# Patient Record
Sex: FEMALE | Race: WHITE | Employment: OTHER | URBAN - METROPOLITAN AREA
[De-identification: names, ages, dates, MRNs, and addresses within clinical notes are randomized per-mention and may not be internally consistent; named-entity substitution may affect disease eponyms.]

---

## 2017-09-12 PROBLEM — Z96.641 STATUS POST RIGHT HIP REPLACEMENT: Status: ACTIVE | Noted: 2017-09-12

## 2017-09-12 PROBLEM — M16.11 PRIMARY OSTEOARTHRITIS OF RIGHT HIP: Chronic | Status: ACTIVE | Noted: 2017-09-12

## 2018-04-11 ENCOUNTER — HOSPITAL ENCOUNTER (OUTPATIENT)
Age: 73
Discharge: HOME | End: 2018-04-13
Payer: MEDICARE

## 2018-04-11 PROCEDURE — 87186 SC STD MICRODIL/AGAR DIL: CPT

## 2018-04-11 PROCEDURE — 87088 URINE BACTERIA CULTURE: CPT

## 2018-04-11 PROCEDURE — 87077 CULTURE AEROBIC IDENTIFY: CPT

## 2018-04-14 LAB
ORGANISM: ABNORMAL
URINE CULTURE, ROUTINE: ABNORMAL
URINE CULTURE, ROUTINE: ABNORMAL

## 2018-07-09 ENCOUNTER — HOSPITAL ENCOUNTER (OUTPATIENT)
Age: 73
Discharge: HOME | End: 2018-07-11

## 2018-07-09 PROCEDURE — 88305 TISSUE EXAM BY PATHOLOGIST: CPT

## 2018-10-17 ENCOUNTER — INITIAL CONSULT (OUTPATIENT)
Dept: GERIATRIC MEDICINE | Age: 73
End: 2018-10-17
Payer: MEDICARE

## 2018-10-17 VITALS
DIASTOLIC BLOOD PRESSURE: 74 MMHG | WEIGHT: 203.9 LBS | HEIGHT: 65 IN | BODY MASS INDEX: 33.97 KG/M2 | HEART RATE: 64 BPM | TEMPERATURE: 98.1 F | RESPIRATION RATE: 20 BRPM | SYSTOLIC BLOOD PRESSURE: 126 MMHG

## 2018-10-17 DIAGNOSIS — G31.84 MCI (MILD COGNITIVE IMPAIRMENT): Primary | ICD-10-CM

## 2018-10-17 PROCEDURE — 1101F PT FALLS ASSESS-DOCD LE1/YR: CPT | Performed by: INTERNAL MEDICINE

## 2018-10-17 PROCEDURE — 1123F ACP DISCUSS/DSCN MKR DOCD: CPT | Performed by: INTERNAL MEDICINE

## 2018-10-17 PROCEDURE — G8399 PT W/DXA RESULTS DOCUMENT: HCPCS | Performed by: INTERNAL MEDICINE

## 2018-10-17 PROCEDURE — 99212 OFFICE O/P EST SF 10 MIN: CPT | Performed by: INTERNAL MEDICINE

## 2018-10-17 PROCEDURE — 1036F TOBACCO NON-USER: CPT | Performed by: INTERNAL MEDICINE

## 2018-10-17 PROCEDURE — 4040F PNEUMOC VAC/ADMIN/RCVD: CPT | Performed by: INTERNAL MEDICINE

## 2018-10-17 PROCEDURE — G8484 FLU IMMUNIZE NO ADMIN: HCPCS | Performed by: INTERNAL MEDICINE

## 2018-10-17 PROCEDURE — 1090F PRES/ABSN URINE INCON ASSESS: CPT | Performed by: INTERNAL MEDICINE

## 2018-10-17 PROCEDURE — 3017F COLORECTAL CA SCREEN DOC REV: CPT | Performed by: INTERNAL MEDICINE

## 2018-10-17 PROCEDURE — G8427 DOCREV CUR MEDS BY ELIG CLIN: HCPCS | Performed by: INTERNAL MEDICINE

## 2018-10-17 PROCEDURE — G8417 CALC BMI ABV UP PARAM F/U: HCPCS | Performed by: INTERNAL MEDICINE

## 2018-10-17 RX ORDER — DONEPEZIL HYDROCHLORIDE 5 MG/1
5 TABLET, FILM COATED ORAL
Qty: 30 TABLET | Refills: 5 | Status: SHIPPED | OUTPATIENT
Start: 2018-10-17 | End: 2018-11-08 | Stop reason: SDUPTHER

## 2018-10-17 RX ORDER — NAPROXEN 375 MG/1
375 TABLET ORAL
COMMUNITY
End: 2019-06-12

## 2018-10-17 RX ORDER — MAGNESIUM OXIDE 400 MG/1
400 TABLET ORAL DAILY
COMMUNITY
End: 2019-06-12

## 2018-10-17 RX ORDER — VALACYCLOVIR HYDROCHLORIDE 500 MG/1
500 TABLET, FILM COATED ORAL 2 TIMES DAILY PRN
COMMUNITY
End: 2019-06-12

## 2018-10-17 RX ORDER — MODAFINIL 200 MG/1
200 TABLET ORAL DAILY
COMMUNITY
End: 2021-03-16

## 2018-10-17 ASSESSMENT — PATIENT HEALTH QUESTIONNAIRE - PHQ9
2. FEELING DOWN, DEPRESSED OR HOPELESS: 1
SUM OF ALL RESPONSES TO PHQ QUESTIONS 1-9: 2
1. LITTLE INTEREST OR PLEASURE IN DOING THINGS: 1
SUM OF ALL RESPONSES TO PHQ QUESTIONS 1-9: 2
SUM OF ALL RESPONSES TO PHQ9 QUESTIONS 1 & 2: 2

## 2018-10-23 ENCOUNTER — HOSPITAL ENCOUNTER (OUTPATIENT)
Age: 73
Discharge: HOME | End: 2018-10-25
Payer: MEDICARE

## 2018-10-23 PROCEDURE — 87088 URINE BACTERIA CULTURE: CPT

## 2018-10-26 LAB — URINE CULTURE, ROUTINE: NORMAL

## 2018-11-07 ENCOUNTER — TELEPHONE (OUTPATIENT)
Dept: SURGERY | Age: 73
End: 2018-11-07

## 2018-11-09 RX ORDER — DONEPEZIL HYDROCHLORIDE 5 MG/1
5 TABLET, FILM COATED ORAL
Qty: 90 TABLET | Refills: 5 | Status: SHIPPED | OUTPATIENT
Start: 2018-11-09 | End: 2019-07-02

## 2018-11-19 ENCOUNTER — TELEPHONE (OUTPATIENT)
Dept: SURGERY | Age: 73
End: 2018-11-19

## 2018-11-19 NOTE — TELEPHONE ENCOUNTER
Patient called to schedule appt for referral for Lt side pain, ?hernia; rectal bleeding for Dr. Paul Avitia. Patient is requesting to see Dr. Spencer Leary, since she had seen him before. Informed patient the office will call her at [phone] to schedule the appointment.

## 2018-12-04 ENCOUNTER — PREP FOR PROCEDURE (OUTPATIENT)
Dept: SURGERY | Age: 73
End: 2018-12-04

## 2018-12-04 ENCOUNTER — OFFICE VISIT (OUTPATIENT)
Dept: SURGERY | Age: 73
End: 2018-12-04
Payer: MEDICARE

## 2018-12-04 VITALS
RESPIRATION RATE: 16 BRPM | HEIGHT: 64 IN | WEIGHT: 207 LBS | SYSTOLIC BLOOD PRESSURE: 144 MMHG | DIASTOLIC BLOOD PRESSURE: 64 MMHG | OXYGEN SATURATION: 93 % | TEMPERATURE: 97.5 F | BODY MASS INDEX: 35.34 KG/M2 | HEART RATE: 60 BPM

## 2018-12-04 DIAGNOSIS — K59.00 CONSTIPATION, UNSPECIFIED CONSTIPATION TYPE: ICD-10-CM

## 2018-12-04 DIAGNOSIS — R10.9 LEFT SIDED ABDOMINAL PAIN: ICD-10-CM

## 2018-12-04 DIAGNOSIS — R10.9 LEFT SIDED ABDOMINAL PAIN: Primary | ICD-10-CM

## 2018-12-04 DIAGNOSIS — K59.00 CONSTIPATION, UNSPECIFIED CONSTIPATION TYPE: Primary | ICD-10-CM

## 2018-12-04 DIAGNOSIS — K62.5 RECTAL BLEEDING: ICD-10-CM

## 2018-12-04 PROCEDURE — G8427 DOCREV CUR MEDS BY ELIG CLIN: HCPCS | Performed by: SURGERY

## 2018-12-04 PROCEDURE — 3017F COLORECTAL CA SCREEN DOC REV: CPT | Performed by: SURGERY

## 2018-12-04 PROCEDURE — G8484 FLU IMMUNIZE NO ADMIN: HCPCS | Performed by: SURGERY

## 2018-12-04 PROCEDURE — 4040F PNEUMOC VAC/ADMIN/RCVD: CPT | Performed by: SURGERY

## 2018-12-04 PROCEDURE — 99205 OFFICE O/P NEW HI 60 MIN: CPT | Performed by: SURGERY

## 2018-12-04 PROCEDURE — G8399 PT W/DXA RESULTS DOCUMENT: HCPCS | Performed by: SURGERY

## 2018-12-04 PROCEDURE — 1090F PRES/ABSN URINE INCON ASSESS: CPT | Performed by: SURGERY

## 2018-12-04 PROCEDURE — G8417 CALC BMI ABV UP PARAM F/U: HCPCS | Performed by: SURGERY

## 2018-12-04 PROCEDURE — 1036F TOBACCO NON-USER: CPT | Performed by: SURGERY

## 2018-12-04 PROCEDURE — 1101F PT FALLS ASSESS-DOCD LE1/YR: CPT | Performed by: SURGERY

## 2018-12-04 PROCEDURE — 1123F ACP DISCUSS/DSCN MKR DOCD: CPT | Performed by: SURGERY

## 2018-12-04 RX ORDER — SODIUM CHLORIDE 9 MG/ML
INJECTION, SOLUTION INTRAVENOUS CONTINUOUS
Status: CANCELLED | OUTPATIENT
Start: 2018-12-04

## 2018-12-04 NOTE — PROGRESS NOTES
GENERAL SURGERY  HISTORY & PHYSICAL    Damien Fu  71 y.o. female   PCP: Thelma Taylor MD     Chief Complaint   Patient presents with    Other     left sided pain rectal bleeding sometimes with wiping. pt states no rhyme or reason as to when the pain occurs         History    HPI:  The patient presents today for evaluation of LLQ abdominal pain that is intermittent, achy, and started several months ago. She also notes worsening constipation and rectal bleeding. Past Medical History:   Diagnosis Date    Arthritis     Diabetes mellitus ([address]     Difficult intubation     carries card, copy on chart  Glidescope#3 with ETT size7    Heart block     Hyperlipidemia     Hypothyroidism     GORDY on CPAP     Pacemaker     Ringing in the ears     Syncope         Past Surgical History:   Procedure Laterality Date    ABLATION OF DYSRHYTHMIC FOCUS  1996    COLONOSCOPY  2009? Dr. Maryellen Kuhn twisted     COLONOSCOPY  2008? Dr. Ly Getting. incomplete.  COLPORRHAPHY  09/10/2011    POSTERIOR    HYSTERECTOMY      vaginal. uterus only.  KNEE ARTHROPLASTY Right     partial    KNEE ARTHROSCOPY  9/08/2011    right knee    PACEMAKER PLACEMENT  5/23/2014    Medtronic dual chamber    TOTAL HIP ARTHROPLASTY Right 2015        Social History     Social History    Marital status:      Spouse name: N/A    Number of children: N/A    Years of education: N/A     Occupational History    Not on file.      Social History Main Topics    Smoking status: Former Smoker     Packs/day: 1.50     Years: 13.00     Types: Cigarettes     Start date: 1/1/1973     Quit date: 1/1/1986    Smokeless tobacco: Never Used      Comment: quit smoking 1985    Alcohol use Yes      Comment: socially -- 3 drinks per month at the most    Drug use: No    Sexual activity: Not on file     Other Topics Concern    Not on file     Social History Narrative    No narrative on file        Family History   Problem Relation Age of Onset

## 2018-12-04 NOTE — PATIENT INSTRUCTIONS
usually give an initial report after the scope is removed. Other tests may be recommended. A small amount of bleeding may occur during the first few days after the procedure. When you return home after the procedure, be sure to follow your doctor's instructions, which may include:   Resume medicines as instructed by your doctor. Resume normal diet, unless directed otherwise by your doctor. The sedative will make you drowsy. Avoid driving, operating machinery, or making important decisions for the rest of the day. Rest for the remainder of the day. After arriving home, contact your doctor if any of the following occurs:   Bleeding from your rectum, notify your doctor if you pass a teaspoonful of blood or more. Black, tarry stools   Severe abdominal pain   Hard, swollen abdomen   Signs of infection, including fever or chills   Inability to pass gas or stool   Coughing, shortness of breath, chest pain, severe nausea or vomiting     In case of an emergency, CALL 911 . GOLYTELY®/COLYTE SPLIT DOSE COLONOSCOPY INSTRUCTIONS    Please follow the below instructions only. Do not follow alternative directions provided by the pharmacy or on the preparation bottle itself. ONE WEEK PRIOR TO THE PROCEDURE:  Josue Beverage your prescription for the Golytely bowel preparation   Try to limit fiber intake   Avoid taking iron supplements if possible      THE DAY BEFORE THE PROCEDURE:   Fill the entire Golytely/colyte jug and place in the refrigerator in the morning.  Beginning when you wake up, follow a clear liquid diet all day:  Clear liquids include:  Water, lemonade/Gatorade, liquidbroth/bouillon, popsicles, black coffee, sodas, apple/grape/white cranberry juice, gelatin (not red colored), sorbet.  Starting at 5pm - drink one 8 oz glass of the prep every 15-30 minutes until you have finished half of the preparation.  Make sure you shake the bottle after each glass to make sure you are getting the the medicine wears off and you can think clearly and react easily. · Rest when you feel tired. Getting enough sleep will help you recover. · You must have a reliable adult drive you home and stay with you overnight after your procedure or your procedure may be cancelled. Diet  · You can eat your normal diet, unless your doctor gives you other instructions. If your stomach is upset, try clear liquids and bland, low-fat foods like plain toast or rice. · Drink plenty of fluids (unless your doctor tells you not to). · Don't drink alcohol for 24 hours. Medicines  · Be safe with medicines. Read and follow all instructions on the label. ¨ If the doctor gave you a prescription medicine for pain, take it as prescribed. ¨ If you are not taking a prescription pain medicine, ask your doctor if you can take an over-the-counter medicine. · If you think your pain medicine is making you sick to your stomach:  ¨ Take your medicine after meals (unless your doctor has told you not to). ¨ Ask your doctor for a different pain medicine. When should you call for help? Call 911 anytime you think you may need emergency care. For example, call if:  · You have severe trouble breathing. · You passed out (lost consciousness). Call your doctor now or seek immediate medical care if:  · You have trouble breathing. · You have ongoing or worsening nausea or vomiting. · You have a fever. · You have a new or worse headache. · The medicine is not wearing off and you can't think clearly. Watch closely for changes in your health, and be sure to contact your doctor if:  · You do not get better as expected. Where can you learn more? Go to https://[employer] marie[employer]. org and sign in to your [employer] account. Enter O790 in the agreement24 avtal24 box to learn more about \"Sedation for a Medical Procedure: Care Instructions. \"     If you do not have an account, please click on the \"Sign Up Now\" link.   Current as of: August 14,

## 2018-12-05 ENCOUNTER — TELEPHONE (OUTPATIENT)
Dept: SURGERY | Age: 73
End: 2018-12-05

## 2018-12-13 NOTE — PROGRESS NOTES
products on the day of surgery    [x] If not already done, you can expect a call from registration    [x] You can expect a call the business day prior to procedure to notify you if your arrival time changes    [x] If you receive a survey after surgery we would greatly appreciate your comments    [] Parent/guardian of a minor must accompany their child and remain on the premises  the entire time they are under our care     [] Pediatric patients may bring favorite toy, blanket or comfort item with them    [] A caregiver or family member must remain with the patient during their stay if they are mentally handicapped, have dementia, disoriented or unable to use a call light or would be a safety concern if left unattended    [x] Please notify surgeon if you develop any illness between now and time of surgery (cold, cough, sore throat, fever, nausea, vomiting) or any signs of infections  including skin, wounds, and dental.    [x]  The Outpatient Pharmacy is available to fill your prescription here on your day of surgery, ask your preop nurse for details    [] Other instructions  EDUCATIONAL MATERIALS PROVIDED:    [] PAT Preoperative Education Packet/Booklet     [] Medication List    [] Fluoroscopy Information Pamphlet    [] Transfusion bracelet applied with instructions    [] Joint replacement video reviewed    [] Shower with soap, lather and rinse well, and use CHG wipes provided the evening before surgery as instructed

## 2018-12-17 ENCOUNTER — ANESTHESIA EVENT (OUTPATIENT)
Dept: ENDOSCOPY | Age: 73
End: 2018-12-17
Payer: MEDICARE

## 2018-12-17 ENCOUNTER — HOSPITAL ENCOUNTER (OUTPATIENT)
Age: 73
Setting detail: OUTPATIENT SURGERY
Discharge: HOME | End: 2018-12-17
Attending: SURGERY | Admitting: SURGERY
Payer: MEDICARE

## 2018-12-17 ENCOUNTER — ANESTHESIA (OUTPATIENT)
Dept: ENDOSCOPY | Age: 73
End: 2018-12-17
Payer: MEDICARE

## 2018-12-17 VITALS
OXYGEN SATURATION: 98 % | WEIGHT: 200 LBS | HEIGHT: 65 IN | HEART RATE: 78 BPM | TEMPERATURE: 98.6 F | BODY MASS INDEX: 33.32 KG/M2 | DIASTOLIC BLOOD PRESSURE: 62 MMHG | RESPIRATION RATE: 14 BRPM | SYSTOLIC BLOOD PRESSURE: 145 MMHG

## 2018-12-17 VITALS — OXYGEN SATURATION: 97 % | DIASTOLIC BLOOD PRESSURE: 51 MMHG | SYSTOLIC BLOOD PRESSURE: 108 MMHG

## 2018-12-17 LAB — METER GLUCOSE: 119 MG/DL (ref 74–99)

## 2018-12-17 PROCEDURE — 2580000003 HC RX 258: Performed by: SURGERY

## 2018-12-17 PROCEDURE — 7100000011 HC PHASE II RECOVERY - ADDTL 15 MIN: Performed by: SURGERY

## 2018-12-17 PROCEDURE — 2580000003 HC RX 258: Performed by: NURSE ANESTHETIST, CERTIFIED REGISTERED

## 2018-12-17 PROCEDURE — G0121 COLON CA SCRN NOT HI RSK IND: HCPCS | Performed by: SURGERY

## 2018-12-17 PROCEDURE — 6360000002 HC RX W HCPCS: Performed by: NURSE ANESTHETIST, CERTIFIED REGISTERED

## 2018-12-17 PROCEDURE — 3700000001 HC ADD 15 MINUTES (ANESTHESIA): Performed by: SURGERY

## 2018-12-17 PROCEDURE — 3609009500 HC COLONOSCOPY DIAGNOSTIC OR SCREENING: Performed by: SURGERY

## 2018-12-17 PROCEDURE — 7100000010 HC PHASE II RECOVERY - FIRST 15 MIN: Performed by: SURGERY

## 2018-12-17 PROCEDURE — 3700000000 HC ANESTHESIA ATTENDED CARE: Performed by: SURGERY

## 2018-12-17 PROCEDURE — 2709999900 HC NON-CHARGEABLE SUPPLY: Performed by: SURGERY

## 2018-12-17 PROCEDURE — 82962 GLUCOSE BLOOD TEST: CPT

## 2018-12-17 RX ORDER — SODIUM CHLORIDE 9 MG/ML
INJECTION, SOLUTION INTRAVENOUS CONTINUOUS PRN
Status: DISCONTINUED | OUTPATIENT
Start: 2018-12-17 | End: 2018-12-17 | Stop reason: SDUPTHER

## 2018-12-17 RX ORDER — SODIUM CHLORIDE 9 MG/ML
INJECTION, SOLUTION INTRAVENOUS CONTINUOUS
Status: DISCONTINUED | OUTPATIENT
Start: 2018-12-17 | End: 2018-12-17 | Stop reason: HOSPADM

## 2018-12-17 RX ORDER — 0.9 % SODIUM CHLORIDE 0.9 %
10 VIAL (ML) INJECTION PRN
Status: DISCONTINUED | OUTPATIENT
Start: 2018-12-17 | End: 2018-12-17 | Stop reason: HOSPADM

## 2018-12-17 RX ORDER — 0.9 % SODIUM CHLORIDE 0.9 %
10 VIAL (ML) INJECTION EVERY 12 HOURS SCHEDULED
Status: DISCONTINUED | OUTPATIENT
Start: 2018-12-17 | End: 2018-12-17 | Stop reason: HOSPADM

## 2018-12-17 RX ORDER — PROPOFOL 10 MG/ML
INJECTION, EMULSION INTRAVENOUS PRN
Status: DISCONTINUED | OUTPATIENT
Start: 2018-12-17 | End: 2018-12-17 | Stop reason: SDUPTHER

## 2018-12-17 RX ADMIN — SODIUM CHLORIDE: 9 INJECTION, SOLUTION INTRAVENOUS at 11:30

## 2018-12-17 RX ADMIN — SODIUM CHLORIDE: 9 INJECTION, SOLUTION INTRAVENOUS at 10:41

## 2018-12-17 RX ADMIN — PROPOFOL 420 MG: 10 INJECTION, EMULSION INTRAVENOUS at 11:25

## 2018-12-17 ASSESSMENT — PAIN - FUNCTIONAL ASSESSMENT: PAIN_FUNCTIONAL_ASSESSMENT: 0-10

## 2018-12-17 ASSESSMENT — PAIN SCALES - GENERAL: PAINLEVEL_OUTOF10: 0

## 2018-12-17 ASSESSMENT — PAIN DESCRIPTION - DESCRIPTORS: DESCRIPTORS: PRESSURE

## 2018-12-17 NOTE — ANESTHESIA PRE PROCEDURE
Department of Anesthesiology  Preprocedure Note       Name:  Genevieve Benedict   Age:  71 y.o.  :  1945                                          MRN:  [id number]         Date:  2018      Surgeon: Foster Gore):  Gianluca Magana MD    Procedure: COLONOSCOPY DIAGNOSTIC (N/A )    Medications prior to admission:   Prior to Admission medications    Medication Sig Start Date End Date Taking? Authorizing Provider   donepezil (ARICEPT) 5 MG tablet TAKE 1 TABLET BY MOUTH DAILY WITH LUNCH 18  Yes Brennon Garcia MD   TRIMETHOPRIM PO Take 100 mg by mouth every other day    Yes Historical Provider, MD   modafinil (PROVIGIL) 200 MG tablet Take 200 mg by mouth daily. .   Yes Historical Provider, MD   naproxen (NAPROSYN) 375 MG tablet Take 375 mg by mouth 1 to 2 tabs daily as needed; LD 18   Yes Historical Provider, MD   valACYclovir (VALTREX) 500 MG tablet Take 500 mg by mouth 2 times daily as needed   Yes Historical Provider, MD   UNABLE TO FIND Resveratrol, 1 tablet daily by mouth. LD 18   Yes Historical Provider, MD   FLAXSEED, LINSEED, PO Take 1,300 mg by mouth 2 times daily LD 18   Yes Historical Provider, MD   Probiotic Product (ALOE 01302 & PROBIOTICS) CAPS Take 1 tablet by mouth   Yes Historical Provider, MD [address], 2 tabs daily.   \"For generating cells\" ; LD 18   Yes Historical Provider, MD   metoprolol succinate (TOPROL XL) 100 MG extended release tablet Take 100 mg by mouth nightly   Yes Historical Provider, MD   citalopram (CELEXA) 20 MG tablet Take 20 mg by mouth nightly    Yes Historical Provider, MD   B Complex Vitamins (B-COMPLEX/B-12 PO) Take by mouth daily LD 18   Yes Historical Provider, MD   Mirabegron ER (MYRBETRIQ) 25 MG TB24 Take 50 mg by mouth nightly    Yes Historical Provider, MD   Biotin 5000 MCG CAPS Take 10,000 mcg by mouth daily LD 18   Yes Historical Provider, MD   levothyroxine (SYNTHROID) 100 MCG tablet Take 100 mcg by mouth Daily. Yes Historical Provider, MD   metformin (GLUCOPHAGE) 1000 MG tablet Take 1,000 mg by mouth daily (with breakfast)    Yes Historical Provider, MD   magnesium oxide (MAG-OX) 400 MG tablet Take 400 mg by mouth daily LD 12/12/18    Historical Provider, MD   aspirin 325 MG EC tablet Take 1 tablet by mouth 2 times daily DVT prophylaxis x 30 days 9/14/17 10/14/17  Cinthya Xie MD   aspirin  MG EC tablet Take 1 tablet by mouth 2 times daily for 15 days. 9/8/11 9/23/11  Cinthya Xie MD       Current medications:    Current Facility-Administered Medications   Medication Dose Route Frequency Provider Last Rate Last Dose    0.9 % sodium chloride infusion   Intravenous Continuous Irineo Rodas MD        sodium chloride (PF) 0.9 % injection 10 mL  10 mL Intravenous 2 times per day Irineo Rodas MD        sodium chloride (PF) 0.9 % injection 10 mL  10 mL Intravenous PRN Irineo Rodas MD           Allergies:     Allergies   Allergen Reactions    Morphine Itching    Statins Depletion Therapy Other (See Comments)     MYALGIA       Problem List:    Patient Active Problem List   Diagnosis Code    CHB (complete heart block) (HCC) I44.2    Diabetes mellitus type 2, uncontrolled ([facility] E11.65    Pacemaker Z95.0    Primary osteoarthritis of right hip M16.11    Status post right hip replacement Z96.641    GORDY on CPAP G47.33, Z99.89    Hypothyroidism E03.9    Left sided abdominal pain R10.9    Rectal bleeding K62.5    Constipation K59.00       Past Medical History:        Diagnosis Date    Arthritis     Diabetes mellitus ([facility] [address]     Difficult intubation     carries card, copy on chart  Glidescope#3 with ETT size7    Hyperlipidemia     Hypothyroidism     Left sided abdominal pain     Memory problem     still competent    GORDY on CPAP     Pacemaker     Rectal bleeding     Ringing in the ears        Past Surgical History:        Procedure Laterality Date    ABLATION OF DYSRHYTHMIC FOCUS  1996   

## 2018-12-17 NOTE — H&P
GENERAL SURGERY  HISTORY & PHYSICAL    Dena Fu  71 y.o. female   PCP: Charlette Yan MD     No chief complaint on file. History    HPI:  The patient presents today for evaluation of LLQ abdominal pain that is intermittent, achy, and started several months ago. She also notes worsening constipation and rectal bleeding. Past Medical History:   Diagnosis Date    Arthritis     Diabetes mellitus ([address]     Difficult intubation     carries card, copy on chart  Glidescope#3 with ETT size7    Hyperlipidemia     Hypothyroidism     Left sided abdominal pain     Memory problem     still competent    GORDY on CPAP     Pacemaker     Rectal bleeding     Ringing in the ears         Past Surgical History:   Procedure Laterality Date    ABLATION OF DYSRHYTHMIC FOCUS  1996    COLONOSCOPY  2009? Dr. Shannon Jacobs twisted     COLONOSCOPY  2008? Dr. Silvio Park. incomplete.  COLPORRHAPHY  09/10/2011    POSTERIOR    HYSTERECTOMY      vaginal. uterus only.  KNEE ARTHROPLASTY Right     partial    KNEE ARTHROSCOPY  9/08/2011    right knee    PACEMAKER PLACEMENT  5/23/2014    Medtronic dual chamber    TOTAL HIP ARTHROPLASTY Right 2015        Social History     Social History    Marital status:      Spouse name: N/A    Number of children: N/A    Years of education: N/A     Occupational History    Not on file.      Social History Main Topics    Smoking status: Former Smoker     Packs/day: 1.50     Years: 13.00     Types: Cigarettes     Start date: 1/1/1973     Quit date: 1/1/1986    Smokeless tobacco: Never Used      Comment: quit smoking 1985    Alcohol use Yes      Comment: socially -- 3 drinks per month at the most    Drug use: No    Sexual activity: Not on file     Other Topics Concern    Not on file     Social History Narrative    No narrative on file        Family History   Problem Relation Age of Onset    Pacemaker Mother     Heart Attack Father         No current or depression  Ophthalmic - no blurry vision or double vision  ENT - no epistaxis or sore throat  Hematological and Lymphatic - no bleeding problems or blood clots  Endocrine - no temperature intolerance or unexpected weight changes  Respiratory - no cough, shortness of breath, or wheezing  Cardiovascular - no chest pain or dyspnea on exertion  Gastrointestinal - see HPI  Genito-Urinary - no dysuria, trouble voiding, or hematuria  Musculoskeletal - no gait disturbance, joint pain or muscular weakness  Neurological - no headaches, numbness/tingling or weakness  Dermatological - no pruritus or rash    Physical Exam   BP (!) 147/69   Pulse 69   Temp 98.6 °F (37 °C) (Temporal)   Resp 18   Ht 5' 4.5\" (1.638 m)   Wt 200 lb (90.7 kg)   SpO2 94%   BMI 33.80 kg/m²    General - no acute distress, comfortable   Head - normocephalic,atraumatic  Eyes - pupils symmetric, conjunctivae pink  ENT - nose/ears appear normal,  moist oral mucosa   Neck - no cervical adenopathy, mass, thyromegaly   Respiratory - normal effort, clear to auscultation  CV - regular rate and rhythm, strong femoral pulses, no pedal edema  GI - remote surgical scars, non distended, mild LLQ tenderness without rebound/guarding, no hernia but epigastric bulging, no mass, no hepatosplenomegaly   Anorectal - deferred  Skin - warm, dry, good turgor; no rash  Musculoskeletal - normal gait, no deformities   Psychiatric - alert and oriented x 3; normal mood, affect, judgement, thought content      Prior consult reports were reviewed. Assessment  LLQ pain, rectal bleeding, constipation - r/o colorectal cancer, colitis, IBD      Plan    colonoscopy at [facility]    I discussed the risks, benefits, and alternatives to colonoscopy with possible biopsy/cauterization/polylpectomy with deep sedation with the patient including the risks of deep sedation (hypotension, hypoxia), bleeding, and perforation. The patient understands the above and agrees to proceed.      Thania Tiwari Rush Castellano MD, FACS

## 2019-01-07 ENCOUNTER — OFFICE VISIT (OUTPATIENT)
Dept: CARDIOLOGY CLINIC | Age: 74
End: 2019-01-07
Payer: MEDICARE

## 2019-01-07 VITALS
DIASTOLIC BLOOD PRESSURE: 86 MMHG | SYSTOLIC BLOOD PRESSURE: 130 MMHG | HEART RATE: 60 BPM | WEIGHT: 203.8 LBS | HEIGHT: 64 IN | RESPIRATION RATE: 18 BRPM | BODY MASS INDEX: 34.79 KG/M2

## 2019-01-07 DIAGNOSIS — Z01.810 PREOP CARDIOVASCULAR EXAM: Primary | ICD-10-CM

## 2019-01-07 DIAGNOSIS — R09.89 BRUIT: ICD-10-CM

## 2019-01-07 PROCEDURE — 93000 ELECTROCARDIOGRAM COMPLETE: CPT | Performed by: INTERNAL MEDICINE

## 2019-01-07 PROCEDURE — 1101F PT FALLS ASSESS-DOCD LE1/YR: CPT | Performed by: INTERNAL MEDICINE

## 2019-01-07 PROCEDURE — 1090F PRES/ABSN URINE INCON ASSESS: CPT | Performed by: INTERNAL MEDICINE

## 2019-01-07 PROCEDURE — 1123F ACP DISCUSS/DSCN MKR DOCD: CPT | Performed by: INTERNAL MEDICINE

## 2019-01-07 PROCEDURE — G8484 FLU IMMUNIZE NO ADMIN: HCPCS | Performed by: INTERNAL MEDICINE

## 2019-01-07 PROCEDURE — 4040F PNEUMOC VAC/ADMIN/RCVD: CPT | Performed by: INTERNAL MEDICINE

## 2019-01-07 PROCEDURE — 1036F TOBACCO NON-USER: CPT | Performed by: INTERNAL MEDICINE

## 2019-01-07 PROCEDURE — G8417 CALC BMI ABV UP PARAM F/U: HCPCS | Performed by: INTERNAL MEDICINE

## 2019-01-07 PROCEDURE — 3017F COLORECTAL CA SCREEN DOC REV: CPT | Performed by: INTERNAL MEDICINE

## 2019-01-07 PROCEDURE — 99214 OFFICE O/P EST MOD 30 MIN: CPT | Performed by: INTERNAL MEDICINE

## 2019-01-07 PROCEDURE — G8427 DOCREV CUR MEDS BY ELIG CLIN: HCPCS | Performed by: INTERNAL MEDICINE

## 2019-01-07 PROCEDURE — G8399 PT W/DXA RESULTS DOCUMENT: HCPCS | Performed by: INTERNAL MEDICINE

## 2019-02-13 ENCOUNTER — HOSPITAL ENCOUNTER (OUTPATIENT)
Dept: CARDIOLOGY | Age: 74
Discharge: HOME | End: 2019-02-13
Payer: MEDICARE

## 2019-02-13 DIAGNOSIS — R09.89 BRUIT: ICD-10-CM

## 2019-02-13 PROCEDURE — 93880 EXTRACRANIAL BILAT STUDY: CPT

## 2019-02-26 ENCOUNTER — HOSPITAL ENCOUNTER (OUTPATIENT)
Age: 74
Discharge: HOME | End: 2019-02-28
Payer: MEDICARE

## 2019-02-26 PROCEDURE — 87186 SC STD MICRODIL/AGAR DIL: CPT

## 2019-02-26 PROCEDURE — 87077 CULTURE AEROBIC IDENTIFY: CPT

## 2019-02-26 PROCEDURE — 87088 URINE BACTERIA CULTURE: CPT

## 2019-02-28 LAB
ORGANISM: ABNORMAL
URINE CULTURE, ROUTINE: ABNORMAL
URINE CULTURE, ROUTINE: ABNORMAL

## 2019-05-23 RX ORDER — LEVOTHYROXINE SODIUM 100 MCG
100 TABLET ORAL DAILY
COMMUNITY
End: 2019-07-02

## 2019-05-23 NOTE — TELEPHONE ENCOUNTER
came in -patient needs refills on   metaprolol 100 mg  Metformin 1000 mg  Levothyroxine 100 micrograms    [facility]

## 2019-05-24 RX ORDER — METOPROLOL SUCCINATE 100 MG/1
100 TABLET, EXTENDED RELEASE ORAL NIGHTLY
Qty: 90 TABLET | Refills: 3 | OUTPATIENT
Start: 2019-05-24

## 2019-05-24 RX ORDER — LEVOTHYROXINE SODIUM 0.1 MG/1
100 TABLET ORAL DAILY
Qty: 90 TABLET | Refills: 3 | OUTPATIENT
Start: 2019-05-24

## 2019-05-24 NOTE — TELEPHONE ENCOUNTER
Please double check. medent say she is taking toprol TWICE A DAY. medent says taking metformin 500MG TWICE A DAY. I just want to be sure before I send. Please double ck the  Doses and sig.      thx

## 2019-05-29 RX ORDER — METOPROLOL SUCCINATE 100 MG/1
100 TABLET, EXTENDED RELEASE ORAL 2 TIMES DAILY
Qty: 180 TABLET | Refills: 0 | Status: SHIPPED | OUTPATIENT
Start: 2019-05-29 | End: 2019-07-02 | Stop reason: SDUPTHER

## 2019-05-29 RX ORDER — TRIMETHOPRIM 100 MG/1
100 TABLET ORAL EVERY OTHER DAY
Qty: 45 TABLET | Refills: 1 | OUTPATIENT
Start: 2019-05-29

## 2019-05-29 RX ORDER — METOPROLOL SUCCINATE 50 MG/1
50 TABLET, EXTENDED RELEASE ORAL 2 TIMES DAILY
Qty: 180 TABLET | Refills: 1 | OUTPATIENT
Start: 2019-05-29

## 2019-05-29 RX ORDER — MODAFINIL 200 MG/1
200 TABLET ORAL DAILY
Qty: 90 TABLET | Refills: 1 | OUTPATIENT
Start: 2019-05-29 | End: 2020-06-28

## 2019-05-29 NOTE — TELEPHONE ENCOUNTER
PLEASE DOUBLE CHECK MEDS!!!    FIRST, I DO NOT MANAGE THE PROVIGIL, OR TRIMETHOPRIM OR MYBETRIQ, I NEVER HAVE. SHE SHOULD CONTACT PRESCRIBING PHYSICIAN FOR THESE. IN REGARD TO METFORMIN, IF ON 500MG BID THEN CHANGE TO 500MG TAB    MEDENT STATES TOPROL XL 100MG BID, THIS SAYS 50MG BID. I JUST NEED TO BE CLEAR ON DOSE. THANK YOU    PLEASE UPDATE MEDS AND REQUEST AND SEND TO ME WHEN APPROPRIATE.  Beni Carrasco

## 2019-06-06 ENCOUNTER — TELEPHONE (OUTPATIENT)
Dept: PRIMARY CARE CLINIC | Age: 74
End: 2019-06-06

## 2019-06-06 NOTE — TELEPHONE ENCOUNTER
Daughter calling to let you know she feels pt is showing signs of alzheimers. Pts mom just passed away from alzheimers recently.  Per daughter she has been on Aricept in past. She is requesting that you ref her to [facility] when she comes in on 6/20

## 2019-06-06 NOTE — TELEPHONE ENCOUNTER
I referred her to Dr Vish Espitia (local specialist for dementia) 10/2018. I don't know if she went. I agree she needs consultation. Who does she want her to see in [facility]? I would be happy to refer to whom ever they would like.

## 2019-06-12 ENCOUNTER — OFFICE VISIT (OUTPATIENT)
Dept: FAMILY MEDICINE CLINIC | Age: 74
End: 2019-06-12
Payer: MEDICARE

## 2019-06-12 VITALS
HEIGHT: 65 IN | DIASTOLIC BLOOD PRESSURE: 82 MMHG | BODY MASS INDEX: 33.49 KG/M2 | SYSTOLIC BLOOD PRESSURE: 132 MMHG | OXYGEN SATURATION: 95 % | RESPIRATION RATE: 16 BRPM | WEIGHT: 201 LBS | TEMPERATURE: 98.1 F | HEART RATE: 62 BPM

## 2019-06-12 DIAGNOSIS — F41.9 ANXIETY: Primary | ICD-10-CM

## 2019-06-12 PROCEDURE — 99213 OFFICE O/P EST LOW 20 MIN: CPT | Performed by: FAMILY MEDICINE

## 2019-06-12 RX ORDER — BUSPIRONE HYDROCHLORIDE 5 MG/1
5 TABLET ORAL 2 TIMES DAILY
Qty: 20 TABLET | Refills: 0 | Status: SHIPPED | OUTPATIENT
Start: 2019-06-12 | End: 2019-07-02

## 2019-06-12 NOTE — PROGRESS NOTES
19  Name: Vanessa Olivas    : 1945    Sex: female    Age: 68 y.o. Subjective:  Chief Complaint: Patient is here for anxiety     Very anxious      Here with     Mom   Last week        says crying a lot----feels real upset        She states not take  provigil  For few weeks          No  Cp or sob-----      Review of Systems   Psychiatric/Behavioral: Negative for agitation, behavioral problems, self-injury and suicidal ideas. The patient is nervous/anxious. Current Outpatient Medications:     busPIRone (BUSPAR) 5 MG tablet, Take 1 tablet by mouth 2 times daily, Disp: 20 tablet, Rfl: 0    metoprolol succinate (TOPROL XL) 100 MG extended release tablet, Take 1 tablet by mouth 2 times daily, Disp: 180 tablet, Rfl: 0    metFORMIN (GLUCOPHAGE) 500 MG tablet, Take 1 tablet by mouth 2 times daily, Disp: 180 tablet, Rfl: 0    SYNTHROID 100 MCG tablet, Take 100 mcg by mouth Daily, Disp: , Rfl:     donepezil (ARICEPT) 5 MG tablet, TAKE 1 TABLET BY MOUTH DAILY WITH LUNCH, Disp: 90 tablet, Rfl: 5    TRIMETHOPRIM PO, Take 100 mg by mouth every other day , Disp: , Rfl:     modafinil (PROVIGIL) 200 MG tablet, Take 200 mg by mouth daily. ., Disp: , Rfl:     UNABLE TO FIND, Resveratrol, 1 tablet daily by mouth. LD 18, Disp: , Rfl:     UNABLE TO FIND, Basis Elysium, 2 tabs daily.   \"For generating cells\" ; LD 18, Disp: , Rfl:     B Complex Vitamins (B-COMPLEX/B-12 PO), Take by mouth daily LD 18, Disp: , Rfl:     Mirabegron ER (MYRBETRIQ) 25 MG TB24, Take 50 mg by mouth nightly , Disp: , Rfl:   Allergies   Allergen Reactions    Morphine Itching    Statins Depletion Therapy Other (See Comments)     MYALGIA     Social History     Socioeconomic History    Marital status:      Spouse name: Not on file    Number of children: Not on file    Years of education: Not on file    Highest education level: Not on file   Occupational History    Not on file Social Needs    Financial resource strain: Not on file    Food insecurity:     Worry: Not on file     Inability: Not on file    Transportation needs:     Medical: Not on file     Non-medical: Not on file   Tobacco Use    Smoking status: Former Smoker     Packs/day: 1.50     Years: 13.00     Pack years: 19.50     Types: Cigarettes     Start date: 1973     Last attempt to quit: 1986     Years since quittin.4    Smokeless tobacco: Never Used    Tobacco comment: quit smoking    Substance and Sexual Activity    Alcohol use: Yes     Comment: socially -- 3 drinks per month at the most    Drug use: No    Sexual activity: Not on file   Lifestyle    Physical activity:     Days per week: Not on file     Minutes per session: Not on file    Stress: Not on file   Relationships    Social connections:     Talks on phone: Not on file     Gets together: Not on file     Attends [employer] service: Not on file     Active member of club or organization: Not on file     Attends meetings of clubs or organizations: Not on file     Relationship status: Not on file    Intimate partner violence:     Fear of current or ex partner: Not on file     Emotionally abused: Not on file     Physically abused: Not on file     Forced sexual activity: Not on file   Other Topics Concern    Not on file   Social History Narrative    Not on file      Past Medical History:   Diagnosis Date    Arthritis     Diabetes mellitus ([facility] [address]     Difficult intubation     carries card, copy on chart  Glidescope#3 with ETT size7    Hyperlipidemia     Hypothyroidism     Left sided abdominal pain     Memory problem     still competent    GORDY on CPAP     Pacemaker     Rectal bleeding     Ringing in the ears      Family History   Problem Relation Age of Onset    Pacemaker Mother     Heart Attack Father       Past Surgical History:   Procedure Laterality Date    ABLATION OF DYSRHYTHMIC FOCUS      COLONOSCOPY  ?     Dr. Julito Bueno

## 2019-06-14 ENCOUNTER — TELEPHONE (OUTPATIENT)
Dept: PRIMARY CARE CLINIC | Age: 74
End: 2019-06-14

## 2019-06-14 NOTE — TELEPHONE ENCOUNTER
Deann, per [facility]'s request I called to move pts appointment.  would like to talk to hs nurse about it. He is unhappy moving the appointment due to wifes alzheimer's and would like to verify with someone clinical that it is okay.  Could you please give him a call to discuss

## 2019-06-26 ENCOUNTER — HOSPITAL ENCOUNTER (OUTPATIENT)
Age: 74
Discharge: HOME | End: 2019-06-28
Payer: MEDICARE

## 2019-06-26 LAB
ALBUMIN SERPL-MCNC: 4.5 G/DL (ref 3.5–5.2)
ALP BLD-CCNC: 97 U/L (ref 35–104)
ALT SERPL-CCNC: 75 U/L (ref 0–32)
AMORPHOUS: NORMAL
ANION GAP SERPL CALCULATED.3IONS-SCNC: 14 MMOL/L (ref 7–16)
AST SERPL-CCNC: 62 U/L (ref 0–31)
BACTERIA: NORMAL /HPF
BASOPHILS ABSOLUTE: 0.06 E9/L (ref 0–0.2)
BASOPHILS RELATIVE PERCENT: 0.5 % (ref 0–2)
BILIRUB SERPL-MCNC: 0.4 MG/DL (ref 0–1.2)
BILIRUBIN URINE: NEGATIVE
BLOOD, URINE: NEGATIVE
BUN BLDV-MCNC: 17 MG/DL (ref 8–23)
CALCIUM SERPL-MCNC: 9.9 MG/DL (ref 8.6–10.2)
CHLORIDE BLD-SCNC: 107 MMOL/L (ref 98–107)
CHOLESTEROL, TOTAL: 225 MG/DL (ref 0–199)
CLARITY: ABNORMAL
CO2: 22 MMOL/L (ref 22–29)
COLOR: YELLOW
CREAT SERPL-MCNC: 0.8 MG/DL (ref 0.5–1)
EOSINOPHILS ABSOLUTE: 0.34 E9/L (ref 0.05–0.5)
EOSINOPHILS RELATIVE PERCENT: 3 % (ref 0–6)
GFR AFRICAN AMERICAN: >60
GFR NON-AFRICAN AMERICAN: >60 ML/MIN/1.73
GLUCOSE BLD-MCNC: 84 MG/DL (ref 74–99)
GLUCOSE URINE: NEGATIVE MG/DL
HBA1C MFR BLD: 6.3 % (ref 4–5.6)
HCT VFR BLD CALC: 46.1 % (ref 34–48)
HDLC SERPL-MCNC: 46 MG/DL
HEMOGLOBIN: 15.2 G/DL (ref 11.5–15.5)
IMMATURE GRANULOCYTES #: 0.02 E9/L
IMMATURE GRANULOCYTES %: 0.2 % (ref 0–5)
KETONES, URINE: NEGATIVE MG/DL
LDL CHOLESTEROL CALCULATED: 148 MG/DL (ref 0–99)
LEUKOCYTE ESTERASE, URINE: ABNORMAL
LYMPHOCYTES ABSOLUTE: 4.51 E9/L (ref 1.5–4)
LYMPHOCYTES RELATIVE PERCENT: 39.8 % (ref 20–42)
MCH RBC QN AUTO: 30.8 PG (ref 26–35)
MCHC RBC AUTO-ENTMCNC: 33 % (ref 32–34.5)
MCV RBC AUTO: 93.3 FL (ref 80–99.9)
MICROALBUMIN UR-MCNC: 13.4 MG/L
MONOCYTES ABSOLUTE: 0.66 E9/L (ref 0.1–0.95)
MONOCYTES RELATIVE PERCENT: 5.8 % (ref 2–12)
NEUTROPHILS ABSOLUTE: 5.73 E9/L (ref 1.8–7.3)
NEUTROPHILS RELATIVE PERCENT: 50.7 % (ref 43–80)
NITRITE, URINE: NEGATIVE
PDW BLD-RTO: 13.6 FL (ref 11.5–15)
PH UA: 5.5 (ref 5–9)
PLATELET # BLD: 183 E9/L (ref 130–450)
PMV BLD AUTO: 11.4 FL (ref 7–12)
POTASSIUM SERPL-SCNC: 3.6 MMOL/L (ref 3.5–5)
PROTEIN UA: NEGATIVE MG/DL
RBC # BLD: 4.94 E12/L (ref 3.5–5.5)
RBC UA: NORMAL /HPF (ref 0–2)
SODIUM BLD-SCNC: 143 MMOL/L (ref 132–146)
SPECIFIC GRAVITY UA: >=1.03 (ref 1–1.03)
T4 TOTAL: 7.1 MCG/DL (ref 4.5–11.7)
TOTAL PROTEIN: 7.3 G/DL (ref 6.4–8.3)
TRIGL SERPL-MCNC: 154 MG/DL (ref 0–149)
TSH SERPL DL<=0.05 MIU/L-ACNC: 5.29 UIU/ML (ref 0.27–4.2)
UROBILINOGEN, URINE: 0.2 E.U./DL
VLDLC SERPL CALC-MCNC: 31 MG/DL
WBC # BLD: 11.3 E9/L (ref 4.5–11.5)
WBC UA: NORMAL /HPF (ref 0–5)

## 2019-06-26 PROCEDURE — 84436 ASSAY OF TOTAL THYROXINE: CPT

## 2019-06-26 PROCEDURE — 85025 COMPLETE CBC W/AUTO DIFF WBC: CPT

## 2019-06-26 PROCEDURE — 82044 UR ALBUMIN SEMIQUANTITATIVE: CPT

## 2019-06-26 PROCEDURE — 80061 LIPID PANEL: CPT

## 2019-06-26 PROCEDURE — 36415 COLL VENOUS BLD VENIPUNCTURE: CPT

## 2019-06-26 PROCEDURE — 84443 ASSAY THYROID STIM HORMONE: CPT

## 2019-06-26 PROCEDURE — 83036 HEMOGLOBIN GLYCOSYLATED A1C: CPT

## 2019-06-26 PROCEDURE — 80053 COMPREHEN METABOLIC PANEL: CPT

## 2019-06-26 PROCEDURE — 81001 URINALYSIS AUTO W/SCOPE: CPT

## 2019-06-26 NOTE — TELEPHONE ENCOUNTER
wants to know if you want pt to have lab done before appt?       Looks like she was due for labs in feb

## 2019-06-26 NOTE — TELEPHONE ENCOUNTER
Yes, it does not appear that she is ever had her February labs so get those drawn as an Magen Mendes.

## 2019-06-27 NOTE — RESULT ENCOUNTER NOTE
Thyroid appears underactive. Cholesterol still elevated. LFTs slightly higher than before. Watch diet.   Keep follow-up to review in more detail, sooner as needed

## 2019-07-02 ENCOUNTER — OFFICE VISIT (OUTPATIENT)
Dept: PRIMARY CARE CLINIC | Age: 74
End: 2019-07-02
Payer: MEDICARE

## 2019-07-02 VITALS
TEMPERATURE: 97.8 F | SYSTOLIC BLOOD PRESSURE: 126 MMHG | BODY MASS INDEX: 32.9 KG/M2 | HEIGHT: 65 IN | WEIGHT: 197.5 LBS | HEART RATE: 58 BPM | DIASTOLIC BLOOD PRESSURE: 70 MMHG | OXYGEN SATURATION: 97 %

## 2019-07-02 DIAGNOSIS — F03.90 DEMENTIA WITHOUT BEHAVIORAL DISTURBANCE, UNSPECIFIED DEMENTIA TYPE: ICD-10-CM

## 2019-07-02 DIAGNOSIS — K76.9 LIVER DISEASE: ICD-10-CM

## 2019-07-02 DIAGNOSIS — I49.9 CARDIAC ARRHYTHMIA, UNSPECIFIED CARDIAC ARRHYTHMIA TYPE: ICD-10-CM

## 2019-07-02 DIAGNOSIS — E11.65 TYPE 2 DIABETES MELLITUS WITH HYPERGLYCEMIA, WITHOUT LONG-TERM CURRENT USE OF INSULIN (HCC): Primary | ICD-10-CM

## 2019-07-02 DIAGNOSIS — E55.9 VITAMIN D DEFICIENCY: ICD-10-CM

## 2019-07-02 DIAGNOSIS — D36.9 TUBULAR ADENOMA: ICD-10-CM

## 2019-07-02 DIAGNOSIS — E78.2 MIXED HYPERLIPIDEMIA: ICD-10-CM

## 2019-07-02 DIAGNOSIS — Z00.00 HEALTH MAINTENANCE EXAMINATION: ICD-10-CM

## 2019-07-02 DIAGNOSIS — E03.9 HYPOTHYROIDISM, UNSPECIFIED TYPE: ICD-10-CM

## 2019-07-02 DIAGNOSIS — I47.1 SUPRAVENTRICULAR TACHYCARDIA (HCC): ICD-10-CM

## 2019-07-02 PROBLEM — I47.10 SUPRAVENTRICULAR TACHYCARDIA: Status: ACTIVE | Noted: 2019-07-02

## 2019-07-02 PROCEDURE — 99215 OFFICE O/P EST HI 40 MIN: CPT | Performed by: FAMILY MEDICINE

## 2019-07-02 RX ORDER — DONEPEZIL HYDROCHLORIDE 10 MG/1
10 TABLET, FILM COATED ORAL NIGHTLY
Qty: 30 TABLET | Refills: 1 | Status: SHIPPED | OUTPATIENT
Start: 2019-07-02 | End: 2019-07-02

## 2019-07-02 RX ORDER — DONEPEZIL HYDROCHLORIDE 5 MG/1
5 TABLET, FILM COATED ORAL
Qty: 90 TABLET | Refills: 1 | Status: CANCELLED | OUTPATIENT
Start: 2019-07-02

## 2019-07-02 RX ORDER — MODAFINIL 200 MG/1
200 TABLET ORAL DAILY
Qty: 90 TABLET | Refills: 1 | Status: CANCELLED | OUTPATIENT
Start: 2019-07-02 | End: 2019-09-30

## 2019-07-02 RX ORDER — METOPROLOL SUCCINATE 100 MG/1
100 TABLET, EXTENDED RELEASE ORAL DAILY
Qty: 90 TABLET | Refills: 3 | Status: SHIPPED | OUTPATIENT
Start: 2019-07-02 | End: 2019-09-26

## 2019-07-02 RX ORDER — LEVOTHYROXINE SODIUM 112 UG/1
112 TABLET ORAL DAILY
Qty: 30 TABLET | Refills: 1 | Status: SHIPPED | OUTPATIENT
Start: 2019-07-02 | End: 2019-08-06

## 2019-07-02 RX ORDER — DONEPEZIL HYDROCHLORIDE 10 MG/1
10 TABLET, FILM COATED ORAL NIGHTLY
Qty: 30 TABLET | Refills: 1 | Status: SHIPPED | OUTPATIENT
Start: 2019-07-02 | End: 2019-09-11 | Stop reason: SDUPTHER

## 2019-07-02 RX ORDER — LEVOTHYROXINE SODIUM 100 MCG
100 TABLET ORAL DAILY
Qty: 90 TABLET | Refills: 1 | Status: CANCELLED | OUTPATIENT
Start: 2019-07-02

## 2019-07-02 RX ORDER — LEVOTHYROXINE SODIUM 112 UG/1
112 TABLET ORAL DAILY
Qty: 30 TABLET | Refills: 1 | Status: SHIPPED | OUTPATIENT
Start: 2019-07-02 | End: 2019-07-02

## 2019-07-02 ASSESSMENT — PATIENT HEALTH QUESTIONNAIRE - PHQ9
SUM OF ALL RESPONSES TO PHQ QUESTIONS 1-9: 1
SUM OF ALL RESPONSES TO PHQ9 QUESTIONS 1 & 2: 1
SUM OF ALL RESPONSES TO PHQ QUESTIONS 1-9: 1
2. FEELING DOWN, DEPRESSED OR HOPELESS: 1
1. LITTLE INTEREST OR PLEASURE IN DOING THINGS: 0

## 2019-07-02 NOTE — ASSESSMENT & PLAN NOTE
Counseled extensively. Differential reviewed including various forms of dementia and pseudodementia. Increase Aricept. Continue to titrate as tolerated. Consider add on therapy such as Namenda. She is to see Dr. Risa SY and referral placed. They would like referred to [facility] as well. Follow-up 5 weeks sooner as needed. Compliance emphasized. Safety precautions reviewed. Absolutely no driving if concerns for dementia. Previously scored a 28, 29 and 30/30 MLS. Cognitive screen today negative. MMS unable to be located in system.

## 2019-07-02 NOTE — PROGRESS NOTES
with her colonoscopy but did not follow-up with me until 4 months later. At that visit she scored a 28 out of 30. She had not gotten the recommended blood work. At that point there was advised to get the blood work as ordered and we referred to Dr. Radha Lyons. She was asked to follow-up in 1 month. At that 1 month follow-up, she had seen Dr. Radha Lyons who felt it was more of a stress reaction but could not rule out early dementia. She wanted to stay aggressive and so we started her on low-dose Aricept. We reviewed the blood work with her. Her B12 was in fact elevated at 1997. Other labs were reviewed and she was asked to follow-up in 3 months with blood work. She came back approximately 1 month later on December 10  For preoperative exam, was tolerating the Aricept and scored a 30/30 on Mini-Mental status exam.  She was asked to follow-up with Dr. Radha Lyons, she stated she had an appointment in mid January and she was asked to follow-up with me with blood work in February. Unfortunately she did not follow-up until today, July 2. Her  states she has had significant memory issues over the past 6 months or so. He stated that if I wanted her to follow-up in February I should have written it down very specifically for them. We do in fact write down their appointment on appointment cards and typically give a reminder call. However I explained that even if she did miss her follow-up appointment, if they were having issues she should have follow-up nonetheless specifically to review those issues and not wait. All that being said he is content with the plan today he states. The plan is to follow-up with Dr. Radha Lyons ASAP and we will re-refer. We will also refer to a memory specialist at [facility]. We will increase her Synthroid as her TSH is elevated from 100 to 112 mcg. She is to recheck blood work in 1 month and follow-up with me in 5 weeks.   We will increase her Aricept from 5 to 10 mg nightly COLONOSCOPY  ? Dr. Geovanna Hadley twisted     COLONOSCOPY  ? Dr. Cari Murray. incomplete.  COLONOSCOPY  2018    Dr. Azul James N/A 2018    COLONOSCOPY DIAGNOSTIC performed by Joyce Bowie MD at [address] [place] [address]  09/10/2011    POSTERIOR    HYSTERECTOMY      vaginal. uterus only.  KNEE ARTHROPLASTY Right     partial    KNEE ARTHROSCOPY  2011    right knee    PACEMAKER PLACEMENT  2014    Medtronic dual chamber    TOTAL HIP ARTHROPLASTY Right 2015     Family History   Problem Relation Age of Onset    Pacemaker Mother     Heart Attack Father      Social History     Tobacco Use    Smoking status: Former Smoker     Packs/day: 1.50     Years: 13.00     Pack years: 19.50     Types: Cigarettes     Start date: 1973     Last attempt to quit: 1986     Years since quittin.5    Smokeless tobacco: Never Used    Tobacco comment: quit smoking    Substance Use Topics    Alcohol use: Yes     Comment: socially -- 3 drinks per month at the most    Drug use: No      Social History     Social History Narrative    PMH:    Problem List: Urinary tract infectious disease, Obstructive sleep apnea syndrome, Adult health    examination, Adult health examination, Constipation, Derangement of knee, Vitamin D deficiency,    Hypothyroidism, Conduction disorder of the heart, Hyperlipidemia    Health Maintenance:    Mini Mental Status - (2018)    Colonoscopy - (2018)    Colonoscopy Screening - (2018)    Mammogram Screening - (2018)    Mammogram - (2018)    Colonoscopy - (2010)    Couseled on Home Safety - (2015)    Bone Density Scan - (3/28/2012)    Medical Problems:    Sleep Apnea - Dr Robina Ty, cpap    Pneumonia    Hypothyroidism - Dr João Ortiz - Dr Danis Traore    Hyperlipidemia - intolerance to all statins    Cardiac Arrhythmia - ? type.  s/p ablation    cardiac dysrhythmia - pauses - lead to pacemaker    Skin Cancer    Surgical Differential    Comprehensive Metabolic Panel    CBC Auto Differential    Comprehensive Metabolic Panel    Urinalysis    Microalbumin / Creatinine Urine Ratio    Vitamin B12 & Folate    Vitamin B12 & Folate   6. Liver disease  CBC Auto Differential    Comprehensive Metabolic Panel    CBC Auto Differential    Comprehensive Metabolic Panel    Urinalysis    Microalbumin / Creatinine Urine Ratio    Vitamin B12 & Folate    Vitamin B12 & Folate   7. Tubular adenoma  CBC Auto Differential    Comprehensive Metabolic Panel    CBC Auto Differential    Comprehensive Metabolic Panel    Urinalysis    Microalbumin / Creatinine Urine Ratio    Vitamin B12 & Folate    Vitamin B12 & Folate   8. Vitamin D deficiency  CBC Auto Differential    Comprehensive Metabolic Panel    CBC Auto Differential    Comprehensive Metabolic Panel    Urinalysis    Microalbumin / Creatinine Urine Ratio    Vitamin D 25 Hydroxy    Vitamin B12 & Folate    Vitamin B12 & Folate   9. Health maintenance examination  CBC Auto Differential    Comprehensive Metabolic Panel    CBC Auto Differential    Comprehensive Metabolic Panel    Urinalysis    Microalbumin / Creatinine Urine Ratio    Vitamin B12 & Folate    Vitamin B12 & Folate   10. Supraventricular tachycardia (HCC)         Hypothyroidism  TSH elevated. This could contribute to some of her symptoms. Increase Synthroid from 100-112. Blood work 1 month follow-up 5 weeks. Type 2 diabetes mellitus with hyperglycemia, without long-term current use of insulin (HCC)   Her A1c was 5.9 on metformin 1000 mg twice a day, decreased to 500 mg twice a  day and went up to 6.1 and now 6.3. She would like his monitored if it continues to trend up she  would like to go back up on the dose but for now keep it 500 twice a day. Risks  reviewed, proper hydration reviewed, risk of lactic acidosis reviewed . Encouraged  she watch blood sugar ambulatory with parameters reviewed call in if out of range.   Hyper and hypoglycemic evaluation or treatment, in-depth blood work,  imaging or otherwise. Hep C 10/18 negative. Tubular adenoma  Small polyp 7/18. Dr. Yelena [facility] recommended basic screenings if repeated at all. Asymptomatic      Vitamin D deficiency  History of. Monitor. Health maintenance examination  Health maintenance issues discussed at length 7/17. Encouraged yearly physicals. No flowsheet data found. Plan as above. Counseled extensively and differential diagnoses relevant to above were reviewed, including serious etiologies. Side effects and interactions of medications were reviewed. The plan is to follow-up with Dr. Risa SY and we will re-refer. We will also referred to a memory specialist at [facility]. We will increase her Synthroid as her TSH is elevated from 100 to 112 mcg. She is to recheck blood work in 1 month and follow-up with me in 5 weeks. We will increase her Aricept from 5 to 10 mg nightly and eventually titrate potentially to 23 mg and discussed add on therapy such as Namenda as well. They did not bring in her medications and could not recall exactly what she was taking. We did very specifically tell him and write down to first verify that what I refilled today is consistent with what and how she has been taking it. Refills provided. Also bring in her prescription bottles next time for us to verify or sooner if they choose. Always written down for him. Lab dates are highlighted to try to reduce confusion. Safety precautions reviewed. As long as symptoms steadily improve/resolve, and medical conditions follow the expected course, FU as below, sooner PRN. Return in about 5 weeks (around 8/6/2019), or if symptoms worsen or fail to improve, for Multiple. Signs and symptoms to watch for discussed, serious signs and symptoms reviewed. ER if any.      Over 40 minutes  spent with the patient in reviewing records, reviewing with patient/family, counseling, ordering,  prescribing, completing h&p, etc., with over 50% of the time spent face to face counseling. Dima Merritt MD    Patients are advised to check with insurance company to ensure coverage and to fully understand benefits and cost prior to any testing. This note was created with the assistance of voice recognition software. Document was reviewed however may contain grammatical errors.

## 2019-08-01 PROBLEM — Z00.00 HEALTH MAINTENANCE EXAMINATION: Status: RESOLVED | Noted: 2019-07-02 | Resolved: 2019-08-01

## 2019-08-05 ENCOUNTER — HOSPITAL ENCOUNTER (OUTPATIENT)
Age: 74
Discharge: HOME | End: 2019-08-07
Payer: MEDICARE

## 2019-08-05 DIAGNOSIS — Z00.00 HEALTH MAINTENANCE EXAMINATION: ICD-10-CM

## 2019-08-05 DIAGNOSIS — K76.9 LIVER DISEASE: ICD-10-CM

## 2019-08-05 DIAGNOSIS — E78.2 MIXED HYPERLIPIDEMIA: ICD-10-CM

## 2019-08-05 DIAGNOSIS — F03.90 DEMENTIA WITHOUT BEHAVIORAL DISTURBANCE, UNSPECIFIED DEMENTIA TYPE: ICD-10-CM

## 2019-08-05 DIAGNOSIS — E55.9 VITAMIN D DEFICIENCY: ICD-10-CM

## 2019-08-05 DIAGNOSIS — E03.9 HYPOTHYROIDISM, UNSPECIFIED TYPE: ICD-10-CM

## 2019-08-05 DIAGNOSIS — D36.9 TUBULAR ADENOMA: ICD-10-CM

## 2019-08-05 DIAGNOSIS — E11.65 TYPE 2 DIABETES MELLITUS WITH HYPERGLYCEMIA, WITHOUT LONG-TERM CURRENT USE OF INSULIN (HCC): ICD-10-CM

## 2019-08-05 DIAGNOSIS — I49.9 CARDIAC ARRHYTHMIA, UNSPECIFIED CARDIAC ARRHYTHMIA TYPE: ICD-10-CM

## 2019-08-05 LAB
ALBUMIN SERPL-MCNC: 4.4 G/DL (ref 3.5–5.2)
ALP BLD-CCNC: 97 U/L (ref 35–104)
ALT SERPL-CCNC: 68 U/L (ref 0–32)
ANION GAP SERPL CALCULATED.3IONS-SCNC: 16 MMOL/L (ref 7–16)
AST SERPL-CCNC: 48 U/L (ref 0–31)
BASOPHILS ABSOLUTE: 0.09 E9/L (ref 0–0.2)
BASOPHILS RELATIVE PERCENT: 0.9 % (ref 0–2)
BILIRUB SERPL-MCNC: 0.3 MG/DL (ref 0–1.2)
BUN BLDV-MCNC: 11 MG/DL (ref 8–23)
CALCIUM SERPL-MCNC: 9.8 MG/DL (ref 8.6–10.2)
CHLORIDE BLD-SCNC: 106 MMOL/L (ref 98–107)
CO2: 23 MMOL/L (ref 22–29)
CREAT SERPL-MCNC: 0.7 MG/DL (ref 0.5–1)
EOSINOPHILS ABSOLUTE: 0.26 E9/L (ref 0.05–0.5)
EOSINOPHILS RELATIVE PERCENT: 2.6 % (ref 0–6)
FOLATE: >20 NG/ML (ref 4.8–24.2)
GFR AFRICAN AMERICAN: >60
GFR NON-AFRICAN AMERICAN: >60 ML/MIN/1.73
GLUCOSE BLD-MCNC: 103 MG/DL (ref 74–99)
HCT VFR BLD CALC: 47.1 % (ref 34–48)
HEMOGLOBIN: 15.4 G/DL (ref 11.5–15.5)
IMMATURE GRANULOCYTES #: 0.05 E9/L
IMMATURE GRANULOCYTES %: 0.5 % (ref 0–5)
LYMPHOCYTES ABSOLUTE: 4.15 E9/L (ref 1.5–4)
LYMPHOCYTES RELATIVE PERCENT: 41.7 % (ref 20–42)
MCH RBC QN AUTO: 30.5 PG (ref 26–35)
MCHC RBC AUTO-ENTMCNC: 32.7 % (ref 32–34.5)
MCV RBC AUTO: 93.3 FL (ref 80–99.9)
MONOCYTES ABSOLUTE: 0.66 E9/L (ref 0.1–0.95)
MONOCYTES RELATIVE PERCENT: 6.6 % (ref 2–12)
NEUTROPHILS ABSOLUTE: 4.75 E9/L (ref 1.8–7.3)
NEUTROPHILS RELATIVE PERCENT: 47.7 % (ref 43–80)
PDW BLD-RTO: 12.9 FL (ref 11.5–15)
PLATELET # BLD: 222 E9/L (ref 130–450)
PMV BLD AUTO: 11 FL (ref 7–12)
POTASSIUM SERPL-SCNC: 4.2 MMOL/L (ref 3.5–5)
RBC # BLD: 5.05 E12/L (ref 3.5–5.5)
SODIUM BLD-SCNC: 145 MMOL/L (ref 132–146)
T4 FREE: 2 NG/DL (ref 0.93–1.7)
TOTAL PROTEIN: 7 G/DL (ref 6.4–8.3)
TSH SERPL DL<=0.05 MIU/L-ACNC: 0.41 UIU/ML (ref 0.27–4.2)
VITAMIN B-12: >2000 PG/ML (ref 211–946)
WBC # BLD: 10 E9/L (ref 4.5–11.5)

## 2019-08-05 PROCEDURE — 80053 COMPREHEN METABOLIC PANEL: CPT

## 2019-08-05 PROCEDURE — 82746 ASSAY OF FOLIC ACID SERUM: CPT

## 2019-08-05 PROCEDURE — 84443 ASSAY THYROID STIM HORMONE: CPT

## 2019-08-05 PROCEDURE — 84439 ASSAY OF FREE THYROXINE: CPT

## 2019-08-05 PROCEDURE — 85025 COMPLETE CBC W/AUTO DIFF WBC: CPT

## 2019-08-05 PROCEDURE — 36415 COLL VENOUS BLD VENIPUNCTURE: CPT

## 2019-08-05 PROCEDURE — 82607 VITAMIN B-12: CPT

## 2019-08-06 ENCOUNTER — OFFICE VISIT (OUTPATIENT)
Dept: PRIMARY CARE CLINIC | Age: 74
End: 2019-08-06
Payer: MEDICARE

## 2019-08-06 VITALS
DIASTOLIC BLOOD PRESSURE: 64 MMHG | SYSTOLIC BLOOD PRESSURE: 126 MMHG | WEIGHT: 194 LBS | OXYGEN SATURATION: 98 % | BODY MASS INDEX: 32.79 KG/M2 | HEART RATE: 64 BPM

## 2019-08-06 DIAGNOSIS — E55.9 VITAMIN D DEFICIENCY: ICD-10-CM

## 2019-08-06 DIAGNOSIS — E78.2 MIXED HYPERLIPIDEMIA: ICD-10-CM

## 2019-08-06 DIAGNOSIS — Z00.00 HEALTH MAINTENANCE EXAMINATION: ICD-10-CM

## 2019-08-06 DIAGNOSIS — E03.9 HYPOTHYROIDISM, UNSPECIFIED TYPE: Primary | ICD-10-CM

## 2019-08-06 DIAGNOSIS — E11.65 TYPE 2 DIABETES MELLITUS WITH HYPERGLYCEMIA, WITHOUT LONG-TERM CURRENT USE OF INSULIN (HCC): ICD-10-CM

## 2019-08-06 DIAGNOSIS — E53.8 VITAMIN B12 DEFICIENCY: ICD-10-CM

## 2019-08-06 DIAGNOSIS — F03.90 DEMENTIA WITHOUT BEHAVIORAL DISTURBANCE, UNSPECIFIED DEMENTIA TYPE: ICD-10-CM

## 2019-08-06 DIAGNOSIS — I47.1 SUPRAVENTRICULAR TACHYCARDIA (HCC): ICD-10-CM

## 2019-08-06 DIAGNOSIS — D36.9 TUBULAR ADENOMA: ICD-10-CM

## 2019-08-06 DIAGNOSIS — K76.9 LIVER DISEASE: ICD-10-CM

## 2019-08-06 PROBLEM — I49.9 CARDIAC ARRHYTHMIA: Status: ACTIVE | Noted: 2019-08-06

## 2019-08-06 PROCEDURE — 99214 OFFICE O/P EST MOD 30 MIN: CPT | Performed by: FAMILY MEDICINE

## 2019-08-06 RX ORDER — LEVOTHYROXINE SODIUM 0.1 MG/1
100 TABLET ORAL DAILY
Qty: 30 TABLET | Refills: 3 | Status: SHIPPED | OUTPATIENT
Start: 2019-08-06 | End: 2019-09-26

## 2019-08-27 ENCOUNTER — HOSPITAL ENCOUNTER (OUTPATIENT)
Age: 74
Discharge: HOME | End: 2019-08-29
Payer: MEDICARE

## 2019-08-27 PROCEDURE — 87186 SC STD MICRODIL/AGAR DIL: CPT

## 2019-08-27 PROCEDURE — 87077 CULTURE AEROBIC IDENTIFY: CPT

## 2019-08-27 PROCEDURE — 87088 URINE BACTERIA CULTURE: CPT

## 2019-08-28 ENCOUNTER — TELEPHONE (OUTPATIENT)
Dept: PRIMARY CARE CLINIC | Age: 74
End: 2019-08-28

## 2019-08-28 ENCOUNTER — APPOINTMENT (OUTPATIENT)
Dept: CT IMAGING | Age: 74
End: 2019-08-28
Payer: MEDICARE

## 2019-08-28 ENCOUNTER — OFFICE VISIT (OUTPATIENT)
Dept: FAMILY MEDICINE CLINIC | Age: 74
End: 2019-08-28
Payer: MEDICARE

## 2019-08-28 ENCOUNTER — HOSPITAL ENCOUNTER (EMERGENCY)
Age: 74
Discharge: HOME | End: 2019-08-28
Attending: EMERGENCY MEDICINE
Payer: MEDICARE

## 2019-08-28 ENCOUNTER — APPOINTMENT (OUTPATIENT)
Dept: GENERAL RADIOLOGY | Age: 74
End: 2019-08-28
Payer: MEDICARE

## 2019-08-28 VITALS
DIASTOLIC BLOOD PRESSURE: 82 MMHG | SYSTOLIC BLOOD PRESSURE: 141 MMHG | RESPIRATION RATE: 18 BRPM | OXYGEN SATURATION: 96 % | HEART RATE: 65 BPM | TEMPERATURE: 98 F

## 2019-08-28 VITALS
HEART RATE: 60 BPM | OXYGEN SATURATION: 95 % | DIASTOLIC BLOOD PRESSURE: 70 MMHG | WEIGHT: 194 LBS | BODY MASS INDEX: 32.79 KG/M2 | TEMPERATURE: 97.8 F | SYSTOLIC BLOOD PRESSURE: 128 MMHG

## 2019-08-28 DIAGNOSIS — D21.0 BENIGN NEOPLASM OF CONNECTIVE AND SOFT TISSUE OF NECK: Primary | ICD-10-CM

## 2019-08-28 DIAGNOSIS — R59.1 LYMPHADENOPATHY: Primary | ICD-10-CM

## 2019-08-28 LAB
ALBUMIN SERPL-MCNC: 4.2 G/DL (ref 3.5–5.2)
ALP BLD-CCNC: 98 U/L (ref 35–104)
ALT SERPL-CCNC: 68 U/L (ref 0–32)
ANION GAP SERPL CALCULATED.3IONS-SCNC: 13 MMOL/L (ref 7–16)
AST SERPL-CCNC: 57 U/L (ref 0–31)
BASOPHILS ABSOLUTE: 0.09 E9/L (ref 0–0.2)
BASOPHILS RELATIVE PERCENT: 0.8 % (ref 0–2)
BILIRUB SERPL-MCNC: 0.4 MG/DL (ref 0–1.2)
BUN BLDV-MCNC: 12 MG/DL (ref 8–23)
CALCIUM SERPL-MCNC: 9.8 MG/DL (ref 8.6–10.2)
CHLORIDE BLD-SCNC: 105 MMOL/L (ref 98–107)
CO2: 21 MMOL/L (ref 22–29)
CREAT SERPL-MCNC: 0.6 MG/DL (ref 0.5–1)
EOSINOPHILS ABSOLUTE: 0.36 E9/L (ref 0.05–0.5)
EOSINOPHILS RELATIVE PERCENT: 3.2 % (ref 0–6)
GFR AFRICAN AMERICAN: >60
GFR NON-AFRICAN AMERICAN: >60 ML/MIN/1.73
GLUCOSE BLD-MCNC: 100 MG/DL (ref 74–99)
HCT VFR BLD CALC: 47.2 % (ref 34–48)
HEMOGLOBIN: 15.7 G/DL (ref 11.5–15.5)
IMMATURE GRANULOCYTES #: 0.05 E9/L
IMMATURE GRANULOCYTES %: 0.4 % (ref 0–5)
LYMPHOCYTES ABSOLUTE: 4.53 E9/L (ref 1.5–4)
LYMPHOCYTES RELATIVE PERCENT: 39.9 % (ref 20–42)
MCH RBC QN AUTO: 30.7 PG (ref 26–35)
MCHC RBC AUTO-ENTMCNC: 33.3 % (ref 32–34.5)
MCV RBC AUTO: 92.4 FL (ref 80–99.9)
MONOCYTES ABSOLUTE: 0.7 E9/L (ref 0.1–0.95)
MONOCYTES RELATIVE PERCENT: 6.2 % (ref 2–12)
NEUTROPHILS ABSOLUTE: 5.62 E9/L (ref 1.8–7.3)
NEUTROPHILS RELATIVE PERCENT: 49.5 % (ref 43–80)
PDW BLD-RTO: 12.6 FL (ref 11.5–15)
PLATELET # BLD: 211 E9/L (ref 130–450)
PMV BLD AUTO: 10.7 FL (ref 7–12)
POTASSIUM SERPL-SCNC: 4.3 MMOL/L (ref 3.5–5)
RBC # BLD: 5.11 E12/L (ref 3.5–5.5)
SODIUM BLD-SCNC: 139 MMOL/L (ref 132–146)
TOTAL PROTEIN: 7.4 G/DL (ref 6.4–8.3)
TSH SERPL DL<=0.05 MIU/L-ACNC: 0.26 UIU/ML (ref 0.27–4.2)
WBC # BLD: 11.4 E9/L (ref 4.5–11.5)

## 2019-08-28 PROCEDURE — 71045 X-RAY EXAM CHEST 1 VIEW: CPT

## 2019-08-28 PROCEDURE — 80053 COMPREHEN METABOLIC PANEL: CPT

## 2019-08-28 PROCEDURE — 99213 OFFICE O/P EST LOW 20 MIN: CPT | Performed by: FAMILY MEDICINE

## 2019-08-28 PROCEDURE — 70450 CT HEAD/BRAIN W/O DYE: CPT

## 2019-08-28 PROCEDURE — 70490 CT SOFT TISSUE NECK W/O DYE: CPT

## 2019-08-28 PROCEDURE — 36415 COLL VENOUS BLD VENIPUNCTURE: CPT

## 2019-08-28 PROCEDURE — 85025 COMPLETE CBC W/AUTO DIFF WBC: CPT

## 2019-08-28 PROCEDURE — 84443 ASSAY THYROID STIM HORMONE: CPT

## 2019-08-28 PROCEDURE — 99284 EMERGENCY DEPT VISIT MOD MDM: CPT

## 2019-08-28 RX ORDER — PHENAZOPYRIDINE HYDROCHLORIDE 100 MG/1
TABLET, FILM COATED ORAL
Refills: 1 | COMMUNITY
Start: 2019-08-27 | End: 2019-09-16 | Stop reason: ALTCHOICE

## 2019-08-28 ASSESSMENT — ENCOUNTER SYMPTOMS
DIARRHEA: 0
EYE REDNESS: 0
EYE DISCHARGE: 0
VOMITING: 0
COUGH: 0
BACK PAIN: 0
WHEEZING: 0
SHORTNESS OF BREATH: 0
ABDOMINAL DISTENTION: 0
SORE THROAT: 0
EYE PAIN: 0
SINUS PRESSURE: 0
NAUSEA: 0

## 2019-08-28 ASSESSMENT — PAIN DESCRIPTION - DESCRIPTORS: DESCRIPTORS: PRESSURE

## 2019-08-28 ASSESSMENT — PAIN DESCRIPTION - ORIENTATION: ORIENTATION: RIGHT

## 2019-08-28 ASSESSMENT — PAIN DESCRIPTION - LOCATION: LOCATION: FACE

## 2019-08-28 ASSESSMENT — PAIN DESCRIPTION - PAIN TYPE: TYPE: ACUTE PAIN

## 2019-08-28 ASSESSMENT — PAIN SCALES - GENERAL: PAINLEVEL_OUTOF10: 5

## 2019-08-28 NOTE — PROGRESS NOTES
problem-specific Assessment & Plan notes found for this encounter. Plan as above. Counseled extensively and differential diagnoses relevant to above were reviewed, including serious etiologies. No follow-ups on file. Signs and symptoms to watch for discussed, serious signs and symptoms reviewed. ER if any. Roverto Griffin MD    Patients are advised to check with insurance company to ensure coverage and to fully understand benefits and cost prior to any testing. This note was created with the assistance of voice recognition software. Document was reviewed however may contain grammatical errors.

## 2019-08-29 NOTE — ED PROVIDER NOTES
Cardiovascular: Normal rate, regular rhythm and normal heart sounds. No murmur heard. Pulmonary/Chest: Effort normal and breath sounds normal. No respiratory distress. She has no wheezes. She has no rales. Abdominal: Soft. Bowel sounds are normal. There is no tenderness. There is no rebound and no guarding. Musculoskeletal: She exhibits tenderness (Right-sided facial TTP). She exhibits no edema. Neurological: She is alert and oriented to person, place, and time. No cranial nerve deficit. Coordination normal.   Skin: Skin is warm and dry. No signs of rash. Nursing note and vitals reviewed. Procedures     MDM  Number of Diagnoses or Management Options  Diagnosis management comments: Patient is a 69-year-old female is presenting with right-sided facial pain has been on for last several days. Patient is also had bilateral mandibular lymphadenopathy for quite a while. Patient was at her PCP today for routine visit and mentioned something and they had decided that she should be evaluated at the ED. She denies any form of rash or skin change to the area. Labs and imaging have been ordered in triage and are still pending. She is CT of the head as well as facial without contrast as we are unable to get a peripheral line to her. Amount and/or Complexity of Data Reviewed  Clinical lab tests: reviewed         ED Course as of Aug 28 2322   Wed Aug 28, 2019   2232 Facial CT does show asymmetric lymph nodes with the right more prominent than the left. No other findings noted. Patient will be discharged and asked to follow up for PCP. Biopsy most likely the next step.      [AL]      ED Course User Index  [AL] Leo Dubose DO          Admission on 08/28/2019   Component Date Value Ref Range Status    WBC 08/28/2019 11.4  4.5 - 11.5 E9/L Final    RBC 08/28/2019 5.11  3.50 - 5.50 E12/L Final    Hemoglobin 08/28/2019 15.7* 11.5 - 15.5 g/dL Final    Hematocrit 08/28/2019 47.2  34.0 - 48.0 % Final    MCV hip arthroplasty (Right, 2015); Colonoscopy (12/17/2018); and Colonoscopy (N/A, 12/17/2018). Social History:  reports that she quit smoking about 33 years ago. Her smoking use included cigarettes. She started smoking about 46 years ago. She has a 19.50 pack-year smoking history. She has never used smokeless tobacco. She reports that she drinks alcohol. She reports that she does not use drugs. Family History: family history includes Heart Attack in her father; Pacemaker in her mother. The patients home medications have been reviewed.     Allergies: Morphine and Statins depletion therapy    -------------------------------------------------- RESULTS -------------------------------------------------  Labs:  Results for orders placed or performed during the hospital encounter of 08/28/19   CBC Auto Differential   Result Value Ref Range    WBC 11.4 4.5 - 11.5 E9/L    RBC 5.11 3.50 - 5.50 E12/L    Hemoglobin 15.7 (H) 11.5 - 15.5 g/dL    Hematocrit 47.2 34.0 - 48.0 %    MCV 92.4 80.0 - 99.9 fL    MCH 30.7 26.0 - 35.0 pg    MCHC 33.3 32.0 - 34.5 %    RDW 12.6 11.5 - 15.0 fL    Platelets 808 673 - 791 E9/L    MPV 10.7 7.0 - 12.0 fL    Neutrophils % 49.5 43.0 - 80.0 %    Immature Granulocytes % 0.4 0.0 - 5.0 %    Lymphocytes % 39.9 20.0 - 42.0 %    Monocytes % 6.2 2.0 - 12.0 %    Eosinophils % 3.2 0.0 - 6.0 %    Basophils % 0.8 0.0 - 2.0 %    Neutrophils Absolute 5.62 1.80 - 7.30 E9/L    Immature Granulocytes # 0.05 E9/L    Lymphocytes Absolute 4.53 (H) 1.50 - 4.00 E9/L    Monocytes Absolute 0.70 0.10 - 0.95 E9/L    Eosinophils Absolute 0.36 0.05 - 0.50 E9/L    Basophils Absolute 0.09 0.00 - 0.20 E9/L   Comprehensive Metabolic Panel   Result Value Ref Range    Sodium 139 132 - 146 mmol/L    Potassium 4.3 3.5 - 5.0 mmol/L    Chloride 105 98 - 107 mmol/L    CO2 21 (L) 22 - 29 mmol/L    Anion Gap 13 7 - 16 mmol/L    Glucose 100 (H) 74 - 99 mg/dL    BUN 12 8 - 23 mg/dL    CREATININE 0.6 0.5 - 1.0 mg/dL    GFR Non- remained hemodynamically stable throughout their entire ED visit and are stable for discharge with outpatient follow-up. The plan has been discussed in detail and they are aware of the specific conditions for emergent return, as well as the importance of follow-up. Discharge Medication List as of 8/28/2019 10:32 PM          Diagnosis:  1. Lymphadenopathy        Disposition:  Patient's disposition: Discharge to home  Patient's condition is stable.        Leonor Del Cid DO  Resident  08/28/19 [id number]

## 2019-08-31 LAB
ORGANISM: ABNORMAL
ORGANISM: ABNORMAL
URINE CULTURE, ROUTINE: ABNORMAL
URINE CULTURE, ROUTINE: ABNORMAL

## 2019-09-05 PROBLEM — Z00.00 HEALTH MAINTENANCE EXAMINATION: Status: RESOLVED | Noted: 2019-07-02 | Resolved: 2019-09-05

## 2019-09-10 ENCOUNTER — TELEPHONE (OUTPATIENT)
Dept: PRIMARY CARE CLINIC | Age: 74
End: 2019-09-10

## 2019-09-10 DIAGNOSIS — F03.90 DEMENTIA WITHOUT BEHAVIORAL DISTURBANCE, UNSPECIFIED DEMENTIA TYPE: ICD-10-CM

## 2019-09-11 RX ORDER — DONEPEZIL HYDROCHLORIDE 10 MG/1
10 TABLET, FILM COATED ORAL NIGHTLY
Qty: 90 TABLET | Refills: 0 | Status: SHIPPED | OUTPATIENT
Start: 2019-09-11 | End: 2020-01-13 | Stop reason: SDUPTHER

## 2019-09-11 NOTE — TELEPHONE ENCOUNTER
PT  called inquiring about prescription for wife. States wife will be out today.   Prescription needs sent to [facility]

## 2019-09-16 ENCOUNTER — OFFICE VISIT (OUTPATIENT)
Dept: FAMILY MEDICINE CLINIC | Age: 74
End: 2019-09-16
Payer: MEDICARE

## 2019-09-16 VITALS
WEIGHT: 195 LBS | HEIGHT: 65 IN | OXYGEN SATURATION: 97 % | SYSTOLIC BLOOD PRESSURE: 120 MMHG | BODY MASS INDEX: 32.49 KG/M2 | TEMPERATURE: 97.7 F | RESPIRATION RATE: 20 BRPM | DIASTOLIC BLOOD PRESSURE: 82 MMHG | HEART RATE: 64 BPM

## 2019-09-16 DIAGNOSIS — M79.671 RIGHT FOOT PAIN: Primary | ICD-10-CM

## 2019-09-16 PROCEDURE — 99214 OFFICE O/P EST MOD 30 MIN: CPT | Performed by: PHYSICIAN ASSISTANT

## 2019-09-16 RX ORDER — METHYLPREDNISOLONE 4 MG/1
TABLET ORAL
Qty: 1 KIT | Refills: 0 | Status: SHIPPED | OUTPATIENT
Start: 2019-09-16 | End: 2019-12-06

## 2019-09-16 RX ORDER — CEPHALEXIN 500 MG/1
500 CAPSULE ORAL 3 TIMES DAILY
Qty: 21 CAPSULE | Refills: 0 | Status: SHIPPED | OUTPATIENT
Start: 2019-09-16 | End: 2019-09-23

## 2019-10-10 DIAGNOSIS — E11.65 TYPE 2 DIABETES MELLITUS WITH HYPERGLYCEMIA, WITHOUT LONG-TERM CURRENT USE OF INSULIN (HCC): ICD-10-CM

## 2019-10-10 DIAGNOSIS — K76.9 LIVER DISEASE: Primary | ICD-10-CM

## 2019-10-10 DIAGNOSIS — E78.2 MIXED HYPERLIPIDEMIA: ICD-10-CM

## 2019-10-10 DIAGNOSIS — E03.9 ACQUIRED HYPOTHYROIDISM: Chronic | ICD-10-CM

## 2019-10-10 DIAGNOSIS — E55.9 VITAMIN D DEFICIENCY: ICD-10-CM

## 2019-10-17 ENCOUNTER — OFFICE VISIT (OUTPATIENT)
Dept: FAMILY MEDICINE CLINIC | Age: 74
End: 2019-10-17
Payer: MEDICARE

## 2019-10-17 VITALS
TEMPERATURE: 97.1 F | OXYGEN SATURATION: 96 % | RESPIRATION RATE: 16 BRPM | WEIGHT: 194 LBS | BODY MASS INDEX: 33.3 KG/M2 | DIASTOLIC BLOOD PRESSURE: 72 MMHG | HEART RATE: 75 BPM | SYSTOLIC BLOOD PRESSURE: 114 MMHG

## 2019-10-17 DIAGNOSIS — L82.0 SEBORRHEIC KERATOSIS, INFLAMED: Primary | ICD-10-CM

## 2019-10-17 PROCEDURE — G8417 CALC BMI ABV UP PARAM F/U: HCPCS | Performed by: FAMILY MEDICINE

## 2019-10-17 PROCEDURE — 1090F PRES/ABSN URINE INCON ASSESS: CPT | Performed by: FAMILY MEDICINE

## 2019-10-17 PROCEDURE — G8484 FLU IMMUNIZE NO ADMIN: HCPCS | Performed by: FAMILY MEDICINE

## 2019-10-17 PROCEDURE — G8427 DOCREV CUR MEDS BY ELIG CLIN: HCPCS | Performed by: FAMILY MEDICINE

## 2019-10-17 PROCEDURE — 4040F PNEUMOC VAC/ADMIN/RCVD: CPT | Performed by: FAMILY MEDICINE

## 2019-10-17 PROCEDURE — G8399 PT W/DXA RESULTS DOCUMENT: HCPCS | Performed by: FAMILY MEDICINE

## 2019-10-17 PROCEDURE — 99213 OFFICE O/P EST LOW 20 MIN: CPT | Performed by: FAMILY MEDICINE

## 2019-10-17 PROCEDURE — 3017F COLORECTAL CA SCREEN DOC REV: CPT | Performed by: FAMILY MEDICINE

## 2019-10-17 PROCEDURE — 1036F TOBACCO NON-USER: CPT | Performed by: FAMILY MEDICINE

## 2019-10-17 PROCEDURE — 1123F ACP DISCUSS/DSCN MKR DOCD: CPT | Performed by: FAMILY MEDICINE

## 2019-10-17 ASSESSMENT — ENCOUNTER SYMPTOMS
VOMITING: 0
SHORTNESS OF BREATH: 0
DIARRHEA: 0
SINUS PRESSURE: 0
RHINORRHEA: 0
CONSTIPATION: 0
NAUSEA: 0
WHEEZING: 0
COUGH: 0
ABDOMINAL PAIN: 0
SORE THROAT: 0
EYE DISCHARGE: 0
SINUS PAIN: 0

## 2019-12-06 ENCOUNTER — OFFICE VISIT (OUTPATIENT)
Dept: FAMILY MEDICINE CLINIC | Age: 74
End: 2019-12-06
Payer: MEDICARE

## 2019-12-06 ENCOUNTER — HOSPITAL ENCOUNTER (OUTPATIENT)
Age: 74
Discharge: HOME | End: 2019-12-08
Payer: MEDICARE

## 2019-12-06 VITALS
HEART RATE: 98 BPM | TEMPERATURE: 97.8 F | OXYGEN SATURATION: 96 % | SYSTOLIC BLOOD PRESSURE: 128 MMHG | DIASTOLIC BLOOD PRESSURE: 70 MMHG

## 2019-12-06 DIAGNOSIS — N39.0 URINARY TRACT INFECTION WITHOUT HEMATURIA, SITE UNSPECIFIED: Primary | ICD-10-CM

## 2019-12-06 DIAGNOSIS — F03.90 DEMENTIA WITHOUT BEHAVIORAL DISTURBANCE, UNSPECIFIED DEMENTIA TYPE: ICD-10-CM

## 2019-12-06 LAB
BILIRUBIN, POC: NEGATIVE
BLOOD URINE, POC: NEGATIVE
CLARITY, POC: CLEAR
COLOR, POC: YELLOW
GLUCOSE URINE, POC: NEGATIVE
KETONES, POC: NEGATIVE
LEUKOCYTE EST, POC: ABNORMAL
NITRITE, POC: NEGATIVE
PH, POC: 5.5
PROTEIN, POC: NEGATIVE
SPECIFIC GRAVITY, POC: >=1.03
UROBILINOGEN, POC: 0.2

## 2019-12-06 PROCEDURE — 4040F PNEUMOC VAC/ADMIN/RCVD: CPT | Performed by: FAMILY MEDICINE

## 2019-12-06 PROCEDURE — 3017F COLORECTAL CA SCREEN DOC REV: CPT | Performed by: FAMILY MEDICINE

## 2019-12-06 PROCEDURE — 87088 URINE BACTERIA CULTURE: CPT

## 2019-12-06 PROCEDURE — G8417 CALC BMI ABV UP PARAM F/U: HCPCS | Performed by: FAMILY MEDICINE

## 2019-12-06 PROCEDURE — 1090F PRES/ABSN URINE INCON ASSESS: CPT | Performed by: FAMILY MEDICINE

## 2019-12-06 PROCEDURE — G8399 PT W/DXA RESULTS DOCUMENT: HCPCS | Performed by: FAMILY MEDICINE

## 2019-12-06 PROCEDURE — 99214 OFFICE O/P EST MOD 30 MIN: CPT | Performed by: FAMILY MEDICINE

## 2019-12-06 PROCEDURE — 1036F TOBACCO NON-USER: CPT | Performed by: FAMILY MEDICINE

## 2019-12-06 PROCEDURE — 81002 URINALYSIS NONAUTO W/O SCOPE: CPT | Performed by: FAMILY MEDICINE

## 2019-12-06 PROCEDURE — G8484 FLU IMMUNIZE NO ADMIN: HCPCS | Performed by: FAMILY MEDICINE

## 2019-12-06 PROCEDURE — G8427 DOCREV CUR MEDS BY ELIG CLIN: HCPCS | Performed by: FAMILY MEDICINE

## 2019-12-06 PROCEDURE — 1123F ACP DISCUSS/DSCN MKR DOCD: CPT | Performed by: FAMILY MEDICINE

## 2019-12-08 LAB — URINE CULTURE, ROUTINE: NORMAL

## 2020-01-13 NOTE — TELEPHONE ENCOUNTER
I can bridge if needed but [facility] has been managing this so they should be contacting them for refill.     She should follow-up when able    Let me know

## 2020-01-13 NOTE — TELEPHONE ENCOUNTER
Pt requesting medication refill to [facility]. Verified dose. Margo Lawg stated he just got out of a triple bipass but if she needs an appointment let him know.

## 2020-01-14 RX ORDER — DONEPEZIL HYDROCHLORIDE 10 MG/1
10 TABLET, FILM COATED ORAL NIGHTLY
Qty: 90 TABLET | Refills: 0 | Status: SHIPPED | OUTPATIENT
Start: 2020-01-14 | End: 2020-01-15 | Stop reason: SDUPTHER

## 2020-01-14 NOTE — TELEPHONE ENCOUNTER
Notified pt. Stated that it was last refilled by you and [facility] has never contacted them for refills. Pt would like to know if you can send the refills from here on out? Please advise. Thanks.

## 2020-01-15 RX ORDER — DONEPEZIL HYDROCHLORIDE 10 MG/1
10 TABLET, FILM COATED ORAL NIGHTLY
Qty: 14 TABLET | Refills: 0 | Status: SHIPPED
Start: 2020-01-15 | End: 2020-03-19 | Stop reason: SDUPTHER

## 2020-01-15 RX ORDER — DONEPEZIL HYDROCHLORIDE 10 MG/1
10 TABLET, FILM COATED ORAL NIGHTLY
Qty: 90 TABLET | Refills: 0 | Status: CANCELLED | OUTPATIENT
Start: 2020-01-15

## 2020-01-22 ENCOUNTER — OFFICE VISIT (OUTPATIENT)
Dept: PRIMARY CARE CLINIC | Age: 75
End: 2020-01-22
Payer: MEDICARE

## 2020-01-22 VITALS
TEMPERATURE: 98 F | WEIGHT: 194 LBS | DIASTOLIC BLOOD PRESSURE: 74 MMHG | BODY MASS INDEX: 33.3 KG/M2 | RESPIRATION RATE: 14 BRPM | OXYGEN SATURATION: 96 % | SYSTOLIC BLOOD PRESSURE: 136 MMHG | HEART RATE: 78 BPM

## 2020-01-22 PROCEDURE — 1123F ACP DISCUSS/DSCN MKR DOCD: CPT | Performed by: FAMILY MEDICINE

## 2020-01-22 PROCEDURE — 99214 OFFICE O/P EST MOD 30 MIN: CPT | Performed by: FAMILY MEDICINE

## 2020-01-22 PROCEDURE — G8417 CALC BMI ABV UP PARAM F/U: HCPCS | Performed by: FAMILY MEDICINE

## 2020-01-22 PROCEDURE — G8427 DOCREV CUR MEDS BY ELIG CLIN: HCPCS | Performed by: FAMILY MEDICINE

## 2020-01-22 PROCEDURE — G8399 PT W/DXA RESULTS DOCUMENT: HCPCS | Performed by: FAMILY MEDICINE

## 2020-01-22 PROCEDURE — G8484 FLU IMMUNIZE NO ADMIN: HCPCS | Performed by: FAMILY MEDICINE

## 2020-01-22 PROCEDURE — 1036F TOBACCO NON-USER: CPT | Performed by: FAMILY MEDICINE

## 2020-01-22 PROCEDURE — 1090F PRES/ABSN URINE INCON ASSESS: CPT | Performed by: FAMILY MEDICINE

## 2020-01-22 PROCEDURE — 4040F PNEUMOC VAC/ADMIN/RCVD: CPT | Performed by: FAMILY MEDICINE

## 2020-01-22 PROCEDURE — 3017F COLORECTAL CA SCREEN DOC REV: CPT | Performed by: FAMILY MEDICINE

## 2020-01-22 RX ORDER — KETOCONAZOLE 20 MG/ML
SHAMPOO TOPICAL
COMMUNITY
Start: 2020-01-18 | End: 2020-07-31

## 2020-01-22 ASSESSMENT — PATIENT HEALTH QUESTIONNAIRE - PHQ9
1. LITTLE INTEREST OR PLEASURE IN DOING THINGS: 0
2. FEELING DOWN, DEPRESSED OR HOPELESS: 0
SUM OF ALL RESPONSES TO PHQ QUESTIONS 1-9: 0
SUM OF ALL RESPONSES TO PHQ QUESTIONS 1-9: 0
SUM OF ALL RESPONSES TO PHQ9 QUESTIONS 1 & 2: 0

## 2020-01-22 NOTE — ASSESSMENT & PLAN NOTE
Counseled. Doing very well CPAP. Uses it at least 8 to 10 hours per night 7 nights per week with very good results. Got a new machine toward the end of 2019.

## 2020-01-22 NOTE — PROGRESS NOTES
abdominal pain nausea or vomiting change in bowels diarrhea constipation melena hematochezia dysuria urgency frequency numbness tingling focal weakness slurred speech facial droop or otherwise. ROS:  As above      Current Outpatient Medications:     donepezil (ARICEPT) 10 MG tablet, Take 1 tablet by mouth nightly, Disp: 14 tablet, Rfl: 0    levothyroxine (SYNTHROID) 112 MCG tablet, Take 1 tablet by mouth, Disp: , Rfl:     metFORMIN (GLUCOPHAGE) 500 MG tablet, Take 1 tablet by mouth daily , Disp: , Rfl:     metoprolol succinate (TOPROL XL) 100 MG extended release tablet, Take 1 tablet by mouth, Disp: , Rfl:     mirabegron (MYRBETRIQ) 50 MG TB24, Take 1 tablet by mouth, Disp: , Rfl:     modafinil (PROVIGIL) 200 MG tablet, Take 200 mg by mouth daily. ., Disp: , Rfl:     B Complex Vitamins (B-COMPLEX/B-12 PO), Take by mouth daily LD 12/12/18, Disp: , Rfl:     ketoconazole (NIZORAL) 2 % shampoo, RIAN EXT AA  OF SCALP TWICE A WEEK LEAVING ON 5 MINUTES THEN RINSE OFF, Disp: , Rfl:   Allergies   Allergen Reactions    Morphine Itching    Statins      Other reaction(s): Unknown    Statins Depletion Therapy Other (See Comments)     MYALGIA       Past Medical History:   Diagnosis Date    Arthritis     Diabetes mellitus ([facility] [place] [address]     Difficult intubation     carries card, copy on chart  Glidescope#3 with ETT size7    Hyperlipidemia     Hypothyroidism     Left sided abdominal pain     Memory problem     still competent    GORDY on CPAP     Pacemaker     Rectal bleeding     Ringing in the ears      Past Surgical History:   Procedure Laterality Date    ABLATION OF DYSRHYTHMIC FOCUS  1996    COLONOSCOPY  2009? Dr. Ruth Tucker twisted     COLONOSCOPY  2008? Dr. Horace Feldman. incomplete.      COLONOSCOPY  12/17/2018    Dr. Wendy Nolasco N/A 12/17/2018    COLONOSCOPY DIAGNOSTIC performed by Chiquis Peña MD at [address] [place] [address]  09/10/2011    POSTERIOR    HYSTERECTOMY

## 2020-01-27 ENCOUNTER — TELEPHONE (OUTPATIENT)
Dept: PRIMARY CARE CLINIC | Age: 75
End: 2020-01-27

## 2020-01-27 RX ORDER — LEVOTHYROXINE SODIUM 0.1 MG/1
100 TABLET ORAL DAILY
Qty: 90 TABLET | Refills: 3 | Status: SHIPPED
Start: 2020-01-27 | End: 2020-03-04

## 2020-01-27 NOTE — TELEPHONE ENCOUNTER
Name of Medication(s) Requested:  Levothyroxine    Pharmacy Requested:   [facility]    Medication(s) pended? [x] Yes  [] No    Last Appointment:  1/22/2020    Future appts:  Future Appointments   Date Time Provider Nini Isabel   3/4/2020  1:30 PM MD RAYSA Garcia [facility]   9/29/2020  3:45 PM Esdras Rahman MD [facility]        Does patient need call back?   [x] Yes  [] No

## 2020-01-30 ENCOUNTER — OFFICE VISIT (OUTPATIENT)
Dept: FAMILY MEDICINE CLINIC | Age: 75
End: 2020-01-30
Payer: MEDICARE

## 2020-01-30 VITALS
SYSTOLIC BLOOD PRESSURE: 124 MMHG | TEMPERATURE: 97.7 F | BODY MASS INDEX: 33.57 KG/M2 | OXYGEN SATURATION: 98 % | WEIGHT: 195.6 LBS | DIASTOLIC BLOOD PRESSURE: 78 MMHG | HEART RATE: 54 BPM

## 2020-01-30 PROCEDURE — G8417 CALC BMI ABV UP PARAM F/U: HCPCS | Performed by: FAMILY MEDICINE

## 2020-01-30 PROCEDURE — 17110 DESTRUCTION B9 LES UP TO 14: CPT | Performed by: FAMILY MEDICINE

## 2020-01-30 PROCEDURE — 1123F ACP DISCUSS/DSCN MKR DOCD: CPT | Performed by: FAMILY MEDICINE

## 2020-01-30 PROCEDURE — G8399 PT W/DXA RESULTS DOCUMENT: HCPCS | Performed by: FAMILY MEDICINE

## 2020-01-30 PROCEDURE — G8427 DOCREV CUR MEDS BY ELIG CLIN: HCPCS | Performed by: FAMILY MEDICINE

## 2020-01-30 PROCEDURE — 1036F TOBACCO NON-USER: CPT | Performed by: FAMILY MEDICINE

## 2020-01-30 PROCEDURE — 99212 OFFICE O/P EST SF 10 MIN: CPT | Performed by: FAMILY MEDICINE

## 2020-01-30 PROCEDURE — G8484 FLU IMMUNIZE NO ADMIN: HCPCS | Performed by: FAMILY MEDICINE

## 2020-01-30 PROCEDURE — 4040F PNEUMOC VAC/ADMIN/RCVD: CPT | Performed by: FAMILY MEDICINE

## 2020-01-30 PROCEDURE — 1090F PRES/ABSN URINE INCON ASSESS: CPT | Performed by: FAMILY MEDICINE

## 2020-01-30 PROCEDURE — 3017F COLORECTAL CA SCREEN DOC REV: CPT | Performed by: FAMILY MEDICINE

## 2020-02-04 ENCOUNTER — TELEPHONE (OUTPATIENT)
Dept: FAMILY MEDICINE CLINIC | Age: 75
End: 2020-02-04

## 2020-02-07 ENCOUNTER — HOSPITAL ENCOUNTER (OUTPATIENT)
Dept: CARDIAC REHAB | Age: 75
Discharge: HOME | End: 2020-02-07

## 2020-02-07 VITALS
HEART RATE: 66 BPM | BODY MASS INDEX: 32.65 KG/M2 | SYSTOLIC BLOOD PRESSURE: 141 MMHG | DIASTOLIC BLOOD PRESSURE: 85 MMHG | HEIGHT: 65 IN | RESPIRATION RATE: 20 BRPM | WEIGHT: 196 LBS

## 2020-02-07 PROCEDURE — 9900000038 HC CARDIAC REHAB PHASE 3 - MONTHLY

## 2020-02-07 ASSESSMENT — PATIENT HEALTH QUESTIONNAIRE - PHQ9: SUM OF ALL RESPONSES TO PHQ QUESTIONS 1-9: 4

## 2020-03-02 ENCOUNTER — HOSPITAL ENCOUNTER (OUTPATIENT)
Age: 75
Discharge: HOME | End: 2020-03-04
Payer: MEDICARE

## 2020-03-02 LAB
ALBUMIN SERPL-MCNC: 4.3 G/DL (ref 3.5–5.2)
ALP BLD-CCNC: 89 U/L (ref 35–104)
ALT SERPL-CCNC: 74 U/L (ref 0–32)
ANION GAP SERPL CALCULATED.3IONS-SCNC: 18 MMOL/L (ref 7–16)
AST SERPL-CCNC: 70 U/L (ref 0–31)
BASOPHILS ABSOLUTE: 0.07 E9/L (ref 0–0.2)
BASOPHILS RELATIVE PERCENT: 0.7 % (ref 0–2)
BILIRUB SERPL-MCNC: 0.5 MG/DL (ref 0–1.2)
BUN BLDV-MCNC: 12 MG/DL (ref 8–23)
CALCIUM SERPL-MCNC: 10 MG/DL (ref 8.6–10.2)
CHLORIDE BLD-SCNC: 105 MMOL/L (ref 98–107)
CHOLESTEROL, TOTAL: 221 MG/DL (ref 0–199)
CO2: 19 MMOL/L (ref 22–29)
CREAT SERPL-MCNC: 0.8 MG/DL (ref 0.5–1)
EOSINOPHILS ABSOLUTE: 0.32 E9/L (ref 0.05–0.5)
EOSINOPHILS RELATIVE PERCENT: 3 % (ref 0–6)
FOLATE: >20 NG/ML (ref 4.8–24.2)
GFR AFRICAN AMERICAN: >60
GFR NON-AFRICAN AMERICAN: >60 ML/MIN/1.73
GLUCOSE BLD-MCNC: 89 MG/DL (ref 74–99)
HBA1C MFR BLD: 5.9 % (ref 4–5.6)
HCT VFR BLD CALC: 48 % (ref 34–48)
HDLC SERPL-MCNC: 46 MG/DL
HEMOGLOBIN: 15.6 G/DL (ref 11.5–15.5)
IMMATURE GRANULOCYTES #: 0.03 E9/L
IMMATURE GRANULOCYTES %: 0.3 % (ref 0–5)
LDL CHOLESTEROL CALCULATED: 136 MG/DL (ref 0–99)
LYMPHOCYTES ABSOLUTE: 4.58 E9/L (ref 1.5–4)
LYMPHOCYTES RELATIVE PERCENT: 43.4 % (ref 20–42)
MCH RBC QN AUTO: 30.1 PG (ref 26–35)
MCHC RBC AUTO-ENTMCNC: 32.5 % (ref 32–34.5)
MCV RBC AUTO: 92.5 FL (ref 80–99.9)
MONOCYTES ABSOLUTE: 0.76 E9/L (ref 0.1–0.95)
MONOCYTES RELATIVE PERCENT: 7.2 % (ref 2–12)
NEUTROPHILS ABSOLUTE: 4.79 E9/L (ref 1.8–7.3)
NEUTROPHILS RELATIVE PERCENT: 45.4 % (ref 43–80)
PDW BLD-RTO: 12.7 FL (ref 11.5–15)
PLATELET # BLD: 191 E9/L (ref 130–450)
PMV BLD AUTO: 11 FL (ref 7–12)
POTASSIUM SERPL-SCNC: 4.4 MMOL/L (ref 3.5–5)
RBC # BLD: 5.19 E12/L (ref 3.5–5.5)
SODIUM BLD-SCNC: 142 MMOL/L (ref 132–146)
T4 FREE: 1.45 NG/DL (ref 0.93–1.7)
TOTAL PROTEIN: 7.2 G/DL (ref 6.4–8.3)
TRIGL SERPL-MCNC: 194 MG/DL (ref 0–149)
TSH SERPL DL<=0.05 MIU/L-ACNC: 4.81 UIU/ML (ref 0.27–4.2)
VITAMIN B-12: >2000 PG/ML (ref 211–946)
VITAMIN D 25-HYDROXY: 80 NG/ML (ref 30–100)
VLDLC SERPL CALC-MCNC: 39 MG/DL
WBC # BLD: 10.6 E9/L (ref 4.5–11.5)

## 2020-03-02 PROCEDURE — 84443 ASSAY THYROID STIM HORMONE: CPT

## 2020-03-02 PROCEDURE — 80053 COMPREHEN METABOLIC PANEL: CPT

## 2020-03-02 PROCEDURE — 82306 VITAMIN D 25 HYDROXY: CPT

## 2020-03-02 PROCEDURE — 36415 COLL VENOUS BLD VENIPUNCTURE: CPT

## 2020-03-02 PROCEDURE — 82746 ASSAY OF FOLIC ACID SERUM: CPT

## 2020-03-02 PROCEDURE — 85025 COMPLETE CBC W/AUTO DIFF WBC: CPT

## 2020-03-02 PROCEDURE — 84439 ASSAY OF FREE THYROXINE: CPT

## 2020-03-02 PROCEDURE — 83036 HEMOGLOBIN GLYCOSYLATED A1C: CPT

## 2020-03-02 PROCEDURE — 80061 LIPID PANEL: CPT

## 2020-03-02 PROCEDURE — 82607 VITAMIN B-12: CPT

## 2020-03-03 NOTE — RESULT ENCOUNTER NOTE
Vitamin D on the high side. Check on what supplementation she is taking. TSH elevated indicating underactivity.   Keep follow-up to review more detail sooner as needed

## 2020-03-04 ENCOUNTER — OFFICE VISIT (OUTPATIENT)
Dept: PRIMARY CARE CLINIC | Age: 75
End: 2020-03-04
Payer: MEDICARE

## 2020-03-04 VITALS
WEIGHT: 194 LBS | BODY MASS INDEX: 32.79 KG/M2 | TEMPERATURE: 98 F | SYSTOLIC BLOOD PRESSURE: 118 MMHG | DIASTOLIC BLOOD PRESSURE: 84 MMHG | OXYGEN SATURATION: 94 % | HEART RATE: 84 BPM | RESPIRATION RATE: 14 BRPM

## 2020-03-04 PROBLEM — Z12.39 SCREENING FOR MALIGNANT NEOPLASM OF BREAST: Status: ACTIVE | Noted: 2019-07-02

## 2020-03-04 PROCEDURE — 1123F ACP DISCUSS/DSCN MKR DOCD: CPT | Performed by: FAMILY MEDICINE

## 2020-03-04 PROCEDURE — 1036F TOBACCO NON-USER: CPT | Performed by: FAMILY MEDICINE

## 2020-03-04 PROCEDURE — G8417 CALC BMI ABV UP PARAM F/U: HCPCS | Performed by: FAMILY MEDICINE

## 2020-03-04 PROCEDURE — 99215 OFFICE O/P EST HI 40 MIN: CPT | Performed by: FAMILY MEDICINE

## 2020-03-04 PROCEDURE — 3017F COLORECTAL CA SCREEN DOC REV: CPT | Performed by: FAMILY MEDICINE

## 2020-03-04 PROCEDURE — G8484 FLU IMMUNIZE NO ADMIN: HCPCS | Performed by: FAMILY MEDICINE

## 2020-03-04 PROCEDURE — G8399 PT W/DXA RESULTS DOCUMENT: HCPCS | Performed by: FAMILY MEDICINE

## 2020-03-04 PROCEDURE — 1090F PRES/ABSN URINE INCON ASSESS: CPT | Performed by: FAMILY MEDICINE

## 2020-03-04 PROCEDURE — 4040F PNEUMOC VAC/ADMIN/RCVD: CPT | Performed by: FAMILY MEDICINE

## 2020-03-04 PROCEDURE — G8427 DOCREV CUR MEDS BY ELIG CLIN: HCPCS | Performed by: FAMILY MEDICINE

## 2020-03-04 RX ORDER — LEVOTHYROXINE SODIUM 112 UG/1
112 TABLET ORAL DAILY
Qty: 90 TABLET | Refills: 3 | Status: SHIPPED
Start: 2020-03-04 | End: 2021-02-17 | Stop reason: SDUPTHER

## 2020-03-04 NOTE — PROGRESS NOTES
19  Carla Bowels : 1945 Sex: female  Age: 69 y.o. Chief Complaint   Patient presents with    Sleep Apnea       HPI  HPI:      Patient presents today with her  for follow-up. They did not keep their appointment with Dr. Messer. I emphasized importance. He states \"they were not doing anything\" I explained the importance of follow-up so that full plan can be initiated. He admits that they wanted to do a 's test on her and that is why they canceled although admits that she is not driving anyhow, he was worried about traveling without a 's license. I counseled on this. Will follow up with him. TSH was high on 100, low on 112, high again on 100. Went from 0.2-4.10, , vitamin D 80 on vitamin D3 over-the-counter supplementation but if she does not overdose, hemoglobin 15.6 other labs stable, urinalysis negative. She was with Dr. Quinten Gunter who is moving and she will establish with another urologist in that group. They manage her Myrbetriq. She sees a sleep specialist who manages the Provigil. She follows with a cardiologist as well. She saw Dr. Reena Grewal .       She follows with urology (previously , will be switching since he left), Godwin Hernandez,  Dr. Geraldine Samson, Dr Messer ([facility])      Most Recent Labs  CBC  Lab Results   Component Value Date    WBC 10.6 2020    WBC 11.4 2019    WBC 10.0 2019    RBC 5.19 2020    RBC 5.11 2019    RBC 5.05 2019    HGB 15.6 2020    HGB 15.7 2019    HGB 15.4 2019    HCT 48.0 2020    HCT 47.2 2019    HCT 47.1 2019    MCV 92.5 2020    MCV 92.4 2019    MCV 93.3 2019     2020     2019     2019      CMP  Lab Results   Component Value Date     2020     2019     2019    K 4.4 2020    K 4.3 2019    K 144 10/04/2013     VITAMIN D  Lab Results   Component Value Date    VITD25 80 03/02/2020    VITD25 37 08/15/2011    VITD25 51 04/25/2011     MAGNESIUM  Lab Results   Component Value Date    MG 2.2 05/22/2014    MG 2.0 04/28/2014    MG 2.3 03/23/2011      PHOS  No results found for: PHOS   RON   Lab Results   Component Value Date    RON NEGATIVE 11/29/2012     RHEUMATOID FACTOR  No results found for: RF  PSA  No results found for: PSA   HEPATITIS C  No results found for: HCVABI  HIV  No results found for: GRG4MMB, HIV1QT  UA  Lab Results   Component Value Date    COLORU yellow 12/06/2019    COLORU Yellow 06/26/2019    COLORU Yellow 05/05/2017    COLORU YELLOW 11/29/2012    CLARITYU clear 12/06/2019    CLARITYU CLOUDY 06/26/2019    CLARITYU Clear 05/05/2017    CLARITYU CLEAR 11/29/2012    GLUCOSEU negative 12/06/2019    GLUCOSEU Negative 06/26/2019    GLUCOSEU Negative 05/05/2017    GLUCOSEU NEGATIVE 11/29/2012    BILIRUBINUR negative 12/06/2019    BILIRUBINUR Negative 06/26/2019    BILIRUBINUR Negative 05/05/2017    BILIRUBINUR NEGATIVE 11/29/2012    KETUA negative 12/06/2019    KETUA Negative 06/26/2019    KETUA Negative 05/05/2017    KETUA NEGATIVE 11/29/2012    SPECGRAV >=1.030 12/06/2019    SPECGRAV >=1.030 06/26/2019    SPECGRAV 1.020 05/05/2017    SPECGRAV >=1.030 11/29/2012    BLOODU negative 12/06/2019    BLOODU Negative 06/26/2019    BLOODU Negative 05/05/2017    BLOODU NEGATIVE 11/29/2012    PHUR 5.5 12/06/2019    PHUR 5.5 06/26/2019    PHUR 5.5 05/05/2017    PHUR 5.5 11/29/2012    PROTEINU negative 12/06/2019    PROTEINU Negative 06/26/2019    PROTEINU Negative 05/05/2017    PROTEINU NEGATIVE 11/29/2012    UROBILINOGEN 0.2 06/26/2019    UROBILINOGEN 0.2 05/05/2017    UROBILINOGEN 0.2 11/29/2012    NITRU Negative 06/26/2019    NITRU Negative 05/05/2017    NITRU NEGATIVE 11/29/2012    LEUKOCYTESUR trace 12/06/2019    LEUKOCYTESUR TRACE 06/26/2019    LEUKOCYTESUR TRACE 05/05/2017    LEUKOCYTESUR NEGATIVE 11/29/2012     Urine Micro/Albumin Ratio  No results found for: MALBCR      Review of Systems  ROS:  Const: Denies chills, fever, malaise and sweats. Eyes: Denies discharge, pain, redness and visual disturbance. ENMT: Denies earaches, other ear symptoms. Denies nasal or sinus symptoms other than stated  above. Denies mouth and tongue lesions and sore throat. CV: Denies chest discomfort, pain; diaphoresis, dizziness, edema, lightheadedness, orthopnea,  palpitations, syncope and near syncopal episode or any exertional symptoms  Resp: Denies cough, hemoptysis, pleuritic pain, SOB, sputum production and wheezing. GI: Denies abdominal pain, change in bowel habits, hematochezia, melena, nausea and vomiting. : Denies urinary symptoms including dysuria , urgency, frequency or hematuria. Musculo: Denies musculoskeletal symptoms. Skin: Denies bruising and rash. Neuro: Denies headache, numbness, stiff neck, tingling and focal weakness slurred speech or facial  droop  Hema/Lymph: Denies bleeding/bruising tendency and enlarged lymph nodes        Current Outpatient Medications:     levothyroxine (SYNTHROID) 112 MCG tablet, Take 1 tablet by mouth daily M-SAT; TAKE 1/2 PO SUNDAYS ONLY, Disp: 90 tablet, Rfl: 3    ketoconazole (NIZORAL) 2 % shampoo, RIAN EXT AA  OF SCALP TWICE A WEEK LEAVING ON 5 MINUTES THEN RINSE OFF, Disp: , Rfl:     donepezil (ARICEPT) 10 MG tablet, Take 1 tablet by mouth nightly, Disp: 14 tablet, Rfl: 0    metFORMIN (GLUCOPHAGE) 500 MG tablet, Take 1 tablet by mouth daily , Disp: , Rfl:     metoprolol succinate (TOPROL XL) 100 MG extended release tablet, Take 1 tablet by mouth, Disp: , Rfl:     mirabegron (MYRBETRIQ) 50 MG TB24, Take 1 tablet by mouth, Disp: , Rfl:     modafinil (PROVIGIL) 200 MG tablet, Take 200 mg by mouth daily. ., Disp: , Rfl:     B Complex Vitamins (B-COMPLEX/B-12 PO), Take by mouth daily LD 12/12/18, Disp: , Rfl:   Allergies   Allergen Reactions    Morphine Itching    Statins      Other reaction(s): Unknown    Statins Depletion Therapy Other (See Comments)     MYALGIA       Past Medical History:   Diagnosis Date    Arthritis     Diabetes mellitus ([address]     Difficult intubation     carries card, copy on chart  Glidescope#3 with ETT size7    Hyperlipidemia     Hypothyroidism     Left sided abdominal pain     Memory problem     still competent    GORDY on CPAP     Pacemaker     Rectal bleeding     Ringing in the ears      Past Surgical History:   Procedure Laterality Date    ABLATION OF DYSRHYTHMIC FOCUS      BREAST SURGERY      implants    COLONOSCOPY  ? Dr. Omaira Wilson twisted     COLONOSCOPY  ? Dr. Maude Lovelace. incomplete.  COLONOSCOPY  2018    Dr. Boom Trujillo N/A 2018    COLONOSCOPY DIAGNOSTIC performed by Prinec Daily MD at [address] [place] [address]  09/10/2011    POSTERIOR    HYSTERECTOMY      vaginal. uterus only.  KNEE ARTHROPLASTY Right     partial    KNEE ARTHROSCOPY  2011    right knee    PACEMAKER PLACEMENT  2014    Medtronic dual chamber    TOTAL HIP ARTHROPLASTY Right 2015     Family History   Problem Relation Age of Onset    Pacemaker Mother     Heart Attack Father      Social History     Tobacco Use    Smoking status: Former Smoker     Packs/day: 1.50     Years: 13.00     Pack years: 19.50     Types: Cigarettes     Start date: 1973     Last attempt to quit: 1986     Years since quittin.1    Smokeless tobacco: Never Used    Tobacco comment: quit smoking    Substance Use Topics    Alcohol use: Yes     Comment: socially -- 3 drinks per month at the most    Drug use: No      Social History     Patient does not qualify to have social determinant information on file (likely too young).    Social History Narrative    PMH:    Problem List: Urinary tract infectious disease, Obstructive sleep apnea syndrome, Adult health    examination, Adult health examination, Constipation, blood sugar ambulatory with parameters reviewed call in if out of range. Hyper and hypoglycemic precautions reviewed. Micro-and macrovascular  complications reviewed.  Importance of at least yearly eye exams and daily foot exams reviewed.  Tolerating metformin . no longer on invokana.     Dementia without behavioral disturbance  Counseled extensively.  Differential reviewed including various forms of dementia and pseudodementia.  On Aricept 10 mg daily.  She should titrate as tolerated.  Consider add on therapy such as Felicia Walker should see Dr. Jama SY , declines now, but was seen Dr. Lily Sams specialist from [facility] [employer] [facility] [employer]. However they were noncompliant without follow-up as well. Emphasized the importance of follow-up. She is not driving and we emphasized importance of this. Safety precautions reviewed.  Absolutely no driving if concerns for dementia.   Cognitive screen declined today negative.      Mixed hyperlipidemia  not at goal  Significant risk of cerebrovascular/cardiovascular event. Imperative this  be controlled with history of TIA, however adamantly refuses any further treatment at this time. Refuses to try any other statin or Zetia stating that these were not tolerated in the  past. She did not tolerate Niaspan. did not tolerate WelChol. . declines  cholestyramine. Risk of stroke and heart attack reviewed. lifestyle modification  reviewed. risk of hyperlipidemia reviewed . Did not tolerate fenofibrate  Counseled on repatha, declines. Simply wants monitored. Refuses other therapy     Supraventricular tachycardia ([address]  h/o svt Status post ablation  . Also history of long pauses-since had pacemaker by Dr. Kathi Hampton. FU with her. Follow-up Dr Keisha Mcmillan . Asymptomatic sends recordings by phone.  ekg done and reviewed with her     Liver disease  Counseled extensively. Differential reviewed, including serious etiologies. Likely  fatty liver. Precautions reviewed.  Lifestyle modification appropriate diet and weight  loss reviewed. Risks of even this leading to cirrhosis reviewed. Other than basic  monitoring on interested in other evaluation or treatment, in-depth blood work,  imaging or otherwise. Hep C 10/18 negative. Relatively stable    Tubular adenoma  Small polyp 7/18. Dr. Luke Jason recommended basic screenings if repeated at all.  Asymptomatic     Vitamin D deficiency  Becoming elevated at [de-identified] (3/20) on over-the-counter vitamin D3-they are not sure what dose there honestly should reduce it to the previous dose. Wrote this down    Health maintenance examination  Health maintenance issues discussed at length 7/17.  Encouraged yearly physicals    No flowsheet data found. Plan as above. Counseled extensively and differential diagnoses relevant to above were reviewed, including serious etiologies. Side effects and interactions of medications were reviewed. Adjust Synthroid. Otherwise simply wants blood work and follow-up in 3 months sooner as needed. Follow-up with [facility] [employer] specialist ASAP. Follow with other specialist as well. Over 40 minutes  spent with the patient in reviewing records, reviewing with patient/family, counseling, ordering,  prescribing, completing h&p, etc., with over 50% of the time spent face to face counseling. As long as symptoms steadily improve/resolve, and medical conditions follow the expected course, FU as below, sooner PRN. No follow-ups on file. Signs and symptoms to watch for discussed, serious signs and symptoms reviewed. ER if any. Kaley Danielson MD    Patients are advised to check with insurance company to ensure coverage and to fully understand benefits and cost prior to any testing. This note was created with the assistance of voice recognition software. Document was reviewed however may contain grammatical errors.

## 2020-03-19 RX ORDER — DONEPEZIL HYDROCHLORIDE 10 MG/1
10 TABLET, FILM COATED ORAL NIGHTLY
Qty: 90 TABLET | Refills: 3 | Status: SHIPPED
Start: 2020-03-19 | End: 2020-11-21 | Stop reason: SDUPTHER

## 2020-03-19 NOTE — TELEPHONE ENCOUNTER
OK.  I will send a refill so that she can continue this, but as mentioned on multiple telephone encounters as well as multiple office visits, their specialist is supposed to be managing this medication. They are supposed to be following with a specialist in [place], I had also previously referred to Dr. Fernando Woody locally but I realize they are not seeing him , although continues to be an option. Keep FU (with labs if applicable) as directed.

## 2020-03-30 ENCOUNTER — HOSPITAL ENCOUNTER (OUTPATIENT)
Dept: CARDIAC REHAB | Age: 75
Discharge: HOME | End: 2020-03-30

## 2020-04-03 PROBLEM — Z12.39 SCREENING FOR MALIGNANT NEOPLASM OF BREAST: Status: RESOLVED | Noted: 2019-07-02 | Resolved: 2020-04-03

## 2020-07-31 ENCOUNTER — OFFICE VISIT (OUTPATIENT)
Dept: PRIMARY CARE CLINIC | Age: 75
End: 2020-07-31
Payer: MEDICARE

## 2020-07-31 VITALS
TEMPERATURE: 96.7 F | DIASTOLIC BLOOD PRESSURE: 70 MMHG | BODY MASS INDEX: 33.29 KG/M2 | WEIGHT: 197 LBS | SYSTOLIC BLOOD PRESSURE: 128 MMHG | OXYGEN SATURATION: 93 % | HEART RATE: 75 BPM

## 2020-07-31 PROBLEM — D48.5 NEOPLASM OF UNCERTAIN BEHAVIOR OF SKIN: Status: ACTIVE | Noted: 2020-07-31

## 2020-07-31 PROBLEM — D75.1 POLYCYTHEMIA: Status: ACTIVE | Noted: 2020-07-31

## 2020-07-31 PROCEDURE — 3017F COLORECTAL CA SCREEN DOC REV: CPT | Performed by: FAMILY MEDICINE

## 2020-07-31 PROCEDURE — 3044F HG A1C LEVEL LT 7.0%: CPT | Performed by: FAMILY MEDICINE

## 2020-07-31 PROCEDURE — G8427 DOCREV CUR MEDS BY ELIG CLIN: HCPCS | Performed by: FAMILY MEDICINE

## 2020-07-31 PROCEDURE — 2022F DILAT RTA XM EVC RTNOPTHY: CPT | Performed by: FAMILY MEDICINE

## 2020-07-31 PROCEDURE — 1036F TOBACCO NON-USER: CPT | Performed by: FAMILY MEDICINE

## 2020-07-31 PROCEDURE — G8417 CALC BMI ABV UP PARAM F/U: HCPCS | Performed by: FAMILY MEDICINE

## 2020-07-31 PROCEDURE — G8399 PT W/DXA RESULTS DOCUMENT: HCPCS | Performed by: FAMILY MEDICINE

## 2020-07-31 PROCEDURE — 1123F ACP DISCUSS/DSCN MKR DOCD: CPT | Performed by: FAMILY MEDICINE

## 2020-07-31 PROCEDURE — 1090F PRES/ABSN URINE INCON ASSESS: CPT | Performed by: FAMILY MEDICINE

## 2020-07-31 PROCEDURE — 4040F PNEUMOC VAC/ADMIN/RCVD: CPT | Performed by: FAMILY MEDICINE

## 2020-07-31 PROCEDURE — 99215 OFFICE O/P EST HI 40 MIN: CPT | Performed by: FAMILY MEDICINE

## 2020-07-31 NOTE — ASSESSMENT & PLAN NOTE
Counseled. Check ferritin. Proper hydration. Recheck. Counseled extensively. Differential reviewed, including serious etiologies.

## 2020-07-31 NOTE — PROGRESS NOTES
19  Alok Gallagher : 1945 Sex: female  Age: 69 y.o. Chief Complaint   Patient presents with   Jada Escamilla Other     has not had labs done        HPI  HPI:    Patient presents today, I spoke with her  first.  Denver Champagne to have memory issues. He is quite frustrated [place], thinks it is [place] racket\". Does not a follow-up there. Willing to follow with Dr. Garcia Later now. Memory seems to be progressing. She is compliant with CPAP. Uses it at least 6 to 8 hours a night with good results    Driving precautions again reemphasized. Safety precautions reviewed.     She will continue to follow with urologist who manages [facility], previously saw Dr. Lacy Edwards    She follows with Dr. Gail Washburn      She has a skin lesion left cheek            She follows with urology (previously , will be switching since he left), Dr. Marjorie Dean (previously), Maryan Ayers,  Dr. Garcia Later, Dr Tod Posada ([facility]), Dr. Phill Oppenheim work from 3/20 reviewed, CO2 19, anion gap 18, HDL 46, , triglycerides 194, refuse medication, ALT 74, AST 70, hemoglobin A1c 5.9, TSH is slightly elevated at 4.810, free T4 1.45, vitamin D On high end at 80, hemoglobin 15.6, B12 greater than 1606 folic acid greater than 20  Most Recent Labs  CBC  Lab Results   Component Value Date    WBC 10.6 2020    WBC 11.4 2019    WBC 10.0 2019    RBC 5.19 2020    RBC 5.11 2019    RBC 5.05 2019    HGB 15.6 2020    HGB 15.7 2019    HGB 15.4 2019    HCT 48.0 2020    HCT 47.2 2019    HCT 47.1 2019    MCV 92.5 2020    MCV 92.4 2019    MCV 93.3 2019     2020     2019     2019      CMP  Lab Results   Component Value Date     2020     2019     2019    K 4.4 2020    K 4.3 2019    K 4.2 2019     2020     2019     08/05/2019    CO2 19 03/02/2020    CO2 21 08/28/2019    CO2 23 08/05/2019    ANIONGAP 18 03/02/2020    ANIONGAP 13 08/28/2019    ANIONGAP 16 08/05/2019    GLUCOSE 89 03/02/2020    GLUCOSE 100 08/28/2019    GLUCOSE 103 08/05/2019    GLUCOSE 104 08/15/2011    GLUCOSE 113 04/25/2011    GLUCOSE 97 04/01/2011    BUN 12 03/02/2020    BUN 12 08/28/2019    BUN 11 08/05/2019    CREATININE 0.8 03/02/2020    CREATININE 0.6 08/28/2019    CREATININE 0.7 08/05/2019    LABGLOM >60 03/02/2020    LABGLOM >60 08/28/2019    LABGLOM >60 08/05/2019    GFRAA >60 03/02/2020    GFRAA >60 08/28/2019    GFRAA >60 08/05/2019    CALCIUM 10.0 03/02/2020    CALCIUM 9.8 08/28/2019    CALCIUM 9.8 08/05/2019    PROT 7.2 03/02/2020    PROT 7.4 08/28/2019    PROT 7.0 08/05/2019    LABALBU 4.3 03/02/2020    LABALBU 4.2 08/28/2019    LABALBU 4.4 08/05/2019    LABALBU 4.4 08/15/2011    LABALBU 4.2 04/25/2011    LABALBU 4.2 03/24/2011    BILITOT 0.5 03/02/2020    BILITOT 0.4 08/28/2019    BILITOT 0.3 08/05/2019    ALKPHOS 89 03/02/2020    ALKPHOS 98 08/28/2019    ALKPHOS 97 08/05/2019    AST 70 03/02/2020    AST 57 08/28/2019    AST 48 08/05/2019    ALT 74 03/02/2020    ALT 68 08/28/2019    ALT 68 08/05/2019     A1C  Lab Results   Component Value Date    LABA1C 5.9 03/02/2020    LABA1C 6.3 06/26/2019    LABA1C 6.0 08/15/2011     TSH  Lab Results   Component Value Date    TSH 4.810 03/02/2020    TSH 0.262 08/28/2019    TSH 0.413 08/05/2019     FREET4  Lab Results   Component Value Date    V1UAUZD 7.1 06/26/2019    X8BSYIV 7.6 05/21/2014     LIPID  Lab Results   Component Value Date    CHOL 221 03/02/2020    CHOL 225 06/26/2019    CHOL 234 10/04/2013    CHOL 212 02/15/2013    CHOL 198 03/24/2011    HDL 46 03/02/2020    HDL 46 06/26/2019    HDL 56.0 10/04/2013    LDLCALC 136 03/02/2020    LDLCALC 148 06/26/2019    LDLCALC 149 10/04/2013    TRIG 194 03/02/2020    TRIG 154 06/26/2019    TRIG 144 10/04/2013     VITAMIN D  Lab Results   Component Value Date VITD25 80 03/02/2020    VITD25 37 08/15/2011    VITD25 51 04/25/2011     MAGNESIUM  Lab Results   Component Value Date    MG 2.2 05/22/2014    MG 2.0 04/28/2014    MG 2.3 03/23/2011      PHOS  No results found for: PHOS   RON   Lab Results   Component Value Date    RON NEGATIVE 11/29/2012     RHEUMATOID FACTOR  No results found for: RF  PSA  No results found for: PSA   HEPATITIS C  No results found for: HCVABI  HIV  No results found for: EXL4TDD, HIV1QT  UA  Lab Results   Component Value Date    COLORU yellow 12/06/2019    COLORU Yellow 06/26/2019    COLORU Yellow 05/05/2017    COLORU YELLOW 11/29/2012    CLARITYU clear 12/06/2019    CLARITYU CLOUDY 06/26/2019    CLARITYU Clear 05/05/2017    CLARITYU CLEAR 11/29/2012    GLUCOSEU negative 12/06/2019    GLUCOSEU Negative 06/26/2019    GLUCOSEU Negative 05/05/2017    GLUCOSEU NEGATIVE 11/29/2012    BILIRUBINUR negative 12/06/2019    BILIRUBINUR Negative 06/26/2019    BILIRUBINUR Negative 05/05/2017    BILIRUBINUR NEGATIVE 11/29/2012    KETUA negative 12/06/2019    KETUA Negative 06/26/2019    KETUA Negative 05/05/2017    KETUA NEGATIVE 11/29/2012    SPECGRAV >=1.030 12/06/2019    SPECGRAV >=1.030 06/26/2019    SPECGRAV 1.020 05/05/2017    SPECGRAV >=1.030 11/29/2012    BLOODU negative 12/06/2019    BLOODU Negative 06/26/2019    BLOODU Negative 05/05/2017    BLOODU NEGATIVE 11/29/2012    PHUR 5.5 12/06/2019    PHUR 5.5 06/26/2019    PHUR 5.5 05/05/2017    PHUR 5.5 11/29/2012    PROTEINU negative 12/06/2019    PROTEINU Negative 06/26/2019    PROTEINU Negative 05/05/2017    PROTEINU NEGATIVE 11/29/2012    UROBILINOGEN 0.2 06/26/2019    UROBILINOGEN 0.2 05/05/2017    UROBILINOGEN 0.2 11/29/2012    NITRU Negative 06/26/2019    NITRU Negative 05/05/2017    NITRU NEGATIVE 11/29/2012    LEUKOCYTESUR trace 12/06/2019    LEUKOCYTESUR TRACE 06/26/2019    LEUKOCYTESUR TRACE 05/05/2017    LEUKOCYTESUR NEGATIVE 11/29/2012     Urine Micro/Albumin Ratio  No results found for: MALBCR      Review of Systems  ROS:  Const: Denies chills, fever, malaise and sweats. Eyes: Denies discharge, pain, redness and visual disturbance. ENMT: Denies earaches, other ear symptoms. Denies nasal or sinus symptoms other than stated  above. Denies mouth and tongue lesions and sore throat. CV: Denies chest discomfort, pain; diaphoresis, dizziness, edema, lightheadedness, orthopnea,  palpitations, syncope and near syncopal episode or any exertional symptoms  Resp: Denies cough, hemoptysis, pleuritic pain, SOB, sputum production and wheezing. GI: Denies abdominal pain, change in bowel habits, hematochezia, melena, nausea and vomiting. : Denies urinary symptoms including dysuria , urgency, frequency or hematuria. Musculo: Denies musculoskeletal symptoms. Skin: Denies bruising and rash, other than as above. Neuro: Denies headache, numbness, stiff neck, tingling and focal weakness slurred speech or facial  Droop, other than as above. Hema/Lymph: Denies bleeding/bruising tendency and enlarged lymph nodes        Current Outpatient Medications:     donepezil (ARICEPT) 10 MG tablet, Take 1 tablet by mouth nightly, Disp: 90 tablet, Rfl: 3    levothyroxine (SYNTHROID) 112 MCG tablet, Take 1 tablet by mouth daily M-SAT; TAKE 1/2 PO SUNDAYS ONLY, Disp: 90 tablet, Rfl: 3    metFORMIN (GLUCOPHAGE) 500 MG tablet, Take 1 tablet by mouth daily , Disp: , Rfl:     metoprolol succinate (TOPROL XL) 100 MG extended release tablet, Take 1 tablet by mouth, Disp: , Rfl:     mirabegron (MYRBETRIQ) 50 MG TB24, Take 1 tablet by mouth, Disp: , Rfl:     modafinil (PROVIGIL) 200 MG tablet, Take 200 mg by mouth daily. ., Disp: , Rfl:     B Complex Vitamins (B-COMPLEX/B-12 PO), Take by mouth daily LD 12/12/18, Disp: , Rfl:   Allergies   Allergen Reactions    Morphine Itching    Statins      Other reaction(s): Unknown    Statins Depletion Therapy Other (See Comments)     MYALGIA       Past Medical History:   Diagnosis Date  Arthritis     Diabetes mellitus ([facility] [address]     Difficult intubation     carries card, copy on chart  Glidescope#3 with ETT size7    Hyperlipidemia     Hypothyroidism     Left sided abdominal pain     Memory problem     still competent    GORDY on CPAP     Pacemaker     Rectal bleeding     Ringing in the ears      Past Surgical History:   Procedure Laterality Date    ABLATION OF DYSRHYTHMIC FOCUS      BREAST SURGERY      implants    COLONOSCOPY  ? Dr. Rod Rodriguez twisted     COLONOSCOPY  ? Dr. Roland Hu. incomplete.  COLONOSCOPY  2018    Dr. Zackery Grimaldo N/A 2018    COLONOSCOPY DIAGNOSTIC performed by Faith Wagoner MD at [address] [place] [address]  09/10/2011    POSTERIOR    HYSTERECTOMY      vaginal. uterus only.      KNEE ARTHROPLASTY Right     partial    KNEE ARTHROSCOPY  2011    right knee    PACEMAKER PLACEMENT  2014    [employer]tronic dual chamber    TOTAL HIP ARTHROPLASTY Right 2015     Family History   Problem Relation Age of Onset    Pacemaker Mother     Heart Attack Father      Social History     Tobacco Use    Smoking status: Former Smoker     Packs/day: 1.50     Years: 13.00     Pack years: 19.50     Types: Cigarettes     Start date: 1973     Last attempt to quit: 1986     Years since quittin.6    Smokeless tobacco: Never Used    Tobacco comment: quit smoking    Substance Use Topics    Alcohol use: Yes     Comment: socially -- 3 drinks per month at the most    Drug use: No      Social History     Social History Narrative    PMH:    Problem List: Urinary tract infectious disease, Obstructive sleep apnea syndrome, Adult health    examination, Adult health examination, Constipation, Derangement of knee, Vitamin D deficiency,    Hypothyroidism, Conduction disorder of the heart, Hyperlipidemia    Health Maintenance:    Mini Mental Status - (2018)    Colonoscopy - (2018)    Colonoscopy Screening - (2018) appreciated. Extremities: No clubbing, cyanosis, or edema. No calf inflammation or tenderness. Abdomen: Bowel sounds are normoactive. Abdomen is soft, nontender, and nondistended. No  abdominal masses. No palpable hepatosplenomegaly. Lymph: No palpable or visible regional lymphadenopathy. Musculoskeletal: no acute joint inflammation. Skin: Dry and warm with no rash. Raised red irritated lesion left skin  Neuro: Alert and oriented. Affect: appropriate. Upper Extremities: 5/5 bilaterally. Lower Extremities:  5/5 bilaterally. Sensation intact to light touch. Reflexes: DTR's are symmetric and 2+ bilaterally. .  Cranial Nerves: Cranial nerves grossly intact. Assessment and Plan:   Diagnosis Orders   1. Dementia without behavioral disturbance, unspecified dementia type [facility])  Ambulatory referral to Geriatrics    Comprehensive Metabolic Panel    CBC Auto Differential    Vitamin B12 & Folate   2. Type 2 diabetes mellitus with hyperglycemia, without long-term current use of insulin (HCC)  Comprehensive Metabolic Panel    CBC Auto Differential    Hemoglobin A1C    Urinalysis    Microalbumin / Creatinine Urine Ratio   3. Supraventricular tachycardia (HCC)  Comprehensive Metabolic Panel    CBC Auto Differential   4. Acquired hypothyroidism  TSH without Reflex    Comprehensive Metabolic Panel    CBC Auto Differential    T4, Free   5. Liver disease  Comprehensive Metabolic Panel    CBC Auto Differential    Hepatitis C Antibody   6. Mixed hyperlipidemia  Lipid Panel    Comprehensive Metabolic Panel    CBC Auto Differential    CK   7. Obstructive sleep apnea syndrome  Comprehensive Metabolic Panel    CBC Auto Differential   8. Tubular adenoma  Comprehensive Metabolic Panel    CBC Auto Differential   9. Vitamin D deficiency  Comprehensive Metabolic Panel    CBC Auto Differential    Vitamin D 25 Hydroxy   10.  Neoplasm of uncertain behavior of skin  Amb External Referral To Dermatology    Comprehensive Metabolic Panel    CBC Auto Differential   11. Polycythemia  Ferritin       Polycythemia  Counseled. Check ferritin. Proper hydration. Recheck. Counseled extensively. Differential reviewed, including serious etiologies. Neoplasm of uncertain behavior of skin  Counseled. Refer back  to Dr. Daniel Weaver. Obstructive sleep apnea syndrome  Counseled. Doing very well CPAP. Uses it at least 8 to 10 hours per night 7 nights per week with very good results. Got a new machine toward the end of 2019. Hypothyroidism    TSH elevated on 112mcg qd, 1/2 Sunday. Was suppressed on 112 qd in the past. Declines d.a.w. Wants to continue generic. Various options reviewed including occluding continuing current dose i.e. alternating 100/112, versus suddenly increasing back to daily. She simply wants blood work again in a month and decide then. Current dose equating to about 104 mcg daily. Declines imaging.        Type 2 diabetes mellitus with hyperglycemia, without long-term current use of insulin ([facility])  Hemoglobin A1c stable, 5.9 on metformin 500 mg twice a day. Previously 1000 twice a day but she would like to keep low dose. Precautions reviewed. Risks  reviewed, proper hydration reviewed, risk of lactic acidosis reviewed . Encouraged  she watch blood sugar ambulatory with parameters reviewed call in if out of range. Hyper and hypoglycemic precautions reviewed. Micro-and macrovascular  complications reviewed.  Importance of at least yearly eye exams and daily foot exams reviewed.  Tolerating metformin . no longer on invokana.     Dementia without behavioral disturbance  Counseled extensively.  Differential reviewed including various forms of dementia and pseudodementia.  On Aricept 10 mg daily.  She should titrate as tolerated.  Consider add on therapy such as Namenda. They defer to specialist. Deni Chapa were seeing Dr. Luiza Roque specialist from [place]. However they re noncompliant without follow-up, feels it is a \"racket\".   Not compliant with follow with Dr. Yesenia Banerjee either but now say they will do so, referral placed. Emphasized the importance of follow-up. She is not driving and we emphasized importance of this. Safety precautions reviewed.  Absolutely no driving if concerns for dementia.         Mixed hyperlipidemia  not at goal  Significant risk of cerebrovascular/cardiovascular event. Imperative this  be controlled with history of TIA, however adamantly refuses any further treatment at this time. Refuses to try any other statin or Zetia stating that these were not tolerated in the  past. She did not tolerate Niaspan. did not tolerate WelChol. . declines  cholestyramine. Risk of stroke and heart attack reviewed. lifestyle modification  reviewed. risk of hyperlipidemia reviewed . Did not tolerate fenofibrate  Counseled on repatha, declines. Simply wants monitored. Refuses other therapy     Supraventricular tachycardia ([address]  h/o svt Status post ablation  . Also history of long pauses-since had pacemaker by Dr. Elena Ortega. FU with her. Follow-up Dr Clinton Lares . Asymptomatic sends recordings by phone.  ekg done and reviewed with her     Liver disease  Counseled extensively. Differential reviewed, including serious etiologies. Likely  fatty liver. Precautions reviewed. Lifestyle modification appropriate diet and weight  loss reviewed. Risks of even this leading to cirrhosis reviewed. Other than basic  monitoring on interested in other evaluation or treatment, in-depth blood work,  imaging or otherwise. Hep C 10/18 negative. Recheck. Relatively stable    Tubular adenoma  Small polyp 7/18. Dr. Contreras Saychaya recommended basic screenings if repeated at all.  Asymptomatic     Vitamin D deficiency  Becoming elevated at [de-identified] (3/20) on over-the-counter vitamin D3-they are not sure what dose they are on, they should reduce it. Again wrote this, they will call us with dose. Health maintenance examination  Health maintenance issues discussed at length 7/17.  Encouraged yearly physicals. Wants to stay conservative now      Polycythemia  Counseled. Check ferritin. Proper hydration. Recheck. Counseled extensively. Differential reviewed, including serious etiologies. Neoplasm of uncertain behavior of skin  Counseled. Refer back  to Dr. Seymour La. No flowsheet data found. Plan as above. Counseled extensively and differential diagnoses relevant to above were reviewed, including serious etiologies. Side effects and interactions of medications were reviewed. Counseled extensively. They will call us with vitamin D dosing. She symptoms get blood work and follow-up in about 5 weeks sooner as needed. Refer back to Dr. Jeevan Horowitz in Dr. Seymour La. Precautions reviewed. Compliance emphasized. Over 40 minutes  spent with the patient in reviewing records, reviewing with patient/family, counseling, ordering,  prescribing, completing h&p, etc., with over 50% of the time spent face to face counseling. As long as symptoms steadily improve/resolve, and medical conditions follow the expected course, FU as below, sooner PRN. Return in about 5 weeks (around 9/4/2020), or if symptoms worsen or fail to improve. Signs and symptoms to watch for discussed, serious signs and symptoms reviewed. ER if any. Kevin De La Cruz MD    Patients are advised to check with insurance company to ensure coverage and to fully understand benefits and cost prior to any testing. This note was created with the assistance of voice recognition software. Document was reviewed however may contain grammatical errors.

## 2020-08-07 ENCOUNTER — OFFICE VISIT (OUTPATIENT)
Dept: GERIATRIC MEDICINE | Age: 75
End: 2020-08-07
Payer: MEDICARE

## 2020-08-07 VITALS
RESPIRATION RATE: 20 BRPM | SYSTOLIC BLOOD PRESSURE: 130 MMHG | BODY MASS INDEX: 32.96 KG/M2 | DIASTOLIC BLOOD PRESSURE: 84 MMHG | TEMPERATURE: 98.5 F | WEIGHT: 197.8 LBS | HEART RATE: 72 BPM | HEIGHT: 65 IN

## 2020-08-07 PROCEDURE — 99212 OFFICE O/P EST SF 10 MIN: CPT | Performed by: INTERNAL MEDICINE

## 2020-08-07 PROCEDURE — 1123F ACP DISCUSS/DSCN MKR DOCD: CPT | Performed by: INTERNAL MEDICINE

## 2020-08-07 PROCEDURE — 1090F PRES/ABSN URINE INCON ASSESS: CPT | Performed by: INTERNAL MEDICINE

## 2020-08-07 PROCEDURE — 4040F PNEUMOC VAC/ADMIN/RCVD: CPT | Performed by: INTERNAL MEDICINE

## 2020-08-07 PROCEDURE — 3017F COLORECTAL CA SCREEN DOC REV: CPT | Performed by: INTERNAL MEDICINE

## 2020-08-07 PROCEDURE — 1036F TOBACCO NON-USER: CPT | Performed by: INTERNAL MEDICINE

## 2020-08-07 PROCEDURE — G8427 DOCREV CUR MEDS BY ELIG CLIN: HCPCS | Performed by: INTERNAL MEDICINE

## 2020-08-07 PROCEDURE — G8417 CALC BMI ABV UP PARAM F/U: HCPCS | Performed by: INTERNAL MEDICINE

## 2020-08-07 PROCEDURE — G8399 PT W/DXA RESULTS DOCUMENT: HCPCS | Performed by: INTERNAL MEDICINE

## 2020-08-12 ENCOUNTER — HOSPITAL ENCOUNTER (OUTPATIENT)
Age: 75
Discharge: HOME | End: 2020-08-14
Payer: MEDICARE

## 2020-08-12 LAB
ALBUMIN SERPL-MCNC: 4.2 G/DL (ref 3.5–5.2)
ALP BLD-CCNC: 87 U/L (ref 35–104)
ALT SERPL-CCNC: 53 U/L (ref 0–32)
ANION GAP SERPL CALCULATED.3IONS-SCNC: 18 MMOL/L (ref 7–16)
AST SERPL-CCNC: 57 U/L (ref 0–31)
BASOPHILS ABSOLUTE: 0.06 E9/L (ref 0–0.2)
BASOPHILS RELATIVE PERCENT: 0.6 % (ref 0–2)
BILIRUB SERPL-MCNC: 0.4 MG/DL (ref 0–1.2)
BUN BLDV-MCNC: 14 MG/DL (ref 8–23)
CALCIUM SERPL-MCNC: 10.1 MG/DL (ref 8.6–10.2)
CHLORIDE BLD-SCNC: 107 MMOL/L (ref 98–107)
CHOLESTEROL, TOTAL: 235 MG/DL (ref 0–199)
CO2: 20 MMOL/L (ref 22–29)
CREAT SERPL-MCNC: 0.9 MG/DL (ref 0.5–1)
EOSINOPHILS ABSOLUTE: 0.31 E9/L (ref 0.05–0.5)
EOSINOPHILS RELATIVE PERCENT: 3.2 % (ref 0–6)
FERRITIN: 153 NG/ML
FOLATE: >20 NG/ML (ref 4.8–24.2)
GFR AFRICAN AMERICAN: >60
GFR NON-AFRICAN AMERICAN: >60 ML/MIN/1.73
GLUCOSE BLD-MCNC: 156 MG/DL (ref 74–99)
HBA1C MFR BLD: 6.1 % (ref 4–5.6)
HCT VFR BLD CALC: 46.9 % (ref 34–48)
HDLC SERPL-MCNC: 47 MG/DL
HEMOGLOBIN: 15.5 G/DL (ref 11.5–15.5)
IMMATURE GRANULOCYTES #: 0.03 E9/L
IMMATURE GRANULOCYTES %: 0.3 % (ref 0–5)
LDL CHOLESTEROL CALCULATED: 122 MG/DL (ref 0–99)
LYMPHOCYTES ABSOLUTE: 4.39 E9/L (ref 1.5–4)
LYMPHOCYTES RELATIVE PERCENT: 45 % (ref 20–42)
MCH RBC QN AUTO: 30.8 PG (ref 26–35)
MCHC RBC AUTO-ENTMCNC: 33 % (ref 32–34.5)
MCV RBC AUTO: 93.1 FL (ref 80–99.9)
MONOCYTES ABSOLUTE: 0.55 E9/L (ref 0.1–0.95)
MONOCYTES RELATIVE PERCENT: 5.6 % (ref 2–12)
NEUTROPHILS ABSOLUTE: 4.41 E9/L (ref 1.8–7.3)
NEUTROPHILS RELATIVE PERCENT: 45.3 % (ref 43–80)
PDW BLD-RTO: 12.9 FL (ref 11.5–15)
PLATELET # BLD: 189 E9/L (ref 130–450)
PMV BLD AUTO: 11.5 FL (ref 7–12)
POTASSIUM SERPL-SCNC: 3.9 MMOL/L (ref 3.5–5)
RBC # BLD: 5.04 E12/L (ref 3.5–5.5)
SODIUM BLD-SCNC: 145 MMOL/L (ref 132–146)
T4 FREE: 1.67 NG/DL (ref 0.93–1.7)
TOTAL CK: 29 U/L (ref 20–180)
TOTAL PROTEIN: 7.2 G/DL (ref 6.4–8.3)
TRIGL SERPL-MCNC: 332 MG/DL (ref 0–149)
TSH SERPL DL<=0.05 MIU/L-ACNC: 1.87 UIU/ML (ref 0.27–4.2)
VITAMIN B-12: 1821 PG/ML (ref 211–946)
VLDLC SERPL CALC-MCNC: 66 MG/DL
WBC # BLD: 9.8 E9/L (ref 4.5–11.5)

## 2020-08-12 PROCEDURE — 36415 COLL VENOUS BLD VENIPUNCTURE: CPT

## 2020-08-12 PROCEDURE — 80053 COMPREHEN METABOLIC PANEL: CPT

## 2020-08-12 PROCEDURE — 82550 ASSAY OF CK (CPK): CPT

## 2020-08-12 PROCEDURE — 82306 VITAMIN D 25 HYDROXY: CPT

## 2020-08-12 PROCEDURE — 80061 LIPID PANEL: CPT

## 2020-08-12 PROCEDURE — 84443 ASSAY THYROID STIM HORMONE: CPT

## 2020-08-12 PROCEDURE — 85025 COMPLETE CBC W/AUTO DIFF WBC: CPT

## 2020-08-12 PROCEDURE — 82607 VITAMIN B-12: CPT

## 2020-08-12 PROCEDURE — 82746 ASSAY OF FOLIC ACID SERUM: CPT

## 2020-08-12 PROCEDURE — 83036 HEMOGLOBIN GLYCOSYLATED A1C: CPT

## 2020-08-12 PROCEDURE — 86803 HEPATITIS C AB TEST: CPT

## 2020-08-12 PROCEDURE — 82728 ASSAY OF FERRITIN: CPT

## 2020-08-12 PROCEDURE — 84439 ASSAY OF FREE THYROXINE: CPT

## 2020-08-13 LAB
HEPATITIS C ANTIBODY INTERPRETATION: NORMAL
VITAMIN D 25-HYDROXY: 97 NG/ML (ref 30–100)

## 2020-08-13 NOTE — RESULT ENCOUNTER NOTE
Vitamin D is nearly at a toxic level. Needs to discontinue any vitamin D supplementation.   Have her keep her follow-up to review more detail sooner as needed

## 2020-08-19 ENCOUNTER — TELEPHONE (OUTPATIENT)
Dept: PRIMARY CARE CLINIC | Age: 75
End: 2020-08-19

## 2020-08-19 NOTE — TELEPHONE ENCOUNTER
Ludy Carrero calling for patient. She is part of a case study for [employer] by Dr. Christ Armendariz and was advised if O2 stat dropped below 93% to contact her doctor. This morning her O2 was 90% wants to know what they should do?   Please advise

## 2020-09-04 ENCOUNTER — OFFICE VISIT (OUTPATIENT)
Dept: PRIMARY CARE CLINIC | Age: 75
End: 2020-09-04
Payer: MEDICARE

## 2020-09-04 VITALS
TEMPERATURE: 98.1 F | HEART RATE: 74 BPM | BODY MASS INDEX: 33.63 KG/M2 | DIASTOLIC BLOOD PRESSURE: 70 MMHG | SYSTOLIC BLOOD PRESSURE: 128 MMHG | OXYGEN SATURATION: 93 % | WEIGHT: 199 LBS

## 2020-09-04 PROCEDURE — G8399 PT W/DXA RESULTS DOCUMENT: HCPCS | Performed by: FAMILY MEDICINE

## 2020-09-04 PROCEDURE — G8427 DOCREV CUR MEDS BY ELIG CLIN: HCPCS | Performed by: FAMILY MEDICINE

## 2020-09-04 PROCEDURE — 2022F DILAT RTA XM EVC RTNOPTHY: CPT | Performed by: FAMILY MEDICINE

## 2020-09-04 PROCEDURE — 4040F PNEUMOC VAC/ADMIN/RCVD: CPT | Performed by: FAMILY MEDICINE

## 2020-09-04 PROCEDURE — G8417 CALC BMI ABV UP PARAM F/U: HCPCS | Performed by: FAMILY MEDICINE

## 2020-09-04 PROCEDURE — 3017F COLORECTAL CA SCREEN DOC REV: CPT | Performed by: FAMILY MEDICINE

## 2020-09-04 PROCEDURE — 3044F HG A1C LEVEL LT 7.0%: CPT | Performed by: FAMILY MEDICINE

## 2020-09-04 PROCEDURE — 1123F ACP DISCUSS/DSCN MKR DOCD: CPT | Performed by: FAMILY MEDICINE

## 2020-09-04 PROCEDURE — 99215 OFFICE O/P EST HI 40 MIN: CPT | Performed by: FAMILY MEDICINE

## 2020-09-04 PROCEDURE — 1036F TOBACCO NON-USER: CPT | Performed by: FAMILY MEDICINE

## 2020-09-04 PROCEDURE — 1090F PRES/ABSN URINE INCON ASSESS: CPT | Performed by: FAMILY MEDICINE

## 2020-09-04 NOTE — PROGRESS NOTES
19  Cole Getting : 1945 Sex: female  Age: 69 y.o. Chief Complaint   Patient presents with    Other     follow up chronic medical conditions        Other       HPI:      Here for follow-up on blood work. No acute complaints today. Saw Dr. José Miguel Erazo, enrolled her in a study involving nitrate water and increased Aricept to 10 mg daily. No longer seeing memory specialist Augusta, says \"racquette\"    She is compliant with CPAP. Uses it at least 6 to 8 hours a night with good results    Driving precautions again reemphasized. Safety precautions reviewed. She will continue to follow with urologist who manages [facility], previously saw Dr. Jae Bullard    She follows with Dr. Sanjay Garcia , Candelaria Marx , saw recently. Enrolled on study, ,nitrate water. Increased aricept      She has a skin lesion left cheek            She follows with urology (previously , will be switching since he left), Dr. Tito Pittman (previously), Clementina Martinez,  Dr. José Miguel Erazo, Dr Vipul Montana ([facility]), Dr. Christiano Webb neg, [id number], FOLIC ACID >82, Ferritin 153, CBC neg, VIT D 97, TFT's nL, A1C 5.9-6.1, ALT 74-53, AST 70-57, , ,     Vitamin D nearly toxic, counseled, not sure what dose she is taking, she is is taking vitamin D supplementation though she is going to stop.   Wrote down for her and her specialist appointments    Most Recent Labs  CBC  Lab Results   Component Value Date    WBC 9.8 2020    WBC 10.6 2020    WBC 11.4 2019    RBC 5.04 2020    RBC 5.19 2020    RBC 5.11 2019    HGB 15.5 2020    HGB 15.6 2020    HGB 15.7 2019    HCT 46.9 2020    HCT 48.0 2020    HCT 47.2 2019    MCV 93.1 2020    MCV 92.5 2020    MCV 92.4 2019     2020     2020     2019      CMP  Lab Results   Component Value Date     2020     TRIG 194 03/02/2020    TRIG 154 06/26/2019     VITAMIN D  Lab Results   Component Value Date    VITD25 97 08/12/2020    VITD25 80 03/02/2020    VITD25 37 08/15/2011     MAGNESIUM  Lab Results   Component Value Date    MG 2.2 05/22/2014    MG 2.0 04/28/2014    MG 2.3 03/23/2011      PHOS  No results found for: PHOS   RON   Lab Results   Component Value Date    RON NEGATIVE 11/29/2012     RHEUMATOID FACTOR  No results found for: RF  PSA  No results found for: PSA   HEPATITIS C  Lab Results   Component Value Date    HCVABI Non-Reactive 08/12/2020     HIV  No results found for: MFD0RQR, HIV1QT  UA  Lab Results   Component Value Date    COLORU yellow 12/06/2019    COLORU Yellow 06/26/2019    COLORU Yellow 05/05/2017    COLORU YELLOW 11/29/2012    CLARITYU clear 12/06/2019    CLARITYU CLOUDY 06/26/2019    CLARITYU Clear 05/05/2017    CLARITYU CLEAR 11/29/2012    GLUCOSEU negative 12/06/2019    GLUCOSEU Negative 06/26/2019    GLUCOSEU Negative 05/05/2017    GLUCOSEU NEGATIVE 11/29/2012    BILIRUBINUR negative 12/06/2019    BILIRUBINUR Negative 06/26/2019    BILIRUBINUR Negative 05/05/2017    BILIRUBINUR NEGATIVE 11/29/2012    KETUA negative 12/06/2019    KETUA Negative 06/26/2019    KETUA Negative 05/05/2017    KETUA NEGATIVE 11/29/2012    SPECGRAV >=1.030 12/06/2019    SPECGRAV >=1.030 06/26/2019    SPECGRAV 1.020 05/05/2017    SPECGRAV >=1.030 11/29/2012    BLOODU negative 12/06/2019    BLOODU Negative 06/26/2019    BLOODU Negative 05/05/2017    BLOODU NEGATIVE 11/29/2012    PHUR 5.5 12/06/2019    PHUR 5.5 06/26/2019    PHUR 5.5 05/05/2017    PHUR 5.5 11/29/2012    PROTEINU negative 12/06/2019    PROTEINU Negative 06/26/2019    PROTEINU Negative 05/05/2017    PROTEINU NEGATIVE 11/29/2012    UROBILINOGEN 0.2 06/26/2019    UROBILINOGEN 0.2 05/05/2017    UROBILINOGEN 0.2 11/29/2012    NITRU Negative 06/26/2019    NITRU Negative 05/05/2017    NITRU NEGATIVE 11/29/2012    LEUKOCYTESUR trace 12/06/2019    LEUKOCYTESUR TRACE 06/26/2019    LEUKOCYTESUR TRACE 05/05/2017    LEUKOCYTESUR NEGATIVE 11/29/2012     Urine Micro/Albumin Ratio  No results found for: MALBCR      Review of Systems  ROS:  Const: Denies chills, fever, malaise and sweats. Eyes: Denies discharge, pain, redness and visual disturbance. ENMT: Denies earaches, other ear symptoms. Denies nasal or sinus symptoms other than stated  above. Denies mouth and tongue lesions and sore throat. CV: Denies chest discomfort, pain; diaphoresis, dizziness, edema, lightheadedness, orthopnea,  palpitations, syncope and near syncopal episode or any exertional symptoms  Resp: Denies cough, hemoptysis, pleuritic pain, SOB, sputum production and wheezing. GI: Denies abdominal pain, change in bowel habits, hematochezia, melena, nausea and vomiting. : Denies urinary symptoms including dysuria , urgency, frequency or hematuria. Musculo: Denies musculoskeletal symptoms. Skin: Denies bruising and rash, other than as above. Neuro: Denies headache, numbness, stiff neck, tingling and focal weakness slurred speech or facial  Droop, other than as above. Hema/Lymph: Denies bleeding/bruising tendency and enlarged lymph nodes        Current Outpatient Medications:     donepezil (ARICEPT) 10 MG tablet, Take 1 tablet by mouth nightly, Disp: 90 tablet, Rfl: 3    levothyroxine (SYNTHROID) 112 MCG tablet, Take 1 tablet by mouth daily M-SAT; TAKE 1/2 PO SUNDAYS ONLY, Disp: 90 tablet, Rfl: 3    metFORMIN (GLUCOPHAGE) 500 MG tablet, Take 1 tablet by mouth daily , Disp: , Rfl:     metoprolol succinate (TOPROL XL) 100 MG extended release tablet, Take 1 tablet by mouth, Disp: , Rfl:     mirabegron (MYRBETRIQ) 50 MG TB24, Take 1 tablet by mouth daily , Disp: , Rfl:     modafinil (PROVIGIL) 200 MG tablet, Take 200 mg by mouth daily. ., Disp: , Rfl:     B Complex Vitamins (B-COMPLEX/B-12 PO), Take by mouth daily LD 12/12/18, Disp: , Rfl:   Allergies   Allergen Reactions    Morphine Itching    Statins Other reaction(s): Unknown    Statins Depletion Therapy Other (See Comments)     MYALGIA       Past Medical History:   Diagnosis Date    Arthritis     Diabetes mellitus ([address]     Difficult intubation     carries card, copy on chart  Glidescope#3 with ETT size7    Hyperlipidemia     Hypothyroidism     Left sided abdominal pain     Memory problem     still competent    GORDY on CPAP     Pacemaker     Rectal bleeding     Ringing in the ears      Past Surgical History:   Procedure Laterality Date    ABLATION OF DYSRHYTHMIC FOCUS      BREAST SURGERY      implants    COLONOSCOPY  ? Dr. Raquel Singh twisted     COLONOSCOPY  ? Dr. Daniel Best. incomplete.  COLONOSCOPY  2018    Dr. Patel Morelos N/A 2018    COLONOSCOPY DIAGNOSTIC performed by Alma Patel MD at [address] [place] [address]  09/10/2011    POSTERIOR    HYSTERECTOMY      vaginal. uterus only.      KNEE ARTHROPLASTY Right     partial    KNEE ARTHROSCOPY  2011    right knee    PACEMAKER PLACEMENT  2014    Medtronic dual chamber    TOTAL HIP ARTHROPLASTY Right 2015     Family History   Problem Relation Age of Onset    Pacemaker Mother     Heart Attack Father      Social History     Tobacco Use    Smoking status: Former Smoker     Packs/day: 1.50     Years: 13.00     Pack years: 19.50     Types: Cigarettes     Start date: 1973     Last attempt to quit: 1986     Years since quittin.6    Smokeless tobacco: Never Used    Tobacco comment: quit smoking    Substance Use Topics    Alcohol use: Yes     Comment: socially -- 3 drinks per month at the most    Drug use: No      Social History     Social History Narrative    PMH:    Problem List: Urinary tract infectious disease, Obstructive sleep apnea syndrome, Adult health    examination, Adult health examination, Constipation, Derangement of knee, Vitamin D deficiency,    Hypothyroidism, Conduction disorder of the heart, Hyperlipidemia    Health Maintenance:    Mini Mental Status - (2018)    Colonoscopy - (2018)    Colonoscopy Screening - (2018)    Mammogram Screening - (2018)    Mammogram - (2018)    Colonoscopy - (2010)    Couseled on Home Safety - (2015)    Bone Density Scan - (3/28/2012)    Medical Problems:    Sleep Apnea - Dr Erin Pepper, cpap    Pneumonia    Hypothyroidism - Dr Sonna Favre - Dr Jessica Abebe    Hyperlipidemia - intolerance to all statins    Cardiac Arrhythmia - ? type. s/p ablation    cardiac dysrhythmia - pauses - lead to pacemaker    Skin Cancer    Surgical Hx:    Partial Hysterectomy - Still with Both Ovaries. Breast Implants    Pacemaker - Dr Antonio Garcia    Knee Replacement, Carpal Tunnel Release, colon-vaginal fistula repair    Bladder Repair - sling    RT Hip Arthroplasty - MARGARITA        FH:    Father:    . (Hx)    Mother:    . (Hx)    Dad - MI 39,  70 MI    Mom - DM , currently living age 80        SH: Marital: . Personal Habits: Cigarette Use: Former Cigarette Smoker - quit age 36. Occ ETOH, minimal. . 2 kids, 6 step kids. 27 grandkids. .Alcohol: Rarely consumes alcohol        Vitals:    20 1320   BP: 128/70   Pulse: 74   Temp: 98.1 °F (36.7 °C)   SpO2: 93%   Weight: 199 lb (90.3 kg)      Wt Readings from Last 3 Encounters:   20 199 lb (90.3 kg)   20 197 lb 12.8 oz (89.7 kg)   20 197 lb (89.4 kg)        Physical Exam  Exam:  Const: Appears comfortable. No signs of acute distress present. Head/Face: Atraumatic on inspection. Eyes: EOMI in both eyes. No discharge from the eyes. PERRL. Sclerae clear. ENMT: Auditory canals normal. Tympanic membranes: intact and translucent. External nose WNL. Nasal mucosa is clear. Oropharynx: No erythema or exudate. Posterior pharynx is normal.  Neck: Supple. Palpation reveals no adenopathy. No masses appreciated. No JVD. Carotids: no  bruits. Resp: Respirations are unlabored.  Clear to auscultation. No rales, rhonchi or wheezes appreciated  over the lungs bilaterally. CV: Rate is regular. Rhythm is regular. No gallop or rubs. No heart murmur appreciated. Extremities: No clubbing, cyanosis, or edema. No calf inflammation or tenderness. Abdomen: Bowel sounds are normoactive. Abdomen is soft, nontender, and nondistended. No  abdominal masses. No palpable hepatosplenomegaly. Lymph: No palpable or visible regional lymphadenopathy. Musculoskeletal: no acute joint inflammation. Skin: Dry and warm with no rash. Raised red irritated lesion left skin  Neuro: Alert and oriented. Affect: appropriate. Upper Extremities: 5/5 bilaterally. Lower Extremities:  5/5 bilaterally. Sensation intact to light touch. Reflexes: DTR's are symmetric and 2+ bilaterally. .  Cranial Nerves: Cranial nerves grossly intact. Assessment and Plan:   Diagnosis Orders   1. Acquired hypothyroidism  CBC Auto Differential    Comprehensive Metabolic Panel    TSH without Reflex    T4, Free   2. Dementia without behavioral disturbance, unspecified dementia type (HCC)  CBC Auto Differential   3. Liver disease  CBC Auto Differential    Comprehensive Metabolic Panel   4. Cardiac arrhythmia, unspecified cardiac arrhythmia type  CBC Auto Differential   5. Mixed hyperlipidemia  CBC Auto Differential    Comprehensive Metabolic Panel    CK    Lipid Panel   6. Neoplasm of uncertain behavior of skin  CBC Auto Differential   7. Obstructive sleep apnea syndrome  CBC Auto Differential   8. Polycythemia  CBC Auto Differential   9. Supraventricular tachycardia (HCC)  CBC Auto Differential   10. Tubular adenoma  CBC Auto Differential   11. Type 2 diabetes mellitus with hyperglycemia, without long-term current use of insulin (HCC)  CBC Auto Differential    Hemoglobin A1C    Urinalysis    Microalbumin / Creatinine Urine Ratio   12. Vitamin D deficiency  CBC Auto Differential    Vitamin D 25 Hydroxy       Polycythemia  Counseled.   Hemoglobin this. Safety precautions reviewed.  Absolutely no driving if concerns for dementia.         Mixed hyperlipidemia  not at goal  Significant risk of cerebrovascular/cardiovascular event. Imperative this  be controlled with history of TIA, however adamantly refuses any further treatment at this time. Refuses to try any other statin or Zetia stating that these were not tolerated in the  past. She did not tolerate Niaspan. did not tolerate WelChol. . declines  cholestyramine. Risk of stroke and heart attack reviewed. lifestyle modification  reviewed. risk of hyperlipidemia reviewed . Did not tolerate fenofibrate  Counseled on repatha, declines. Simply wants monitored. Refuses other therapy     Supraventricular tachycardia ([facility] [address]  h/o svt Status post ablation  . Also history of long pauses-since had pacemaker by Dr. Maddison Andrade. FU with her. Follow-up Dr Denisse Segura . Asymptomatic sends recordings by phone.  ekg done and reviewed with her     Liver disease  Counseled extensively. Differential reviewed, including serious etiologies. Likely  fatty liver. Precautions reviewed. Lifestyle modification appropriate diet and weight  loss reviewed. Risks of even this leading to cirrhosis reviewed. Other than basic  monitoring on interested in other evaluation or treatment, in-depth blood work,  imaging or otherwise. Hep C 10/18 negative, again was negative 9/20. Relatively stable    Tubular adenoma  Small polyp 7/18. Dr. Maite Felder recommended basic screenings if repeated at all.  Asymptomatic     Vitamin D deficiency  Borderline toxic. Needs to stop vitamin D and they will do so. Health maintenance examination  Health maintenance issues discussed at length 7/17.  Encouraged yearly physicals. Wants to stay conservative now            No flowsheet data found. Plan as above. Counseled extensively and differential diagnoses relevant to above were reviewed, including serious etiologies.    Side effects and interactions of medications were reviewed. Counseled extensively. Stop vitamin D. Continue per specialist.  Parth Tripathi extensively. Otherwise symptoms blood work and follow-up in 3-month sooner as needed. Over 40 minutes  spent with the patient in reviewing records, reviewing with patient/family, counseling, ordering,  prescribing, completing h&p, etc., with over 50% of the time spent face to face counseling. As long as symptoms steadily improve/resolve, and medical conditions follow the expected course, FU as below, sooner PRN. Return in about 3 months (around 12/4/2020). Signs and symptoms to watch for discussed, serious signs and symptoms reviewed. ER if any. Mendoza Bravo MD    Patients are advised to check with insurance company to ensure coverage and to fully understand benefits and cost prior to any testing. This note was created with the assistance of voice recognition software. Document was reviewed however may contain grammatical errors.

## 2020-09-21 ENCOUNTER — NURSE ONLY (OUTPATIENT)
Dept: PRIMARY CARE CLINIC | Age: 75
End: 2020-09-21

## 2020-09-21 ENCOUNTER — HOSPITAL ENCOUNTER (OUTPATIENT)
Age: 75
Discharge: HOME | End: 2020-09-23
Payer: MEDICARE

## 2020-09-21 PROCEDURE — U0003 INFECTIOUS AGENT DETECTION BY NUCLEIC ACID (DNA OR RNA); SEVERE ACUTE RESPIRATORY SYNDROME CORONAVIRUS 2 (SARS-COV-2) (CORONAVIRUS DISEASE [COVID-19]), AMPLIFIED PROBE TECHNIQUE, MAKING USE OF HIGH THROUGHPUT TECHNOLOGIES AS DESCRIBED BY CMS-2020-01-R: HCPCS

## 2020-09-23 LAB
SARS-COV-2: NOT DETECTED
SOURCE: NORMAL

## 2020-10-26 ENCOUNTER — HOSPITAL ENCOUNTER (OUTPATIENT)
Age: 75
Discharge: HOME | End: 2020-10-28
Payer: MEDICARE

## 2020-10-26 ENCOUNTER — NURSE ONLY (OUTPATIENT)
Dept: PRIMARY CARE CLINIC | Age: 75
End: 2020-10-26

## 2020-10-26 PROCEDURE — U0003 INFECTIOUS AGENT DETECTION BY NUCLEIC ACID (DNA OR RNA); SEVERE ACUTE RESPIRATORY SYNDROME CORONAVIRUS 2 (SARS-COV-2) (CORONAVIRUS DISEASE [COVID-19]), AMPLIFIED PROBE TECHNIQUE, MAKING USE OF HIGH THROUGHPUT TECHNOLOGIES AS DESCRIBED BY CMS-2020-01-R: HCPCS

## 2020-10-28 LAB
SARS-COV-2: NOT DETECTED
SOURCE: NORMAL

## 2020-11-02 ENCOUNTER — OFFICE VISIT (OUTPATIENT)
Dept: PRIMARY CARE CLINIC | Age: 75
End: 2020-11-02
Payer: MEDICARE

## 2020-11-02 VITALS
OXYGEN SATURATION: 97 % | DIASTOLIC BLOOD PRESSURE: 80 MMHG | HEART RATE: 60 BPM | SYSTOLIC BLOOD PRESSURE: 132 MMHG | TEMPERATURE: 98.1 F

## 2020-11-02 PROCEDURE — G8482 FLU IMMUNIZE ORDER/ADMIN: HCPCS | Performed by: FAMILY MEDICINE

## 2020-11-02 PROCEDURE — 4040F PNEUMOC VAC/ADMIN/RCVD: CPT | Performed by: FAMILY MEDICINE

## 2020-11-02 PROCEDURE — G8399 PT W/DXA RESULTS DOCUMENT: HCPCS | Performed by: FAMILY MEDICINE

## 2020-11-02 PROCEDURE — 3017F COLORECTAL CA SCREEN DOC REV: CPT | Performed by: FAMILY MEDICINE

## 2020-11-02 PROCEDURE — 1090F PRES/ABSN URINE INCON ASSESS: CPT | Performed by: FAMILY MEDICINE

## 2020-11-02 PROCEDURE — 69209 REMOVE IMPACTED EAR WAX UNI: CPT | Performed by: FAMILY MEDICINE

## 2020-11-02 PROCEDURE — G8417 CALC BMI ABV UP PARAM F/U: HCPCS | Performed by: FAMILY MEDICINE

## 2020-11-02 PROCEDURE — G8427 DOCREV CUR MEDS BY ELIG CLIN: HCPCS | Performed by: FAMILY MEDICINE

## 2020-11-02 PROCEDURE — 1036F TOBACCO NON-USER: CPT | Performed by: FAMILY MEDICINE

## 2020-11-02 PROCEDURE — 1123F ACP DISCUSS/DSCN MKR DOCD: CPT | Performed by: FAMILY MEDICINE

## 2020-11-02 PROCEDURE — 99212 OFFICE O/P EST SF 10 MIN: CPT | Performed by: FAMILY MEDICINE

## 2020-11-03 NOTE — PROGRESS NOTES
incomplete.  COLONOSCOPY  2018    Dr. Silva Reap N/A 2018    COLONOSCOPY DIAGNOSTIC performed by Shahnaz Tejeda MD at [address] [place] [address]  09/10/2011    POSTERIOR    HYSTERECTOMY      vaginal. uterus only.  KNEE ARTHROPLASTY Right     partial    KNEE ARTHROSCOPY  2011    right knee    PACEMAKER PLACEMENT  2014    Medtronic dual chamber    TOTAL HIP ARTHROPLASTY Right 2015     Family History   Problem Relation Age of Onset    Pacemaker Mother     Heart Attack Father      Social History     Tobacco Use    Smoking status: Former Smoker     Packs/day: 1.50     Years: 13.00     Pack years: 19.50     Types: Cigarettes     Start date: 1973     Last attempt to quit: 1986     Years since quittin.8    Smokeless tobacco: Never Used    Tobacco comment: quit smoking    Substance Use Topics    Alcohol use: Yes     Comment: socially -- 3 drinks per month at the most    Drug use: No      Social History     Social History Narrative    PMH:    Problem List: Urinary tract infectious disease, Obstructive sleep apnea syndrome, Adult health    examination, Adult health examination, Constipation, Derangement of knee, Vitamin D deficiency,    Hypothyroidism, Conduction disorder of the heart, Hyperlipidemia    Health Maintenance:    Mini Mental Status - (2018)    Colonoscopy - (2018)    Colonoscopy Screening - (2018)    Mammogram Screening - (2018)    Mammogram - (2018)    Colonoscopy - (2010)    Couseled on Home Safety - (2015)    Bone Density Scan - (3/28/2012)    Medical Problems:    Sleep Apnea - Dr Bee Ruvalcaba, cpap    Pneumonia    Hypothyroidism - Dr Mccormick Deacory - Dr Fili nAg    Hyperlipidemia - intolerance to all statins    Cardiac Arrhythmia - ? type. s/p ablation    cardiac dysrhythmia - pauses - lead to pacemaker    Skin Cancer    Surgical Hx:    Partial Hysterectomy - Still with Both Ovaries.     Breast Implants Pacemaker - Dr Patten Flatten    Knee Replacement, Carpal Tunnel Release, colon-vaginal fistula repair    Bladder Repair - sling    RT Hip Arthroplasty - MARGARITA        FH:    Father:    . (Hx)    Mother:    . (Hx)    Dad - MI 39,  70 MI    Mom - DM , currently living age 80        SH: Marital: . Personal Habits: Cigarette Use: Former Cigarette Smoker - quit age 36. Occ ETOH, minimal. . 2 kids, 6 step kids. 27 grandkids. .Alcohol: Rarely consumes alcohol        Vitals:    20 1450   BP: 132/80   Pulse: 60   Temp: 98.1 °F (36.7 °C)   SpO2: 97%     Wt Readings from Last 3 Encounters:   10/28/20 196 lb (88.9 kg)   20 199 lb (90.3 kg)   20 197 lb 12.8 oz (89.7 kg)        Physical Exam    Exam:  Const: Appears comfortable. No signs of acute distress present. Head/Face: Atraumatic, normocephalic on inspection. Eyes: No discharge from the eyes. Sclerae clear. ENMT: Ears show severe impaction bilaterally  Neck: Supple. Palpation reveals no adenopathy. No masses appreciated. No JVD. Resp: Respirations are unlabored. Clear to auscultation bilaterally. No rales, rhonchi or wheezes appreciated over the  lungs bilaterally. CV: RRR   Extremities: No clubbing or cyanosis. No edema of the lower limbs  bilaterally. No calf inflammation or tenderness. Abdomen: Abdomen is soft, nontender, and nondistended. No abdominal masses  appreciated. No palpable hepatosplenomegaly. Bowel sounds are normoactive. Skin: Dry and warm with no rash. Muscular skeletal: No acute joint inflammation. Neuro:Grossly intact without focal deficit    Risk benefits of irrigation reviewed including perforation. We discussed attempting today versus trying Debrox first versus referral.  They would like irrigation attempted. Irrigation was successful, ear canals were clear and TMs are clear afterward. Her symptoms resolved. Office Labs This Visit :  No results found for this visit on 20. Assessment and Plan:    1. Bilateral hearing loss due to cerumen impaction  Resolved after irrigation  - DC REMOVAL IMPACTED CERUMEN IRRIGATION/LVG UNILAT    2. Otalgia of both ears  Resolved after irrigation      No problem-specific Assessment & Plan notes found for this encounter. Plan as above. Counseled extensively and differential diagnoses relevant to above were reviewed, including serious etiologies. Side effects and interactions of medications were reviewed. As long as symptoms steadily improve/resolve, and medical conditions follow the expected course, FU as below, sooner PRN. No follow-ups on file. As previously directed sooner as needed      Signs and symptoms to watch for discussed, serious signs and symptoms reviewed. ER if any. Beverly Foy MD    Patients are advised to check with insurance company to ensure coverage and to fully understand benefits and cost prior to any testing. This note was created with the assistance of voice recognition software. Document was reviewed however may contain grammatical errors.

## 2020-11-06 ENCOUNTER — OFFICE VISIT (OUTPATIENT)
Dept: GERIATRIC MEDICINE | Age: 75
End: 2020-11-06
Payer: MEDICARE

## 2020-11-06 VITALS
WEIGHT: 196 LBS | TEMPERATURE: 97.6 F | DIASTOLIC BLOOD PRESSURE: 80 MMHG | BODY MASS INDEX: 33.46 KG/M2 | OXYGEN SATURATION: 94 % | RESPIRATION RATE: 16 BRPM | HEART RATE: 60 BPM | SYSTOLIC BLOOD PRESSURE: 118 MMHG | HEIGHT: 64 IN

## 2020-11-06 PROCEDURE — 1036F TOBACCO NON-USER: CPT | Performed by: INTERNAL MEDICINE

## 2020-11-06 PROCEDURE — G8482 FLU IMMUNIZE ORDER/ADMIN: HCPCS | Performed by: INTERNAL MEDICINE

## 2020-11-06 PROCEDURE — 4040F PNEUMOC VAC/ADMIN/RCVD: CPT | Performed by: INTERNAL MEDICINE

## 2020-11-06 PROCEDURE — 99212 OFFICE O/P EST SF 10 MIN: CPT | Performed by: INTERNAL MEDICINE

## 2020-11-06 PROCEDURE — G8399 PT W/DXA RESULTS DOCUMENT: HCPCS | Performed by: INTERNAL MEDICINE

## 2020-11-06 PROCEDURE — G8427 DOCREV CUR MEDS BY ELIG CLIN: HCPCS | Performed by: INTERNAL MEDICINE

## 2020-11-06 PROCEDURE — 3017F COLORECTAL CA SCREEN DOC REV: CPT | Performed by: INTERNAL MEDICINE

## 2020-11-06 PROCEDURE — 1090F PRES/ABSN URINE INCON ASSESS: CPT | Performed by: INTERNAL MEDICINE

## 2020-11-06 PROCEDURE — 1123F ACP DISCUSS/DSCN MKR DOCD: CPT | Performed by: INTERNAL MEDICINE

## 2020-11-06 PROCEDURE — G8417 CALC BMI ABV UP PARAM F/U: HCPCS | Performed by: INTERNAL MEDICINE

## 2020-11-06 RX ORDER — MEMANTINE HYDROCHLORIDE 10 MG/1
10 TABLET ORAL DAILY
Qty: 30 TABLET | Refills: 5 | Status: SHIPPED
Start: 2020-11-06 | End: 2021-09-15

## 2020-11-21 DIAGNOSIS — F03.90 DEMENTIA WITHOUT BEHAVIORAL DISTURBANCE, UNSPECIFIED DEMENTIA TYPE: ICD-10-CM

## 2020-11-21 RX ORDER — DONEPEZIL HYDROCHLORIDE 10 MG/1
10 TABLET, FILM COATED ORAL 2 TIMES DAILY
Qty: 180 TABLET | Refills: 3 | Status: SHIPPED
Start: 2020-11-21 | End: 2021-11-05

## 2020-12-01 ENCOUNTER — HOSPITAL ENCOUNTER (OUTPATIENT)
Age: 75
Discharge: HOME | End: 2020-12-01
Payer: MEDICARE

## 2020-12-01 LAB
ALBUMIN SERPL-MCNC: 4.5 G/DL (ref 3.5–5.2)
ALP BLD-CCNC: 89 U/L (ref 35–104)
ALT SERPL-CCNC: 56 U/L (ref 0–32)
ANION GAP SERPL CALCULATED.3IONS-SCNC: 9 MMOL/L (ref 7–16)
AST SERPL-CCNC: 49 U/L (ref 0–31)
BASOPHILS ABSOLUTE: 0.06 E9/L (ref 0–0.2)
BASOPHILS RELATIVE PERCENT: 0.5 % (ref 0–2)
BILIRUB SERPL-MCNC: 0.3 MG/DL (ref 0–1.2)
BUN BLDV-MCNC: 13 MG/DL (ref 8–23)
CALCIUM SERPL-MCNC: 10 MG/DL (ref 8.6–10.2)
CHLORIDE BLD-SCNC: 105 MMOL/L (ref 98–107)
CHOLESTEROL, TOTAL: 247 MG/DL (ref 0–199)
CO2: 27 MMOL/L (ref 22–29)
CREAT SERPL-MCNC: 0.8 MG/DL (ref 0.5–1)
EOSINOPHILS ABSOLUTE: 0.24 E9/L (ref 0.05–0.5)
EOSINOPHILS RELATIVE PERCENT: 2 % (ref 0–6)
FERRITIN: 148 NG/ML
GFR AFRICAN AMERICAN: >60
GFR NON-AFRICAN AMERICAN: >60 ML/MIN/1.73
GLUCOSE BLD-MCNC: 84 MG/DL (ref 74–99)
HBA1C MFR BLD: 5.6 % (ref 4–5.6)
HCT VFR BLD CALC: 47.4 % (ref 34–48)
HDLC SERPL-MCNC: 52 MG/DL
HEMOGLOBIN: 15.3 G/DL (ref 11.5–15.5)
IMMATURE GRANULOCYTES #: 0.05 E9/L
IMMATURE GRANULOCYTES %: 0.4 % (ref 0–5)
LDL CHOLESTEROL CALCULATED: 156 MG/DL (ref 0–99)
LYMPHOCYTES ABSOLUTE: 5.76 E9/L (ref 1.5–4)
LYMPHOCYTES RELATIVE PERCENT: 49 % (ref 20–42)
MCH RBC QN AUTO: 30.6 PG (ref 26–35)
MCHC RBC AUTO-ENTMCNC: 32.3 % (ref 32–34.5)
MCV RBC AUTO: 94.8 FL (ref 80–99.9)
MONOCYTES ABSOLUTE: 0.9 E9/L (ref 0.1–0.95)
MONOCYTES RELATIVE PERCENT: 7.7 % (ref 2–12)
NEUTROPHILS ABSOLUTE: 4.75 E9/L (ref 1.8–7.3)
NEUTROPHILS RELATIVE PERCENT: 40.4 % (ref 43–80)
PDW BLD-RTO: 12.8 FL (ref 11.5–15)
PLATELET # BLD: 188 E9/L (ref 130–450)
PMV BLD AUTO: 10.1 FL (ref 7–12)
POTASSIUM SERPL-SCNC: 4.3 MMOL/L (ref 3.5–5)
RBC # BLD: 5 E12/L (ref 3.5–5.5)
SODIUM BLD-SCNC: 141 MMOL/L (ref 132–146)
T4 FREE: 1.68 NG/DL (ref 0.93–1.7)
TOTAL CK: 33 U/L (ref 20–180)
TOTAL PROTEIN: 7.6 G/DL (ref 6.4–8.3)
TRIGL SERPL-MCNC: 196 MG/DL (ref 0–149)
TSH SERPL DL<=0.05 MIU/L-ACNC: 1.76 UIU/ML (ref 0.27–4.2)
VLDLC SERPL CALC-MCNC: 39 MG/DL
WBC # BLD: 11.8 E9/L (ref 4.5–11.5)

## 2020-12-01 PROCEDURE — 81001 URINALYSIS AUTO W/SCOPE: CPT

## 2020-12-01 PROCEDURE — 85025 COMPLETE CBC W/AUTO DIFF WBC: CPT

## 2020-12-01 PROCEDURE — 84443 ASSAY THYROID STIM HORMONE: CPT

## 2020-12-01 PROCEDURE — 83036 HEMOGLOBIN GLYCOSYLATED A1C: CPT

## 2020-12-01 PROCEDURE — 82044 UR ALBUMIN SEMIQUANTITATIVE: CPT

## 2020-12-01 PROCEDURE — 82728 ASSAY OF FERRITIN: CPT

## 2020-12-01 PROCEDURE — 36415 COLL VENOUS BLD VENIPUNCTURE: CPT

## 2020-12-01 PROCEDURE — 80061 LIPID PANEL: CPT

## 2020-12-01 PROCEDURE — 82550 ASSAY OF CK (CPK): CPT

## 2020-12-01 PROCEDURE — 82570 ASSAY OF URINE CREATININE: CPT

## 2020-12-01 PROCEDURE — 84439 ASSAY OF FREE THYROXINE: CPT

## 2020-12-01 PROCEDURE — 80053 COMPREHEN METABOLIC PANEL: CPT

## 2020-12-01 PROCEDURE — 82306 VITAMIN D 25 HYDROXY: CPT

## 2020-12-01 NOTE — RESULT ENCOUNTER NOTE
Cholesterol elevated again , Liver enzymes elevated but stable, white count somewhat elevated minimally, make sure no symptoms of infection or otherwise.   Keep follow-up to review more detail sooner as needed

## 2020-12-02 LAB
BACTERIA: ABNORMAL /HPF
BILIRUBIN URINE: NEGATIVE
BLOOD, URINE: NEGATIVE
CLARITY: CLEAR
COLOR: YELLOW
CRYSTALS, UA: ABNORMAL /HPF
GLUCOSE URINE: NEGATIVE MG/DL
KETONES, URINE: NEGATIVE MG/DL
LEUKOCYTE ESTERASE, URINE: ABNORMAL
NITRITE, URINE: POSITIVE
PH UA: 6 (ref 5–9)
PROTEIN UA: NEGATIVE MG/DL
RBC UA: ABNORMAL /HPF (ref 0–2)
RENAL EPITHELIAL, UA: ABNORMAL /HPF
SPECIFIC GRAVITY UA: >=1.03 (ref 1–1.03)
UROBILINOGEN, URINE: 0.2 E.U./DL
VITAMIN D 25-HYDROXY: 78 NG/ML (ref 30–100)
WBC UA: ABNORMAL /HPF (ref 0–5)
YEAST: PRESENT /HPF

## 2020-12-02 NOTE — RESULT ENCOUNTER NOTE
Vitamin D on the high side, make sure she is holding her vitamin D. Let me know if she is still taking it. Looks like yeast in the urine.   Check if any symptoms of UTI or evidence of yeast infection

## 2020-12-04 ENCOUNTER — OFFICE VISIT (OUTPATIENT)
Dept: PRIMARY CARE CLINIC | Age: 75
End: 2020-12-04
Payer: MEDICARE

## 2020-12-04 VITALS
TEMPERATURE: 98.3 F | OXYGEN SATURATION: 95 % | WEIGHT: 195 LBS | HEART RATE: 68 BPM | SYSTOLIC BLOOD PRESSURE: 132 MMHG | BODY MASS INDEX: 33.47 KG/M2 | DIASTOLIC BLOOD PRESSURE: 70 MMHG

## 2020-12-04 PROBLEM — D72.829 LEUKOCYTOSIS: Status: ACTIVE | Noted: 2020-12-04

## 2020-12-04 LAB
CREATININE URINE: 153 MG/DL (ref 29–226)
MICROALBUMIN UR-MCNC: 18.8 MG/L
MICROALBUMIN/CREAT UR-RTO: 12.3 (ref 0–30)

## 2020-12-04 PROCEDURE — G8482 FLU IMMUNIZE ORDER/ADMIN: HCPCS | Performed by: FAMILY MEDICINE

## 2020-12-04 PROCEDURE — 1090F PRES/ABSN URINE INCON ASSESS: CPT | Performed by: FAMILY MEDICINE

## 2020-12-04 PROCEDURE — 1123F ACP DISCUSS/DSCN MKR DOCD: CPT | Performed by: FAMILY MEDICINE

## 2020-12-04 PROCEDURE — 99215 OFFICE O/P EST HI 40 MIN: CPT | Performed by: FAMILY MEDICINE

## 2020-12-04 PROCEDURE — G8427 DOCREV CUR MEDS BY ELIG CLIN: HCPCS | Performed by: FAMILY MEDICINE

## 2020-12-04 PROCEDURE — 1036F TOBACCO NON-USER: CPT | Performed by: FAMILY MEDICINE

## 2020-12-04 PROCEDURE — 2022F DILAT RTA XM EVC RTNOPTHY: CPT | Performed by: FAMILY MEDICINE

## 2020-12-04 PROCEDURE — G8399 PT W/DXA RESULTS DOCUMENT: HCPCS | Performed by: FAMILY MEDICINE

## 2020-12-04 PROCEDURE — G8417 CALC BMI ABV UP PARAM F/U: HCPCS | Performed by: FAMILY MEDICINE

## 2020-12-04 PROCEDURE — 3044F HG A1C LEVEL LT 7.0%: CPT | Performed by: FAMILY MEDICINE

## 2020-12-04 PROCEDURE — 3017F COLORECTAL CA SCREEN DOC REV: CPT | Performed by: FAMILY MEDICINE

## 2020-12-04 PROCEDURE — 4040F PNEUMOC VAC/ADMIN/RCVD: CPT | Performed by: FAMILY MEDICINE

## 2020-12-04 RX ORDER — CEPHALEXIN 500 MG/1
500 CAPSULE ORAL 2 TIMES DAILY
Qty: 14 CAPSULE | Refills: 0 | Status: SHIPPED | OUTPATIENT
Start: 2020-12-04 | End: 2020-12-11

## 2020-12-04 RX ORDER — FLUCONAZOLE 150 MG/1
TABLET ORAL
Qty: 2 TABLET | Refills: 0 | Status: SHIPPED
Start: 2020-12-04 | End: 2021-02-17

## 2020-12-04 NOTE — ASSESSMENT & PLAN NOTE
Counseled. Differential reviewed, including serious intra-abdominal pathology, including stones, including vaginal infections. Evidence of yeast infection. Patient defers pelvic exam.  Reviewed UA. Patient declines imaging/BW. Proper hygiene reviewed. Proper hydration reviewed. Rationale behind cranberry juice reviewed. R/B of azo and probiotic reviewed. Antibiotic therapy as per EMR with standard precautions. As long as sxs steadily improve/resolve, FU by 2wks, sooner prn. Cephalexin and Diflucan with precautions.

## 2020-12-04 NOTE — PROGRESS NOTES
Juanis Dixon : 1945 Sex: female  Age: 69 y.o. Chief Complaint   Patient presents with    3 Month Follow-Up    Discuss Labs       HPI:    Patient presents today for follow-up. To review blood work. She does note dysuria urgency and frequency. Follow Dr. Goldy Fofana. Involved in a nitrate water study. No longer seeing memory specialist [place], says \"racquette\"    She is compliant with CPAP. Uses it at least 6 to 8 hours a night with good results    Driving precautions again reemphasized. Not driving. Safety precautions reviewed. She will continue to follow with urologist who manages [facility], previously saw Dr. Estela Jeronimo      She follows with urology (previously , will be switching since he left), Dr. Brianda Julien (previously), Bulmaro Lin,  Dr. Goldy Fofana, Dr Jaylin Nayak ([facility]), Dr. Holguin List 11.8, pos ua + yeast.   VIT D 97-78. CMP normal except ALT elevated but stable 56, AST 49, glucose 84, LDL Went from 122-156 triglycerides went down from 3 32-1 96 and HDL went up to 52.   Hemoglobin A1c 5.6 TFTs normal vitamin D trending down from 97-78, white count elevated 11.8 hemoglobin 15.3 platelet count 535 ferritin 148 urinalysis shows positive nitrite small leukocyte many bacteria and yeast, microalbumin creatinine ratio 12.3  Most Recent Labs  CBC  Lab Results   Component Value Date    WBC 11.8 2020    WBC 9.8 2020    WBC 10.6 2020    RBC 5.00 2020    RBC 5.04 2020    RBC 5.19 2020    HGB 15.3 2020    HGB 15.5 2020    HGB 15.6 2020    HCT 47.4 2020    HCT 46.9 2020    HCT 48.0 2020    MCV 94.8 2020    MCV 93.1 2020    MCV 92.5 2020     2020     2020     2020      CMP  Lab Results   Component Value Date     2020     2020     2020    K 4.3 2020    K 3.9 2020    K 4.4 03/02/2020     12/01/2020     08/12/2020     03/02/2020    CO2 27 12/01/2020    CO2 20 08/12/2020    CO2 19 03/02/2020    ANIONGAP 9 12/01/2020    ANIONGAP 18 08/12/2020    ANIONGAP 18 03/02/2020    GLUCOSE 84 12/01/2020    GLUCOSE 156 08/12/2020    GLUCOSE 89 03/02/2020    GLUCOSE 104 08/15/2011    GLUCOSE 113 04/25/2011    GLUCOSE 97 04/01/2011    BUN 13 12/01/2020    BUN 14 08/12/2020    BUN 12 03/02/2020    CREATININE 0.8 12/01/2020    CREATININE 0.9 08/12/2020    CREATININE 0.8 03/02/2020    LABGLOM >60 12/01/2020    LABGLOM >60 08/12/2020    LABGLOM >60 03/02/2020    GFRAA >60 12/01/2020    GFRAA >60 08/12/2020    GFRAA >60 03/02/2020    CALCIUM 10.0 12/01/2020    CALCIUM 10.1 08/12/2020    CALCIUM 10.0 03/02/2020    PROT 7.6 12/01/2020    PROT 7.2 08/12/2020    PROT 7.2 03/02/2020    LABALBU 4.5 12/01/2020    LABALBU 4.2 08/12/2020    LABALBU 4.3 03/02/2020    LABALBU 4.4 08/15/2011    LABALBU 4.2 04/25/2011    LABALBU 4.2 03/24/2011    BILITOT 0.3 12/01/2020    BILITOT 0.4 08/12/2020    BILITOT 0.5 03/02/2020    ALKPHOS 89 12/01/2020    ALKPHOS 87 08/12/2020    ALKPHOS 89 03/02/2020    AST 49 12/01/2020    AST 57 08/12/2020    AST 70 03/02/2020    ALT 56 12/01/2020    ALT 53 08/12/2020    ALT 74 03/02/2020     A1C  Lab Results   Component Value Date    LABA1C 5.6 12/01/2020    LABA1C 6.1 08/12/2020    LABA1C 5.9 03/02/2020     TSH  Lab Results   Component Value Date    TSH 1.760 12/01/2020    TSH 1.870 08/12/2020    TSH 4.810 03/02/2020     FREET4  Lab Results   Component Value Date    C7TLOLO 7.1 06/26/2019    G5PKHXI 7.6 05/21/2014     LIPID  Lab Results   Component Value Date    CHOL 247 12/01/2020    CHOL 235 08/12/2020    CHOL 221 03/02/2020    HDL 52 12/01/2020    HDL 47 08/12/2020    HDL 46 03/02/2020    LDLCALC 156 12/01/2020    LDLCALC 122 08/12/2020    LDLCALC 136 03/02/2020    TRIG 196 12/01/2020    TRIG 332 08/12/2020    TRIG 194 03/02/2020     VITAMIN D  Lab Results   Component Urine Micro/Albumin Ratio  Lab Results   Component Value Date    [facility]         Review of Systems  ROS:  Const: Denies chills, fever, malaise and sweats. Eyes: Denies discharge, pain, redness and visual disturbance. ENMT: Denies earaches, other ear symptoms. Denies nasal or sinus symptoms other than stated  above. Denies mouth and tongue lesions and sore throat. CV: Denies chest discomfort, pain; diaphoresis, dizziness, edema, lightheadedness, orthopnea,  palpitations, syncope and near syncopal episode or any exertional symptoms  Resp: Denies cough, hemoptysis, pleuritic pain, SOB, sputum production and wheezing. GI: Denies abdominal pain, change in bowel habits, hematochezia, melena, nausea and vomiting. : Dysuria urgency and frequency  Musculo: Denies musculoskeletal symptoms. Skin: Denies bruising and rash, other than as above. Neuro: Denies headache, numbness, stiff neck, tingling and focal weakness slurred speech or facial  Droop, other than as above. Hema/Lymph: Denies bleeding/bruising tendency and enlarged lymph nodes        Current Outpatient Medications:     cephALEXin (KEFLEX) 500 MG capsule, Take 1 capsule by mouth 2 times daily for 7 days, Disp: 14 capsule, Rfl: 0    fluconazole (DIFLUCAN) 150 MG tablet, Take 1 po qd x 1 at onset of symptoms.  Repeat  x 1 after course of antibiotic, Disp: 2 tablet, Rfl: 0    donepezil (ARICEPT) 10 MG tablet, Take 1 tablet by mouth 2 times daily, Disp: 180 tablet, Rfl: 3    memantine (NAMENDA) 10 MG tablet, Take 1 tablet by mouth daily, Disp: 30 tablet, Rfl: 5    levothyroxine (SYNTHROID) 112 MCG tablet, Take 1 tablet by mouth daily M-SAT; TAKE 1/2 PO SUNDAYS ONLY, Disp: 90 tablet, Rfl: 3    metFORMIN (GLUCOPHAGE) 500 MG tablet, Take 1 tablet by mouth daily , Disp: , Rfl:     metoprolol succinate (TOPROL XL) 100 MG extended release tablet, Take 1 tablet by mouth, Disp: , Rfl:     mirabegron (MYRBETRIQ) 50 MG TB24, Take 1 tablet by mouth daily , Disp: , Rfl:     modafinil (PROVIGIL) 200 MG tablet, Take 200 mg by mouth daily. ., Disp: , Rfl:     B Complex Vitamins (B-COMPLEX/B-12 PO), Take by mouth daily LD 18, Disp: , Rfl:   Allergies   Allergen Reactions    Morphine Itching    Statins      Other reaction(s): Unknown    Statins Depletion Therapy Other (See Comments)     MYALGIA       Past Medical History:   Diagnosis Date    Arthritis     Diabetes mellitus ([address]     Difficult intubation     carries card, copy on chart  Glidescope#3 with ETT size7    Hyperlipidemia     Hypothyroidism     Left sided abdominal pain     Memory problem     still competent    GORDY on CPAP     Pacemaker     Rectal bleeding     Ringing in the ears      Past Surgical History:   Procedure Laterality Date    ABLATION OF DYSRHYTHMIC FOCUS      BREAST SURGERY      implants    COLONOSCOPY  ? Dr. Eron Horvath twisted     COLONOSCOPY  ? Dr. Concepción Faith. incomplete.  COLONOSCOPY  2018    Dr. Ariel Finley N/A 2018    COLONOSCOPY DIAGNOSTIC performed by Edel Restrepo MD at [address] [place] [address]  09/10/2011    POSTERIOR    HYSTERECTOMY      vaginal. uterus only.      KNEE ARTHROPLASTY Right     partial    KNEE ARTHROSCOPY  2011    right knee    PACEMAKER PLACEMENT  2014    Medtronic dual chamber    TOTAL HIP ARTHROPLASTY Right 2015     Family History   Problem Relation Age of Onset    Pacemaker Mother     Heart Attack Father      Social History     Tobacco Use    Smoking status: Former Smoker     Packs/day: 1.50     Years: 13.00     Pack years: 19.50     Types: Cigarettes     Start date: 1973     Last attempt to quit: 1986     Years since quittin.9    Smokeless tobacco: Never Used    Tobacco comment: quit smoking    Substance Use Topics    Alcohol use: Yes     Comment: socially -- 3 drinks per month at the most    Drug use: No      Social History     Social History Narrative    PMH:    Problem List: Urinary tract infectious disease, Obstructive sleep apnea syndrome, Adult health    examination, Adult health examination, Constipation, Derangement of knee, Vitamin D deficiency,    Hypothyroidism, Conduction disorder of the heart, Hyperlipidemia    Health Maintenance:    Mini Mental Status - (2018)    Colonoscopy - (2018)    Colonoscopy Screening - (2018)    Mammogram Screening - (2018)    Mammogram - (2018)    Colonoscopy - (2010)    Couseled on Home Safety - (2015)    Bone Density Scan - (3/28/2012)    Medical Problems:    Sleep Apnea - Dr Karine Vizcarra, cpap    Pneumonia    Hypothyroidism - Dr Cal Elizalde - Dr Gisela Mancia    Hyperlipidemia - intolerance to all statins    Cardiac Arrhythmia - ? type. s/p ablation    cardiac dysrhythmia - pauses - lead to pacemaker    Skin Cancer    Surgical Hx:    Partial Hysterectomy - Still with Both Ovaries. Breast Implants    Pacemaker - Dr Sammie Polanco    Knee Replacement, Carpal Tunnel Release, colon-vaginal fistula repair    Bladder Repair - sling    RT Hip Arthroplasty - MARGARITA        FH:    Father:    . (Hx)    Mother:    . (Hx)    Dad - MI 39,  70 MI    Mom - DM , currently living age 80        SH: Marital: . Personal Habits: Cigarette Use: Former Cigarette Smoker - quit age 36. Occ ETOH, minimal. . 2 kids, 6 step kids. 27 grandkids. .Alcohol: Rarely consumes alcohol        Vitals:    20 1310   BP: 132/70   Pulse: 68   Temp: 98.3 °F (36.8 °C)   SpO2: 95%   Weight: 195 lb (88.5 kg)      Wt Readings from Last 3 Encounters:   20 195 lb (88.5 kg)   20 196 lb (88.9 kg)   10/28/20 196 lb (88.9 kg)        Physical Exam  Exam:  Const: Appears comfortable. No signs of acute distress present. Head/Face: Atraumatic on inspection. Eyes: EOMI in both eyes. No discharge from the eyes. PERRL. Sclerae clear. ENMT: Auditory canals normal. Tympanic membranes: intact and translucent. External nose WNL. Nasal mucosa is clear. Oropharynx: No erythema or exudate. Posterior pharynx is normal.  Neck: Supple. Palpation reveals no adenopathy. No masses appreciated. No JVD. Carotids: no  bruits. Resp: Respirations are unlabored. Clear to auscultation. No rales, rhonchi or wheezes appreciated  over the lungs bilaterally. CV: Rate is regular. Rhythm is regular. No gallop or rubs. No heart murmur appreciated. Extremities: No clubbing, cyanosis, or edema. No calf inflammation or tenderness. Abdomen: Bowel sounds are normoactive. Abdomen is soft, nontender, and nondistended. No  abdominal masses. No palpable hepatosplenomegaly. Lymph: No palpable or visible regional lymphadenopathy. Musculoskeletal: no acute joint inflammation. Skin: Dry and warm with no rash. Raised red irritated lesion left skin  Neuro: Alert and oriented. Affect: appropriate. Upper Extremities: 5/5 bilaterally. Lower Extremities:  5/5 bilaterally. Sensation intact to light touch. Reflexes: DTR's are symmetric and 2+ bilaterally. .  Cranial Nerves: Cranial nerves grossly intact. Assessment and Plan:   Diagnosis Orders   1. Leukocytosis, unspecified type  CBC Auto Differential    CBC Auto Differential   2. Urinary tract infection without hematuria, site unspecified  Urinalysis    Culture, Urine    cephALEXin (KEFLEX) 500 MG capsule    fluconazole (DIFLUCAN) 150 MG tablet   3. Mixed hyperlipidemia  CK    Comprehensive Metabolic Panel    Lipid Panel   4. Liver disease     5. Acquired hypothyroidism  TSH without Reflex    T4, Free   6. Dementia without behavioral disturbance, unspecified dementia type (HCC)     7. Cardiac arrhythmia, unspecified cardiac arrhythmia type     8. Obstructive sleep apnea syndrome     9. Supraventricular tachycardia ([address]     10. Tubular adenoma     11.  Type 2 diabetes mellitus with hyperglycemia, without long-term current use of insulin (HCC)  Comprehensive Metabolic Panel    Hemoglobin A1C Urinalysis    Microalbumin / Creatinine Urine Ratio   12. Vitamin D deficiency  Vitamin D 25 Hydroxy   13. Vitamin B12 deficiency  Vitamin B12 & Folate         Urinary tract infection without hematuria  Counseled. Differential reviewed, including serious intra-abdominal pathology, including stones, including vaginal infections. Evidence of yeast infection. Patient defers pelvic exam.  Reviewed UA. Patient declines imaging/BW. Proper hygiene reviewed. Proper hydration reviewed. Rationale behind cranberry juice reviewed. R/B of azo and probiotic reviewed. Antibiotic therapy as per EMR with standard precautions. As long as sxs steadily improve/resolve, FU by 2wks, sooner prn. Cephalexin and Diflucan with precautions. Leukocytosis  Counseled extensively. Differential reviewed, including serious etiologies. Possibly from UTI. Recheck 2 weeks sooner as needed      Polycythemia  Counseled. Hemoglobin normalized but ferritin was borderline high, but improved. Monitor. Counseled on hemochromatosis. Proper hydration. Counseled extensively. Differential reviewed, including serious etiologies. Neoplasm of uncertain behavior of skin  Counseled. Continue per Dr. Dr. Kiara Olivo. Obstructive sleep apnea syndrome  Counseled. Doing very well CPAP. Uses it at least 8 to 10 hours per night 7 nights per week with very good results. Got a new machine toward the end of 2019. Hypothyroidism    TSH elevated on 112mcg qd, 1/2 Sunday. Was suppressed on 112 qd in the past. Declines d.a.w. Wants to continue generic. TFTs currently normal.  Defers imaging.        Type 2 diabetes mellitus with hyperglycemia, without long-term current use of insulin ([facility])  Hemoglobin A1c stable, 5.9 to 6.1 -5.6on metformin 500 mg twice a day. Previously 1000 twice a day but she would like to keep low dose. Precautions reviewed. Risks  reviewed, proper hydration reviewed, risk of lactic acidosis reviewed .  Encouraged  she watch blood sugar ambulatory with parameters reviewed call in if out of range. Hyper and hypoglycemic precautions reviewed. Micro-and macrovascular  complications reviewed.  Importance of at least yearly eye exams and daily foot exams reviewed.  Tolerating metformin . no longer on invokana.     Dementia without behavioral disturbance  Counseled extensively.  Differential reviewed including various forms of dementia and pseudodementia.  On Aricept 10 mg daily, and Namenda.   Continue per Dr. Fernando Gonzalez were seeing Dr. Margarito Ryan specialist from [facility] [address]. However they re noncompliant without follow-up, feels it is a \"racket\". Not compliant with follow with Dr. Issac Carpenter either but now they are. She is enrolled in a study, nitrate water. Emphasized the importance of follow-up. She is not driving and we emphasized importance of this. Safety precautions reviewed.  Absolutely no driving if concerns for dementia.         Mixed hyperlipidemia  not at goal  Significant risk of cerebrovascular/cardiovascular event. Imperative this  be controlled with history of TIA, however adamantly refuses any further treatment at this time. Refuses to try any other statin or Zetia stating that these were not tolerated in the  past. She did not tolerate Niaspan. did not tolerate WelChol. . declines  cholestyramine. Risk of stroke and heart attack reviewed. lifestyle modification  reviewed. risk of hyperlipidemia reviewed . Did not tolerate fenofibrate  Counseled on repatha, declines. Simply wants monitored. Despite counseling refuses additional therapy now. Monitor.     Supraventricular tachycardia ([address]  h/o svt Status post ablation  . Also history of long pauses-since had pacemaker by Dr. iTm Stanton. FU with her. Follow-up Dr Srikanth Huang . Asymptomatic sends recordings by phone.  ekg done and reviewed with her     Liver disease  Counseled extensively. Differential reviewed, including serious etiologies. Likely  fatty liver. Precautions reviewed. Lifestyle modification appropriate diet and weight  loss reviewed. Risks of even this leading to cirrhosis reviewed. Other than basic  monitoring on interested in other evaluation or treatment, in-depth blood work,  imaging or otherwise. Hep C 10/18 negative, again was negative 9/20. Relatively stable    Tubular adenoma  Small polyp 7/18. Dr. Lisa Carreon recommended basic screenings if repeated at all.  Asymptomatic     Vitamin D deficiency  On supplementation she is borderline toxic, it has improved, continue to monitor. Continue to remain off vitamin D. Health maintenance examination  Health maintenance issues discussed at length 7/17.  Encouraged yearly physicals. Wants to stay conservative now            No flowsheet data found. Plan as above. Counseled extensively and differential diagnoses relevant to above were reviewed, including serious etiologies. Side effects and interactions of medications were reviewed. Counseled extensively. Add cephalexin x7 days, Diflucan now and repeat in 7 days. Repeat CBC urinalysis culture 2 weeks. Call for results follow-up if abnormal as needed otherwise blood work follow-up in 3 months sooner as needed. Continue per multiple specialist      Over 40 minutes  spent with the patient in reviewing records, reviewing with patient/family, counseling, ordering,  prescribing, completing h&p, etc., with over 50% of the time spent face to face counseling. As long as symptoms steadily improve/resolve, and medical conditions follow the expected course, FU as below, sooner PRN. Return in about 3 months (around 3/4/2021), or if symptoms worsen or fail to improve, for physical.    Signs and symptoms to watch for discussed, serious signs and symptoms reviewed. ER if any. Flavia Ramirez MD    Patients are advised to check with insurance company to ensure coverage and to fully understand benefits and cost prior to any testing.   This

## 2020-12-04 NOTE — ASSESSMENT & PLAN NOTE
Counseled extensively. Differential reviewed, including serious etiologies. Possibly from UTI.   Recheck 2 weeks sooner as needed

## 2021-02-08 DIAGNOSIS — E53.8 VITAMIN B12 DEFICIENCY: ICD-10-CM

## 2021-02-08 DIAGNOSIS — E78.2 MIXED HYPERLIPIDEMIA: ICD-10-CM

## 2021-02-08 DIAGNOSIS — E55.9 VITAMIN D DEFICIENCY: ICD-10-CM

## 2021-02-08 DIAGNOSIS — E03.9 ACQUIRED HYPOTHYROIDISM: ICD-10-CM

## 2021-02-08 DIAGNOSIS — D72.829 LEUKOCYTOSIS, UNSPECIFIED TYPE: ICD-10-CM

## 2021-02-08 DIAGNOSIS — N39.0 URINARY TRACT INFECTION WITHOUT HEMATURIA, SITE UNSPECIFIED: ICD-10-CM

## 2021-02-08 DIAGNOSIS — E11.65 TYPE 2 DIABETES MELLITUS WITH HYPERGLYCEMIA, WITHOUT LONG-TERM CURRENT USE OF INSULIN (HCC): ICD-10-CM

## 2021-02-08 LAB
ALBUMIN SERPL-MCNC: 4.2 G/DL (ref 3.5–5.2)
ALP BLD-CCNC: 82 U/L (ref 35–104)
ALT SERPL-CCNC: 41 U/L (ref 0–32)
ANION GAP SERPL CALCULATED.3IONS-SCNC: 13 MMOL/L (ref 7–16)
AST SERPL-CCNC: 55 U/L (ref 0–31)
BACTERIA: ABNORMAL /HPF
BILIRUB SERPL-MCNC: 0.3 MG/DL (ref 0–1.2)
BILIRUBIN URINE: NEGATIVE
BLOOD, URINE: NEGATIVE
BUN BLDV-MCNC: 12 MG/DL (ref 8–23)
CALCIUM SERPL-MCNC: 9.8 MG/DL (ref 8.6–10.2)
CHLORIDE BLD-SCNC: 106 MMOL/L (ref 98–107)
CHOLESTEROL, TOTAL: 233 MG/DL (ref 0–199)
CLARITY: CLEAR
CO2: 22 MMOL/L (ref 22–29)
COLOR: YELLOW
CREAT SERPL-MCNC: 0.6 MG/DL (ref 0.5–1)
CREATININE URINE: 77 MG/DL (ref 29–226)
FOLATE: >20 NG/ML (ref 4.8–24.2)
GFR AFRICAN AMERICAN: >60
GFR NON-AFRICAN AMERICAN: >60 ML/MIN/1.73
GLUCOSE BLD-MCNC: 80 MG/DL (ref 74–99)
GLUCOSE URINE: NEGATIVE MG/DL
HDLC SERPL-MCNC: 46 MG/DL
KETONES, URINE: NEGATIVE MG/DL
LDL CHOLESTEROL CALCULATED: 134 MG/DL (ref 0–99)
LEUKOCYTE ESTERASE, URINE: ABNORMAL
MICROALBUMIN UR-MCNC: <12 MG/L
MICROALBUMIN/CREAT UR-RTO: ABNORMAL (ref 0–30)
NITRITE, URINE: NEGATIVE
PH UA: 5.5 (ref 5–9)
POTASSIUM SERPL-SCNC: 4.3 MMOL/L (ref 3.5–5)
PROTEIN UA: NEGATIVE MG/DL
RBC UA: ABNORMAL /HPF (ref 0–2)
SODIUM BLD-SCNC: 141 MMOL/L (ref 132–146)
SPECIFIC GRAVITY UA: 1.02 (ref 1–1.03)
T4 FREE: 2.04 NG/DL (ref 0.93–1.7)
TOTAL CK: 45 U/L (ref 20–180)
TOTAL PROTEIN: 7.2 G/DL (ref 6.4–8.3)
TRIGL SERPL-MCNC: 267 MG/DL (ref 0–149)
TSH SERPL DL<=0.05 MIU/L-ACNC: 3.33 UIU/ML (ref 0.27–4.2)
UROBILINOGEN, URINE: 0.2 E.U./DL
VITAMIN B-12: 1749 PG/ML (ref 211–946)
VLDLC SERPL CALC-MCNC: 53 MG/DL
WBC UA: ABNORMAL /HPF (ref 0–5)

## 2021-02-09 ENCOUNTER — HOSPITAL ENCOUNTER (OUTPATIENT)
Age: 76
Discharge: HOME | End: 2021-02-09
Payer: MEDICARE

## 2021-02-09 DIAGNOSIS — N39.0 URINARY TRACT INFECTION WITHOUT HEMATURIA, SITE UNSPECIFIED: ICD-10-CM

## 2021-02-09 DIAGNOSIS — D72.829 LEUKOCYTOSIS, UNSPECIFIED TYPE: ICD-10-CM

## 2021-02-09 LAB
BACTERIA: ABNORMAL /HPF
BASOPHILS ABSOLUTE: 0.07 E9/L (ref 0–0.2)
BASOPHILS ABSOLUTE: 0.11 E9/L (ref 0–0.2)
BASOPHILS RELATIVE PERCENT: 0.7 % (ref 0–2)
BASOPHILS RELATIVE PERCENT: 0.9 % (ref 0–2)
BILIRUBIN URINE: NEGATIVE
BLOOD, URINE: NEGATIVE
BURR CELLS: ABNORMAL
CLARITY: CLEAR
COLOR: YELLOW
EOSINOPHILS ABSOLUTE: 0.2 E9/L (ref 0.05–0.5)
EOSINOPHILS ABSOLUTE: 0.21 E9/L (ref 0.05–0.5)
EOSINOPHILS RELATIVE PERCENT: 1.7 % (ref 0–6)
EOSINOPHILS RELATIVE PERCENT: 1.9 % (ref 0–6)
GLUCOSE URINE: NEGATIVE MG/DL
HCT VFR BLD CALC: 47 % (ref 34–48)
HCT VFR BLD CALC: 48 % (ref 34–48)
HEMOGLOBIN: 15.6 G/DL (ref 11.5–15.5)
HEMOGLOBIN: 15.9 G/DL (ref 11.5–15.5)
IMMATURE GRANULOCYTES #: 0.04 E9/L
IMMATURE GRANULOCYTES %: 0.4 % (ref 0–5)
KETONES, URINE: NEGATIVE MG/DL
LEUKOCYTE ESTERASE, URINE: ABNORMAL
LYMPHOCYTES ABSOLUTE: 4.44 E9/L (ref 1.5–4)
LYMPHOCYTES ABSOLUTE: 6.55 E9/L (ref 1.5–4)
LYMPHOCYTES RELATIVE PERCENT: 42.2 % (ref 20–42)
LYMPHOCYTES RELATIVE PERCENT: 52.2 % (ref 20–42)
MCH RBC QN AUTO: 30.8 PG (ref 26–35)
MCH RBC QN AUTO: 31.1 PG (ref 26–35)
MCHC RBC AUTO-ENTMCNC: 32.5 % (ref 32–34.5)
MCHC RBC AUTO-ENTMCNC: 33.8 % (ref 32–34.5)
MCV RBC AUTO: 91.1 FL (ref 80–99.9)
MCV RBC AUTO: 95.8 FL (ref 80–99.9)
MONOCYTES ABSOLUTE: 0.38 E9/L (ref 0.1–0.95)
MONOCYTES ABSOLUTE: 0.66 E9/L (ref 0.1–0.95)
MONOCYTES RELATIVE PERCENT: 2.6 % (ref 2–12)
MONOCYTES RELATIVE PERCENT: 6.3 % (ref 2–12)
NEUTROPHILS ABSOLUTE: 5.1 E9/L (ref 1.8–7.3)
NEUTROPHILS ABSOLUTE: 5.42 E9/L (ref 1.8–7.3)
NEUTROPHILS RELATIVE PERCENT: 42.6 % (ref 43–80)
NEUTROPHILS RELATIVE PERCENT: 48.5 % (ref 43–80)
NITRITE, URINE: NEGATIVE
OVALOCYTES: ABNORMAL
PDW BLD-RTO: 12.8 FL (ref 11.5–15)
PDW BLD-RTO: 13 FL (ref 11.5–15)
PH UA: 6 (ref 5–9)
PLATELET # BLD: 164 E9/L (ref 130–450)
PLATELET # BLD: 171 E9/L (ref 130–450)
PMV BLD AUTO: 11.6 FL (ref 7–12)
PMV BLD AUTO: 9.7 FL (ref 7–12)
PROTEIN UA: NEGATIVE MG/DL
RBC # BLD: 5.01 E12/L (ref 3.5–5.5)
RBC # BLD: 5.16 E12/L (ref 3.5–5.5)
RBC UA: ABNORMAL /HPF (ref 0–2)
SCHISTOCYTES: ABNORMAL
SPECIFIC GRAVITY UA: >=1.03 (ref 1–1.03)
TEAR DROP CELLS: ABNORMAL
UROBILINOGEN, URINE: 0.2 E.U./DL
VITAMIN D 25-HYDROXY: 81 NG/ML (ref 30–100)
WBC # BLD: 10.5 E9/L (ref 4.5–11.5)
WBC # BLD: 12.6 E9/L (ref 4.5–11.5)
WBC UA: ABNORMAL /HPF (ref 0–5)

## 2021-02-09 PROCEDURE — 85025 COMPLETE CBC W/AUTO DIFF WBC: CPT

## 2021-02-09 PROCEDURE — 36415 COLL VENOUS BLD VENIPUNCTURE: CPT

## 2021-02-09 PROCEDURE — 81001 URINALYSIS AUTO W/SCOPE: CPT

## 2021-02-10 LAB
ORGANISM: ABNORMAL
URINE CULTURE, ROUTINE: ABNORMAL

## 2021-02-10 NOTE — RESULT ENCOUNTER NOTE
Interestingly, blood work was drawn 1 day later, I am not sure why. White count actually normalized? ?.  Hemoglobin slightly elevated but hematocrit normal,Abnormal cells do not seem present at this draw???   Follow-up

## 2021-02-17 ENCOUNTER — OFFICE VISIT (OUTPATIENT)
Dept: PRIMARY CARE CLINIC | Age: 76
End: 2021-02-17
Payer: MEDICARE

## 2021-02-17 VITALS
BODY MASS INDEX: 33.47 KG/M2 | HEART RATE: 55 BPM | OXYGEN SATURATION: 97 % | WEIGHT: 195 LBS | TEMPERATURE: 97.9 F | DIASTOLIC BLOOD PRESSURE: 62 MMHG | SYSTOLIC BLOOD PRESSURE: 120 MMHG

## 2021-02-17 DIAGNOSIS — F03.90 DEMENTIA WITHOUT BEHAVIORAL DISTURBANCE, UNSPECIFIED DEMENTIA TYPE: ICD-10-CM

## 2021-02-17 DIAGNOSIS — E55.9 VITAMIN D DEFICIENCY: ICD-10-CM

## 2021-02-17 DIAGNOSIS — D36.9 TUBULAR ADENOMA: ICD-10-CM

## 2021-02-17 DIAGNOSIS — D72.829 LEUKOCYTOSIS, UNSPECIFIED TYPE: ICD-10-CM

## 2021-02-17 DIAGNOSIS — I49.9 CARDIAC ARRHYTHMIA, UNSPECIFIED CARDIAC ARRHYTHMIA TYPE: ICD-10-CM

## 2021-02-17 DIAGNOSIS — E53.8 VITAMIN B12 DEFICIENCY: ICD-10-CM

## 2021-02-17 DIAGNOSIS — E11.65 TYPE 2 DIABETES MELLITUS WITH HYPERGLYCEMIA, WITHOUT LONG-TERM CURRENT USE OF INSULIN (HCC): ICD-10-CM

## 2021-02-17 DIAGNOSIS — E03.9 ACQUIRED HYPOTHYROIDISM: ICD-10-CM

## 2021-02-17 DIAGNOSIS — K76.9 LIVER DISEASE: ICD-10-CM

## 2021-02-17 DIAGNOSIS — E78.2 MIXED HYPERLIPIDEMIA: Primary | ICD-10-CM

## 2021-02-17 DIAGNOSIS — G47.33 OBSTRUCTIVE SLEEP APNEA SYNDROME: ICD-10-CM

## 2021-02-17 PROCEDURE — 1123F ACP DISCUSS/DSCN MKR DOCD: CPT | Performed by: FAMILY MEDICINE

## 2021-02-17 PROCEDURE — 3017F COLORECTAL CA SCREEN DOC REV: CPT | Performed by: FAMILY MEDICINE

## 2021-02-17 PROCEDURE — G8427 DOCREV CUR MEDS BY ELIG CLIN: HCPCS | Performed by: FAMILY MEDICINE

## 2021-02-17 PROCEDURE — G8482 FLU IMMUNIZE ORDER/ADMIN: HCPCS | Performed by: FAMILY MEDICINE

## 2021-02-17 PROCEDURE — 1036F TOBACCO NON-USER: CPT | Performed by: FAMILY MEDICINE

## 2021-02-17 PROCEDURE — G8417 CALC BMI ABV UP PARAM F/U: HCPCS | Performed by: FAMILY MEDICINE

## 2021-02-17 PROCEDURE — 2022F DILAT RTA XM EVC RTNOPTHY: CPT | Performed by: FAMILY MEDICINE

## 2021-02-17 PROCEDURE — 99215 OFFICE O/P EST HI 40 MIN: CPT | Performed by: FAMILY MEDICINE

## 2021-02-17 PROCEDURE — 4040F PNEUMOC VAC/ADMIN/RCVD: CPT | Performed by: FAMILY MEDICINE

## 2021-02-17 PROCEDURE — 3046F HEMOGLOBIN A1C LEVEL >9.0%: CPT | Performed by: FAMILY MEDICINE

## 2021-02-17 PROCEDURE — G8399 PT W/DXA RESULTS DOCUMENT: HCPCS | Performed by: FAMILY MEDICINE

## 2021-02-17 PROCEDURE — 1090F PRES/ABSN URINE INCON ASSESS: CPT | Performed by: FAMILY MEDICINE

## 2021-02-17 RX ORDER — OMEGA-3-ACID ETHYL ESTERS 1 G/1
2 CAPSULE, LIQUID FILLED ORAL 2 TIMES DAILY
Qty: 360 CAPSULE | Refills: 3 | Status: SHIPPED | OUTPATIENT
Start: 2021-02-17

## 2021-02-17 RX ORDER — LEVOTHYROXINE SODIUM 112 UG/1
112 TABLET ORAL DAILY
Qty: 90 TABLET | Refills: 3 | Status: SHIPPED
Start: 2021-02-17 | End: 2021-05-21 | Stop reason: ALTCHOICE

## 2021-02-17 SDOH — ECONOMIC STABILITY: FOOD INSECURITY: WITHIN THE PAST 12 MONTHS, YOU WORRIED THAT YOUR FOOD WOULD RUN OUT BEFORE YOU GOT MONEY TO BUY MORE.: PATIENT DECLINED

## 2021-02-17 SDOH — ECONOMIC STABILITY: TRANSPORTATION INSECURITY
IN THE PAST 12 MONTHS, HAS LACK OF TRANSPORTATION KEPT YOU FROM MEETINGS, WORK, OR FROM GETTING THINGS NEEDED FOR DAILY LIVING?: PATIENT DECLINED

## 2021-02-17 SDOH — ECONOMIC STABILITY: INCOME INSECURITY: HOW HARD IS IT FOR YOU TO PAY FOR THE VERY BASICS LIKE FOOD, HOUSING, MEDICAL CARE, AND HEATING?: PATIENT DECLINED

## 2021-02-17 ASSESSMENT — PATIENT HEALTH QUESTIONNAIRE - PHQ9
SUM OF ALL RESPONSES TO PHQ QUESTIONS 1-9: 0
SUM OF ALL RESPONSES TO PHQ9 QUESTIONS 1 & 2: 0

## 2021-02-17 NOTE — PROGRESS NOTES
Ravi Hoskins : 1945 Sex: female  Age: 77 y.o. Chief Complaint   Patient presents with    Discuss Labs       HPI:    Overall feeling well. Dementia does seem to be slowly progressing. Sees Dr. Sebastian Day next month. Tolerating medication      She follows with urology (previously , will be switching since he left), Dr. Beba Chan (previously), Keisha Tolbert,  Dr. Sebastian Day, Dr Melva Guerra ([facility]), Dr. Jared Chaparro    Blood work reviewed  , Cholesterol still not controlled,  triglycerides 267 with an HDL 46. Willing to try Lovaza, not willing to try statins or other pharmacotherapy ever again. CMP normal except ALT went from 56-41 AST 49-55 glucose 80 TSH 3.3 Free T4 2.04 vitamin D 81 off supplementation. Interestingly CBC was abnormal with a white count of 12.6 from 11.8 hemoglobin 15.6 platelet count 359 with schistocytes and teardrop cells ovalocytes. She was told to go back the next day by the lab stating that they did not draw enough blood they redraw a CBC and her white count normalized, hemoglobin 15.9. My recommendations were to see hematologist, going to defer now, urinalysis overall negative, asymptomatic, culture did show Enterococcus but again asymptomatic.   Most Recent Labs  CBC  Lab Results   Component Value Date    WBC 10.5 2021    WBC 12.6 2021    WBC 11.8 2020    RBC 5.16 2021    RBC 5.01 2021    RBC 5.00 2020    HGB 15.9 2021    HGB 15.6 2021    HGB 15.3 2020    HCT 47.0 2021    HCT 48.0 2021    HCT 47.4 2020    MCV 91.1 2021    MCV 95.8 2021    MCV 94.8 2020     2021     2021     2020      CMP  Lab Results   Component Value Date     2021     2020     2020    K 4.3 2021    K 4.3 2020    K 3.9 2020     2021     2020     08/12/2020    CO2 22 02/08/2021    CO2 27 12/01/2020    CO2 20 08/12/2020    ANIONGAP 13 02/08/2021    ANIONGAP 9 12/01/2020    ANIONGAP 18 08/12/2020    GLUCOSE 80 02/08/2021    GLUCOSE 84 12/01/2020    GLUCOSE 156 08/12/2020    GLUCOSE 104 08/15/2011    GLUCOSE 113 04/25/2011    GLUCOSE 97 04/01/2011    BUN 12 02/08/2021    BUN 13 12/01/2020    BUN 14 08/12/2020    CREATININE 0.6 02/08/2021    CREATININE 0.8 12/01/2020    CREATININE 0.9 08/12/2020    LABGLOM >60 02/08/2021    LABGLOM >60 12/01/2020    LABGLOM >60 08/12/2020    GFRAA >60 02/08/2021    GFRAA >60 12/01/2020    GFRAA >60 08/12/2020    CALCIUM 9.8 02/08/2021    CALCIUM 10.0 12/01/2020    CALCIUM 10.1 08/12/2020    PROT 7.2 02/08/2021    PROT 7.6 12/01/2020    PROT 7.2 08/12/2020    LABALBU 4.2 02/08/2021    LABALBU 4.5 12/01/2020    LABALBU 4.2 08/12/2020    LABALBU 4.4 08/15/2011    LABALBU 4.2 04/25/2011    LABALBU 4.2 03/24/2011    BILITOT 0.3 02/08/2021    BILITOT 0.3 12/01/2020    BILITOT 0.4 08/12/2020    ALKPHOS 82 02/08/2021    ALKPHOS 89 12/01/2020    ALKPHOS 87 08/12/2020    AST 55 02/08/2021    AST 49 12/01/2020    AST 57 08/12/2020    ALT 41 02/08/2021    ALT 56 12/01/2020    ALT 53 08/12/2020     A1C  Lab Results   Component Value Date    LABA1C 5.6 12/01/2020    LABA1C 6.1 08/12/2020    LABA1C 5.9 03/02/2020     TSH  Lab Results   Component Value Date    TSH 3.330 02/08/2021    TSH 1.760 12/01/2020    TSH 1.870 08/12/2020     FREET4  Lab Results   Component Value Date    N4VNHJD 7.1 06/26/2019    H9XAFCM 7.6 05/21/2014     LIPID  Lab Results   Component Value Date    CHOL 233 02/08/2021    CHOL 247 12/01/2020    CHOL 235 08/12/2020    HDL 46 02/08/2021    HDL 52 12/01/2020    HDL 47 08/12/2020    LDLCALC 134 02/08/2021    LDLCALC 156 12/01/2020    LDLCALC 122 08/12/2020    TRIG 267 02/08/2021    TRIG 196 12/01/2020    TRIG 332 08/12/2020     VITAMIN D  Lab Results   Component Value Date    VITD25 81 02/08/2021    VITD25 78 12/01/2020 above. Denies mouth and tongue lesions and sore throat. CV: Denies chest discomfort, pain; diaphoresis, dizziness, edema, lightheadedness, orthopnea,  palpitations, syncope and near syncopal episode or any exertional symptoms  Resp: Denies cough, hemoptysis, pleuritic pain, SOB, sputum production and wheezing. GI: Denies abdominal pain, change in bowel habits, hematochezia, melena, nausea and vomiting. : Dysuria urgency and frequency  Musculo: Denies musculoskeletal symptoms. Skin: Denies bruising and rash, other than as above. Neuro: Denies headache, numbness, stiff neck, tingling and focal weakness slurred speech or facial  Droop, other than as above. Hema/Lymph: Denies bleeding/bruising tendency and enlarged lymph nodes        Current Outpatient Medications:     levothyroxine (SYNTHROID) 112 MCG tablet, Take 1 tablet by mouth daily M-SAT; TAKE 1/2 PO SUNDAYS ONLY, Disp: 90 tablet, Rfl: 3    omega-3 acid ethyl esters (LOVAZA) 1 g capsule, Take 2 capsules by mouth 2 times daily, Disp: 360 capsule, Rfl: 3    donepezil (ARICEPT) 10 MG tablet, Take 1 tablet by mouth 2 times daily, Disp: 180 tablet, Rfl: 3    memantine (NAMENDA) 10 MG tablet, Take 1 tablet by mouth daily, Disp: 30 tablet, Rfl: 5    metFORMIN (GLUCOPHAGE) 500 MG tablet, Take 1 tablet by mouth daily , Disp: , Rfl:     metoprolol succinate (TOPROL XL) 100 MG extended release tablet, Take 1 tablet by mouth, Disp: , Rfl:     mirabegron (MYRBETRIQ) 50 MG TB24, Take 1 tablet by mouth daily , Disp: , Rfl:     modafinil (PROVIGIL) 200 MG tablet, Take 200 mg by mouth daily. ., Disp: , Rfl:     B Complex Vitamins (B-COMPLEX/B-12 PO), Take by mouth daily LD 12/12/18, Disp: , Rfl:   Allergies   Allergen Reactions    Morphine Itching    Statins      Other reaction(s): Unknown    Statins Depletion Therapy Other (See Comments)     MYALGIA       Past Medical History:   Diagnosis Date    Arthritis     Diabetes mellitus ([facility] [address]     Difficult intubation     carries card, copy on chart  Glidescope#3 with ETT size7    Hyperlipidemia     Hypothyroidism     Left sided abdominal pain     Memory problem     still competent    GORDY on CPAP     Pacemaker     Rectal bleeding     Ringing in the ears      Past Surgical History:   Procedure Laterality Date    ABLATION OF DYSRHYTHMIC FOCUS      BREAST SURGERY      implants    COLONOSCOPY  ? Dr. Dilshad Sommers twisted     COLONOSCOPY  ? Dr. Rajeev Otero. incomplete.  COLONOSCOPY  2018    Dr. Margaret Deleon N/A 2018    COLONOSCOPY DIAGNOSTIC performed by Charity Huang MD at [address] [place] [address]  09/10/2011    POSTERIOR    HYSTERECTOMY      vaginal. uterus only.      KNEE ARTHROPLASTY Right     partial    KNEE ARTHROSCOPY  2011    right knee    PACEMAKER PLACEMENT  2014    Medtronic dual chamber    TOTAL HIP ARTHROPLASTY Right 2015     Family History   Problem Relation Age of Onset    Pacemaker Mother     Heart Attack Father      Social History     Tobacco Use    Smoking status: Former Smoker     Packs/day: 1.50     Years: 13.00     Pack years: 19.50     Types: Cigarettes     Start date: 1973     Quit date: 1986     Years since quittin.1    Smokeless tobacco: Never Used    Tobacco comment: quit smoking    Substance Use Topics    Alcohol use: Yes     Comment: socially -- 3 drinks per month at the most    Drug use: No      Social History     Social History Narrative    PMH:    Problem List: Urinary tract infectious disease, Obstructive sleep apnea syndrome, Adult health    examination, Adult health examination, Constipation, Derangement of knee, Vitamin D deficiency,    Hypothyroidism, Conduction disorder of the heart, Hyperlipidemia    Health Maintenance:    Mini Mental Status - (2018)    Colonoscopy - (2018)    Colonoscopy Screening - (2018)    Mammogram Screening - (2018)    Mammogram - (2018) Colonoscopy - (2010)    Couseled on Home Safety - (2015)    Bone Density Scan - (3/28/2012)    Medical Problems:    Sleep Apnea - Dr Josefa Mack, cpap    Pneumonia    Hypothyroidism - Dr Isabela Wheat - Dr Elvis Chino    Hyperlipidemia - intolerance to all statins    Cardiac Arrhythmia - ? type. s/p ablation    cardiac dysrhythmia - pauses - lead to pacemaker    Skin Cancer    Surgical Hx:    Partial Hysterectomy - Still with Both Ovaries. Breast Implants    Pacemaker - Dr Tg Montanez    Knee Replacement, Carpal Tunnel Release, colon-vaginal fistula repair    Bladder Repair - sling    RT Hip Arthroplasty - MARGARITA        FH:    Father:    . (Hx)    Mother:    . (Hx)    Dad - MI 39,  70 MI    Mom - DM , currently living age 80        SH: Marital: . Personal Habits: Cigarette Use: Former Cigarette Smoker - quit age 36. Occ ETOH, minimal. . 2 kids, 6 step kids. 27 grandkids. .Alcohol: Rarely consumes alcohol        Vitals:    21 1305   BP: 120/62   Pulse: 55   Temp: 97.9 °F (36.6 °C)   SpO2: 97%   Weight: 195 lb (88.5 kg)      Wt Readings from Last 3 Encounters:   21 195 lb (88.5 kg)   20 195 lb (88.5 kg)   20 196 lb (88.9 kg)          Exam:  Const: Appears comfortable. No signs of acute distress present. Head/Face: Atraumatic on inspection. Eyes: EOMI in both eyes. No discharge from the eyes. PERRL. Sclerae clear. ENMT: Auditory canals normal. Tympanic membranes: intact and translucent. External nose WNL. Nasal mucosa is clear. Oropharynx: No erythema or exudate. Posterior pharynx is normal.  Neck: Supple. Palpation reveals no adenopathy. No masses appreciated. No JVD. Carotids: no  bruits. Resp: Respirations are unlabored. Clear to auscultation. No rales, rhonchi or wheezes appreciated  over the lungs bilaterally. CV: Rate is regular. Rhythm is regular. No gallop or rubs. No heart murmur appreciated. Extremities: No clubbing, cyanosis, or edema.  No calf including serious etiologies. Simply wants basic monitoring at this time. Neoplasm of uncertain behavior of skin  Counseled. Continue per Dr. Dr. Damian Lemus. Obstructive sleep apnea syndrome  Counseled. Doing very well CPAP. Uses it at least 8 to 10 hours per night 7 nights per week with very good results. Got a new machine toward the end of 2019. Hypothyroidism    TSH elevated on 112mcg qd, 1/2 Sunday. Was suppressed on 112 qd in the past. Declines d.a.w. Wants to continue generic. Fam Lilly Defers imaging. TSH normal but free T4 elevated, took medicine shortly before blood draw. Continue current dosing, monitor, asymptomatic     Type 2 diabetes mellitus with hyperglycemia, without long-term current use of insulin ([facility])  Hemoglobin A1c stable, 5.9 to 6.1 -5.6on metformin 500 mg twice a day. Previously 1000 twice a day but she would like to keep low dose. Precautions reviewed. Risks  reviewed, proper hydration reviewed, risk of lactic acidosis reviewed . Encouraged  she watch blood sugar ambulatory with parameters reviewed call in if out of range. Hyper and hypoglycemic precautions reviewed. Micro-and macrovascular  complications reviewed.  Importance of at least yearly eye exams and daily foot exams reviewed.  Tolerating metformin . no longer on invokana. Monitor     Dementia without behavioral disturbance  Counseled extensively.  Differential reviewed including various forms of dementia and pseudodementia.  On Aricept 10 mg daily, and Namenda.   Continue per Dr. Davis Na were seeing Dr. Jos Wiley specialist from [place]. However they re noncompliant without follow-up, feels it is a \"racket\". Not compliant with follow with Dr. Danielle Xavier either but now they are. She is enrolled in a study, nitrate water. She is not driving and we emphasized importance of this.  Safety precautions reviewed.   Sees Dr. Danielle Xavier again next month    Mixed hyperlipidemia  not at goal  Significant risk of cerebrovascular/cardiovascular event. Imperative this  be controlled with history of TIA, however adamantly refuses any further treatment at this time. Refuses to try any other statin or Zetia stating that these were not tolerated in the  past. She did not tolerate Niaspan. did not tolerate WelChol. . declines  cholestyramine. Risk of stroke and heart attack reviewed. lifestyle modification  reviewed. risk of hyperlipidemia reviewed . Did not tolerate fenofibrate  Counseled on repatha, declines. After discussion and shared decision-making would like to try Lovaza with precautions, simply with blood work in 3 months sooner as needed     Supraventricular tachycardia ([address]  h/o svt Status post ablation  . Also history of long pauses-since had pacemaker by Dr. Taurus Crocker. FU with her. Follow-up Dr Rakan Pompa . Asymptomatic sends recordings by phone.  ekg done and reviewed with her     Liver disease  Counseled extensively. Differential reviewed, including serious etiologies. Likely  fatty liver. Precautions reviewed. Lifestyle modification appropriate diet and weight  loss reviewed. Risks of even this leading to cirrhosis reviewed. Other than basic  monitoring on interested in other evaluation or treatment, in-depth blood work,  imaging or otherwise. Hep C 10/18 negative, again was negative 9/20. Relatively stable    Tubular adenoma  Small polyp 7/18. Dr. Beather Libman recommended basic screenings if repeated at all.  Asymptomatic     Vitamin D deficiency  On supplementation she is borderline toxic, it has improved, continue to monitor. Continue to remain off vitamin D. Health maintenance examination  Health maintenance issues discussed at length 7/17.  Encouraged yearly physicals. Wants to stay conservative now. Also schedule AWV next visit    B12 deficiency  Risk of high and low reviewed. Now high, discussed backing off. No flowsheet data found. Plan as above.   Counseled extensively and differential diagnoses relevant to above were reviewed, including serious etiologies. Side effects and interactions of medications were reviewed. After discussion and shared decision making would like to proceed as above, continue per specialists, add Lovaza, continue current regimen otherwise, blood work and follow-up in 3 months sooner as needed. All questions answered    As long as symptoms steadily improve/resolve, and medical conditions follow the expected course, FU as below, sooner PRN. Return in about 3 months (around 5/17/2021), or if symptoms worsen or fail to improve, for physical, Review BW. Signs and symptoms to watch for discussed, serious signs and symptoms reviewed. ER if any. Over 40 minutes  spent with the patient in reviewing records, reviewing with patient/family, counseling, ordering,  prescribing, completing h&p, etc., with over 50% of the time spent face to face counseling. Brendon Mathews MD    Patients are advised to check with insurance company to ensure coverage and to fully understand benefits and cost prior to any testing. This note was created with the assistance of voice recognition software. Document was reviewed however may contain grammatical errors.

## 2021-02-25 ENCOUNTER — OFFICE VISIT (OUTPATIENT)
Dept: FAMILY MEDICINE CLINIC | Age: 76
End: 2021-02-25
Payer: MEDICARE

## 2021-02-25 VITALS
HEART RATE: 64 BPM | WEIGHT: 195 LBS | OXYGEN SATURATION: 98 % | DIASTOLIC BLOOD PRESSURE: 80 MMHG | HEIGHT: 64 IN | RESPIRATION RATE: 18 BRPM | TEMPERATURE: 97.8 F | BODY MASS INDEX: 33.29 KG/M2 | SYSTOLIC BLOOD PRESSURE: 128 MMHG

## 2021-02-25 DIAGNOSIS — N39.0 RECURRENT UTI: ICD-10-CM

## 2021-02-25 DIAGNOSIS — R30.0 DYSURIA: Primary | ICD-10-CM

## 2021-02-25 DIAGNOSIS — R30.0 DYSURIA: ICD-10-CM

## 2021-02-25 DIAGNOSIS — N30.00 ACUTE CYSTITIS WITHOUT HEMATURIA: ICD-10-CM

## 2021-02-25 LAB
BILIRUBIN, POC: NEGATIVE
BLOOD URINE, POC: NEGATIVE
CLARITY, POC: CLEAR
COLOR, POC: YELLOW
GLUCOSE URINE, POC: NEGATIVE
KETONES, POC: NEGATIVE
LEUKOCYTE EST, POC: NORMAL
NITRITE, POC: NEGATIVE
PH, POC: 6
PROTEIN, POC: NEGATIVE
SPECIFIC GRAVITY, POC: >=1.03
UROBILINOGEN, POC: 0.2

## 2021-02-25 PROCEDURE — 1036F TOBACCO NON-USER: CPT | Performed by: PHYSICIAN ASSISTANT

## 2021-02-25 PROCEDURE — 81002 URINALYSIS NONAUTO W/O SCOPE: CPT | Performed by: PHYSICIAN ASSISTANT

## 2021-02-25 PROCEDURE — G8399 PT W/DXA RESULTS DOCUMENT: HCPCS | Performed by: PHYSICIAN ASSISTANT

## 2021-02-25 PROCEDURE — G8427 DOCREV CUR MEDS BY ELIG CLIN: HCPCS | Performed by: PHYSICIAN ASSISTANT

## 2021-02-25 PROCEDURE — 1090F PRES/ABSN URINE INCON ASSESS: CPT | Performed by: PHYSICIAN ASSISTANT

## 2021-02-25 PROCEDURE — 3017F COLORECTAL CA SCREEN DOC REV: CPT | Performed by: PHYSICIAN ASSISTANT

## 2021-02-25 PROCEDURE — G8482 FLU IMMUNIZE ORDER/ADMIN: HCPCS | Performed by: PHYSICIAN ASSISTANT

## 2021-02-25 PROCEDURE — 99214 OFFICE O/P EST MOD 30 MIN: CPT | Performed by: PHYSICIAN ASSISTANT

## 2021-02-25 PROCEDURE — 1123F ACP DISCUSS/DSCN MKR DOCD: CPT | Performed by: PHYSICIAN ASSISTANT

## 2021-02-25 PROCEDURE — G8417 CALC BMI ABV UP PARAM F/U: HCPCS | Performed by: PHYSICIAN ASSISTANT

## 2021-02-25 PROCEDURE — 4040F PNEUMOC VAC/ADMIN/RCVD: CPT | Performed by: PHYSICIAN ASSISTANT

## 2021-02-25 RX ORDER — CEPHALEXIN 500 MG/1
500 CAPSULE ORAL 2 TIMES DAILY
Qty: 14 CAPSULE | Refills: 0 | Status: SHIPPED
Start: 2021-02-25 | End: 2021-03-01 | Stop reason: ALTCHOICE

## 2021-02-25 NOTE — PROGRESS NOTES
 ABLATION OF DYSRHYTHMIC FOCUS  1996    BREAST SURGERY      implants    COLONOSCOPY  2009? Dr. Parker Martin twisted     COLONOSCOPY  2008? Dr. Henrietta Chun. incomplete.  COLONOSCOPY  12/17/2018    Dr. Brianda Melgar N/A 12/17/2018    COLONOSCOPY DIAGNOSTIC performed by Carie Carrasco MD at [address] [place] [address]  09/10/2011    POSTERIOR    HYSTERECTOMY      vaginal. uterus only.  KNEE ARTHROPLASTY Right     partial    KNEE ARTHROSCOPY  9/08/2011    right knee    PACEMAKER PLACEMENT  5/23/2014    Medtronic dual chamber    TOTAL HIP ARTHROPLASTY Right 2015       Family History   Problem Relation Age of Onset    Pacemaker Mother     Heart Attack Father        Medications:     Current Outpatient Medications:     levothyroxine (SYNTHROID) 112 MCG tablet, Take 1 tablet by mouth daily M-SAT; TAKE 1/2 PO SUNDAYS ONLY, Disp: 90 tablet, Rfl: 3    omega-3 acid ethyl esters (LOVAZA) 1 g capsule, Take 2 capsules by mouth 2 times daily, Disp: 360 capsule, Rfl: 3    donepezil (ARICEPT) 10 MG tablet, Take 1 tablet by mouth 2 times daily, Disp: 180 tablet, Rfl: 3    memantine (NAMENDA) 10 MG tablet, Take 1 tablet by mouth daily, Disp: 30 tablet, Rfl: 5    metFORMIN (GLUCOPHAGE) 500 MG tablet, Take 1 tablet by mouth daily , Disp: , Rfl:     metoprolol succinate (TOPROL XL) 100 MG extended release tablet, Take 1 tablet by mouth, Disp: , Rfl:     mirabegron (MYRBETRIQ) 50 MG TB24, Take 1 tablet by mouth daily , Disp: , Rfl:     modafinil (PROVIGIL) 200 MG tablet, Take 200 mg by mouth daily. ., Disp: , Rfl:     B Complex Vitamins (B-COMPLEX/B-12 PO), Take by mouth daily LD 12/12/18, Disp: , Rfl:     Allergies:      Allergies   Allergen Reactions    Morphine Itching    Statins      Other reaction(s): Unknown    Statins Depletion Therapy Other (See Comments)     MYALGIA       Social History:     Social History     Tobacco Use    Smoking status: Former Smoker     Packs/day: 1.50     Years: 13.00 Pack years: 19.50     Types: Cigarettes     Start date: 1973     Quit date: 1986     Years since quittin.1    Smokeless tobacco: Never Used    Tobacco comment: quit smoking    Substance Use Topics    Alcohol use: Yes     Comment: socially -- 3 drinks per month at the most    Drug use: No       Patient lives at home. Physical Exam:     Vitals:    21 1131   BP: 128/80   Pulse: 64   Resp: 18   Temp: 97.8 °F (36.6 °C)   SpO2: 98%   Weight: 195 lb (88.5 kg)   Height: 5' 4\" (1.626 m)       Exam:  Physical Exam  Nurses note and vital signs reviewed and patient is not hypoxic. General: The patient appears well and in no apparent distress. Patient is resting comfortably on cart. Skin: Warm, dry, no pallor noted. There is no rash noted. Head: Normocephalic, atraumatic. Eye: Normal conjunctiva  Ears, Nose, Mouth, and Throat: Oral mucosa is moist  Cardiovascular: Regular Rate and Rhythm  Respiratory: Patient is in no distress, no accessory muscle use, lungs are clear to auscultation, no wheezing, crackles or rhonchi  Back: Non-tender, no CVA tenderness bilaterally to percussion. GI: Normal bowel sounds, no tenderness to palpation, no masses appreciated. No rebound, guarding, or rigidity noted. Musculoskeletal: The patient has no evidence of calf tenderness, no pitting edema, symmetrical pulses noted bilaterally  Neurological: A&O x4, normal speech      Testing:     Results for orders placed or performed in visit on 21   POCT Urinalysis no Micro   Result Value Ref Range    Color, UA yellow     Clarity, UA clear     Glucose, UA POC negative     Bilirubin, UA negative     Ketones, UA negative     Spec Grav, UA >=1.030     Blood, UA POC negative     pH, UA 6.0     Protein, UA POC negative     Urobilinogen, UA 0.2     Leukocytes, UA small     Nitrite, UA negative            Medical Decision Making:     Vital signs reviewed    Past medical history reviewed. Allergies reviewed. Medications reviewed. Patient on arrival does not appear to be in any apparent distress or discomfort. The patient is noted to be afebrile and nontoxic appearing. The patient had UA that did show evidence of a UTI. We did obtain a urine culture, which was sent to the lab for further evaluation. The patient recently had a urine culture that was done and showed resistance to doxycycline but did have sensitivity to ampicillin. Urinalysis did show evidence of a urinary tract infection. The patient had a urine culture set up. The patient will be started on antibiotics. The patient is to push fluids get plenty of rest.  We will have the patient follow-up with primary care physician. Patient will need repeat urinalysis to ensure resolution. The patient is to follow up with PCP for results and we will make any necessary adjustments to the patient's medication regimen. The patient will go directly to ED if notes nausea, vomiting, back pain, fever, chills or worsening symptoms. The patient has no other concerns at this time. Clinical Impression:   Bisi Castle was seen today for dysuria. Diagnoses and all orders for this visit:    Dysuria  -     POCT Urinalysis no Micro  -     Culture, Urine; Future    Acute cystitis without hematuria    Recurrent UTI    Other orders  -     cephALEXin (KEFLEX) 500 MG capsule; Take 1 capsule by mouth 2 times daily for 7 days        The patient is to call for any concerns or return if any of the signs or symptoms worsen. The patient is to follow-up with PCP in the next 2-3 days for repeat evaluation repeat assessment or go directly to the emergency department.      SIGNATURE: Caleb Grey III, PA-C

## 2021-02-28 LAB
ORGANISM: ABNORMAL
URINE CULTURE, ROUTINE: ABNORMAL

## 2021-03-01 RX ORDER — NITROFURANTOIN 25; 75 MG/1; MG/1
100 CAPSULE ORAL 2 TIMES DAILY
Qty: 14 CAPSULE | Refills: 0 | Status: SHIPPED | OUTPATIENT
Start: 2021-03-01 | End: 2021-03-08

## 2021-03-16 ENCOUNTER — OFFICE VISIT (OUTPATIENT)
Dept: GERIATRIC MEDICINE | Age: 76
End: 2021-03-16
Payer: MEDICARE

## 2021-03-16 VITALS
HEART RATE: 60 BPM | DIASTOLIC BLOOD PRESSURE: 78 MMHG | TEMPERATURE: 97.5 F | WEIGHT: 196.5 LBS | BODY MASS INDEX: 32.74 KG/M2 | HEIGHT: 65 IN | RESPIRATION RATE: 16 BRPM | SYSTOLIC BLOOD PRESSURE: 122 MMHG

## 2021-03-16 DIAGNOSIS — G31.84 MCI (MILD COGNITIVE IMPAIRMENT): Primary | ICD-10-CM

## 2021-03-16 PROCEDURE — G8399 PT W/DXA RESULTS DOCUMENT: HCPCS | Performed by: INTERNAL MEDICINE

## 2021-03-16 PROCEDURE — 1123F ACP DISCUSS/DSCN MKR DOCD: CPT | Performed by: INTERNAL MEDICINE

## 2021-03-16 PROCEDURE — G8482 FLU IMMUNIZE ORDER/ADMIN: HCPCS | Performed by: INTERNAL MEDICINE

## 2021-03-16 PROCEDURE — G8427 DOCREV CUR MEDS BY ELIG CLIN: HCPCS | Performed by: INTERNAL MEDICINE

## 2021-03-16 PROCEDURE — 99212 OFFICE O/P EST SF 10 MIN: CPT | Performed by: INTERNAL MEDICINE

## 2021-03-16 PROCEDURE — 1090F PRES/ABSN URINE INCON ASSESS: CPT | Performed by: INTERNAL MEDICINE

## 2021-03-16 PROCEDURE — 3017F COLORECTAL CA SCREEN DOC REV: CPT | Performed by: INTERNAL MEDICINE

## 2021-03-16 PROCEDURE — G8417 CALC BMI ABV UP PARAM F/U: HCPCS | Performed by: INTERNAL MEDICINE

## 2021-03-16 PROCEDURE — 4040F PNEUMOC VAC/ADMIN/RCVD: CPT | Performed by: INTERNAL MEDICINE

## 2021-03-16 PROCEDURE — 1036F TOBACCO NON-USER: CPT | Performed by: INTERNAL MEDICINE

## 2021-03-16 RX ORDER — MEMANTINE HYDROCHLORIDE 10 MG/1
10 TABLET ORAL 2 TIMES DAILY
Qty: 180 TABLET | Refills: 3 | Status: SHIPPED
Start: 2021-03-16 | End: 2022-08-22

## 2021-03-16 NOTE — PROGRESS NOTES
Chief Complaint   Patient presents with    Follow-up     November follow up re: SDAT. Here with , Chika Ko.

## 2021-03-16 NOTE — PROGRESS NOTES
CC Here for checkup on memory and MCI  Here for follow up and  notices some decline  eg forgot a light swich used regularly upstairs was there   Knew he estelita made mustard and brought some  And today she is 3/6 was 4/4 and missed year said 2020    CLOCK 4/4   2/3  5 MINUTE MEMORY  Impression: Slow advancing of SDAT   Plan;Aricept 20 mg a day            NAMENDA to 20 mg titration           Continue H3 , considering Citriuline later

## 2021-03-31 ENCOUNTER — TELEPHONE (OUTPATIENT)
Dept: PRIMARY CARE CLINIC | Age: 76
End: 2021-03-31

## 2021-03-31 NOTE — TELEPHONE ENCOUNTER
Pt  calling and concerned about bp readings for patient. They have been 102/66 105/63 106/73 [id number] 117/66 all within the last 15 minutes. He is worried because they usually are not this low. ANy concern? Not having any symptoms at all. hy

## 2021-03-31 NOTE — TELEPHONE ENCOUNTER
Typically we do not worry unless systolic less than 286, sometimes even less than 90 unless symptoms. Make follow-up so we can check as well. Notify of symptoms.   Properly hydrate

## 2021-05-05 DIAGNOSIS — E03.9 ACQUIRED HYPOTHYROIDISM: ICD-10-CM

## 2021-05-05 DIAGNOSIS — D72.829 LEUKOCYTOSIS, UNSPECIFIED TYPE: ICD-10-CM

## 2021-05-05 DIAGNOSIS — E11.65 TYPE 2 DIABETES MELLITUS WITH HYPERGLYCEMIA, WITHOUT LONG-TERM CURRENT USE OF INSULIN (HCC): ICD-10-CM

## 2021-05-05 DIAGNOSIS — E53.8 VITAMIN B12 DEFICIENCY: ICD-10-CM

## 2021-05-05 DIAGNOSIS — E78.2 MIXED HYPERLIPIDEMIA: ICD-10-CM

## 2021-05-17 ENCOUNTER — OFFICE VISIT (OUTPATIENT)
Dept: PRIMARY CARE CLINIC | Age: 76
End: 2021-05-17
Payer: MEDICARE

## 2021-05-17 VITALS
BODY MASS INDEX: 31.82 KG/M2 | HEIGHT: 65 IN | WEIGHT: 191 LBS | HEART RATE: 60 BPM | DIASTOLIC BLOOD PRESSURE: 76 MMHG | TEMPERATURE: 97.5 F | SYSTOLIC BLOOD PRESSURE: 124 MMHG | OXYGEN SATURATION: 97 %

## 2021-05-17 DIAGNOSIS — N39.0 URINARY TRACT INFECTION WITHOUT HEMATURIA, SITE UNSPECIFIED: ICD-10-CM

## 2021-05-17 DIAGNOSIS — K76.9 LIVER DISEASE: ICD-10-CM

## 2021-05-17 DIAGNOSIS — N39.0 URINARY TRACT INFECTION WITHOUT HEMATURIA, SITE UNSPECIFIED: Primary | ICD-10-CM

## 2021-05-17 DIAGNOSIS — F03.90 DEMENTIA WITHOUT BEHAVIORAL DISTURBANCE, UNSPECIFIED DEMENTIA TYPE: ICD-10-CM

## 2021-05-17 DIAGNOSIS — E03.9 ACQUIRED HYPOTHYROIDISM: ICD-10-CM

## 2021-05-17 DIAGNOSIS — G47.33 OBSTRUCTIVE SLEEP APNEA SYNDROME: ICD-10-CM

## 2021-05-17 DIAGNOSIS — E78.2 MIXED HYPERLIPIDEMIA: ICD-10-CM

## 2021-05-17 DIAGNOSIS — E53.8 VITAMIN B12 DEFICIENCY: ICD-10-CM

## 2021-05-17 DIAGNOSIS — E55.9 VITAMIN D DEFICIENCY: ICD-10-CM

## 2021-05-17 DIAGNOSIS — I47.1 SUPRAVENTRICULAR TACHYCARDIA (HCC): ICD-10-CM

## 2021-05-17 DIAGNOSIS — E11.65 TYPE 2 DIABETES MELLITUS WITH HYPERGLYCEMIA, WITHOUT LONG-TERM CURRENT USE OF INSULIN (HCC): ICD-10-CM

## 2021-05-17 DIAGNOSIS — I49.9 CARDIAC ARRHYTHMIA, UNSPECIFIED CARDIAC ARRHYTHMIA TYPE: ICD-10-CM

## 2021-05-17 DIAGNOSIS — D36.9 TUBULAR ADENOMA: ICD-10-CM

## 2021-05-17 LAB
BILIRUBIN, POC: NEGATIVE
BLOOD URINE, POC: NEGATIVE
CLARITY, POC: CLEAR
COLOR, POC: YELLOW
GLUCOSE URINE, POC: NEGATIVE
KETONES, POC: NEGATIVE
LEUKOCYTE EST, POC: NORMAL
NITRITE, POC: NEGATIVE
PH, POC: 6
PROTEIN, POC: NEGATIVE
SPECIFIC GRAVITY, POC: 1.02
UROBILINOGEN, POC: 0.2

## 2021-05-17 PROCEDURE — 81002 URINALYSIS NONAUTO W/O SCOPE: CPT | Performed by: FAMILY MEDICINE

## 2021-05-17 PROCEDURE — 99214 OFFICE O/P EST MOD 30 MIN: CPT | Performed by: FAMILY MEDICINE

## 2021-05-17 PROCEDURE — 3046F HEMOGLOBIN A1C LEVEL >9.0%: CPT | Performed by: FAMILY MEDICINE

## 2021-05-17 PROCEDURE — G8399 PT W/DXA RESULTS DOCUMENT: HCPCS | Performed by: FAMILY MEDICINE

## 2021-05-17 PROCEDURE — 1123F ACP DISCUSS/DSCN MKR DOCD: CPT | Performed by: FAMILY MEDICINE

## 2021-05-17 PROCEDURE — 1036F TOBACCO NON-USER: CPT | Performed by: FAMILY MEDICINE

## 2021-05-17 PROCEDURE — G8427 DOCREV CUR MEDS BY ELIG CLIN: HCPCS | Performed by: FAMILY MEDICINE

## 2021-05-17 PROCEDURE — 4040F PNEUMOC VAC/ADMIN/RCVD: CPT | Performed by: FAMILY MEDICINE

## 2021-05-17 PROCEDURE — G8417 CALC BMI ABV UP PARAM F/U: HCPCS | Performed by: FAMILY MEDICINE

## 2021-05-17 PROCEDURE — 2022F DILAT RTA XM EVC RTNOPTHY: CPT | Performed by: FAMILY MEDICINE

## 2021-05-17 PROCEDURE — 1090F PRES/ABSN URINE INCON ASSESS: CPT | Performed by: FAMILY MEDICINE

## 2021-05-17 PROCEDURE — 3017F COLORECTAL CA SCREEN DOC REV: CPT | Performed by: FAMILY MEDICINE

## 2021-05-17 RX ORDER — TRIMETHOPRIM 100 MG/1
TABLET ORAL
COMMUNITY
Start: 2021-03-11 | End: 2021-09-15

## 2021-05-17 RX ORDER — NITROFURANTOIN 25; 75 MG/1; MG/1
100 CAPSULE ORAL 2 TIMES DAILY
Qty: 14 CAPSULE | Refills: 0 | Status: SHIPPED | OUTPATIENT
Start: 2021-05-17 | End: 2021-05-24

## 2021-05-17 NOTE — PROGRESS NOTES
Coy Cancer : 1945 Sex: female  Age: 77 y.o. Chief Complaint   Patient presents with    Annual Exam    Discuss Labs    Hyperlipidemia       HPI:    Patient originally presents for annual wellness and to review blood work but the lab was unable to draw her and so they canceled all of her labs. She is also complaining of 2 weeks of dysuria urgency frequency and odor to her urine.   She like this addressed and she will follow up for the physical.  In the past failed to cephalexin but symptoms resolved with Macrobid    Her  feels her dementia is stable in the short-term memory is actually slightly improved, feels the nitrate trial is helping      She follows with urology (previously , will be switching since he left), Dr. Lars Toro (previously), Sydney Richardson,  Dr. Shell Tomas, Dr Aki Garcia ([facility]), Dr. Kar Molina      Most Recent Labs  CBC  Lab Results   Component Value Date    WBC 10.5 2021    WBC 12.6 2021    WBC 11.8 2020    RBC 5.16 2021    RBC 5.01 2021    RBC 5.00 2020    HGB 15.9 2021    HGB 15.6 2021    HGB 15.3 2020    HCT 47.0 2021    HCT 48.0 2021    HCT 47.4 2020    MCV 91.1 2021    MCV 95.8 2021    MCV 94.8 2020     2021     2021     2020      CMP  Lab Results   Component Value Date     2021     2020     2020    K 4.3 2021    K 4.3 2020    K 3.9 2020     2021     2020     2020    CO2 22 2021    CO2 27 2020    CO2 20 2020    ANIONGAP 13 2021    ANIONGAP 9 2020    ANIONGAP 18 2020    GLUCOSE 80 2021    GLUCOSE 84 2020    GLUCOSE 156 2020    GLUCOSE 104 08/15/2011    GLUCOSE 113 2011    GLUCOSE 97 2011    BUN 12 2021    BUN 13 2020    BUN 14 2020 Date    HCVABI Non-Reactive 08/12/2020     HIV  No results found for: PAA5MMP, HIV1QT  UA  Lab Results   Component Value Date    COLORU Yellow 05/17/2021    COLORU yellow 02/25/2021    COLORU Yellow 02/09/2021    COLORU Yellow 02/08/2021    COLORU Yellow 12/01/2020    CLARITYU Clear 05/17/2021    CLARITYU clear 02/25/2021    CLARITYU Clear 02/09/2021    CLARITYU Clear 02/08/2021    CLARITYU Clear 12/01/2020    GLUCOSEU Negative 05/17/2021    GLUCOSEU negative 02/25/2021    GLUCOSEU Negative 02/09/2021    GLUCOSEU Negative 02/08/2021    GLUCOSEU Negative 12/01/2020    BILIRUBINUR Negative 05/17/2021    BILIRUBINUR negative 02/25/2021    BILIRUBINUR Negative 02/09/2021    BILIRUBINUR Negative 02/08/2021    BILIRUBINUR Negative 12/01/2020    BILIRUBINUR negative 12/06/2019    KETUA Negative 05/17/2021    KETUA negative 02/25/2021    KETUA Negative 02/09/2021    KETUA Negative 02/08/2021    KETUA Negative 12/01/2020    SPECGRAV 1.020 05/17/2021    SPECGRAV >=1.030 02/25/2021    SPECGRAV >=1.030 02/09/2021    SPECGRAV 1.025 02/08/2021    SPECGRAV >=1.030 12/01/2020    BLOODU Negative 05/17/2021    BLOODU negative 02/25/2021    BLOODU Negative 02/09/2021    BLOODU Negative 02/08/2021    BLOODU Negative 12/01/2020    PHUR 6.0 05/17/2021    PHUR 6.0 02/25/2021    PHUR 6.0 02/09/2021    PHUR 5.5 02/08/2021    PHUR 6.0 12/01/2020    PROTEINU Negative 05/17/2021    PROTEINU negative 02/25/2021    PROTEINU Negative 02/09/2021    PROTEINU Negative 02/08/2021    PROTEINU Negative 12/01/2020    UROBILINOGEN 0.2 02/09/2021    UROBILINOGEN 0.2 02/08/2021    UROBILINOGEN 0.2 12/01/2020    NITRU Negative 02/09/2021    NITRU Negative 02/08/2021    NITRU POSITIVE 12/01/2020    LEUKOCYTESUR Small 05/17/2021    LEUKOCYTESUR small 02/25/2021    LEUKOCYTESUR SMALL 02/09/2021    LEUKOCYTESUR TRACE 02/08/2021    LEUKOCYTESUR SMALL 12/01/2020     Urine Micro/Albumin Ratio  Lab Results   Component Value Date    MALBCR - 02/08/2021    MALBCR Disp: , Rfl:     mirabegron (MYRBETRIQ) 50 MG TB24, Take 1 tablet by mouth daily , Disp: , Rfl:     B Complex Vitamins (B-COMPLEX/B-12 PO), Take by mouth daily LD 18, Disp: , Rfl:   Allergies   Allergen Reactions    Morphine Itching    Statins      Other reaction(s): Unknown    Statins Depletion Therapy Other (See Comments)     MYALGIA       Past Medical History:   Diagnosis Date    Arthritis     Diabetes mellitus ([address]     Difficult intubation     carries card, copy on chart  Glidescope#3 with ETT size7    Hyperlipidemia     Hypothyroidism     Left sided abdominal pain     Memory problem     still competent    GORDY on CPAP     Pacemaker     Rectal bleeding     Ringing in the ears      Past Surgical History:   Procedure Laterality Date    ABLATION OF DYSRHYTHMIC FOCUS      BREAST SURGERY      implants    COLONOSCOPY  ? Dr. Leeann Owen twisted     COLONOSCOPY  ? Dr. Burkett Body. incomplete.  COLONOSCOPY  2018    Dr. Adán Babcock N/A 2018    COLONOSCOPY DIAGNOSTIC performed by Alison Wan MD at [address] [place] [address]  09/10/2011    POSTERIOR    HYSTERECTOMY      vaginal. uterus only.      KNEE ARTHROPLASTY Right     partial    KNEE ARTHROSCOPY  2011    right knee    PACEMAKER PLACEMENT  2014    Medtronic dual chamber    TOTAL HIP ARTHROPLASTY Right 2015     Family History   Problem Relation Age of Onset    Pacemaker Mother     Heart Attack Father      Social History     Tobacco Use    Smoking status: Former Smoker     Packs/day: 1.50     Years: 13.00     Pack years: 19.50     Types: Cigarettes     Start date: 1973     Quit date: 1986     Years since quittin.3    Smokeless tobacco: Never Used    Tobacco comment: quit smoking    Vaping Use    Vaping Use: Never used   Substance Use Topics    Alcohol use: Yes     Comment: socially -- 3 drinks per month at the most    Drug use: No      Social History     Social History Narrative    PMH:    Problem List: Urinary tract infectious disease, Obstructive sleep apnea syndrome, Adult health    examination, Adult health examination, Constipation, Derangement of knee, Vitamin D deficiency,    Hypothyroidism, Conduction disorder of the heart, Hyperlipidemia    Health Maintenance:    Mini Mental Status - (2018)    Colonoscopy - (2018)    Colonoscopy Screening - (2018)    Mammogram Screening - (2018)    Mammogram - (2018)    Colonoscopy - (2010)    Couseled on Home Safety - (2015)    Bone Density Scan - (3/28/2012)    Medical Problems:    Sleep Apnea - Dr Chelle Hernandez, cpap    Pneumonia    Hypothyroidism - Dr Nolan Zaidi - Dr Cornel Gregory    Hyperlipidemia - intolerance to all statins    Cardiac Arrhythmia - ? type. s/p ablation    cardiac dysrhythmia - pauses - lead to pacemaker    Skin Cancer    Surgical Hx:    Partial Hysterectomy - Still with Both Ovaries. Breast Implants    Pacemaker - Dr Philippe Edge    Knee Replacement, Carpal Tunnel Release, colon-vaginal fistula repair    Bladder Repair - sling    RT Hip Arthroplasty - MARGARITA        FH:    Father:    . (Hx)    Mother:    . (Hx)    Dad - MI 39,  70 MI    Mom - DM , currently living age 80        SH: Marital: . Personal Habits: Cigarette Use: Former Cigarette Smoker - quit age 36. Occ ETOH, minimal. . 2 kids, 6 step kids. 27 grandkids. .Alcohol: Rarely consumes alcohol        Vitals:    21 1510   BP: 124/76   Pulse: 60   Temp: 97.5 °F (36.4 °C)   SpO2: 97%   Weight: 191 lb (86.6 kg)   Height: 5' 4.5\" (1.638 m)      Wt Readings from Last 3 Encounters:   21 191 lb (86.6 kg)   21 196 lb 8 oz (89.1 kg)   21 195 lb (88.5 kg)          Exam:  Const: Appears comfortable. No signs of acute distress present. Head/Face: Atraumatic on inspection. Eyes: EOMI in both eyes. No discharge from the eyes. PERRL. Sclerae clear.   ENMT: Auditory canals normal. Tympanic membranes: intact and translucent. External nose WNL. Nasal mucosa is clear. Oropharynx: No erythema or exudate. Posterior pharynx is normal.  Neck: Supple. Palpation reveals no adenopathy. No masses appreciated. No JVD. Carotids: no  bruits. Resp: Respirations are unlabored. Clear to auscultation. No rales, rhonchi or wheezes appreciated  over the lungs bilaterally. CV: Rate is regular. Rhythm is regular. No gallop or rubs. No heart murmur appreciated. Extremities: No clubbing, cyanosis, or edema. No calf inflammation or tenderness. Abdomen: Bowel sounds are normoactive. Abdomen is soft, nontender, and nondistended. No  abdominal masses. No palpable hepatosplenomegaly. Lymph: No palpable or visible regional lymphadenopathy. Musculoskeletal: no acute joint inflammation. Skin: Dry and warm with no rash. Raised red irritated lesion left skin  Neuro: Alert and oriented. Affect: appropriate. Upper Extremities: 5/5 bilaterally. Lower Extremities:  5/5 bilaterally. Sensation intact to light touch. Reflexes: DTR's are symmetric and 2+ bilaterally. .  Cranial Nerves: Cranial nerves grossly intact. No CVA tenderness      Assessment and Plan:   Diagnosis Orders   1. Urinary tract infection without hematuria, site unspecified  Urinalysis    Culture, Urine    nitrofurantoin, macrocrystal-monohydrate, (MACROBID) 100 MG capsule    POCT Urinalysis no Micro   2. Supraventricular tachycardia ([address]     3. Mixed hyperlipidemia  Lipid Panel    Comprehensive Metabolic Panel    CK   4. Acquired hypothyroidism  T4, Free    TSH without Reflex   5. Dementia without behavioral disturbance, unspecified dementia type ([address]     6. Liver disease     7. Cardiac arrhythmia, unspecified cardiac arrhythmia type     8. Obstructive sleep apnea syndrome     9. Tubular adenoma     10.  Type 2 diabetes mellitus with hyperglycemia, without long-term current use of insulin (HCC)  Urinalysis    Microalbumin / Creatinine Urine Ratio    CBC Auto blood work       Type 2 diabetes mellitus with hyperglycemia, without long-term current use of insulin ([facility])  Hemoglobin A1c stable, 5.9 to 6.1 -5.6on metformin 500 mg twice a day. Previously 1000 twice a day but she would like to keep low dose. Precautions reviewed. Risks  reviewed, proper hydration reviewed, risk of lactic acidosis reviewed . Encouraged  she watch blood sugar ambulatory with parameters reviewed call in if out of range. Hyper and hypoglycemic precautions reviewed. Micro-and macrovascular  complications reviewed.  Importance of at least yearly eye exams and daily foot exams reviewed.  Tolerating metformin . no longer on invokana. Monitor Await repeat blood work       Dementia without behavioral disturbance  Counseled extensively.  Differential reviewed including various forms of dementia and pseudodementia.  On Aricept 10 mg daily, and Namenda.   Continue per Dr. George Llamas were seeing Dr. Ashok Cameron specialist from [facility] "[employer] OF [place]. However they re noncompliant without follow-up, feels it is a \"racket\". Not compliant with follow with Dr. Domenic José either but now they are. She is enrolled in a study, nitrate water. She is not driving and we emphasized importance of this. Safety precautions reviewed.   Sees Dr. Domenic José again September.  feels her dementia has stabilized in short-term memory actually has slightly improved. Mixed hyperlipidemia  not at goal  Significant risk of cerebrovascular/cardiovascular event. Imperative this  be controlled with history of TIA, however adamantly refuses any further treatment at this time. Refuses to try any other statin or Zetia stating that these were not tolerated in the  past. She did not tolerate Niaspan. did not tolerate WelChol. . declines  cholestyramine. Risk of stroke and heart attack reviewed. lifestyle modification  reviewed. risk of hyperlipidemia reviewed . Did not tolerate fenofibrate  Counseled on repatha, declines. for physical.    Signs and symptoms to watch for discussed, serious signs and symptoms reviewed. ER if any. Mika Bower MD    Patients are advised to check with insurance company to ensure coverage and to fully understand benefits and cost prior to any testing. This note was created with the assistance of voice recognition software. Document was reviewed however may contain grammatical errors.

## 2021-05-19 LAB
ORGANISM: ABNORMAL
URINE CULTURE, ROUTINE: ABNORMAL

## 2021-05-20 ENCOUNTER — HOSPITAL ENCOUNTER (OUTPATIENT)
Age: 76
Discharge: HOME | End: 2021-05-20
Payer: MEDICARE

## 2021-05-20 DIAGNOSIS — E03.9 ACQUIRED HYPOTHYROIDISM: ICD-10-CM

## 2021-05-20 DIAGNOSIS — E11.65 TYPE 2 DIABETES MELLITUS WITH HYPERGLYCEMIA, WITHOUT LONG-TERM CURRENT USE OF INSULIN (HCC): ICD-10-CM

## 2021-05-20 DIAGNOSIS — E78.2 MIXED HYPERLIPIDEMIA: ICD-10-CM

## 2021-05-20 LAB
ALBUMIN SERPL-MCNC: 4.1 G/DL (ref 3.5–5.2)
ALP BLD-CCNC: 75 U/L (ref 35–104)
ALT SERPL-CCNC: 32 U/L (ref 0–32)
ANION GAP SERPL CALCULATED.3IONS-SCNC: 12 MMOL/L (ref 7–16)
AST SERPL-CCNC: 26 U/L (ref 0–31)
BACTERIA: ABNORMAL /HPF
BASOPHILS ABSOLUTE: 0.11 E9/L (ref 0–0.2)
BASOPHILS RELATIVE PERCENT: 1 % (ref 0–2)
BILIRUB SERPL-MCNC: 0.5 MG/DL (ref 0–1.2)
BILIRUBIN URINE: NEGATIVE
BLOOD, URINE: NEGATIVE
BUN BLDV-MCNC: 11 MG/DL (ref 6–23)
CALCIUM SERPL-MCNC: 9.8 MG/DL (ref 8.6–10.2)
CHLORIDE BLD-SCNC: 107 MMOL/L (ref 98–107)
CLARITY: CLEAR
CO2: 23 MMOL/L (ref 22–29)
COLOR: YELLOW
CREAT SERPL-MCNC: 0.7 MG/DL (ref 0.5–1)
EOSINOPHILS ABSOLUTE: 0.74 E9/L (ref 0.05–0.5)
EOSINOPHILS RELATIVE PERCENT: 6.6 % (ref 0–6)
GFR AFRICAN AMERICAN: >60
GFR NON-AFRICAN AMERICAN: >60 ML/MIN/1.73
GLUCOSE BLD-MCNC: 85 MG/DL (ref 74–99)
GLUCOSE URINE: NEGATIVE MG/DL
HCT VFR BLD CALC: 44.5 % (ref 34–48)
HEMOGLOBIN: 15.4 G/DL (ref 11.5–15.5)
IMMATURE GRANULOCYTES #: 0.03 E9/L
IMMATURE GRANULOCYTES %: 0.3 % (ref 0–5)
KETONES, URINE: NEGATIVE MG/DL
LEUKOCYTE ESTERASE, URINE: ABNORMAL
LYMPHOCYTES ABSOLUTE: 5.23 E9/L (ref 1.5–4)
LYMPHOCYTES RELATIVE PERCENT: 46.3 % (ref 20–42)
MCH RBC QN AUTO: 31 PG (ref 26–35)
MCHC RBC AUTO-ENTMCNC: 34.6 % (ref 32–34.5)
MCV RBC AUTO: 89.7 FL (ref 80–99.9)
MONOCYTES ABSOLUTE: 0.66 E9/L (ref 0.1–0.95)
MONOCYTES RELATIVE PERCENT: 5.8 % (ref 2–12)
NEUTROPHILS ABSOLUTE: 4.52 E9/L (ref 1.8–7.3)
NEUTROPHILS RELATIVE PERCENT: 40 % (ref 43–80)
NITRITE, URINE: NEGATIVE
PDW BLD-RTO: 12.4 FL (ref 11.5–15)
PH UA: 5.5 (ref 5–9)
PLATELET # BLD: 166 E9/L (ref 130–450)
PMV BLD AUTO: 10.1 FL (ref 7–12)
POTASSIUM SERPL-SCNC: 3.7 MMOL/L (ref 3.5–5)
PROTEIN UA: NEGATIVE MG/DL
RBC # BLD: 4.96 E12/L (ref 3.5–5.5)
RBC UA: ABNORMAL /HPF (ref 0–2)
SODIUM BLD-SCNC: 142 MMOL/L (ref 132–146)
SPECIFIC GRAVITY UA: 1.01 (ref 1–1.03)
TOTAL CK: 17 U/L (ref 20–180)
TOTAL PROTEIN: 6.9 G/DL (ref 6.4–8.3)
UROBILINOGEN, URINE: 0.2 E.U./DL
WBC # BLD: 11.3 E9/L (ref 4.5–11.5)
WBC UA: ABNORMAL /HPF (ref 0–5)

## 2021-05-20 PROCEDURE — 81001 URINALYSIS AUTO W/SCOPE: CPT

## 2021-05-20 PROCEDURE — 82306 VITAMIN D 25 HYDROXY: CPT

## 2021-05-20 PROCEDURE — 84439 ASSAY OF FREE THYROXINE: CPT

## 2021-05-20 PROCEDURE — 80053 COMPREHEN METABOLIC PANEL: CPT

## 2021-05-20 PROCEDURE — 36415 COLL VENOUS BLD VENIPUNCTURE: CPT

## 2021-05-20 PROCEDURE — 80061 LIPID PANEL: CPT

## 2021-05-20 PROCEDURE — 82550 ASSAY OF CK (CPK): CPT

## 2021-05-20 PROCEDURE — 85025 COMPLETE CBC W/AUTO DIFF WBC: CPT

## 2021-05-20 PROCEDURE — 84443 ASSAY THYROID STIM HORMONE: CPT

## 2021-05-21 LAB
CHOLESTEROL, TOTAL: 218 MG/DL (ref 0–199)
HDLC SERPL-MCNC: 42 MG/DL
LDL CHOLESTEROL CALCULATED: 148 MG/DL (ref 0–99)
T4 FREE: 2.42 NG/DL (ref 0.93–1.7)
TRIGL SERPL-MCNC: 141 MG/DL (ref 0–149)
TSH SERPL DL<=0.05 MIU/L-ACNC: 0.87 UIU/ML (ref 0.27–4.2)
VITAMIN D 25-HYDROXY: 94 NG/ML (ref 30–100)
VLDLC SERPL CALC-MCNC: 28 MG/DL

## 2021-05-21 RX ORDER — LEVOTHYROXINE SODIUM 0.1 MG/1
100 TABLET ORAL DAILY
Qty: 30 TABLET | Refills: 3 | Status: SHIPPED
Start: 2021-05-21 | End: 2022-03-14 | Stop reason: ALTCHOICE

## 2021-05-21 NOTE — RESULT ENCOUNTER NOTE
Lets lower it  to 100 mcg daily, it is a subtle change. She is to stop the 112 mcg follow-up as directed sooner as needed. I will called in.   Please notify him and let me know if any issues

## 2021-05-21 NOTE — RESULT ENCOUNTER NOTE
Free T4 still elevated. Double check if she took her medicine shortly before blood work. Double check what dose she is taking and how she is taking it i.e. is she taking 112 mcg Monday through Saturday and one half on Sunday? Based on this number should be lowered.   Let me know

## 2021-06-01 ENCOUNTER — OFFICE VISIT (OUTPATIENT)
Dept: PRIMARY CARE CLINIC | Age: 76
End: 2021-06-01
Payer: MEDICARE

## 2021-06-01 VITALS
OXYGEN SATURATION: 96 % | WEIGHT: 191 LBS | TEMPERATURE: 97.3 F | DIASTOLIC BLOOD PRESSURE: 82 MMHG | HEIGHT: 64 IN | HEART RATE: 63 BPM | BODY MASS INDEX: 32.61 KG/M2 | SYSTOLIC BLOOD PRESSURE: 112 MMHG

## 2021-06-01 DIAGNOSIS — G47.33 OBSTRUCTIVE SLEEP APNEA SYNDROME: ICD-10-CM

## 2021-06-01 DIAGNOSIS — N39.0 URINARY TRACT INFECTION WITHOUT HEMATURIA, SITE UNSPECIFIED: ICD-10-CM

## 2021-06-01 DIAGNOSIS — Z12.31 ENCOUNTER FOR SCREENING MAMMOGRAM FOR MALIGNANT NEOPLASM OF BREAST: ICD-10-CM

## 2021-06-01 DIAGNOSIS — I47.1 SUPRAVENTRICULAR TACHYCARDIA (HCC): ICD-10-CM

## 2021-06-01 DIAGNOSIS — E55.9 VITAMIN D DEFICIENCY: ICD-10-CM

## 2021-06-01 DIAGNOSIS — Z78.0 MENOPAUSE: ICD-10-CM

## 2021-06-01 DIAGNOSIS — E78.2 MIXED HYPERLIPIDEMIA: ICD-10-CM

## 2021-06-01 DIAGNOSIS — K76.9 LIVER DISEASE: ICD-10-CM

## 2021-06-01 DIAGNOSIS — I49.9 CARDIAC ARRHYTHMIA, UNSPECIFIED CARDIAC ARRHYTHMIA TYPE: ICD-10-CM

## 2021-06-01 DIAGNOSIS — Z00.00 ROUTINE GENERAL MEDICAL EXAMINATION AT A HEALTH CARE FACILITY: Primary | ICD-10-CM

## 2021-06-01 DIAGNOSIS — E53.8 VITAMIN B12 DEFICIENCY: ICD-10-CM

## 2021-06-01 DIAGNOSIS — Z12.11 COLON CANCER SCREENING: ICD-10-CM

## 2021-06-01 DIAGNOSIS — F03.90 DEMENTIA WITHOUT BEHAVIORAL DISTURBANCE, UNSPECIFIED DEMENTIA TYPE: ICD-10-CM

## 2021-06-01 DIAGNOSIS — D36.9 TUBULAR ADENOMA: ICD-10-CM

## 2021-06-01 DIAGNOSIS — E11.65 TYPE 2 DIABETES MELLITUS WITH HYPERGLYCEMIA, WITHOUT LONG-TERM CURRENT USE OF INSULIN (HCC): ICD-10-CM

## 2021-06-01 DIAGNOSIS — E03.9 ACQUIRED HYPOTHYROIDISM: ICD-10-CM

## 2021-06-01 PROCEDURE — 99214 OFFICE O/P EST MOD 30 MIN: CPT | Performed by: FAMILY MEDICINE

## 2021-06-01 PROCEDURE — 3046F HEMOGLOBIN A1C LEVEL >9.0%: CPT | Performed by: FAMILY MEDICINE

## 2021-06-01 PROCEDURE — 1036F TOBACCO NON-USER: CPT | Performed by: FAMILY MEDICINE

## 2021-06-01 PROCEDURE — G8417 CALC BMI ABV UP PARAM F/U: HCPCS | Performed by: FAMILY MEDICINE

## 2021-06-01 PROCEDURE — G8399 PT W/DXA RESULTS DOCUMENT: HCPCS | Performed by: FAMILY MEDICINE

## 2021-06-01 PROCEDURE — 4040F PNEUMOC VAC/ADMIN/RCVD: CPT | Performed by: FAMILY MEDICINE

## 2021-06-01 PROCEDURE — 2022F DILAT RTA XM EVC RTNOPTHY: CPT | Performed by: FAMILY MEDICINE

## 2021-06-01 PROCEDURE — 1090F PRES/ABSN URINE INCON ASSESS: CPT | Performed by: FAMILY MEDICINE

## 2021-06-01 PROCEDURE — 3017F COLORECTAL CA SCREEN DOC REV: CPT | Performed by: FAMILY MEDICINE

## 2021-06-01 PROCEDURE — G0438 PPPS, INITIAL VISIT: HCPCS | Performed by: FAMILY MEDICINE

## 2021-06-01 PROCEDURE — G8427 DOCREV CUR MEDS BY ELIG CLIN: HCPCS | Performed by: FAMILY MEDICINE

## 2021-06-01 PROCEDURE — 1123F ACP DISCUSS/DSCN MKR DOCD: CPT | Performed by: FAMILY MEDICINE

## 2021-06-01 ASSESSMENT — PATIENT HEALTH QUESTIONNAIRE - PHQ9
SUM OF ALL RESPONSES TO PHQ QUESTIONS 1-9: 0
2. FEELING DOWN, DEPRESSED OR HOPELESS: 0
SUM OF ALL RESPONSES TO PHQ9 QUESTIONS 1 & 2: 0
1. LITTLE INTEREST OR PLEASURE IN DOING THINGS: 0

## 2021-06-01 ASSESSMENT — LIFESTYLE VARIABLES
HAVE YOU OR SOMEONE ELSE BEEN INJURED AS A RESULT OF YOUR DRINKING: 0
HOW OFTEN DO YOU HAVE A DRINK CONTAINING ALCOHOL: 1
HOW OFTEN DURING THE LAST YEAR HAVE YOU FAILED TO DO WHAT WAS NORMALLY EXPECTED FROM YOU BECAUSE OF DRINKING: 0
AUDIT-C TOTAL SCORE: 1
HOW OFTEN DURING THE LAST YEAR HAVE YOU HAD A FEELING OF GUILT OR REMORSE AFTER DRINKING: 0
HOW OFTEN DURING THE LAST YEAR HAVE YOU FOUND THAT YOU WERE NOT ABLE TO STOP DRINKING ONCE YOU HAD STARTED: 0
HAS A RELATIVE, FRIEND, DOCTOR, OR ANOTHER HEALTH PROFESSIONAL EXPRESSED CONCERN ABOUT YOUR DRINKING OR SUGGESTED YOU CUT DOWN: 0
HOW OFTEN DURING THE LAST YEAR HAVE YOU NEEDED AN ALCOHOLIC DRINK FIRST THING IN THE MORNING TO GET YOURSELF GOING AFTER A NIGHT OF HEAVY DRINKING: 0
HOW OFTEN DO YOU HAVE SIX OR MORE DRINKS ON ONE OCCASION: 0
AUDIT TOTAL SCORE: 1
HOW OFTEN DURING THE LAST YEAR HAVE YOU BEEN UNABLE TO REMEMBER WHAT HAPPENED THE NIGHT BEFORE BECAUSE YOU HAD BEEN DRINKING: 0
HOW MANY STANDARD DRINKS CONTAINING ALCOHOL DO YOU HAVE ON A TYPICAL DAY: 0

## 2021-06-01 NOTE — PROGRESS NOTES
Medicare Annual Wellness Visit  Name: Jennifer Nicole Date: 2021   MRN: [id number] Sex: Female   Age: 77 y.o. Ethnicity: Non-/Non    : 1945 Race: Johnie Corcoran is here for Medicare AWV and Other (Follow-up chronic medical conditions, follow-up blood work, follow-up UTI)    Screenings for behavioral, psychosocial and functional/safety risks, and cognitive dysfunction are all negative except as indicated below. These results, as well as other patient data from the [address] form, are documented in Flowsheets linked to this Encounter. Health Maintenance:  Proper diet reviewed including Mediterranean and DASH diets. Counseled on healthy weight, appropriate exercise, avoidance of tobacco, and recommendations for minimal to no alcohol consumption. Counseled on the potential pros and cons of vitamins and supplements. Reviewed the recommendations and risk/benefits of vaccines including Td/Tdap (10/11 counseled),  Pneumovax (),  prevnar 13 (10/18), flu vaccine, Hepatitis vaccines,  and shingrix (had shingrix x 2)  (patient had chicken pox). [place] Phizer x 2. HIV and Hep C screening guidelines were reviewed. Importance of regular eye and dental exams and health reviewed. Risks/Benefits of ASA reviewed and discussed latest guidelines. Sun protection reviewed. Notify if any new or changing moles/skin lesions, etc. Dexa Scan indications/ risk factors for osteoporotic fractures and prevention reviewed. Colonoscopy recommendations reviewed. Pt denies change in bowel habits or [facility] of colon polyps/CA. hAD cSCOPE , DID NOT RECOMMEND FU. Fit test givne. Follows with Dr Shelba Baumgarten.   Reviewed indications for other testing such as  PFT's.and  indications for imaging including brain, carotid, chest, abdominal, aortic .  dasx      Counseled on instructions regarding, and importance of monthly breast exams, yearly physician exams (sooner if Procedure Laterality Date    ABLATION OF DYSRHYTHMIC FOCUS  1996    BREAST SURGERY      implants    COLONOSCOPY  2009? Dr. Jamal Mays twisted     COLONOSCOPY  2008? Dr. Grimm Goes. incomplete.  COLONOSCOPY  12/17/2018    Dr. Juwan Anderson N/A 12/17/2018    COLONOSCOPY DIAGNOSTIC performed by Óscar Lopez MD at [address] [place] [address]  09/10/2011    POSTERIOR    HYSTERECTOMY      vaginal. uterus only.  KNEE ARTHROPLASTY Right     partial    KNEE ARTHROSCOPY  9/08/2011    right knee    PACEMAKER PLACEMENT  5/23/2014    Medtronic dual chamber    TOTAL HIP ARTHROPLASTY Right 2015         Family History   Problem Relation Age of Onset    Pacemaker Mother     Heart Attack Father        CareTeam (Including outside providers/suppliers regularly involved in providing care):   Patient Care Team:  Francesca Bernard MD as PCP - Karen Gordon MD as PCP - [facility]  Aki Jones MD as Consulting Physician (Pulmonology)  Mireille Cleary MD as Consulting Physician (Electrophysiology)  Larry Chang DO as Consulting Physician (Cardiology)    Wt Readings from Last 3 Encounters:   06/01/21 191 lb (86.6 kg)   05/17/21 191 lb (86.6 kg)   03/16/21 196 lb 8 oz (89.1 kg)     Vitals:    06/01/21 1431   BP: 112/82   Pulse: 63   Temp: 97.3 °F (36.3 °C)   SpO2: 96%   Weight: 191 lb (86.6 kg)   Height: 5' 4\" (1.626 m)     Body mass index is 32.79 kg/m². Based upon direct observation of the patient, evaluation of cognition reveals recent and remote memory intact. Patient's complete Health Risk Assessment and screening values have been reviewed and are found in Flowsheets. The following problems were reviewed today and where indicated follow up appointments were made and/or referrals ordered.     Positive Risk Factor Screenings with Interventions:            General Health and ACP:  General  In general, how would you say your health is?: Very Good  In the past 7 days, have you experienced any of the following?  New or Increased Pain, New or Increased Fatigue, Loneliness, Social Isolation, Stress or Anger?: None of These  Do you get the social and emotional support that you need?: Yes  Do you have a Living Will?: Yes  Advance Directives     Power of  Living Will ACP-Advance Directive ACP-Power of     Not on File Not on File Not on File Not on File      General Health Risk Interventions:  · counseled    Health Habits/Nutrition:  Health Habits/Nutrition  Do you exercise for at least 20 minutes 2-3 times per week?: (!) No  Have you lost any weight without trying in the past 3 months?: No  Do you eat only one meal per day?: No  Have you seen the dentist within the past year?: Yes  Body mass index: (!) 32.78  Health Habits/Nutrition Interventions:  · counseled    Hearing/Vision:  No exam data present  Hearing/Vision  Do you or your family notice any trouble with your hearing that hasn't been managed with hearing aids?: (!) Yes  Do you have difficulty driving, watching TV, or doing any of your daily activities because of your eyesight?: No  Have you had an eye exam within the past year?: Yes  Hearing/Vision Interventions:  · Hearing concerns:  patient declines any further evaluation/treatment for hearing issues      Personalized Preventive Plan   Current Health Maintenance Status  Immunization History   Administered Date(s) Administered    COVID-19, Gregorio Peter, PF, 30mcg/0.3mL 02/01/2021, 02/26/2021    Influenza Vaccine, unspecified formulation 09/21/2011, 10/25/2012, 10/01/2013, 10/27/2014, 10/01/2015, 11/08/2016, 10/15/2018    Influenza Virus Vaccine 10/01/2007, 09/21/2011, 10/25/2012, 10/01/2013, 10/01/2015, 11/08/2016, 10/15/2018    Influenza, High Dose (Fluzone 65 yrs and older) 10/24/2019, 09/16/2020    Influenza, High-dose, Quadv, 65 yrs +, IM (Fluzone) 09/16/2020    Influenza, MDCK Quadv, with preserv IM (Flucelvax 4 yrs and older) 11/06/2018    Influenza, Triv, inactivated, subunit, adjuvanted, IM (Fluad 65 yrs and older) 10/17/2017, 11/06/2018    Pneumococcal Conjugate 13-valent (Preeti Nipple) 10/01/2015, 10/15/2018    Pneumococcal Polysaccharide (Jglucnwio14) 10/01/2007, 03/20/2012, 11/06/2018    Pneumococcal Vaccine 11/06/2018    Tdap (Boostrix, Adacel) 10/21/2011    Zoster Live (Zostavax) 10/02/2013    Zoster Recombinant (Shingrix) 03/23/2018, 11/06/2018        Health Maintenance   Topic Date Due    Diabetic foot exam  Never done    Diabetic retinal exam  Never done    Annual Wellness Visit (AWV)  Never done    Breast cancer screen  01/18/2020    DTaP/Tdap/Td vaccine (2 - Td) 10/21/2021    A1C test (Diabetic or Prediabetic)  12/01/2021    Diabetic microalbuminuria test  02/08/2022    Lipid screen  05/20/2022    TSH testing  05/20/2022    Colon cancer screen colonoscopy  12/17/2028    DEXA (modify frequency per FRAX score)  Completed    Flu vaccine  Completed    Shingles Vaccine  Completed    Pneumococcal 65+ years Vaccine  Completed    COVID-19 Vaccine  Completed    Hepatitis C screen  Completed    Hepatitis A vaccine  Aged Out    Hib vaccine  Aged Out    Meningococcal (ACWY) vaccine  Aged Out     Recommendations for SPARQ Due: see orders and patient instructions/AVS.  . Recommended screening schedule for the next 5-10 years is provided to the patient in written form: see Patient Instructions/AVS.    Pawan Leon was seen today for medicare awv and other. Diagnoses and all orders for this visit:    Routine general medical examination at a health care facility  -     CBC Auto Differential; Future    Urinary tract infection without hematuria, site unspecified  -     NE OFFICE OUTPATIENT VISIT 25 MINUTES  -     CBC Auto Differential; Future  -     Culture, Urine; Future    Vitamin D deficiency  -     CBC Auto Differential; Future  -     Vitamin D 25 Hydroxy;  Future    Supraventricular tachycardia (HCC)  -     NE OFFICE OUTPATIENT VISIT 25 MINUTES  -     CBC Auto Differential; Future    Mixed hyperlipidemia  -     CT OFFICE OUTPATIENT VISIT 25 MINUTES  -     Lipid Panel; Future  -     CBC Auto Differential; Future  -     CK; Future    Dementia without behavioral disturbance, unspecified dementia type (HCC)  -     CBC Auto Differential; Future    Acquired hypothyroidism  -     CT OFFICE OUTPATIENT VISIT 25 MINUTES  -     TSH without Reflex; Future  -     CBC Auto Differential; Future  -     T4, Free; Future    Liver disease  -     CT OFFICE OUTPATIENT VISIT 25 MINUTES  -     CBC Auto Differential; Future    Cardiac arrhythmia, unspecified cardiac arrhythmia type  -     CBC Auto Differential; Future    Obstructive sleep apnea syndrome  -     CBC Auto Differential; Future    Tubular adenoma  -     CBC Auto Differential; Future    Vitamin B12 deficiency  -     CBC Auto Differential; Future    Menopause  -     CBC Auto Differential; Future  -     DEXA BONE DENSITY AXIAL SKELETON; Future    Encounter for screening mammogram for malignant neoplasm of breast  -     CBC Auto Differential; Future  -     SHAKIR DIGITAL SCREEN W OR WO CAD BILATERAL; Future    Colon cancer screening  -     CBC Auto Differential; Future  -     POCT Fecal Immunochemical Test (FIT); Future    Type 2 diabetes mellitus with hyperglycemia, without long-term current use of insulin (HCC)  -     Comprehensive Metabolic Panel; Future  -     CBC Auto Differential; Future  -     Hemoglobin A1C; Future  -     Urinalysis; Future  -     Microalbumin / Creatinine Urine Ratio;  Future             HM ISSUES DISCUSSED AT LENGTH AS ABOVE, ENCOURAGED Kari Craig

## 2021-06-01 NOTE — PROGRESS NOTES
Mechelle Fraga : 1945 Sex: female  Age: 77 y.o. Chief Complaint   Patient presents with    Medicare AW    Other     Follow-up chronic medical conditions, follow-up blood work, follow-up UTI       HPI:    Patient presents today for follow-up and for annual wellness visit. UTI symptoms resolved. Unable to give urine sample today. Defers for 6 weeks unless symptoms. Her  feels her dementia is stable in the short-term memory is actually slightly improved, feels the nitrate trial is helping      She follows with urology (previously , will be switching since he left), Dr. Aristeo Ovalles (previously), Colin Green,  Dr. Ronny Schaumann, Dr Clyde Mack ([facility]), Dr. Kali Fritz    Blood work reviewed, CMP normal, LFTs normalized, LDL high at 148 HDL 42 triglycerides 141, TSH 0.869, free T4 elevated at 2.42, Synthroid was lowered, vitamin D still 94.  at first says she was taking vitamin D but when I explained the concern he says she was not. He will make sure she has not. We will monitor.   CBC grossly normal differential reviewed urinalysis normalized, past culture 3 days prior showed E. coli sensitive to antibiotic prescribed      most Recent Labs  CBC  Lab Results   Component Value Date    WBC 11.3 2021    WBC 10.5 2021    WBC 12.6 2021    RBC 4.96 2021    RBC 5.16 2021    RBC 5.01 2021    HGB 15.4 2021    HGB 15.9 2021    HGB 15.6 2021    HCT 44.5 2021    HCT 47.0 2021    HCT 48.0 2021    MCV 89.7 2021    MCV 91.1 2021    MCV 95.8 2021     2021     2021     2021      CMP  Lab Results   Component Value Date     2021     2021     2020    K 3.7 2021    K 4.3 2021    K 4.3 2020     2021     2021     2020    CO2 23 2021    CO2 22 2021 CO2 27 12/01/2020    ANIONGAP 12 05/20/2021    ANIONGAP 13 02/08/2021    ANIONGAP 9 12/01/2020    GLUCOSE 85 05/20/2021    GLUCOSE 80 02/08/2021    GLUCOSE 84 12/01/2020    GLUCOSE 104 08/15/2011    GLUCOSE 113 04/25/2011    GLUCOSE 97 04/01/2011    BUN 11 05/20/2021    BUN 12 02/08/2021    BUN 13 12/01/2020    CREATININE 0.7 05/20/2021    CREATININE 0.6 02/08/2021    CREATININE 0.8 12/01/2020    LABGLOM >60 05/20/2021    LABGLOM >60 02/08/2021    LABGLOM >60 12/01/2020    GFRAA >60 05/20/2021    GFRAA >60 02/08/2021    GFRAA >60 12/01/2020    CALCIUM 9.8 05/20/2021    CALCIUM 9.8 02/08/2021    CALCIUM 10.0 12/01/2020    PROT 6.9 05/20/2021    PROT 7.2 02/08/2021    PROT 7.6 12/01/2020    LABALBU 4.1 05/20/2021    LABALBU 4.2 02/08/2021    LABALBU 4.5 12/01/2020    LABALBU 4.4 08/15/2011    LABALBU 4.2 04/25/2011    LABALBU 4.2 03/24/2011    BILITOT 0.5 05/20/2021    BILITOT 0.3 02/08/2021    BILITOT 0.3 12/01/2020    ALKPHOS 75 05/20/2021    ALKPHOS 82 02/08/2021    ALKPHOS 89 12/01/2020    AST 26 05/20/2021    AST 55 02/08/2021    AST 49 12/01/2020    ALT 32 05/20/2021    ALT 41 02/08/2021    ALT 56 12/01/2020     A1C  Lab Results   Component Value Date    LABA1C 5.6 12/01/2020    LABA1C 6.1 08/12/2020    LABA1C 5.9 03/02/2020     TSH  Lab Results   Component Value Date    TSH 0.869 05/20/2021    TSH 3.330 02/08/2021    TSH 1.760 12/01/2020     FREET4  Lab Results   Component Value Date    O3PUTCF 7.1 06/26/2019    T7FJIFF 7.6 05/21/2014     LIPID  Lab Results   Component Value Date    CHOL 218 05/20/2021    CHOL 233 02/08/2021    CHOL 247 12/01/2020    HDL 42 05/20/2021    HDL 46 02/08/2021    HDL 52 12/01/2020    LDLCALC 148 05/20/2021    LDLCALC 134 02/08/2021    LDLCALC 156 12/01/2020    TRIG 141 05/20/2021    TRIG 267 02/08/2021    TRIG 196 12/01/2020     VITAMIN D  Lab Results   Component Value Date    VITD25 94 05/20/2021    VITD25 81 02/08/2021    VITD25 78 12/01/2020     MAGNESIUM  Lab Results Component Value Date    MG 2.2 05/22/2014    MG 2.0 04/28/2014    MG 2.3 03/23/2011      PHOS  No results found for: PHOS   RON   Lab Results   Component Value Date    RON NEGATIVE 11/29/2012     RHEUMATOID FACTOR  No results found for: RF  PSA  No results found for: PSA   HEPATITIS C  Lab Results   Component Value Date    HCVABI Non-Reactive 08/12/2020     HIV  No results found for: [id number], HIV1QT  UA  Lab Results   Component Value Date    COLORU Yellow 05/20/2021    COLORU Yellow 05/17/2021    COLORU yellow 02/25/2021    COLORU Yellow 02/09/2021    COLORU Yellow 02/08/2021    CLARITYU Clear 05/20/2021    CLARITYU Clear 05/17/2021    CLARITYU clear 02/25/2021    CLARITYU Clear 02/09/2021    CLARITYU Clear 02/08/2021    GLUCOSEU Negative 05/20/2021    GLUCOSEU Negative 05/17/2021    GLUCOSEU negative 02/25/2021    GLUCOSEU Negative 02/09/2021    GLUCOSEU Negative 02/08/2021    BILIRUBINUR Negative 05/20/2021    BILIRUBINUR Negative 05/17/2021    BILIRUBINUR negative 02/25/2021    BILIRUBINUR Negative 02/09/2021    BILIRUBINUR Negative 02/08/2021    BILIRUBINUR negative 12/06/2019    KETUA Negative 05/20/2021    KETUA Negative 05/17/2021    KETUA negative 02/25/2021    KETUA Negative 02/09/2021    KETUA Negative 02/08/2021    SPECGRAV 1.015 05/20/2021    SPECGRAV 1.020 05/17/2021    SPECGRAV >=1.030 02/25/2021    SPECGRAV >=1.030 02/09/2021    SPECGRAV 1.025 02/08/2021    BLOODU Negative 05/20/2021    BLOODU Negative 05/17/2021    BLOODU negative 02/25/2021    BLOODU Negative 02/09/2021    BLOODU Negative 02/08/2021    PHUR 5.5 05/20/2021    PHUR 6.0 05/17/2021    PHUR 6.0 02/25/2021    PHUR 6.0 02/09/2021    PHUR 5.5 02/08/2021    PROTEINU Negative 05/20/2021    PROTEINU Negative 05/17/2021    PROTEINU negative 02/25/2021    PROTEINU Negative 02/09/2021    PROTEINU Negative 02/08/2021    UROBILINOGEN 0.2 05/20/2021    UROBILINOGEN 0.2 02/09/2021    UROBILINOGEN 0.2 02/08/2021    NITRU Negative 05/20/2021    NITRU Negative 02/09/2021    NITRU Negative 02/08/2021    LEUKOCYTESUR TRACE 05/20/2021    LEUKOCYTESUR Small 05/17/2021    LEUKOCYTESUR small 02/25/2021    LEUKOCYTESUR SMALL 02/09/2021    LEUKOCYTESUR TRACE 02/08/2021     Urine Micro/Albumin Ratio  Lab Results   Component Value Date    MALBCR - 02/08/2021    MALBCR 12.3 12/01/2020           ROS:  Const: Denies chills, fever, malaise and sweats. Eyes: Denies discharge, pain, redness and visual disturbance. ENMT: Denies earaches, other ear symptoms. Denies nasal or sinus symptoms other than stated  above. Denies mouth and tongue lesions and sore throat. CV: Denies chest discomfort, pain; diaphoresis, dizziness, edema, lightheadedness, orthopnea,  palpitations, syncope and near syncopal episode or any exertional symptoms  Resp: Denies cough, hemoptysis, pleuritic pain, SOB, sputum production and wheezing. GI: Denies abdominal pain, change in bowel habits, hematochezia, melena, nausea and vomiting. : Dysuria urgency and frequency, denies odor that typically occurs with her infections  Musculo: Denies musculoskeletal symptoms. Skin: Denies bruising and rash, other than as above. Neuro: Denies headache, numbness, stiff neck, tingling and focal weakness slurred speech or facial  Droop, other than as above.    Hema/Lymph: Denies bleeding/bruising tendency and enlarged lymph nodes        Current Outpatient Medications:     levothyroxine (SYNTHROID) 100 MCG tablet, Take 1 tablet by mouth daily Ideally on empty stomach, Disp: 30 tablet, Rfl: 3    trimethoprim (TRIMPEX) 100 MG tablet, , Disp: , Rfl:     memantine (NAMENDA) 10 MG tablet, Take 1 tablet by mouth 2 times daily, Disp: 180 tablet, Rfl: 3    omega-3 acid ethyl esters (LOVAZA) 1 g capsule, Take 2 capsules by mouth 2 times daily, Disp: 360 capsule, Rfl: 3    donepezil (ARICEPT) 10 MG tablet, Take 1 tablet by mouth 2 times daily, Disp: 180 tablet, Rfl: 3    memantine (NAMENDA) 10 MG tablet, Take 1 tablet by Tobacco comment: quit smoking    Vaping Use    Vaping Use: Never used   Substance Use Topics    Alcohol use: Yes     Comment: socially -- 3 drinks per month at the most    Drug use: No      Social History     Social History Narrative    PMH:    Problem List: Urinary tract infectious disease, Obstructive sleep apnea syndrome, Adult health    examination, Adult health examination, Constipation, Derangement of knee, Vitamin D deficiency,    Hypothyroidism, Conduction disorder of the heart, Hyperlipidemia    Health Maintenance:    Mini Mental Status - (2018)    Colonoscopy - (2018)    Colonoscopy Screening - (2018)    Mammogram Screening - (2018)    Mammogram - (2018)    Colonoscopy - (2010)    Couseled on Home Safety - (2015)    Bone Density Scan - (3/28/2012)    Medical Problems:    Sleep Apnea - Dr Fide Goff, cpap    Pneumonia    Hypothyroidism - Dr Myla Jenkins - Dr Germaine Flores    Hyperlipidemia - intolerance to all statins    Cardiac Arrhythmia - ? type. s/p ablation    cardiac dysrhythmia - pauses - lead to pacemaker    Skin Cancer    Surgical Hx:    Partial Hysterectomy - Still with Both Ovaries. Breast Implants    Pacemaker - Dr Solano Comment    Knee Replacement, Carpal Tunnel Release, colon-vaginal fistula repair    Bladder Repair - sling    RT Hip Arthroplasty - MARGARITA        FH:    Father:    . (Hx)    Mother:    . (Hx)    Dad - MI 39,  70 MI    Mom - DM , currently living age 80        SH: Marital: . Personal Habits: Cigarette Use: Former Cigarette Smoker - quit age 36. Occ ETOH, minimal. . 2 kids, 6 step kids. 27 grandkids. .Alcohol: Rarely consumes alcohol        Vitals:    21 1431   BP: 112/82   Pulse: 63   Temp: 97.3 °F (36.3 °C)   SpO2: 96%   Weight: 191 lb (86.6 kg)   Height: 5' 4\" (1.626 m)      Wt Readings from Last 3 Encounters:   21 191 lb (86.6 kg)   21 191 lb (86.6 kg)   21 196 lb 8 oz (89.1 kg)          Exam:  Const: Appears comfortable. No signs of acute distress present. Head/Face: Atraumatic on inspection. Eyes: EOMI in both eyes. No discharge from the eyes. PERRL. Sclerae clear. ENMT: Auditory canals normal. Tympanic membranes: intact and translucent. External nose WNL. Nasal mucosa is clear. Oropharynx: No erythema or exudate. Posterior pharynx is normal.  Neck: Supple. Palpation reveals no adenopathy. No masses appreciated. No JVD. Carotids: no  bruits. Resp: Respirations are unlabored. Clear to auscultation. No rales, rhonchi or wheezes appreciated  over the lungs bilaterally. CV: Rate is regular. Rhythm is regular. No gallop or rubs. No heart murmur appreciated. Extremities: No clubbing, cyanosis, or edema. No calf inflammation or tenderness. Abdomen: Bowel sounds are normoactive. Abdomen is soft, nontender, and nondistended. No  abdominal masses. No palpable hepatosplenomegaly. Lymph: No palpable or visible regional lymphadenopathy. Musculoskeletal: no acute joint inflammation. Skin: Dry and warm with no rash. Raised red irritated lesion left skin  Neuro: Alert and oriented. Affect: appropriate. Upper Extremities: 5/5 bilaterally. Lower Extremities:  5/5 bilaterally. Sensation intact to light touch. Reflexes: DTR's are symmetric and 2+ bilaterally. .  Cranial Nerves: Cranial nerves grossly intact. No CVA tenderness      Assessment and Plan:   Diagnosis Orders   1. Routine general medical examination at a health care facility  CBC Auto Differential   2. Urinary tract infection without hematuria, site unspecified  [place] OFFICE OUTPATIENT VISIT 25 MINUTES    CBC Auto Differential    Culture, Urine   3. Vitamin D deficiency  CBC Auto Differential    Vitamin D 25 Hydroxy   4. Supraventricular tachycardia (HCC)  [place] OFFICE OUTPATIENT VISIT 25 MINUTES    CBC Auto Differential   5. Mixed hyperlipidemia  [place] OFFICE OUTPATIENT VISIT 25 MINUTES    Lipid Panel    CBC Auto Differential    CK   6.  Dementia without behavioral disturbance, unspecified dementia type (HCC)  CBC Auto Differential   7. Acquired hypothyroidism  [place] OFFICE OUTPATIENT VISIT 25 MINUTES    TSH without Reflex    CBC Auto Differential    T4, Free   8. Liver disease  [place] OFFICE OUTPATIENT VISIT 25 MINUTES    CBC Auto Differential   9. Cardiac arrhythmia, unspecified cardiac arrhythmia type  CBC Auto Differential   10. Obstructive sleep apnea syndrome  CBC Auto Differential   11. Tubular adenoma  CBC Auto Differential   12. Vitamin B12 deficiency  CBC Auto Differential   13. Menopause  CBC Auto Differential    DEXA BONE DENSITY AXIAL SKELETON   14. Encounter for screening mammogram for malignant neoplasm of breast  CBC Auto Differential    SHAKIR DIGITAL SCREEN W OR WO CAD BILATERAL   15. Colon cancer screening  CBC Auto Differential    POCT Fecal Immunochemical Test (FIT)   16. Type 2 diabetes mellitus with hyperglycemia, without long-term current use of insulin (HCC)  Comprehensive Metabolic Panel    CBC Auto Differential    Hemoglobin A1C    Urinalysis    Microalbumin / Creatinine Urine Ratio       UTI  Counseled. Differential reviewed, treated with antibiotic, symptoms resolved. Cannot give urine today. Defers repeat until her 6-week follow-up sooner as needed. Precaution reviewed. Ways of prevention reviewed. Leukocytosis  Counseled extensively. Differential reviewed, including serious etiologies. Her CBC on 2/8 was concerning with a persistent leukocytosis With abnormal differential but the next day was normalasymptomatic and so simply wants to monitor. Otherwise recommendation was to see hematologistdefers now. Repeat normalized    Polycythemia  Counseled. Hemoglobin normalized but ferritin was borderline high, but improved. Monitor. Counseled on hemochromatosis. Proper hydration. Counseled extensively. Differential reviewed, including serious etiologies. Simply wants basic monitoring at this time.   Repeat normalized      Neoplasm of uncertain behavior of skin  Counseled. Continue per Dr. Dr. Edenilson Dueñas. Obstructive sleep apnea syndrome    Counseled. Doing very well CPAP. Uses it at least 8 to 10 hours per night 7 nights per week with very good results. Got a new machine toward the end of 2019. Hypothyroidism    TSH elevated on 112mcg qd, 1/2 Sunday. Was suppressed on 112 qd in the past. Declines d.a.w. Wants to continue generic. Inda Brittle Defers imaging. TSH normal but free T4 elevated, took medicine shortly before blood draw. Repeat confirmed. Therefore levothyroxine lowered to 100 mcg as a repeat 6 weeks sooner as needed           Type 2 diabetes mellitus with hyperglycemia, without long-term current use of insulin ([facility])  Hemoglobin A1c stable, 5.9 to 6.1 -5.6 on metformin 500 mg twice a day. Previously 1000 twice a day but she would like to keep low dose. Precautions reviewed. Risks  reviewed, proper hydration reviewed, risk of lactic acidosis reviewed . Encouraged  she watch blood sugar ambulatory with parameters reviewed call in if out of range. Hyper and hypoglycemic precautions reviewed. Micro-and macrovascular  complications reviewed.  Importance of at least yearly eye exams and daily foot exams reviewed.  Tolerating metformin . no longer on invokana. Monitor current sugar normal         Dementia without behavioral disturbance  Counseled extensively.  Differential reviewed including various forms of dementia and pseudodementia.  On Aricept 10 mg daily, and Namenda.   Continue per Dr. Elise Garcia were seeing Dr. Martell Dominguez specialist from [address] [place] [address] . However they re noncompliant without follow-up, feels it is a \"racket\". Not compliant with follow with Dr. Mary Reaves either but now they are. She is enrolled in a study, nitrate water. She is not driving and we emphasized importance of this. Safety precautions reviewed.   Sees Dr. Mary Reaves again September.    feels her dementia has stabilized in short-term memory actually has slightly improved. Mixed hyperlipidemia  not at goal  Significant risk of cerebrovascular/cardiovascular event. Imperative this  be controlled with history of TIA, however adamantly refuses any further treatment at this time. Refuses to try any other statin or Zetia stating that these were not tolerated in the  past. She did not tolerate Niaspan. did not tolerate WelChol. . declines  cholestyramine. Risk of stroke and heart attack reviewed. lifestyle modification  reviewed. risk of hyperlipidemia reviewed . Did not tolerate fenofibrate  Counseled on repatha, declines. tolerating Lovaza, declines change in therapy despite counseling    Supraventricular tachycardia ([address]  h/o svt Status post ablation  . Also history of long pauses-since had pacemaker by Dr. Jair Adam. FU with her. Follow-up Dr Lee Agarwal . Asymptomatic sends recordings by phone.  ekg done and reviewed with her     Liver disease  Counseled extensively. Differential reviewed, including serious etiologies. Likely  fatty liver. Precautions reviewed. Lifestyle modification appropriate diet and weight  loss reviewed. Risks of even this leading to cirrhosis reviewed. Other than basic  monitoring on interested in other evaluation or treatment, in-depth blood work,  imaging or otherwise. Hep C 10/18 negative, again was negative 9/20. Relatively stable  And latest numbers actually normalized        Tubular adenoma  Small polyp 7/18. Dr. Mandie Vasquez recommended basic screenings if repeated at all.  Asymptomatic     Vitamin D deficiency  On supplementation she is borderline toxic, it has improved, continue to monitor. Continue to remain off vitamin D. There are some question whether or not she is still taking it, needs to stop as it is currently 80, counseled      Health maintenance examination  Health maintenance issues discussed at length, see other note, today 6/21. Encourage yearly. B12 deficiency  Risk of high and low reviewed.   Last time

## 2021-07-07 RX ORDER — METOPROLOL SUCCINATE 100 MG/1
100 TABLET, EXTENDED RELEASE ORAL 2 TIMES DAILY
Qty: 180 TABLET | Refills: 3 | Status: SHIPPED
Start: 2021-07-07 | End: 2022-05-11 | Stop reason: SDUPTHER

## 2021-07-07 NOTE — TELEPHONE ENCOUNTER
The pt's  is calling to refill the pt's metoprolol succinate 100 mg, he is saying she takes it twice a day but that is not what I see from previous fills, also I see in our chart that the pt hasn't filled it since 06/20 do you want the pt to refill this medication

## 2021-07-07 NOTE — TELEPHONE ENCOUNTER
Spoke with Godfrey Kahn had a large quantity from a year ago and has been giving her that. He states the medication is down to the last 4 tabs. States the medication bottle reads take 1 tab bid of metoprolol suc 100mg. He also states the medication was prescribed by Dr. Omaira Polanco. He has been checking her BP at home for a study she is a part of and it has been running good.

## 2021-07-07 NOTE — TELEPHONE ENCOUNTER
Further investigation will be necessary. Please inform the  of the discrepancy, reconfirmed with him that he is certain she is taking 100 mg twice a day. Let him know that based on our chart I do not see where we ever called in. It is listed in our chart from July 2019 that [facility] called in. We may need to check with the pharmacy also as to when she last filled it and what the instructions were on that last prescription. We will need confirmation as 100 mg twice a day is higher than typically used, however she has been doing well lately so I would like to continue what she is doing ultimately.

## 2021-07-12 ENCOUNTER — HOSPITAL ENCOUNTER (OUTPATIENT)
Age: 76
Discharge: HOME | End: 2021-07-12
Payer: MEDICARE

## 2021-07-12 LAB
ALBUMIN SERPL-MCNC: 4.4 G/DL (ref 3.5–5.2)
ALP BLD-CCNC: 79 U/L (ref 35–104)
ALT SERPL-CCNC: 33 U/L (ref 0–32)
ANION GAP SERPL CALCULATED.3IONS-SCNC: 14 MMOL/L (ref 7–16)
AST SERPL-CCNC: 25 U/L (ref 0–31)
BACTERIA: ABNORMAL /HPF
BASOPHILS ABSOLUTE: 0.06 E9/L (ref 0–0.2)
BASOPHILS RELATIVE PERCENT: 0.5 % (ref 0–2)
BILIRUB SERPL-MCNC: 0.4 MG/DL (ref 0–1.2)
BILIRUBIN URINE: NEGATIVE
BLOOD, URINE: NEGATIVE
BUN BLDV-MCNC: 13 MG/DL (ref 6–23)
CALCIUM SERPL-MCNC: 9.8 MG/DL (ref 8.6–10.2)
CHLORIDE BLD-SCNC: 109 MMOL/L (ref 98–107)
CLARITY: CLEAR
CO2: 23 MMOL/L (ref 22–29)
COLOR: YELLOW
CREAT SERPL-MCNC: 0.8 MG/DL (ref 0.5–1)
EOSINOPHILS ABSOLUTE: 0.27 E9/L (ref 0.05–0.5)
EOSINOPHILS RELATIVE PERCENT: 2.4 % (ref 0–6)
EPITHELIAL CELLS, UA: ABNORMAL /HPF
GFR AFRICAN AMERICAN: >60
GFR NON-AFRICAN AMERICAN: >60 ML/MIN/1.73
GLUCOSE BLD-MCNC: 126 MG/DL (ref 74–99)
GLUCOSE URINE: NEGATIVE MG/DL
HCT VFR BLD CALC: 48.5 % (ref 34–48)
HEMOGLOBIN: 16.6 G/DL (ref 11.5–15.5)
IMMATURE GRANULOCYTES #: 0.03 E9/L
IMMATURE GRANULOCYTES %: 0.3 % (ref 0–5)
KETONES, URINE: NEGATIVE MG/DL
LEUKOCYTE ESTERASE, URINE: ABNORMAL
LYMPHOCYTES ABSOLUTE: 4.75 E9/L (ref 1.5–4)
LYMPHOCYTES RELATIVE PERCENT: 42.8 % (ref 20–42)
MCH RBC QN AUTO: 30.9 PG (ref 26–35)
MCHC RBC AUTO-ENTMCNC: 34.2 % (ref 32–34.5)
MCV RBC AUTO: 90.3 FL (ref 80–99.9)
MONOCYTES ABSOLUTE: 0.65 E9/L (ref 0.1–0.95)
MONOCYTES RELATIVE PERCENT: 5.9 % (ref 2–12)
NEUTROPHILS ABSOLUTE: 5.34 E9/L (ref 1.8–7.3)
NEUTROPHILS RELATIVE PERCENT: 48.1 % (ref 43–80)
NITRITE, URINE: POSITIVE
PDW BLD-RTO: 12.8 FL (ref 11.5–15)
PH UA: 6 (ref 5–9)
PLATELET # BLD: 183 E9/L (ref 130–450)
PMV BLD AUTO: 10.1 FL (ref 7–12)
POTASSIUM SERPL-SCNC: 4.2 MMOL/L (ref 3.5–5)
PROTEIN UA: NEGATIVE MG/DL
RBC # BLD: 5.37 E12/L (ref 3.5–5.5)
RBC UA: ABNORMAL /HPF (ref 0–2)
SODIUM BLD-SCNC: 146 MMOL/L (ref 132–146)
SPECIFIC GRAVITY UA: 1.02 (ref 1–1.03)
TOTAL CK: 23 U/L (ref 20–180)
TOTAL PROTEIN: 7.2 G/DL (ref 6.4–8.3)
UROBILINOGEN, URINE: 1 E.U./DL
WBC # BLD: 11.1 E9/L (ref 4.5–11.5)
WBC UA: ABNORMAL /HPF (ref 0–5)

## 2021-07-12 PROCEDURE — 80053 COMPREHEN METABOLIC PANEL: CPT

## 2021-07-12 PROCEDURE — 87077 CULTURE AEROBIC IDENTIFY: CPT

## 2021-07-12 PROCEDURE — 82570 ASSAY OF URINE CREATININE: CPT

## 2021-07-12 PROCEDURE — 84439 ASSAY OF FREE THYROXINE: CPT

## 2021-07-12 PROCEDURE — 36415 COLL VENOUS BLD VENIPUNCTURE: CPT

## 2021-07-12 PROCEDURE — 82044 UR ALBUMIN SEMIQUANTITATIVE: CPT

## 2021-07-12 PROCEDURE — 82306 VITAMIN D 25 HYDROXY: CPT

## 2021-07-12 PROCEDURE — 83036 HEMOGLOBIN GLYCOSYLATED A1C: CPT

## 2021-07-12 PROCEDURE — 85025 COMPLETE CBC W/AUTO DIFF WBC: CPT

## 2021-07-12 PROCEDURE — 87088 URINE BACTERIA CULTURE: CPT

## 2021-07-12 PROCEDURE — 81001 URINALYSIS AUTO W/SCOPE: CPT

## 2021-07-12 PROCEDURE — 82550 ASSAY OF CK (CPK): CPT

## 2021-07-12 PROCEDURE — 84443 ASSAY THYROID STIM HORMONE: CPT

## 2021-07-12 PROCEDURE — 87186 SC STD MICRODIL/AGAR DIL: CPT

## 2021-07-13 ENCOUNTER — HOSPITAL ENCOUNTER (OUTPATIENT)
Age: 76
Discharge: HOME | End: 2021-07-13
Payer: MEDICARE

## 2021-07-13 ENCOUNTER — OFFICE VISIT (OUTPATIENT)
Dept: PRIMARY CARE CLINIC | Age: 76
End: 2021-07-13
Payer: MEDICARE

## 2021-07-13 VITALS
WEIGHT: 191 LBS | RESPIRATION RATE: 16 BRPM | BODY MASS INDEX: 32.61 KG/M2 | SYSTOLIC BLOOD PRESSURE: 112 MMHG | HEART RATE: 56 BPM | TEMPERATURE: 96.9 F | OXYGEN SATURATION: 96 % | DIASTOLIC BLOOD PRESSURE: 84 MMHG | HEIGHT: 64 IN

## 2021-07-13 DIAGNOSIS — D36.9 TUBULAR ADENOMA: ICD-10-CM

## 2021-07-13 DIAGNOSIS — E11.65 TYPE 2 DIABETES MELLITUS WITH HYPERGLYCEMIA, WITHOUT LONG-TERM CURRENT USE OF INSULIN (HCC): ICD-10-CM

## 2021-07-13 DIAGNOSIS — E78.2 MIXED HYPERLIPIDEMIA: Primary | ICD-10-CM

## 2021-07-13 DIAGNOSIS — E53.8 VITAMIN B12 DEFICIENCY: ICD-10-CM

## 2021-07-13 DIAGNOSIS — I49.9 CARDIAC ARRHYTHMIA, UNSPECIFIED CARDIAC ARRHYTHMIA TYPE: ICD-10-CM

## 2021-07-13 DIAGNOSIS — G47.33 OBSTRUCTIVE SLEEP APNEA SYNDROME: ICD-10-CM

## 2021-07-13 DIAGNOSIS — E03.9 ACQUIRED HYPOTHYROIDISM: ICD-10-CM

## 2021-07-13 DIAGNOSIS — D75.1 POLYCYTHEMIA: ICD-10-CM

## 2021-07-13 DIAGNOSIS — K76.9 LIVER DISEASE: ICD-10-CM

## 2021-07-13 DIAGNOSIS — E55.9 VITAMIN D DEFICIENCY: ICD-10-CM

## 2021-07-13 DIAGNOSIS — R71.8 OTHER ABNORMALITY OF RED BLOOD CELLS: ICD-10-CM

## 2021-07-13 DIAGNOSIS — I47.1 SUPRAVENTRICULAR TACHYCARDIA (HCC): ICD-10-CM

## 2021-07-13 DIAGNOSIS — F03.90 DEMENTIA WITHOUT BEHAVIORAL DISTURBANCE, UNSPECIFIED DEMENTIA TYPE: ICD-10-CM

## 2021-07-13 LAB
ALBUMIN SERPL-MCNC: 4.4 G/DL (ref 3.5–5.2)
ALP BLD-CCNC: 78 U/L (ref 35–104)
ALT SERPL-CCNC: 30 U/L (ref 0–32)
ANION GAP SERPL CALCULATED.3IONS-SCNC: 15 MMOL/L (ref 7–16)
AST SERPL-CCNC: 28 U/L (ref 0–31)
BASOPHILS ABSOLUTE: 0.07 E9/L (ref 0–0.2)
BASOPHILS RELATIVE PERCENT: 0.7 % (ref 0–2)
BILIRUB SERPL-MCNC: 0.6 MG/DL (ref 0–1.2)
BUN BLDV-MCNC: 12 MG/DL (ref 6–23)
CALCIUM SERPL-MCNC: 10 MG/DL (ref 8.6–10.2)
CHLORIDE BLD-SCNC: 106 MMOL/L (ref 98–107)
CHOLESTEROL, TOTAL: 230 MG/DL (ref 0–199)
CO2: 24 MMOL/L (ref 22–29)
CREAT SERPL-MCNC: 0.8 MG/DL (ref 0.5–1)
CREATININE URINE: 162 MG/DL (ref 29–226)
EOSINOPHILS ABSOLUTE: 0.24 E9/L (ref 0.05–0.5)
EOSINOPHILS RELATIVE PERCENT: 2.2 % (ref 0–6)
GFR AFRICAN AMERICAN: >60
GFR NON-AFRICAN AMERICAN: >60 ML/MIN/1.73
GLUCOSE BLD-MCNC: 104 MG/DL (ref 74–99)
HBA1C MFR BLD: 5.5 % (ref 4–5.6)
HBA1C MFR BLD: 5.5 % (ref 4–5.6)
HCT VFR BLD CALC: 46 % (ref 34–48)
HDLC SERPL-MCNC: 51 MG/DL
HEMOGLOBIN: 15.8 G/DL (ref 11.5–15.5)
IMMATURE GRANULOCYTES #: 0.06 E9/L
IMMATURE GRANULOCYTES %: 0.6 % (ref 0–5)
LDL CHOLESTEROL CALCULATED: 148 MG/DL (ref 0–99)
LYMPHOCYTES ABSOLUTE: 4.98 E9/L (ref 1.5–4)
LYMPHOCYTES RELATIVE PERCENT: 46.5 % (ref 20–42)
MCH RBC QN AUTO: 30.7 PG (ref 26–35)
MCHC RBC AUTO-ENTMCNC: 34.3 % (ref 32–34.5)
MCV RBC AUTO: 89.5 FL (ref 80–99.9)
MICROALBUMIN UR-MCNC: <12 MG/L
MICROALBUMIN/CREAT UR-RTO: ABNORMAL (ref 0–30)
MONOCYTES ABSOLUTE: 0.64 E9/L (ref 0.1–0.95)
MONOCYTES RELATIVE PERCENT: 6 % (ref 2–12)
NEUTROPHILS ABSOLUTE: 4.71 E9/L (ref 1.8–7.3)
NEUTROPHILS RELATIVE PERCENT: 44 % (ref 43–80)
PDW BLD-RTO: 12.7 FL (ref 11.5–15)
PLATELET # BLD: 184 E9/L (ref 130–450)
PMV BLD AUTO: 10.3 FL (ref 7–12)
POTASSIUM SERPL-SCNC: 4 MMOL/L (ref 3.5–5)
RBC # BLD: 5.14 E12/L (ref 3.5–5.5)
SODIUM BLD-SCNC: 145 MMOL/L (ref 132–146)
T4 FREE: 1.82 NG/DL (ref 0.93–1.7)
T4 FREE: 1.83 NG/DL (ref 0.93–1.7)
TOTAL CK: 22 U/L (ref 20–180)
TOTAL PROTEIN: 7 G/DL (ref 6.4–8.3)
TRIGL SERPL-MCNC: 153 MG/DL (ref 0–149)
TSH SERPL DL<=0.05 MIU/L-ACNC: 1.32 UIU/ML (ref 0.27–4.2)
TSH SERPL DL<=0.05 MIU/L-ACNC: 1.4 UIU/ML (ref 0.27–4.2)
VITAMIN D 25-HYDROXY: 57 NG/ML (ref 30–100)
VITAMIN D 25-HYDROXY: 60 NG/ML (ref 30–100)
VLDLC SERPL CALC-MCNC: 31 MG/DL
WBC # BLD: 10.7 E9/L (ref 4.5–11.5)

## 2021-07-13 PROCEDURE — 84439 ASSAY OF FREE THYROXINE: CPT

## 2021-07-13 PROCEDURE — 1090F PRES/ABSN URINE INCON ASSESS: CPT | Performed by: FAMILY MEDICINE

## 2021-07-13 PROCEDURE — 4040F PNEUMOC VAC/ADMIN/RCVD: CPT | Performed by: FAMILY MEDICINE

## 2021-07-13 PROCEDURE — 99214 OFFICE O/P EST MOD 30 MIN: CPT | Performed by: FAMILY MEDICINE

## 2021-07-13 PROCEDURE — 82306 VITAMIN D 25 HYDROXY: CPT

## 2021-07-13 PROCEDURE — 1036F TOBACCO NON-USER: CPT | Performed by: FAMILY MEDICINE

## 2021-07-13 PROCEDURE — G8417 CALC BMI ABV UP PARAM F/U: HCPCS | Performed by: FAMILY MEDICINE

## 2021-07-13 PROCEDURE — 3044F HG A1C LEVEL LT 7.0%: CPT | Performed by: FAMILY MEDICINE

## 2021-07-13 PROCEDURE — 83036 HEMOGLOBIN GLYCOSYLATED A1C: CPT

## 2021-07-13 PROCEDURE — 84443 ASSAY THYROID STIM HORMONE: CPT

## 2021-07-13 PROCEDURE — 85025 COMPLETE CBC W/AUTO DIFF WBC: CPT

## 2021-07-13 PROCEDURE — 2022F DILAT RTA XM EVC RTNOPTHY: CPT | Performed by: FAMILY MEDICINE

## 2021-07-13 PROCEDURE — 1123F ACP DISCUSS/DSCN MKR DOCD: CPT | Performed by: FAMILY MEDICINE

## 2021-07-13 PROCEDURE — 82550 ASSAY OF CK (CPK): CPT

## 2021-07-13 PROCEDURE — G8399 PT W/DXA RESULTS DOCUMENT: HCPCS | Performed by: FAMILY MEDICINE

## 2021-07-13 PROCEDURE — 36415 COLL VENOUS BLD VENIPUNCTURE: CPT

## 2021-07-13 PROCEDURE — 3017F COLORECTAL CA SCREEN DOC REV: CPT | Performed by: FAMILY MEDICINE

## 2021-07-13 PROCEDURE — 80053 COMPREHEN METABOLIC PANEL: CPT

## 2021-07-13 PROCEDURE — 80061 LIPID PANEL: CPT

## 2021-07-13 PROCEDURE — G8427 DOCREV CUR MEDS BY ELIG CLIN: HCPCS | Performed by: FAMILY MEDICINE

## 2021-07-13 NOTE — PROGRESS NOTES
Mikaela Mccarthy : 1945 Sex: female  Age: 77 y.o. Chief Complaint   Patient presents with    Results       HPI:    Patient presents today for follow-up of blood work. She and her  state they went to our [facility] lab yesterday, they were told that an 8-hour fast was not adequate and to return the next day for lipid. It appears they repeated/duplicated multiple labs. Overall feeling well without acute complaints. No UTI symptoms. She follows with urology (previously , will be switching since he left), Dr. Mynor Worrell (previously), Britany Gómez,  Dr. Corinne Price, Dr Ronel Toribio ([facility]), Dr. Iris Gustafson drawn yesterday and today include CMP CK hemoglobin A1c TSH free T4 vitamin D CBC. They did a urinalysis yesterday showing nitrite and leukocyte but she is asymptomatic, microalbumin creatinine ratioNC.   Below comparisons will be from May, yesterday and today, glucose -104, HDL 42-51 LDL 1 48-1 48 triglycerides 1 41-1 53, ALT 32-30 3-30 glucose as above, hemoglobin A1c 5.5 both days in a row, free T4 2.42-1 0.83-1.82, TSH 1.4 vitamin D 94 to 60-57, white count normal, hemoglobin 15.4-16 0.6-15.8    most Recent Labs  CBC  Lab Results   Component Value Date    WBC 10.7 2021    WBC 11.1 2021    WBC 11.3 2021    RBC 5.14 2021    RBC 5.37 2021    RBC 4.96 2021    HGB 15.8 2021    HGB 16.6 2021    HGB 15.4 2021    HCT 46.0 2021    HCT 48.5 2021    HCT 44.5 2021    MCV 89.5 2021    MCV 90.3 2021    MCV 89.7 2021     2021     2021     2021      CMP  Lab Results   Component Value Date     2021     2021     2021    K 4.0 2021    K 4.2 2021    K 3.7 2021     2021     2021     2021    CO2 24 2021    CO2 23 2021    CO2 23 Value Date    MG 2.2 05/22/2014    MG 2.0 04/28/2014    MG 2.3 03/23/2011      PHOS  No results found for: PHOS   RNO   Lab Results   Component Value Date    RON NEGATIVE 11/29/2012     RHEUMATOID FACTOR  No results found for: RF  PSA  No results found for: PSA   HEPATITIS C  Lab Results   Component Value Date    HCVABI Non-Reactive 08/12/2020     HIV  No results found for: LUW6FSY, HIV1QT  UA  Lab Results   Component Value Date    COLORU Yellow 07/12/2021    COLORU Yellow 05/20/2021    COLORU Yellow 05/17/2021    COLORU yellow 02/25/2021    COLORU Yellow 02/09/2021    CLARITYU Clear 07/12/2021    CLARITYU Clear 05/20/2021    CLARITYU Clear 05/17/2021    CLARITYU clear 02/25/2021    CLARITYU Clear 02/09/2021    GLUCOSEU Negative 07/12/2021    GLUCOSEU Negative 05/20/2021    GLUCOSEU Negative 05/17/2021    GLUCOSEU negative 02/25/2021    GLUCOSEU Negative 02/09/2021    BILIRUBINUR Negative 07/12/2021    BILIRUBINUR Negative 05/20/2021    BILIRUBINUR Negative 05/17/2021    BILIRUBINUR negative 02/25/2021    BILIRUBINUR Negative 02/09/2021    BILIRUBINUR negative 12/06/2019    KETUA Negative 07/12/2021    KETUA Negative 05/20/2021    KETUA Negative 05/17/2021    KETUA negative 02/25/2021    KETUA Negative 02/09/2021    SPECGRAV 1.025 07/12/2021    SPECGRAV 1.015 05/20/2021    SPECGRAV 1.020 05/17/2021    SPECGRAV >=1.030 02/25/2021    SPECGRAV >=1.030 02/09/2021    BLOODU Negative 07/12/2021    BLOODU Negative 05/20/2021    BLOODU Negative 05/17/2021    BLOODU negative 02/25/2021    BLOODU Negative 02/09/2021    PHUR 6.0 07/12/2021    PHUR 5.5 05/20/2021    PHUR 6.0 05/17/2021    PHUR 6.0 02/25/2021    PHUR 6.0 02/09/2021    PROTEINU Negative 07/12/2021    PROTEINU Negative 05/20/2021    PROTEINU Negative 05/17/2021    PROTEINU negative 02/25/2021    PROTEINU Negative 02/09/2021    UROBILINOGEN 1.0 07/12/2021    UROBILINOGEN 0.2 05/20/2021    UROBILINOGEN 0.2 02/09/2021    NITRU POSITIVE 07/12/2021    NITRU Negative (ARICEPT) 10 MG tablet, Take 1 tablet by mouth 2 times daily, Disp: 180 tablet, Rfl: 3    memantine (NAMENDA) 10 MG tablet, Take 1 tablet by mouth daily, Disp: 30 tablet, Rfl: 5    metFORMIN (GLUCOPHAGE) 500 MG tablet, Take 1 tablet by mouth daily , Disp: , Rfl:     mirabegron (MYRBETRIQ) 50 MG TB24, Take 1 tablet by mouth daily , Disp: , Rfl:     B Complex Vitamins (B-COMPLEX/B-12 PO), Take by mouth daily LD 18, Disp: , Rfl:   Allergies   Allergen Reactions    Morphine Itching    Statins      Other reaction(s): Unknown    Statins Depletion Therapy Other (See Comments)     MYALGIA       Past Medical History:   Diagnosis Date    Arthritis     Diabetes mellitus ([address]     Difficult intubation     carries card, copy on chart  Glidescope#3 with ETT size7    Hyperlipidemia     Hypothyroidism     Left sided abdominal pain     Memory problem     still competent    GORDY on CPAP     Pacemaker     Rectal bleeding     Ringing in the ears      Past Surgical History:   Procedure Laterality Date    ABLATION OF DYSRHYTHMIC FOCUS      BREAST SURGERY      implants    COLONOSCOPY  ? Dr. Jessica Ash twisted     COLONOSCOPY  ? Dr. Venice Ashford. incomplete.  COLONOSCOPY  2018    Dr. Jose Richmond N/A 2018    COLONOSCOPY DIAGNOSTIC performed by Ashish Gabriel MD at [address] [place] [address]  09/10/2011    POSTERIOR    HYSTERECTOMY      vaginal. uterus only.      KNEE ARTHROPLASTY Right     partial    KNEE ARTHROSCOPY  2011    right knee    PACEMAKER PLACEMENT  2014    Medtronic dual chamber    TOTAL HIP ARTHROPLASTY Right 2015     Family History   Problem Relation Age of Onset    Pacemaker Mother     Heart Attack Father      Social History     Tobacco Use    Smoking status: Former Smoker     Packs/day: 1.50     Years: 13.00     Pack years: 19.50     Types: Cigarettes     Start date: 1973     Quit date: 1986     Years since quittin.5    Smokeless tobacco: Never Used    Tobacco comment: quit smoking    Vaping Use    Vaping Use: Never used   Substance Use Topics    Alcohol use: Yes     Comment: socially -- 3 drinks per month at the most    Drug use: No      Social History     Social History Narrative    PMH:    Problem List: Urinary tract infectious disease, Obstructive sleep apnea syndrome, Adult health    examination, Adult health examination, Constipation, Derangement of knee, Vitamin D deficiency,    Hypothyroidism, Conduction disorder of the heart, Hyperlipidemia    Health Maintenance:    Mini Mental Status - (2018)    Colonoscopy - (2018)    Colonoscopy Screening - (2018)    Mammogram Screening - (2018)    Mammogram - (2018)    Colonoscopy - (2010)    Couseled on Home Safety - (2015)    Bone Density Scan - (3/28/2012)    Medical Problems:    Sleep Apnea - Dr Redd Kemp, cpap    Pneumonia    Hypothyroidism - Dr Joel Henning - Dr Yahaira Hale    Hyperlipidemia - intolerance to all statins    Cardiac Arrhythmia - ? type. s/p ablation    cardiac dysrhythmia - pauses - lead to pacemaker    Skin Cancer    Surgical Hx:    Partial Hysterectomy - Still with Both Ovaries. Breast Implants    Pacemaker - Dr Kana Jacobs    Knee Replacement, Carpal Tunnel Release, colon-vaginal fistula repair    Bladder Repair - sling    RT Hip Arthroplasty - MARGARITA        FH:    Father:    . (Hx)    Mother:    . (Hx)    Dad - MI 39,  70 MI    Mom - DM , currently living age 80        SH: Marital: . Personal Habits: Cigarette Use: Former Cigarette Smoker - quit age 36. Occ ETOH, minimal. . 2 kids, 6 step kids. 27 grandkids. .Alcohol: Rarely consumes alcohol        Vitals:    21 1449   BP: 112/84   Pulse: 56   Resp: 16   Temp: 96.9 °F (36.1 °C)   TempSrc: Temporal   SpO2: 96%   Weight: 191 lb (86.6 kg)   Height: 5' 4\" (1.626 m)      Wt Readings from Last 3 Encounters:   21 191 lb (86.6 kg)   21 191 lb adenoma             Leukocytosis  Counseled extensively. Differential reviewed, including serious etiologies. Her CBC on 2/8 was concerning with a persistent leukocytosis With abnormal differential but the next day was normalasymptomatic and so simply wants to monitor. Otherwise recommendation was to see hematologistdefers now. Remains normal  Polycythemia  Counseled. Hemoglobin normalized but ferritin was borderline high, but improved. Monitor. Counseled on hemochromatosis. Proper hydration. Counseled extensively. Differential reviewed, including serious etiologies. Simply wants basic monitoring at this time. Fluctuates    Neoplasm of uncertain behavior of skin  Counseled. Continue per Dr. Dr. Lulú Jauregui. Obstructive sleep apnea syndrome    Counseled. Doing very well CPAP. Uses it at least 8 to 10 hours per night 7 nights per week with very good results. Got a new machine toward the end of 2019. Hypothyroidism    TSH elevated on 112mcg qd, 1/2 Sunday. Was suppressed on 112 qd in the past. Declines d.a.w. Wants to continue generic. Jacque Serrato Defers imaging. TSH normal but free T4 elevated, improved, took medicine shortly before blood draw. Lowered last visit. Monitor           Type 2 diabetes mellitus with hyperglycemia, without long-term current use of insulin ([facility])  Hemoglobin A1c stable, 5.9 to 6.1 -5.65.5 on metformin 500 mg twice a day. Previously 1000 twice a day but she would like to keep low dose. Precautions reviewed. Risks  reviewed, proper hydration reviewed, risk of lactic acidosis reviewed . Encouraged  she watch blood sugar ambulatory with parameters reviewed call in if out of range. Hyper and hypoglycemic precautions reviewed. Micro-and macrovascular  complications reviewed.  Importance of at least yearly eye exams and daily foot exams reviewed.  Tolerating metformin . no longer on invokana.   Monitor            Dementia without behavioral disturbance  Counseled extensively.  Differential reviewed including various forms of dementia and pseudodementia.  On Aricept 10 mg daily, and Namenda.   Continue per Dr. Saha Fearing were seeing Dr. Bere Muir specialist from [facility] [employer]. However they re noncompliant without follow-up, feels it is a \"racket\". Follow-up with Dr. Benjamin Marquis. She is enrolled in a study, nitrate water. She is not driving and we emphasized importance of this. Safety precautions reviewed.   Sees Dr. Benjamin Marquis again September.  feels her dementia has stabilized      Mixed hyperlipidemia  not at goal  Significant risk of cerebrovascular/cardiovascular event. Imperative this  be controlled with history of TIA, however adamantly refuses any further treatment at this time. Refuses to try any other statin or Zetia stating that these were not tolerated in the  past. She did not tolerate Niaspan. did not tolerate WelChol. . declines  cholestyramine. Risk of stroke and heart attack reviewed. lifestyle modification  reviewed. risk of hyperlipidemia reviewed . Did not tolerate fenofibrate  Counseled on repatha, declines. tolerating Lovaza, declines change in therapy despite counseling    Supraventricular tachycardia ([address]  h/o svt Status post ablation  . Also history of long pauses-since had pacemaker by Dr. Saud Plummer. FU with her. Follow-up Dr Trevon Shah . Asymptomatic sends recordings by phone.  ekg done and reviewed with her     Liver disease  Counseled extensively. Differential reviewed, including serious etiologies. Likely  fatty liver. Precautions reviewed. Lifestyle modification appropriate diet and weight  loss reviewed. Risks of even this leading to cirrhosis reviewed. Other than basic  monitoring on interested in other evaluation or treatment, in-depth blood work,  imaging or otherwise. Hep C 10/18 negative, again was negative 9/20. Relatively stable  , Fluctuates        Tubular adenoma  Small polyp 7/18.  Dr. Aba Joseph recommended basic screenings if repeated at all.  Asymptomatic     Vitamin D deficiency  On supplementation she was borderline toxic, it has stabilized, continue off vitamin D    Health maintenance examination  Health maintenance issues discussed at length  6/21. Encourage yearly. B12 deficiency  Risk of high and low reviewed. Last time high, discussed backing off. Monitor        No flowsheet data found. Plan as above. Counseled extensively and differential diagnoses relevant to above were reviewed, including serious etiologies. Side effects and interactions of medications were reviewed. Reviewed all at length. Counseled extensively. Despite relatively young age, her multiple continue to have concern for guarded prognosis. Precautions reviewed. Second set of labs were not back for her appointment yet. She is going to return in 3 weeks to reassess sooner as needed. Notify us of any symptoms including that of urine. Will plan labs at least 3 months sooner as needed. As long as symptoms steadily improve/resolve, and medical conditions follow the expected course, FU as below, sooner PRN. Return in about 3 weeks (around 8/3/2021), or if symptoms worsen or fail to improve. Signs and symptoms to watch for discussed, serious signs and symptoms reviewed. ER if any. Laura Lund MD    Patients are advised to check with insurance company to ensure coverage and to fully understand benefits and cost prior to any testing. This note was created with the assistance of voice recognition software. Document was reviewed however may contain grammatical errors.

## 2021-07-15 LAB
ORGANISM: ABNORMAL
URINE CULTURE, ROUTINE: ABNORMAL

## 2021-07-15 RX ORDER — CEPHALEXIN 500 MG/1
500 CAPSULE ORAL 2 TIMES DAILY
Qty: 14 CAPSULE | Refills: 0 | Status: SHIPPED | OUTPATIENT
Start: 2021-07-15 | End: 2021-07-22

## 2021-07-15 NOTE — RESULT ENCOUNTER NOTE
Urine culture back, over 100,000 E. coli. Although you are not supposed to treat nonsymptomatic bacteria in the urine, It does worry me to have this amount. of aurelio. Please check if she has any symptoms of UTI. If so I would like her on antibiotic. Please confirm allergy list up-to-date and we will likely use cephalexin twice a day for 7 days with standard precautions.   Send back

## 2021-08-05 ENCOUNTER — TELEPHONE (OUTPATIENT)
Dept: PRIMARY CARE CLINIC | Age: 76
End: 2021-08-05

## 2021-08-09 ENCOUNTER — OFFICE VISIT (OUTPATIENT)
Dept: FAMILY MEDICINE CLINIC | Age: 76
End: 2021-08-09
Payer: MEDICARE

## 2021-08-09 VITALS
RESPIRATION RATE: 16 BRPM | SYSTOLIC BLOOD PRESSURE: 126 MMHG | TEMPERATURE: 97.4 F | HEIGHT: 65 IN | BODY MASS INDEX: 31.65 KG/M2 | HEART RATE: 70 BPM | WEIGHT: 190 LBS | DIASTOLIC BLOOD PRESSURE: 72 MMHG | OXYGEN SATURATION: 98 %

## 2021-08-09 DIAGNOSIS — R30.0 DYSURIA: Primary | ICD-10-CM

## 2021-08-09 DIAGNOSIS — R82.90 FOUL SMELLING URINE: ICD-10-CM

## 2021-08-09 DIAGNOSIS — R39.9 UTI SYMPTOMS: ICD-10-CM

## 2021-08-09 LAB
BILIRUBIN, POC: NEGATIVE
BLOOD URINE, POC: NEGATIVE
CLARITY, POC: CLEAR
COLOR, POC: YELLOW
GLUCOSE URINE, POC: NEGATIVE
KETONES, POC: NEGATIVE
LEUKOCYTE EST, POC: NEGATIVE
NITRITE, POC: NEGATIVE
PH, POC: 5.5
PROTEIN, POC: NEGATIVE
SPECIFIC GRAVITY, POC: >=1.03
UROBILINOGEN, POC: 0.2

## 2021-08-09 PROCEDURE — G8399 PT W/DXA RESULTS DOCUMENT: HCPCS | Performed by: PHYSICIAN ASSISTANT

## 2021-08-09 PROCEDURE — 4040F PNEUMOC VAC/ADMIN/RCVD: CPT | Performed by: PHYSICIAN ASSISTANT

## 2021-08-09 PROCEDURE — 3017F COLORECTAL CA SCREEN DOC REV: CPT | Performed by: PHYSICIAN ASSISTANT

## 2021-08-09 PROCEDURE — 99214 OFFICE O/P EST MOD 30 MIN: CPT | Performed by: PHYSICIAN ASSISTANT

## 2021-08-09 PROCEDURE — 1036F TOBACCO NON-USER: CPT | Performed by: PHYSICIAN ASSISTANT

## 2021-08-09 PROCEDURE — G8417 CALC BMI ABV UP PARAM F/U: HCPCS | Performed by: PHYSICIAN ASSISTANT

## 2021-08-09 PROCEDURE — 1123F ACP DISCUSS/DSCN MKR DOCD: CPT | Performed by: PHYSICIAN ASSISTANT

## 2021-08-09 PROCEDURE — 1090F PRES/ABSN URINE INCON ASSESS: CPT | Performed by: PHYSICIAN ASSISTANT

## 2021-08-09 PROCEDURE — G8427 DOCREV CUR MEDS BY ELIG CLIN: HCPCS | Performed by: PHYSICIAN ASSISTANT

## 2021-08-09 PROCEDURE — 81002 URINALYSIS NONAUTO W/O SCOPE: CPT | Performed by: PHYSICIAN ASSISTANT

## 2021-08-09 RX ORDER — NITROFURANTOIN 25; 75 MG/1; MG/1
100 CAPSULE ORAL 2 TIMES DAILY
Qty: 14 CAPSULE | Refills: 0 | Status: SHIPPED | OUTPATIENT
Start: 2021-08-09 | End: 2021-08-16

## 2021-08-09 NOTE — PROGRESS NOTES
21  Ean Baum : 1945 Sex: female  Age 77 y.o. Subjective:  Chief Complaint   Patient presents with    Dysuria     one week          HPI:   Ean Baum , 77 y.o. female presents to express care for evaluation of burning with urination    HPI  76-year-old female presents to express care for evaluation of possible urinary tract infection. The patient has had some burning with urination, frequency. The patient had started with the symptoms on Friday. The patient does have a history of dementia and  does the vast [de-identified] of the speaking. The patient has had some discomfort with urination. The patient is also noted to have a foul smell to her urine according to the . The patient is not really having any abdominal pain, fever, chills. ROS:   Unless otherwise stated in this report the patient's positive and negative responses for review of systems for constitutional, eyes, ENT, cardiovascular, respiratory, gastrointestinal, neurological, , musculoskeletal, and integument systems and related systems to the presenting problem are either stated in the history of present illness or were not pertinent or were negative for the symptoms and/or complaints related to the presenting medical problem. Positives and pertinent negatives as per HPI. All others reviewed and are negative. PMH:     Past Medical History:   Diagnosis Date    Arthritis     Diabetes mellitus ([address]     Difficult intubation     carries card, copy on chart  Glidescope#3 with ETT size7    Hyperlipidemia     Hypothyroidism     Left sided abdominal pain     Memory problem     still competent    GORDY on CPAP     Pacemaker     Rectal bleeding     Ringing in the ears        Past Surgical History:   Procedure Laterality Date    ABLATION OF DYSRHYTHMIC FOCUS      BREAST SURGERY      implants    COLONOSCOPY  ? Dr. Jannie Dumont twisted     COLONOSCOPY  ? Dr. Bertha Pop. incomplete.  COLONOSCOPY  12/17/2018    Dr. Lilliam Ramos N/A 12/17/2018    COLONOSCOPY DIAGNOSTIC performed by Benny Carlson MD at [address] [place] [address]  09/10/2011    POSTERIOR    HYSTERECTOMY      vaginal. uterus only.  KNEE ARTHROPLASTY Right     partial    KNEE ARTHROSCOPY  9/08/2011    right knee    PACEMAKER PLACEMENT  5/23/2014    Medtronic dual chamber    TOTAL HIP ARTHROPLASTY Right 2015       Family History   Problem Relation Age of Onset    Pacemaker Mother     Heart Attack Father        Medications:     Current Outpatient Medications:     nitrofurantoin, macrocrystal-monohydrate, (MACROBID) 100 MG capsule, Take 1 capsule by mouth 2 times daily for 7 days, Disp: 14 capsule, Rfl: 0    metoprolol succinate (TOPROL XL) 100 MG extended release tablet, Take 1 tablet by mouth 2 times daily, Disp: 180 tablet, Rfl: 3    levothyroxine (SYNTHROID) 100 MCG tablet, Take 1 tablet by mouth daily Ideally on empty stomach, Disp: 30 tablet, Rfl: 3    trimethoprim (TRIMPEX) 100 MG tablet, , Disp: , Rfl:     memantine (NAMENDA) 10 MG tablet, Take 1 tablet by mouth 2 times daily, Disp: 180 tablet, Rfl: 3    omega-3 acid ethyl esters (LOVAZA) 1 g capsule, Take 2 capsules by mouth 2 times daily, Disp: 360 capsule, Rfl: 3    donepezil (ARICEPT) 10 MG tablet, Take 1 tablet by mouth 2 times daily, Disp: 180 tablet, Rfl: 3    memantine (NAMENDA) 10 MG tablet, Take 1 tablet by mouth daily, Disp: 30 tablet, Rfl: 5    metFORMIN (GLUCOPHAGE) 500 MG tablet, Take 1 tablet by mouth daily , Disp: , Rfl:     mirabegron (MYRBETRIQ) 50 MG TB24, Take 1 tablet by mouth daily , Disp: , Rfl:     B Complex Vitamins (B-COMPLEX/B-12 PO), Take by mouth daily LD 12/12/18, Disp: , Rfl:     Allergies:      Allergies   Allergen Reactions    Morphine Itching    Statins      Other reaction(s): Unknown    Statins Depletion Therapy Other (See Comments)     MYALGIA       Social History:     Social History Tobacco Use    Smoking status: Former Smoker     Packs/day: 1.50     Years: 13.00     Pack years: 19.50     Types: Cigarettes     Start date: 1973     Quit date: 1986     Years since quittin.6    Smokeless tobacco: Never Used    Tobacco comment: quit smoking    Vaping Use    Vaping Use: Never used   Substance Use Topics    Alcohol use: Yes     Comment: socially -- 3 drinks per month at the most    Drug use: No       Patient lives at home. Physical Exam:     Vitals:    21 1538   BP: 126/72   Pulse: 70   Resp: 16   Temp: 97.4 °F (36.3 °C)   SpO2: 98%   Weight: 190 lb (86.2 kg)   Height: 5' 4.5\" (1.638 m)       Exam:  Physical Exam  Nurses note and vital signs reviewed and patient is not hypoxic. General: The patient appears well and in no apparent distress. Patient is resting comfortably on cart. Skin: Warm, dry, no pallor noted. There is no rash noted. Head: Normocephalic, atraumatic. Eye: Normal conjunctiva  Ears, Nose, Mouth, and Throat: Oral mucosa is moist  Cardiovascular: Regular Rate and Rhythm  Respiratory: Patient is in no distress, no accessory muscle use, lungs are clear to auscultation, no wheezing, crackles or rhonchi  Back: Non-tender, no CVA tenderness bilaterally to percussion. GI: Normal bowel sounds, no tenderness to palpation, no masses appreciated. No rebound, guarding, or rigidity noted.   Musculoskeletal: The patient has no evidence of calf tenderness, no pitting edema, symmetrical pulses noted bilaterally  Neurological: A&O x4, normal speech        Testing:     Results for orders placed or performed in visit on 21   POCT Urinalysis no Micro   Result Value Ref Range    Color, UA yellow     Clarity, UA clear     Glucose, UA POC negative     Bilirubin, UA negative     Ketones, UA negative     Spec Grav, UA >=1.030     Blood, UA POC negative     pH, UA 5.5     Protein, UA POC negative     Urobilinogen, UA 0.2     Leukocytes, UA negative     Nitrite, UA

## 2021-08-12 ENCOUNTER — OFFICE VISIT (OUTPATIENT)
Dept: PRIMARY CARE CLINIC | Age: 76
End: 2021-08-12
Payer: MEDICARE

## 2021-08-12 VITALS
WEIGHT: 190 LBS | BODY MASS INDEX: 32.11 KG/M2 | OXYGEN SATURATION: 96 % | DIASTOLIC BLOOD PRESSURE: 60 MMHG | TEMPERATURE: 97.2 F | SYSTOLIC BLOOD PRESSURE: 128 MMHG | HEART RATE: 55 BPM

## 2021-08-12 DIAGNOSIS — K76.9 LIVER DISEASE: ICD-10-CM

## 2021-08-12 DIAGNOSIS — F03.90 DEMENTIA WITHOUT BEHAVIORAL DISTURBANCE, UNSPECIFIED DEMENTIA TYPE: ICD-10-CM

## 2021-08-12 DIAGNOSIS — D75.1 POLYCYTHEMIA: ICD-10-CM

## 2021-08-12 DIAGNOSIS — I49.9 CARDIAC ARRHYTHMIA, UNSPECIFIED CARDIAC ARRHYTHMIA TYPE: ICD-10-CM

## 2021-08-12 DIAGNOSIS — E55.9 VITAMIN D DEFICIENCY: ICD-10-CM

## 2021-08-12 DIAGNOSIS — E53.8 VITAMIN B12 DEFICIENCY: ICD-10-CM

## 2021-08-12 DIAGNOSIS — N39.0 URINARY TRACT INFECTION WITHOUT HEMATURIA, SITE UNSPECIFIED: Primary | ICD-10-CM

## 2021-08-12 DIAGNOSIS — E78.2 MIXED HYPERLIPIDEMIA: ICD-10-CM

## 2021-08-12 DIAGNOSIS — E03.9 ACQUIRED HYPOTHYROIDISM: ICD-10-CM

## 2021-08-12 DIAGNOSIS — E11.65 TYPE 2 DIABETES MELLITUS WITH HYPERGLYCEMIA, WITHOUT LONG-TERM CURRENT USE OF INSULIN (HCC): ICD-10-CM

## 2021-08-12 PROCEDURE — G8417 CALC BMI ABV UP PARAM F/U: HCPCS | Performed by: FAMILY MEDICINE

## 2021-08-12 PROCEDURE — 1036F TOBACCO NON-USER: CPT | Performed by: FAMILY MEDICINE

## 2021-08-12 PROCEDURE — 1090F PRES/ABSN URINE INCON ASSESS: CPT | Performed by: FAMILY MEDICINE

## 2021-08-12 PROCEDURE — 4040F PNEUMOC VAC/ADMIN/RCVD: CPT | Performed by: FAMILY MEDICINE

## 2021-08-12 PROCEDURE — G8399 PT W/DXA RESULTS DOCUMENT: HCPCS | Performed by: FAMILY MEDICINE

## 2021-08-12 PROCEDURE — 2022F DILAT RTA XM EVC RTNOPTHY: CPT | Performed by: FAMILY MEDICINE

## 2021-08-12 PROCEDURE — G8427 DOCREV CUR MEDS BY ELIG CLIN: HCPCS | Performed by: FAMILY MEDICINE

## 2021-08-12 PROCEDURE — 3017F COLORECTAL CA SCREEN DOC REV: CPT | Performed by: FAMILY MEDICINE

## 2021-08-12 PROCEDURE — 3044F HG A1C LEVEL LT 7.0%: CPT | Performed by: FAMILY MEDICINE

## 2021-08-12 PROCEDURE — 99214 OFFICE O/P EST MOD 30 MIN: CPT | Performed by: FAMILY MEDICINE

## 2021-08-12 PROCEDURE — 1123F ACP DISCUSS/DSCN MKR DOCD: CPT | Performed by: FAMILY MEDICINE

## 2021-08-12 NOTE — PROGRESS NOTES
Nay Sullivan : 1945 Sex: female  Age: 77 y.o. Chief Complaint   Patient presents with    Discuss Labs       HPI:      Patient presents today with  to review blood work. Overall feeling well. Was recently seen again for UTI. Had odor to urine dysuria urgency frequency. She has been getting better. Was prescribed Macrobid in [facility] care, on day 2, tolerating. She has had frequent UTIs. Has upcoming appointment Dr. Felicia Foster i and she will be sure to discuss it with him. Most recent culture from 2021 showed over 100,000 Enterococcus faecalis sensitive to Roxianne Murrain. She will take this as prescribed. It is important that she repeat urine culture by 2 weeks sooner as needed to ensure resolution and to document whether or not this is persistent or recurrent. She does get symptomatic with these infections. Dementia symptoms of been stable. Seems to get more clarity in the evening. Unfortunately her son passed away 3 weeks ago from bone marrow cancer. Was very emotional for 10 days but doing better now per her .     Blood work reviewed, CMP shows glucose 104 LFTs normal CMP otherwise normal HDL 51  triglyceride 153 and declines pharmacotherapy hemoglobin A1c 5.5 Free T4 1.82, took TRT shortly before blood work, TSH normal 1.4 vitamin D 57 hemoglobin 15.8 CBC otherwise normal urinalysis negative        She follows with urology (previously , now Dr. Felicia Foster), Dr. Anitra Nieto (previously), Micaela Lee,  Dr. Ai Rodriges, Dr Calixto Holguin ([facility]), Dr. Adalid Macias          most Recent Labs  CBC  Lab Results   Component Value Date    WBC 10.7 2021    WBC 11.1 2021    WBC 11.3 2021    RBC 5.14 2021    RBC 5.37 2021    RBC 4.96 2021    HGB 15.8 2021    HGB 16.6 2021    HGB 15.4 2021    HCT 46.0 2021    HCT 48.5 2021    HCT 44.5 2021    MCV 89.5 2021    MCV 90.3 07/12/2021    MCV 89.7 05/20/2021     07/13/2021     07/12/2021     05/20/2021      CMP  Lab Results   Component Value Date     07/13/2021     07/12/2021     05/20/2021    K 4.0 07/13/2021    K 4.2 07/12/2021    K 3.7 05/20/2021     07/13/2021     07/12/2021     05/20/2021    CO2 24 07/13/2021    CO2 23 07/12/2021    CO2 23 05/20/2021    ANIONGAP 15 07/13/2021    ANIONGAP 14 07/12/2021    ANIONGAP 12 05/20/2021    GLUCOSE 104 07/13/2021    GLUCOSE 126 07/12/2021    GLUCOSE 85 05/20/2021    GLUCOSE 104 08/15/2011    GLUCOSE 113 04/25/2011    GLUCOSE 97 04/01/2011    BUN 12 07/13/2021    BUN 13 07/12/2021    BUN 11 05/20/2021    CREATININE 0.8 07/13/2021    CREATININE 0.8 07/12/2021    CREATININE 0.7 05/20/2021    LABGLOM >60 07/13/2021    LABGLOM >60 07/12/2021    LABGLOM >60 05/20/2021    GFRAA >60 07/13/2021    GFRAA >60 07/12/2021    GFRAA >60 05/20/2021    CALCIUM 10.0 07/13/2021    CALCIUM 9.8 07/12/2021    CALCIUM 9.8 05/20/2021    PROT 7.0 07/13/2021    PROT 7.2 07/12/2021    PROT 6.9 05/20/2021    LABALBU 4.4 07/13/2021    LABALBU 4.4 07/12/2021    LABALBU 4.1 05/20/2021    LABALBU 4.4 08/15/2011    LABALBU 4.2 04/25/2011    LABALBU 4.2 03/24/2011    BILITOT 0.6 07/13/2021    BILITOT 0.4 07/12/2021    BILITOT 0.5 05/20/2021    ALKPHOS 78 07/13/2021    ALKPHOS 79 07/12/2021    ALKPHOS 75 05/20/2021    AST 28 07/13/2021    AST 25 07/12/2021    AST 26 05/20/2021    ALT 30 07/13/2021    ALT 33 07/12/2021    ALT 32 05/20/2021     A1C  Lab Results   Component Value Date    LABA1C 5.5 07/13/2021    LABA1C 5.5 07/12/2021    LABA1C 5.6 12/01/2020     TSH  Lab Results   Component Value Date    TSH 1.400 07/13/2021    TSH 1.320 07/12/2021    TSH 0.869 05/20/2021     FREET4  Lab Results   Component Value Date    X4MCSEB 7.1 06/26/2019    I0OHROX 7.6 05/21/2014     LIPID  Lab Results   Component Value Date    CHOL 230 07/13/2021    CHOL 218 05/20/2021    CHOL 233 02/08/2021    HDL 51 07/13/2021    HDL 42 05/20/2021    HDL 46 02/08/2021    LDLCALC 148 07/13/2021    LDLCALC 148 05/20/2021    LDLCALC 134 02/08/2021    TRIG 153 07/13/2021    TRIG 141 05/20/2021    TRIG 267 02/08/2021     VITAMIN D  Lab Results   Component Value Date    VITD25 57 07/13/2021    VITD25 60 07/12/2021    VITD25 94 05/20/2021     MAGNESIUM  Lab Results   Component Value Date    MG 2.2 05/22/2014    MG 2.0 04/28/2014    MG 2.3 03/23/2011      PHOS  No results found for: PHOS   RON   Lab Results   Component Value Date    RON NEGATIVE 11/29/2012     RHEUMATOID FACTOR  No results found for: RF  PSA  No results found for: PSA   HEPATITIS C  Lab Results   Component Value Date    HCVABI Non-Reactive 08/12/2020     HIV  No results found for: CRZ8YPF, HIV1QT  UA  Lab Results   Component Value Date    COLORU yellow 08/09/2021    COLORU Yellow 07/12/2021    COLORU Yellow 05/20/2021    COLORU Yellow 05/17/2021    COLORU yellow 02/25/2021    COLORU Yellow 02/09/2021    CLARITYU clear 08/09/2021    CLARITYU Clear 07/12/2021    CLARITYU Clear 05/20/2021    CLARITYU Clear 05/17/2021    CLARITYU clear 02/25/2021    CLARITYU Clear 02/09/2021    GLUCOSEU negative 08/09/2021    GLUCOSEU Negative 07/12/2021    GLUCOSEU Negative 05/20/2021    GLUCOSEU Negative 05/17/2021    GLUCOSEU negative 02/25/2021    GLUCOSEU Negative 02/09/2021    BILIRUBINUR negative 08/09/2021    BILIRUBINUR Negative 07/12/2021    BILIRUBINUR Negative 05/20/2021    BILIRUBINUR Negative 05/17/2021    BILIRUBINUR negative 02/25/2021    KETUA negative 08/09/2021    KETUA Negative 07/12/2021    KETUA Negative 05/20/2021    KETUA Negative 05/17/2021    KETUA negative 02/25/2021    KETUA Negative 02/09/2021    SPECGRAV >=1.030 08/09/2021    SPECGRAV 1.025 07/12/2021    SPECGRAV 1.015 05/20/2021    SPECGRAV 1.020 05/17/2021    SPECGRAV >=1.030 02/25/2021    SPECGRAV >=1.030 02/09/2021    BLOODU negative 08/09/2021    BLOODU Negative 07/12/2021    BLOODU Negative 05/20/2021    BLOODU Negative 05/17/2021    BLOODU negative 02/25/2021    BLOODU Negative 02/09/2021    PHUR 5.5 08/09/2021    PHUR 6.0 07/12/2021    PHUR 5.5 05/20/2021    PHUR 6.0 05/17/2021    PHUR 6.0 02/25/2021    PHUR 6.0 02/09/2021    PROTEINU negative 08/09/2021    PROTEINU Negative 07/12/2021    PROTEINU Negative 05/20/2021    PROTEINU Negative 05/17/2021    PROTEINU negative 02/25/2021    PROTEINU Negative 02/09/2021    UROBILINOGEN 1.0 07/12/2021    UROBILINOGEN 0.2 05/20/2021    UROBILINOGEN 0.2 02/09/2021    NITRU POSITIVE 07/12/2021    NITRU Negative 05/20/2021    NITRU Negative 02/09/2021    LEUKOCYTESUR negative 08/09/2021    LEUKOCYTESUR TRACE 07/12/2021    LEUKOCYTESUR TRACE 05/20/2021    LEUKOCYTESUR Small 05/17/2021    LEUKOCYTESUR small 02/25/2021    LEUKOCYTESUR SMALL 02/09/2021     Urine Micro/Albumin Ratio  Lab Results   Component Value Date    MALBCR - 07/12/2021    MALBCR - 02/08/2021    MALBCR 12.3 12/01/2020           ROS:  Const: Denies chills, fever, malaise and sweats. Eyes: Denies discharge, pain, redness and visual disturbance. ENMT: Denies earaches, other ear symptoms. Denies nasal or sinus symptoms other than stated  above. Denies mouth and tongue lesions and sore throat. CV: Denies chest discomfort, pain; diaphoresis, dizziness, edema, lightheadedness, orthopnea,  palpitations, syncope and near syncopal episode or any exertional symptoms  Resp: Denies cough, hemoptysis, pleuritic pain, SOB, sputum production and wheezing. GI: Denies abdominal pain, change in bowel habits, hematochezia, melena, nausea and vomiting. : Dysuria urgency and frequency,  odor that typically occurs with her infections, improving with antibiotic  Musculo: Denies musculoskeletal symptoms. Skin: Denies bruising and rash, other than as above. Neuro: Denies headache, numbness, stiff neck, tingling and focal weakness slurred speech or facial  Droop, other than as above.    Hema/Lymph: Denies bleeding/bruising tendency and enlarged lymph nodes        Current Outpatient Medications:     nitrofurantoin, macrocrystal-monohydrate, (MACROBID) 100 MG capsule, Take 1 capsule by mouth 2 times daily for 7 days, Disp: 14 capsule, Rfl: 0    metoprolol succinate (TOPROL XL) 100 MG extended release tablet, Take 1 tablet by mouth 2 times daily, Disp: 180 tablet, Rfl: 3    levothyroxine (SYNTHROID) 100 MCG tablet, Take 1 tablet by mouth daily Ideally on empty stomach, Disp: 30 tablet, Rfl: 3    trimethoprim (TRIMPEX) 100 MG tablet, , Disp: , Rfl:     memantine (NAMENDA) 10 MG tablet, Take 1 tablet by mouth 2 times daily, Disp: 180 tablet, Rfl: 3    omega-3 acid ethyl esters (LOVAZA) 1 g capsule, Take 2 capsules by mouth 2 times daily, Disp: 360 capsule, Rfl: 3    donepezil (ARICEPT) 10 MG tablet, Take 1 tablet by mouth 2 times daily, Disp: 180 tablet, Rfl: 3    memantine (NAMENDA) 10 MG tablet, Take 1 tablet by mouth daily, Disp: 30 tablet, Rfl: 5    metFORMIN (GLUCOPHAGE) 500 MG tablet, Take 1 tablet by mouth daily , Disp: , Rfl:     mirabegron (MYRBETRIQ) 50 MG TB24, Take 1 tablet by mouth daily , Disp: , Rfl:     B Complex Vitamins (B-COMPLEX/B-12 PO), Take by mouth daily LD 12/12/18, Disp: , Rfl:   Allergies   Allergen Reactions    Morphine Itching    Statins      Other reaction(s): Unknown    Statins Depletion Therapy Other (See Comments)     MYALGIA       Past Medical History:   Diagnosis Date    Arthritis     Diabetes mellitus ([facility] [address]     Difficult intubation     carries card, copy on chart  Glidescope#3 with ETT size7    Hyperlipidemia     Hypothyroidism     Left sided abdominal pain     Memory problem     still competent    GORDY on CPAP     Pacemaker     Rectal bleeding     Ringing in the ears      Past Surgical History:   Procedure Laterality Date    ABLATION OF DYSRHYTHMIC FOCUS  1996    BREAST SURGERY      implants    COLONOSCOPY  2009? Dr. Ravi Cody twisted     COLONOSCOPY  2008? Dr. Lala Nieves. incomplete.  COLONOSCOPY  2018    Dr. Kathia Foster N/A 2018    COLONOSCOPY DIAGNOSTIC performed by Debbie Denton MD at [address] [place] [address]  09/10/2011    POSTERIOR    HYSTERECTOMY      vaginal. uterus only.  KNEE ARTHROPLASTY Right     partial    KNEE ARTHROSCOPY  2011    right knee    PACEMAKER PLACEMENT  2014    Medtronic dual chamber    TOTAL HIP ARTHROPLASTY Right 2015     Family History   Problem Relation Age of Onset    Pacemaker Mother     Heart Attack Father     Cancer Other 49        Bone Marrow Cancer / Had Bone Marrow Transplant     Social History     Tobacco Use    Smoking status: Former Smoker     Packs/day: 1.50     Years: 13.00     Pack years: 19.50     Types: Cigarettes     Start date: 1973     Quit date: 1986     Years since quittin.6    Smokeless tobacco: Never Used    Tobacco comment: quit smoking    Vaping Use    Vaping Use: Never used   Substance Use Topics    Alcohol use: Yes     Comment: socially -- 3 drinks per month at the most    Drug use: No      Social History     Social History Narrative    PMH:    Problem List: Urinary tract infectious disease, Obstructive sleep apnea syndrome, Adult health    examination, Adult health examination, Constipation, Derangement of knee, Vitamin D deficiency,    Hypothyroidism, Conduction disorder of the heart, Hyperlipidemia    Health Maintenance:    Mini Mental Status - (2018)    Colonoscopy - (2018)    Colonoscopy Screening - (2018)    Mammogram Screening - (2018)    Mammogram - (2018)    Colonoscopy - (2010)    Couseled on Home Safety - (2015)    Bone Density Scan - (3/28/2012)    Medical Problems:    Sleep Apnea - Dr Petra High, cpap    Pneumonia    Hypothyroidism - Dr Raquel Vasquez - Dr Bella Ruiz    Hyperlipidemia - intolerance to all statins    Cardiac Arrhythmia - ? type.  s/p ablation    cardiac dysrhythmia - pauses - lead to pacemaker    Skin Cancer    Surgical Hx:    Partial Hysterectomy - Still with Both Ovaries. Breast Implants    Pacemaker - Dr Rod Leung    Knee Replacement, Carpal Tunnel Release, colon-vaginal fistula repair    Bladder Repair - sling    RT Hip Arthroplasty - MARGARITA        FH:    Father:    . (Hx)    Mother:    . (Hx)    Dad - MI 39,  70 MI    Mom - DM , currently living age 80        SH: Marital: . Personal Habits: Cigarette Use: Former Cigarette Smoker - quit age 36. Occ ETOH, minimal. . 2 kids, 6 step kids. 27 grandkids. .Alcohol: Rarely consumes alcohol        Vitals:    21 1435   BP: 128/60   Pulse: 55   Temp: 97.2 °F (36.2 °C)   SpO2: 96%   Weight: 190 lb (86.2 kg)      Wt Readings from Last 3 Encounters:   21 190 lb (86.2 kg)   21 190 lb (86.2 kg)   21 191 lb (86.6 kg)          Exam:  Const: Appears comfortable. No signs of acute distress present. Head/Face: Atraumatic on inspection. Eyes: EOMI in both eyes. No discharge from the eyes. PERRL. Sclerae clear. ENMT: Auditory canals normal. Tympanic membranes: intact and translucent. External nose WNL. Nasal mucosa is clear. Oropharynx: No erythema or exudate. Posterior pharynx is normal.  Neck: Supple. Palpation reveals no adenopathy. No masses appreciated. No JVD. Carotids: no  bruits. Resp: Respirations are unlabored. Clear to auscultation. No rales, rhonchi or wheezes appreciated  over the lungs bilaterally. CV: Rate is regular. Rhythm is regular. No gallop or rubs. No heart murmur appreciated. Extremities: No clubbing, cyanosis, or edema. No calf inflammation or tenderness. Abdomen: Bowel sounds are normoactive. Abdomen is soft, nontender, and nondistended. No  abdominal masses. No palpable hepatosplenomegaly. Lymph: No palpable or visible regional lymphadenopathy. Musculoskeletal: no acute joint inflammation. Skin: Dry and warm with no rash.  Raised red irritated lesion left skin  Neuro: Alert Differential reviewed, including serious etiologies. Simply wants basic monitoring at this time. Fluctuates    Neoplasm of uncertain behavior of skin  Counseled. Continue per Dr. Dr. Lulú Jauregui. Obstructive sleep apnea syndrome    Counseled. Doing very well CPAP. Uses it at least 8 to 10 hours per night 7 nights per week with very good results. Got a new machine toward the end of 2019. Encourage her machine be checked     Hypothyroidism    TSH elevated on 112mcg qd, 1/2 Sunday. Was suppressed on 112 qd in the past. Declines d.a.w. Wants to continue generic. Jacque Serrato Defers imaging. TSH normal but free T4 elevated,took medicine shortly before blood draw. They states she cannot remember not to do this. Monitor. Asymptomatic           Type 2 diabetes mellitus with hyperglycemia, without long-term current use of insulin ([facility])  Hemoglobin A1c stable, 5.9 to 6.1 -5.65.55.5 on metformin 500 mg twice a day. Previously 1000 twice a day. Precautions reviewed. Risks  reviewed, proper hydration reviewed, standard precautions reviewed including loose bowels, LA. Encouraged  she watch blood sugar ambulatory with parameters reviewed call in if out of range. Hyper and hypoglycemic precautions reviewed. Micro-and macrovascular  complications reviewed.  Importance of at least yearly eye exams and daily foot exams reviewed.  Tolerating metformin . no longer on invokana. Monitor            Dementia without behavioral disturbance  Counseled extensively.  Differential reviewed including various forms of dementia and pseudodementia.  On Aricept 10 mg daily, and Namenda.   Continue per Dr. Kerry rFazier were seeing Dr. Brittany Joyner specialist from [place]. However they re noncompliant without follow-up, feels it is a \"racket\". Follow-up with Dr. Juvenal Vasquez. She is enrolled in a study, nitrate water. She is not driving and we emphasized importance of this.  Safety precautions reviewed.   Sees Dr. Juvenal Vasquez again September.  feels her dementia is stable      Mixed hyperlipidemia  not at goal  Significant risk of cerebrovascular/cardiovascular event. Imperative this  be controlled with history of TIA, however adamantly refuses any further treatment at this time. Refuses to try any other statin or Zetia stating that these were not tolerated in the  past. She did not tolerate Niaspan. did not tolerate WelChol. . declines  cholestyramine. Risk of stroke and heart attack reviewed. lifestyle modification  reviewed. risk of hyperlipidemia reviewed . Did not tolerate fenofibrate  Counseled on repatha, declines. tolerating Lovaza, declines change in therapy despite counseling    Supraventricular tachycardia ([facility] [address]  h/o svt Status post ablation  . Also history of long pauses-since had pacemaker by Dr. Montana Valles. FU with her. Follow-up Dr Katie Mckinney . Asymptomatic sends recordings by phone.  ekg done and reviewed with her     Liver disease  Counseled extensively. Differential reviewed, including serious etiologies. Likely  fatty liver. Precautions reviewed. Lifestyle modification appropriate diet and weight  loss reviewed. Risks of even this leading to cirrhosis reviewed. Other than basic  monitoring on interested in other evaluation or treatment, in-depth blood work,  imaging or otherwise. Hep C 10/18 negative, again was negative 9/20. LFTs currently normal          Tubular adenoma  Small polyp 7/18.  [facility] recommended basic screenings if repeated at all.  Asymptomatic     Vitamin D deficiency  On supplementation she was borderline toxic, it has stabilized, continue off vitamin D. Monitor    Health maintenance examination  Health maintenance issues discussed at length  6/21. Encourage yearly. B12 deficiency  Risk of high and low reviewed. Last time high, discussed backing off. Monitor        No flowsheet data found. Plan as above.   Counseled extensively and differential diagnoses relevant to above were reviewed, including serious etiologies. Side effects and interactions of medications were reviewed. Reviewed all at length. Counseled extensively. Despite relatively young age, her multiple comorbidities gives concern for guarded prognosis. Precautions reviewed. Continue per multiple specialist.  Continue current management. Recheck urinalysis/culture about 2 weeks and she is going to follow-up with her  at at his appointment in a month sooner as needed as well as blood work and follow-up in 3 months sooner as needed    As long as symptoms steadily improve/resolve, and medical conditions follow the expected course, FU as below, sooner PRN. Return in about 1 month (around 9/12/2021), or if symptoms worsen or fail to improve. Signs and symptoms to watch for discussed, serious signs and symptoms reviewed. ER if any. Nat Otto MD    Patients are advised to check with insurance company to ensure coverage and to fully understand benefits and cost prior to any testing. This note was created with the assistance of voice recognition software. Document was reviewed however may contain grammatical errors.

## 2021-08-26 ENCOUNTER — TELEPHONE (OUTPATIENT)
Dept: PRIMARY CARE CLINIC | Age: 76
End: 2021-08-26

## 2021-08-26 DIAGNOSIS — N39.0 URINARY TRACT INFECTION WITHOUT HEMATURIA, SITE UNSPECIFIED: Primary | ICD-10-CM

## 2021-08-26 NOTE — TELEPHONE ENCOUNTER
There is a repeat urinalysis and culture that was dated expected 8/26. Make sure they provide that urine ASAP ideally before antibiotic therapy. It is imperative that they follow with Dr. Jeana Victoria as well. Because this has been multiple occurrences making this a complex situation, it is best to be seen, through express care if willing. Otherwise I will call in another antibiotic as long as they understand the risk of phone medicine. Standard precautions. Let me know what they were to do. But again I would give a urinalysis and culture sample immediately so they can start the antibiotic ASAP.

## 2021-08-30 ENCOUNTER — HOSPITAL ENCOUNTER (EMERGENCY)
Age: 76
Discharge: HOME | End: 2021-08-30
Payer: MEDICARE

## 2021-08-30 ENCOUNTER — HOSPITAL ENCOUNTER (OUTPATIENT)
Age: 76
Discharge: HOME | End: 2021-08-30
Payer: MEDICARE

## 2021-08-30 DIAGNOSIS — E11.65 TYPE 2 DIABETES MELLITUS WITH HYPERGLYCEMIA, WITHOUT LONG-TERM CURRENT USE OF INSULIN (HCC): ICD-10-CM

## 2021-08-30 DIAGNOSIS — N39.0 URINARY TRACT INFECTION WITHOUT HEMATURIA, SITE UNSPECIFIED: ICD-10-CM

## 2021-08-30 LAB
BILIRUBIN URINE: NEGATIVE
BLOOD, URINE: NEGATIVE
CLARITY: CLEAR
COLOR: YELLOW
GLUCOSE URINE: NEGATIVE MG/DL
KETONES, URINE: NEGATIVE MG/DL
LEUKOCYTE ESTERASE, URINE: NEGATIVE
NITRITE, URINE: NEGATIVE
PH UA: 5.5 (ref 5–9)
PROTEIN UA: NEGATIVE MG/DL
SPECIFIC GRAVITY UA: >=1.03 (ref 1–1.03)
UROBILINOGEN, URINE: 0.2 E.U./DL

## 2021-08-30 PROCEDURE — 81003 URINALYSIS AUTO W/O SCOPE: CPT

## 2021-08-30 PROCEDURE — 87186 SC STD MICRODIL/AGAR DIL: CPT

## 2021-08-30 PROCEDURE — 82044 UR ALBUMIN SEMIQUANTITATIVE: CPT

## 2021-08-30 PROCEDURE — 82570 ASSAY OF URINE CREATININE: CPT

## 2021-08-30 PROCEDURE — 87077 CULTURE AEROBIC IDENTIFY: CPT

## 2021-08-30 PROCEDURE — 87088 URINE BACTERIA CULTURE: CPT

## 2021-08-30 RX ORDER — AMOXICILLIN 500 MG/1
500 CAPSULE ORAL 3 TIMES DAILY
Qty: 21 CAPSULE | Refills: 0 | Status: SHIPPED | OUTPATIENT
Start: 2021-08-30 | End: 2021-09-06

## 2021-08-30 NOTE — TELEPHONE ENCOUNTER
Based on last culture we will call in amoxicillin 500 mg three times a day For 7 days as long as no known allergy to this medication/class. Keep follow-up to review more detail sooner as needed .  important that they collect a urine sample for urinalysis and culture prior to starting antibiotic therapy

## 2021-08-30 NOTE — TELEPHONE ENCOUNTER
Advised .  He will take pt to lab now for UA C&S and asking for ATB to be called in today after she drops off her urine

## 2021-08-31 LAB
CREATININE URINE: 223 MG/DL (ref 29–226)
MICROALBUMIN UR-MCNC: <12 MG/L
MICROALBUMIN/CREAT UR-RTO: ABNORMAL (ref 0–30)

## 2021-09-02 LAB
ORGANISM: ABNORMAL
URINE CULTURE, ROUTINE: ABNORMAL

## 2021-09-02 NOTE — RESULT ENCOUNTER NOTE
Urine culture positive for bacteria sensitive to the antibiotic prescribed. As long as tolerating and getting better, fu by 2 weeks to recheck (or as directed at office visit), sooner PRN.

## 2021-09-15 ENCOUNTER — OFFICE VISIT (OUTPATIENT)
Dept: GERIATRIC MEDICINE | Age: 76
End: 2021-09-15
Payer: MEDICARE

## 2021-09-15 VITALS
TEMPERATURE: 98 F | WEIGHT: 191.8 LBS | SYSTOLIC BLOOD PRESSURE: 118 MMHG | DIASTOLIC BLOOD PRESSURE: 74 MMHG | HEIGHT: 64 IN | HEART RATE: 68 BPM | RESPIRATION RATE: 16 BRPM | BODY MASS INDEX: 32.74 KG/M2

## 2021-09-15 DIAGNOSIS — G31.84 MCI (MILD COGNITIVE IMPAIRMENT): Primary | ICD-10-CM

## 2021-09-15 PROCEDURE — G8399 PT W/DXA RESULTS DOCUMENT: HCPCS | Performed by: INTERNAL MEDICINE

## 2021-09-15 PROCEDURE — G8417 CALC BMI ABV UP PARAM F/U: HCPCS | Performed by: INTERNAL MEDICINE

## 2021-09-15 PROCEDURE — 1123F ACP DISCUSS/DSCN MKR DOCD: CPT | Performed by: INTERNAL MEDICINE

## 2021-09-15 PROCEDURE — 3017F COLORECTAL CA SCREEN DOC REV: CPT | Performed by: INTERNAL MEDICINE

## 2021-09-15 PROCEDURE — 1090F PRES/ABSN URINE INCON ASSESS: CPT | Performed by: INTERNAL MEDICINE

## 2021-09-15 PROCEDURE — 99212 OFFICE O/P EST SF 10 MIN: CPT | Performed by: INTERNAL MEDICINE

## 2021-09-15 PROCEDURE — G8427 DOCREV CUR MEDS BY ELIG CLIN: HCPCS | Performed by: INTERNAL MEDICINE

## 2021-09-15 PROCEDURE — 4040F PNEUMOC VAC/ADMIN/RCVD: CPT | Performed by: INTERNAL MEDICINE

## 2021-09-15 PROCEDURE — 1036F TOBACCO NON-USER: CPT | Performed by: INTERNAL MEDICINE

## 2021-09-15 NOTE — PROGRESS NOTES
Chief Complaint   Patient presents with    6 Month Follow-Up     March follow up re: slow progressing SDAT. Here with , who states pt is on the \"drink\" therapy & has been stable.   States that at times pt has \"moments of clarity\" but they  don't last -- wants to know what causes them & how to get them to last.

## 2021-09-15 NOTE — PROGRESS NOTES
CC Reevaluate memory    Here with  her caregiver  She is stable acc to    But for 2 years no housework  Less talking but understands speech  And he is doing all IADL's   And she is able to do all ADL's  Sleeps >8 hours and up to 12 hours  Minicog 3/3  5 minute memory   DLROW  Animals 9/2  Clock 4/4   Cube close     A stockholder  And retired nuclear physician  Plays  Cards well  Impression; MCI stable  Plan: Continue same

## 2021-09-16 ENCOUNTER — OFFICE VISIT (OUTPATIENT)
Dept: PRIMARY CARE CLINIC | Age: 76
End: 2021-09-16
Payer: MEDICARE

## 2021-09-16 VITALS
SYSTOLIC BLOOD PRESSURE: 124 MMHG | OXYGEN SATURATION: 96 % | HEART RATE: 68 BPM | BODY MASS INDEX: 33.3 KG/M2 | WEIGHT: 191 LBS | TEMPERATURE: 96.9 F | DIASTOLIC BLOOD PRESSURE: 70 MMHG

## 2021-09-16 DIAGNOSIS — N39.0 RECURRENT URINARY TRACT INFECTION: Primary | ICD-10-CM

## 2021-09-16 DIAGNOSIS — R31.9 HEMATURIA, UNSPECIFIED TYPE: ICD-10-CM

## 2021-09-16 DIAGNOSIS — E11.65 TYPE 2 DIABETES MELLITUS WITH HYPERGLYCEMIA, WITHOUT LONG-TERM CURRENT USE OF INSULIN (HCC): ICD-10-CM

## 2021-09-16 DIAGNOSIS — Z23 FLU VACCINE NEED: ICD-10-CM

## 2021-09-16 DIAGNOSIS — F03.90 DEMENTIA WITHOUT BEHAVIORAL DISTURBANCE, UNSPECIFIED DEMENTIA TYPE: ICD-10-CM

## 2021-09-16 LAB
BILIRUBIN URINE: NEGATIVE
BILIRUBIN, POC: NEGATIVE
BLOOD URINE, POC: NORMAL
BLOOD, URINE: NEGATIVE
CLARITY, POC: NORMAL
CLARITY: CLEAR
COLOR, POC: YELLOW
COLOR: YELLOW
GLUCOSE URINE, POC: NEGATIVE
GLUCOSE URINE: NEGATIVE MG/DL
KETONES, POC: NEGATIVE
KETONES, URINE: ABNORMAL MG/DL
LEUKOCYTE EST, POC: NORMAL
LEUKOCYTE ESTERASE, URINE: NEGATIVE
NITRITE, POC: NEGATIVE
NITRITE, URINE: NEGATIVE
PH UA: 6 (ref 5–9)
PH, POC: 6
PROTEIN UA: NEGATIVE MG/DL
PROTEIN, POC: NEGATIVE
SPECIFIC GRAVITY UA: >=1.03 (ref 1–1.03)
SPECIFIC GRAVITY, POC: >=1.03
UROBILINOGEN, POC: 0.2
UROBILINOGEN, URINE: 0.2 E.U./DL

## 2021-09-16 PROCEDURE — 4040F PNEUMOC VAC/ADMIN/RCVD: CPT | Performed by: FAMILY MEDICINE

## 2021-09-16 PROCEDURE — 1123F ACP DISCUSS/DSCN MKR DOCD: CPT | Performed by: FAMILY MEDICINE

## 2021-09-16 PROCEDURE — 1090F PRES/ABSN URINE INCON ASSESS: CPT | Performed by: FAMILY MEDICINE

## 2021-09-16 PROCEDURE — 1036F TOBACCO NON-USER: CPT | Performed by: FAMILY MEDICINE

## 2021-09-16 PROCEDURE — 81002 URINALYSIS NONAUTO W/O SCOPE: CPT | Performed by: FAMILY MEDICINE

## 2021-09-16 PROCEDURE — G8427 DOCREV CUR MEDS BY ELIG CLIN: HCPCS | Performed by: FAMILY MEDICINE

## 2021-09-16 PROCEDURE — 90694 VACC AIIV4 NO PRSRV 0.5ML IM: CPT | Performed by: FAMILY MEDICINE

## 2021-09-16 PROCEDURE — 2022F DILAT RTA XM EVC RTNOPTHY: CPT | Performed by: FAMILY MEDICINE

## 2021-09-16 PROCEDURE — G8417 CALC BMI ABV UP PARAM F/U: HCPCS | Performed by: FAMILY MEDICINE

## 2021-09-16 PROCEDURE — 99214 OFFICE O/P EST MOD 30 MIN: CPT | Performed by: FAMILY MEDICINE

## 2021-09-16 PROCEDURE — G8399 PT W/DXA RESULTS DOCUMENT: HCPCS | Performed by: FAMILY MEDICINE

## 2021-09-16 PROCEDURE — 3044F HG A1C LEVEL LT 7.0%: CPT | Performed by: FAMILY MEDICINE

## 2021-09-16 PROCEDURE — G0008 ADMIN INFLUENZA VIRUS VAC: HCPCS | Performed by: FAMILY MEDICINE

## 2021-09-16 PROCEDURE — 3017F COLORECTAL CA SCREEN DOC REV: CPT | Performed by: FAMILY MEDICINE

## 2021-09-16 NOTE — PROGRESS NOTES
Catalina Sterling : 1945 Sex: female  Age: 77 y.o. Chief Complaint   Patient presents with    Urinary Tract Infection     follow up        HPI:    Patient presents today for follow-up of UTI. Her  states he is not aware of an upcoming urology appointment, possibly November he states. States the urine still smells.  grew over 100,000 Enterococcus sensitive to ampicillin, treated with amoxicillin. She states intermittent burning. He still notes an odor. No other symptoms. Urinalysis today shows trace blood small leukocyte. We will send to the lab for UA culture treating in a positive refer to urology. Recently saw Dr. Concepción Bassett. He felt stable and continuing current management.   Her  feels her dementia is stable                She follows with urology (previously , now Dr. Mario Eason), Dr. Daniele Morales (previously), Wayne Quiros,  Dr. Concepción Bassett, Dr Brooklyn Ignacio ([facility]), Dr. Ursula Corbett          most Recent Labs  CBC  Lab Results   Component Value Date    WBC 10.7 2021    WBC 11.1 2021    WBC 11.3 2021    RBC 5.14 2021    RBC 5.37 2021    RBC 4.96 2021    HGB 15.8 2021    HGB 16.6 2021    HGB 15.4 2021    HCT 46.0 2021    HCT 48.5 2021    HCT 44.5 2021    MCV 89.5 2021    MCV 90.3 2021    MCV 89.7 2021     2021     2021     2021      CMP  Lab Results   Component Value Date     2021     2021     2021    K 4.0 2021    K 4.2 2021    K 3.7 2021     2021     2021     2021    CO2 24 2021    CO2 23 2021    CO2 23 2021    ANIONGAP 15 2021    ANIONGAP 14 2021    ANIONGAP 12 2021    GLUCOSE 104 2021    GLUCOSE 126 2021    GLUCOSE 85 2021    GLUCOSE 104 08/15/2011    GLUCOSE 113 04/25/2011    GLUCOSE 97 04/01/2011    BUN 12 07/13/2021    BUN 13 07/12/2021    BUN 11 05/20/2021    CREATININE 0.8 07/13/2021    CREATININE 0.8 07/12/2021    CREATININE 0.7 05/20/2021    LABGLOM >60 07/13/2021    LABGLOM >60 07/12/2021    LABGLOM >60 05/20/2021    GFRAA >60 07/13/2021    GFRAA >60 07/12/2021    GFRAA >60 05/20/2021    CALCIUM 10.0 07/13/2021    CALCIUM 9.8 07/12/2021    CALCIUM 9.8 05/20/2021    PROT 7.0 07/13/2021    PROT 7.2 07/12/2021    PROT 6.9 05/20/2021    LABALBU 4.4 07/13/2021    LABALBU 4.4 07/12/2021    LABALBU 4.1 05/20/2021    LABALBU 4.4 08/15/2011    LABALBU 4.2 04/25/2011    LABALBU 4.2 03/24/2011    BILITOT 0.6 07/13/2021    BILITOT 0.4 07/12/2021    BILITOT 0.5 05/20/2021    ALKPHOS 78 07/13/2021    ALKPHOS 79 07/12/2021    ALKPHOS 75 05/20/2021    AST 28 07/13/2021    AST 25 07/12/2021    AST 26 05/20/2021    ALT 30 07/13/2021    ALT 33 07/12/2021    ALT 32 05/20/2021     A1C  Lab Results   Component Value Date    LABA1C 5.5 07/13/2021    LABA1C 5.5 07/12/2021    LABA1C 5.6 12/01/2020     TSH  Lab Results   Component Value Date    TSH 1.400 07/13/2021    TSH 1.320 07/12/2021    TSH 0.869 05/20/2021     FREET4  Lab Results   Component Value Date    A7KPRRA 7.1 06/26/2019    D6NAGTR 7.6 05/21/2014     LIPID  Lab Results   Component Value Date    CHOL 230 07/13/2021    CHOL 218 05/20/2021    CHOL 233 02/08/2021    HDL 51 07/13/2021    HDL 42 05/20/2021    HDL 46 02/08/2021    LDLCALC 148 07/13/2021    LDLCALC 148 05/20/2021    LDLCALC 134 02/08/2021    TRIG 153 07/13/2021    TRIG 141 05/20/2021    TRIG 267 02/08/2021     VITAMIN D  Lab Results   Component Value Date    VITD25 57 07/13/2021    VITD25 60 07/12/2021    VITD25 94 05/20/2021     MAGNESIUM  Lab Results   Component Value Date    MG 2.2 05/22/2014    MG 2.0 04/28/2014    MG 2.3 03/23/2011      PHOS  No results found for: PHOS   RON   Lab Results   Component Value Date    RON NEGATIVE 11/29/2012     RHEUMATOID FACTOR  No results found for: RF  PSA  No results found for: PSA   HEPATITIS C  Lab Results   Component Value Date    HCVABI Non-Reactive 08/12/2020     HIV  No results found for: [id number], HIV1QT  UA  Lab Results   Component Value Date    COLORU yellow 09/16/2021    COLORU Yellow 08/30/2021    COLORU yellow 08/09/2021    COLORU Yellow 07/12/2021    COLORU Yellow 05/20/2021    CLARITYU cloudy 09/16/2021    CLARITYU Clear 08/30/2021    CLARITYU clear 08/09/2021    CLARITYU Clear 07/12/2021    CLARITYU Clear 05/20/2021    GLUCOSEU negative 09/16/2021    GLUCOSEU Negative 08/30/2021    GLUCOSEU negative 08/09/2021    GLUCOSEU Negative 07/12/2021    GLUCOSEU Negative 05/20/2021    BILIRUBINUR negative 09/16/2021    BILIRUBINUR Negative 08/30/2021    BILIRUBINUR negative 08/09/2021    BILIRUBINUR Negative 07/12/2021    BILIRUBINUR Negative 05/20/2021    BILIRUBINUR Negative 05/17/2021    KETUA negative 09/16/2021    KETUA Negative 08/30/2021    KETUA negative 08/09/2021    KETUA Negative 07/12/2021    KETUA Negative 05/20/2021    SPECGRAV >=1.030 09/16/2021    SPECGRAV >=1.030 08/30/2021    SPECGRAV >=1.030 08/09/2021    SPECGRAV 1.025 07/12/2021    SPECGRAV 1.015 05/20/2021    BLOODU trace 09/16/2021    BLOODU Negative 08/30/2021    BLOODU negative 08/09/2021    BLOODU Negative 07/12/2021    BLOODU Negative 05/20/2021    PHUR 6.0 09/16/2021    PHUR 5.5 08/30/2021    PHUR 5.5 08/09/2021    PHUR 6.0 07/12/2021    PHUR 5.5 05/20/2021    PROTEINU negative 09/16/2021    PROTEINU Negative 08/30/2021    PROTEINU negative 08/09/2021    PROTEINU Negative 07/12/2021    PROTEINU Negative 05/20/2021    UROBILINOGEN 0.2 08/30/2021    UROBILINOGEN 1.0 07/12/2021    UROBILINOGEN 0.2 05/20/2021    NITRU Negative 08/30/2021    NITRU POSITIVE 07/12/2021    NITRU Negative 05/20/2021    LEUKOCYTESUR small 09/16/2021    LEUKOCYTESUR Negative 08/30/2021    LEUKOCYTESUR negative 08/09/2021    LEUKOCYTESUR TRACE 07/12/2021    LEUKOCYTESUR TRACE 05/20/2021 Urine Micro/Albumin Ratio  Lab Results   Component Value Date    [facility] - 08/30/2021    [facility]    [facility]           ROS:  Const: Denies chills, fever, malaise and sweats. Eyes: Denies discharge, pain, redness and visual disturbance. ENMT: Denies earaches, other ear symptoms. Denies nasal or sinus symptoms other than stated  above. Denies mouth and tongue lesions and sore throat. CV: Denies chest discomfort, pain; diaphoresis, dizziness, edema, lightheadedness, orthopnea,  palpitations, syncope and near syncopal episode or any exertional symptoms  Resp: Denies cough, hemoptysis, pleuritic pain, SOB, sputum production and wheezing. GI: Denies abdominal pain, change in bowel habits, hematochezia, melena, nausea and vomiting. : Dirty urine with intermittent dysuria  Musculo: Denies musculoskeletal symptoms. Skin: Denies bruising and rash, other than as above. Neuro: Denies headache, numbness, stiff neck, tingling and focal weakness slurred speech or facial  Droop, other than as above.    Hema/Lymph: Denies bleeding/bruising tendency and enlarged lymph nodes        Current Outpatient Medications:     metoprolol succinate (TOPROL XL) 100 MG extended release tablet, Take 1 tablet by mouth 2 times daily, Disp: 180 tablet, Rfl: 3    levothyroxine (SYNTHROID) 100 MCG tablet, Take 1 tablet by mouth daily Ideally on empty stomach, Disp: 30 tablet, Rfl: 3    memantine (NAMENDA) 10 MG tablet, Take 1 tablet by mouth 2 times daily, Disp: 180 tablet, Rfl: 3    omega-3 acid ethyl esters (LOVAZA) 1 g capsule, Take 2 capsules by mouth 2 times daily, Disp: 360 capsule, Rfl: 3    donepezil (ARICEPT) 10 MG tablet, Take 1 tablet by mouth 2 times daily, Disp: 180 tablet, Rfl: 3    metFORMIN (GLUCOPHAGE) 500 MG tablet, Take 1 tablet by mouth daily , Disp: , Rfl:     mirabegron (MYRBETRIQ) 50 MG TB24, Take 1 tablet by mouth daily , Disp: , Rfl:     B Complex Vitamins (B-COMPLEX/B-12 PO), Take by mouth daily LD 18, Disp: , Rfl:   Allergies   Allergen Reactions    Morphine Itching    Statins      Other reaction(s): Unknown    Statins Depletion Therapy Other (See Comments)     MYALGIA       Past Medical History:   Diagnosis Date    Arthritis     Diabetes mellitus ([address]     Difficult intubation     carries card, copy on chart  Glidescope#3 with ETT size7    Hyperlipidemia     Hypothyroidism     Left sided abdominal pain     Memory problem     still competent    GORDY on CPAP     Pacemaker     Rectal bleeding     Ringing in the ears      Past Surgical History:   Procedure Laterality Date    ABLATION OF DYSRHYTHMIC FOCUS      BREAST SURGERY      implants    COLONOSCOPY  ? Dr. Jody Koroma twisted     COLONOSCOPY  ? Dr. Pawan Rocha. incomplete.  COLONOSCOPY  2018    Dr. Conor Ortiz N/A 2018    COLONOSCOPY DIAGNOSTIC performed by Marika Eaton MD at [address] [place] [address]  09/10/2011    POSTERIOR    HYSTERECTOMY      vaginal. uterus only.      KNEE ARTHROPLASTY Right     partial    KNEE ARTHROSCOPY  2011    right knee    PACEMAKER PLACEMENT  2014    Medtronic dual chamber    TOTAL HIP ARTHROPLASTY Right 2015     Family History   Problem Relation Age of Onset    Pacemaker Mother     Heart Attack Father     Cancer Other 49        Bone Marrow Cancer / Had Bone Marrow Transplant     Social History     Tobacco Use    Smoking status: Former Smoker     Packs/day: 1.50     Years: 13.00     Pack years: 19.50     Types: Cigarettes     Start date: 1973     Quit date: 1986     Years since quittin.7    Smokeless tobacco: Never Used    Tobacco comment: quit smoking    Vaping Use    Vaping Use: Never used   Substance Use Topics    Alcohol use: Yes     Comment: socially -- 3 drinks per month at the most    Drug use: No      Social History     Social History Narrative    PMH:    Problem List: Urinary tract infectious disease, Obstructive sleep apnea syndrome, Adult health    examination, Adult health examination, Constipation, Derangement of knee, Vitamin D deficiency,    Hypothyroidism, Conduction disorder of the heart, Hyperlipidemia    Health Maintenance:    Mini Mental Status - (2018)    Colonoscopy - (2018)    Colonoscopy Screening - (2018)    Mammogram Screening - (2018)    Mammogram - (2018)    Colonoscopy - (2010)    Couseled on Home Safety - (2015)    Bone Density Scan - (3/28/2012)    Medical Problems:    Sleep Apnea - Dr Harper Hernandez, cpap    Pneumonia    Hypothyroidism - Dr Rodríguez Napier - Dr Siomara Orlando    Hyperlipidemia - intolerance to all statins    Cardiac Arrhythmia - ? type. s/p ablation    cardiac dysrhythmia - pauses - lead to pacemaker    Skin Cancer    Surgical Hx:    Partial Hysterectomy - Still with Both Ovaries. Breast Implants    Pacemaker - Dr Jesus Manuel Kaur    Knee Replacement, Carpal Tunnel Release, colon-vaginal fistula repair    Bladder Repair - sling    RT Hip Arthroplasty - MARGARITA        FH:    Father:    . (Hx)    Mother:    . (Hx)    Dad - MI 39,  70 MI    Mom - DM , currently living age 80        SH: Marital: . Personal Habits: Cigarette Use: Former Cigarette Smoker - quit age 36. Occ ETOH, minimal. . 2 kids, 6 step kids. 27 grandkids. .Alcohol: Rarely consumes alcohol        Vitals:    21 1509   BP: 124/70   Pulse: 68   Temp: 96.9 °F (36.1 °C)   SpO2: 96%   Weight: 191 lb (86.6 kg)      Wt Readings from Last 3 Encounters:   21 191 lb (86.6 kg)   09/15/21 191 lb 12.8 oz (87 kg)   21 190 lb (86.2 kg)          Exam:  Const: Appears comfortable. No signs of acute distress present. Head/Face: Atraumatic on inspection. Eyes: EOMI in both eyes. No discharge from the eyes. PERRL. Sclerae clear. ENMT: Auditory canals normal. Tympanic membranes: intact and translucent. External nose WNL. Nasal mucosa is clear.  Oropharynx: No erythema or exudate. Posterior pharynx is normal.  Neck: Supple. Palpation reveals no adenopathy. No masses appreciated. No JVD. Carotids: no  bruits. Resp: Respirations are unlabored. Clear to auscultation. No rales, rhonchi or wheezes appreciated  over the lungs bilaterally. CV: Rate is regular. Rhythm is regular. No gallop or rubs. No heart murmur appreciated. Extremities: No clubbing, cyanosis, or edema. No calf inflammation or tenderness. Abdomen: Bowel sounds are normoactive. Abdomen is soft, nontender, and nondistended. No  abdominal masses. No palpable hepatosplenomegaly. Lymph: No palpable or visible regional lymphadenopathy. Musculoskeletal: no acute joint inflammation. Skin: Dry and warm with no rash. Raised red irritated lesion left skin  Neuro: Alert and oriented. Affect: appropriate. Upper Extremities: 5/5 bilaterally. Lower Extremities:  5/5 bilaterally. Sensation intact to light touch. Reflexes: DTR's are symmetric and 2+ bilaterally. .  Cranial Nerves: Cranial nerves grossly intact. No CVA tenderness      Assessment and Plan:   Diagnosis Orders   1. Recurrent urinary tract infection  POCT Urinalysis no Micro    Amb External Referral To Urology    Culture, Urine    Urinalysis   2. Hematuria, unspecified type     3. Dementia without behavioral disturbance, unspecified dementia type ([facility] [address]     4. Flu vaccine need  INFLUENZA, QUADV, ADJUVANTED, 65 YRS =, IM, PF, PREFILL SYR, 0.5ML (FLUAD)   5. Type 2 diabetes mellitus with hyperglycemia, without long-term current use of insulin (HCC)       UTI  This has been recurrent. Has been on multiple recent antibiotics most recently amoxicillin With over 100,000 Enterococcus on culture. Previous culture showed the same, culture before that E. coli. Urinalysis in the office shows trace blood small leukocyte. High specific gravity. Proper duration reviewed. She was able to provide another urine sample to send to the lab for UA and culture.   Will defer antibiotic Lifestyle modification appropriate diet and weight  loss reviewed. Risks of even this leading to cirrhosis reviewed. Other than basic  monitoring on interested in other evaluation or treatment, in-depth blood work,  imaging or otherwise. Hep C 10/18 negative, again was negative 9/20. LFTs currently normal          Tubular adenoma  Small polyp 7/18. Dr. Renetta Subramanian recommended basic screenings if repeated at all.  Asymptomatic     Vitamin D deficiency  On supplementation she was borderline toxic, it has stabilized, continue off vitamin D. Monitor    Health maintenance examination  Health maintenance issues discussed at length  6/21. Encourage yearly. B12 deficiency  Risk of high and low reviewed. Last time high, discussed backing off. Monitor    Hematuria  Counseled extensively. Differential reviewed, including serious etiologies. Possibly from UTI. Await confirmatory testing. Refer to Dr. Yaritza Batista    No flowsheet data found. Plan as above. Counseled extensively and differential diagnoses relevant to above were reviewed, including serious etiologies. Side effects and interactions of medications were reviewed. Counseled. Call for urine results if she does not hear from us. Treat if positive. Refer to Dr. Yaritza Batista if positive follow-up 2 weeks, if negative and asymptomatic at least get blood work and keep follow-up with me November sooner as needed. Flu vaccine today    As long as symptoms steadily improve/resolve, and medical conditions follow the expected course, FU as below, sooner PRN. Return for Keep Scheduled FU, SOONER PRN. Signs and symptoms to watch for discussed, serious signs and symptoms reviewed. ER if any. Kemar Moscoso MD    Patients are advised to check with insurance company to ensure coverage and to fully understand benefits and cost prior to any testing. This note was created with the assistance of voice recognition software.   Document was reviewed however may contain grammatical errors.

## 2021-09-18 LAB — URINE CULTURE, ROUTINE: NORMAL

## 2021-09-20 NOTE — RESULT ENCOUNTER NOTE
Urine culture negative this time indicating no active urinary tract infection. Notify if symptoms.   If continued odor should see GYN to make sure no vaginal cause

## 2021-09-27 ENCOUNTER — OFFICE VISIT (OUTPATIENT)
Dept: PRIMARY CARE CLINIC | Age: 76
End: 2021-09-27
Payer: MEDICARE

## 2021-09-27 VITALS
WEIGHT: 190 LBS | TEMPERATURE: 97.2 F | BODY MASS INDEX: 33.13 KG/M2 | SYSTOLIC BLOOD PRESSURE: 122 MMHG | HEART RATE: 58 BPM | DIASTOLIC BLOOD PRESSURE: 60 MMHG | OXYGEN SATURATION: 96 %

## 2021-09-27 DIAGNOSIS — R31.9 HEMATURIA, UNSPECIFIED TYPE: ICD-10-CM

## 2021-09-27 DIAGNOSIS — N39.0 RECURRENT URINARY TRACT INFECTION: ICD-10-CM

## 2021-09-27 DIAGNOSIS — N89.8 VAGINAL ODOR: Primary | ICD-10-CM

## 2021-09-27 PROCEDURE — G8428 CUR MEDS NOT DOCUMENT: HCPCS | Performed by: FAMILY MEDICINE

## 2021-09-27 PROCEDURE — 99214 OFFICE O/P EST MOD 30 MIN: CPT | Performed by: FAMILY MEDICINE

## 2021-09-27 PROCEDURE — 4040F PNEUMOC VAC/ADMIN/RCVD: CPT | Performed by: FAMILY MEDICINE

## 2021-09-27 PROCEDURE — 1123F ACP DISCUSS/DSCN MKR DOCD: CPT | Performed by: FAMILY MEDICINE

## 2021-09-27 PROCEDURE — 1036F TOBACCO NON-USER: CPT | Performed by: FAMILY MEDICINE

## 2021-09-27 PROCEDURE — G8399 PT W/DXA RESULTS DOCUMENT: HCPCS | Performed by: FAMILY MEDICINE

## 2021-09-27 PROCEDURE — 3017F COLORECTAL CA SCREEN DOC REV: CPT | Performed by: FAMILY MEDICINE

## 2021-09-27 PROCEDURE — G8417 CALC BMI ABV UP PARAM F/U: HCPCS | Performed by: FAMILY MEDICINE

## 2021-09-27 PROCEDURE — 1090F PRES/ABSN URINE INCON ASSESS: CPT | Performed by: FAMILY MEDICINE

## 2021-09-27 RX ORDER — METRONIDAZOLE 500 MG/1
500 TABLET ORAL 2 TIMES DAILY
Qty: 14 TABLET | Refills: 0 | Status: SHIPPED | OUTPATIENT
Start: 2021-09-27 | End: 2021-10-04

## 2021-09-27 NOTE — PROGRESS NOTES
21  Didier Rae : 1945 Sex: female  Age: 77 y.o. Chief Complaint   Patient presents with    Other     would like gyn refreral        HPI  HPI:      Patient presents today,  notes continued vaginal odor. This is typically associate with UTI but her last culture was negative despite the persistent symptoms. We discussed possible GYN cause. Both infectious and/or neoplastic. They would like a referral to GYN. In the meantime they would like to empirically try metronidazole with standard precautions. She denies current dysuria urgency frequency abdominal pain back pain chest pain shortness of breath fever chills or otherwise.   Most recent labs reviewed    Most Recent Labs  CBC  Lab Results   Component Value Date    WBC 10.7 2021    WBC 11.1 2021    WBC 11.3 2021    RBC 5.14 2021    RBC 5.37 2021    RBC 4.96 2021    HGB 15.8 2021    HGB 16.6 2021    HGB 15.4 2021    HCT 46.0 2021    HCT 48.5 2021    HCT 44.5 2021    MCV 89.5 2021    MCV 90.3 2021    MCV 89.7 2021     2021     2021     2021      CMP  Lab Results   Component Value Date     2021     2021     2021    K 4.0 2021    K 4.2 2021    K 3.7 2021     2021     2021     2021    CO2 24 2021    CO2 23 2021    CO2 23 2021    ANIONGAP 15 2021    ANIONGAP 14 2021    ANIONGAP 12 2021    GLUCOSE 104 2021    GLUCOSE 126 2021    GLUCOSE 85 2021    GLUCOSE 104 08/15/2011    GLUCOSE 113 2011    GLUCOSE 97 2011    BUN 12 2021    BUN 13 2021    BUN 11 2021    CREATININE 0.8 2021    CREATININE 0.8 2021    CREATININE 0.7 2021    LABGLOM >60 2021    LABGLOM >60 2021    LABGLOM >60 2021    GFRAA >60 2021 GFRAA >60 07/12/2021    GFRAA >60 05/20/2021    CALCIUM 10.0 07/13/2021    CALCIUM 9.8 07/12/2021    CALCIUM 9.8 05/20/2021    PROT 7.0 07/13/2021    PROT 7.2 07/12/2021    PROT 6.9 05/20/2021    LABALBU 4.4 07/13/2021    LABALBU 4.4 07/12/2021    LABALBU 4.1 05/20/2021    LABALBU 4.4 08/15/2011    LABALBU 4.2 04/25/2011    LABALBU 4.2 03/24/2011    BILITOT 0.6 07/13/2021    BILITOT 0.4 07/12/2021    BILITOT 0.5 05/20/2021    ALKPHOS 78 07/13/2021    ALKPHOS 79 07/12/2021    ALKPHOS 75 05/20/2021    AST 28 07/13/2021    AST 25 07/12/2021    AST 26 05/20/2021    ALT 30 07/13/2021    ALT 33 07/12/2021    ALT 32 05/20/2021     A1C  Lab Results   Component Value Date    LABA1C 5.5 07/13/2021    LABA1C 5.5 07/12/2021    LABA1C 5.6 12/01/2020     TSH  Lab Results   Component Value Date    TSH 1.400 07/13/2021    TSH 1.320 07/12/2021    TSH 0.869 05/20/2021     FREET4  Lab Results   Component Value Date    N3APGHW 7.1 06/26/2019    W3PUBPZ 7.6 05/21/2014     LIPID  Lab Results   Component Value Date    CHOL 230 07/13/2021    CHOL 218 05/20/2021    CHOL 233 02/08/2021    HDL 51 07/13/2021    HDL 42 05/20/2021    HDL 46 02/08/2021    LDLCALC 148 07/13/2021    LDLCALC 148 05/20/2021    LDLCALC 134 02/08/2021    TRIG 153 07/13/2021    TRIG 141 05/20/2021    TRIG 267 02/08/2021     VITAMIN D  Lab Results   Component Value Date    VITD25 57 07/13/2021    VITD25 60 07/12/2021    VITD25 94 05/20/2021     MAGNESIUM  Lab Results   Component Value Date    MG 2.2 05/22/2014    MG 2.0 04/28/2014    MG 2.3 03/23/2011      PHOS  No results found for: PHOS   RON   Lab Results   Component Value Date    RON NEGATIVE 11/29/2012     RHEUMATOID FACTOR  No results found for: RF  PSA  No results found for: PSA   HEPATITIS C  Lab Results   Component Value Date    HCVABI Non-Reactive 08/12/2020     HIV  No results found for: EMO7UGP, HIV1QT  UA  Lab Results   Component Value Date    COLORU Yellow 09/16/2021    COLORU yellow 09/16/2021    COLORU Yellow 08/30/2021    COLORU yellow 08/09/2021    COLORU Yellow 07/12/2021    CLARITYU Clear 09/16/2021    CLARITYU cloudy 09/16/2021    CLARITYU Clear 08/30/2021    CLARITYU clear 08/09/2021    CLARITYU Clear 07/12/2021    GLUCOSEU Negative 09/16/2021    GLUCOSEU negative 09/16/2021    GLUCOSEU Negative 08/30/2021    GLUCOSEU negative 08/09/2021    GLUCOSEU Negative 07/12/2021    BILIRUBINUR Negative 09/16/2021    BILIRUBINUR negative 09/16/2021    BILIRUBINUR Negative 08/30/2021    BILIRUBINUR negative 08/09/2021    BILIRUBINUR Negative 07/12/2021    BILIRUBINUR Negative 05/17/2021    KETUA TRACE 09/16/2021    KETUA negative 09/16/2021    KETUA Negative 08/30/2021    KETUA negative 08/09/2021    KETUA Negative 07/12/2021    SPECGRAV >=1.030 09/16/2021    SPECGRAV >=1.030 09/16/2021    SPECGRAV >=1.030 08/30/2021    SPECGRAV >=1.030 08/09/2021    SPECGRAV 1.025 07/12/2021    BLOODU Negative 09/16/2021    BLOODU trace 09/16/2021    BLOODU Negative 08/30/2021    BLOODU negative 08/09/2021    BLOODU Negative 07/12/2021    PHUR 6.0 09/16/2021    PHUR 6.0 09/16/2021    PHUR 5.5 08/30/2021    PHUR 5.5 08/09/2021    PHUR 6.0 07/12/2021    PROTEINU Negative 09/16/2021    PROTEINU negative 09/16/2021    PROTEINU Negative 08/30/2021    PROTEINU negative 08/09/2021    PROTEINU Negative 07/12/2021    UROBILINOGEN 0.2 09/16/2021    UROBILINOGEN 0.2 08/30/2021    UROBILINOGEN 1.0 07/12/2021    NITRU Negative 09/16/2021    NITRU Negative 08/30/2021    NITRU POSITIVE 07/12/2021    LEUKOCYTESUR Negative 09/16/2021    LEUKOCYTESUR small 09/16/2021    LEUKOCYTESUR Negative 08/30/2021    LEUKOCYTESUR negative 08/09/2021    LEUKOCYTESUR TRACE 07/12/2021     Urine Micro/Albumin Ratio  Lab Results   Component Value Date    [facility]    [facility]    [facility]         ROS:  As above      Current Outpatient Medications:     metoprolol succinate (TOPROL XL) 100 MG extended release tablet, Take 1 tablet by mouth 2 times daily, Disp: 180 tablet, Rfl: 3    levothyroxine (SYNTHROID) 100 MCG tablet, Take 1 tablet by mouth daily Ideally on empty stomach, Disp: 30 tablet, Rfl: 3    memantine (NAMENDA) 10 MG tablet, Take 1 tablet by mouth 2 times daily, Disp: 180 tablet, Rfl: 3    omega-3 acid ethyl esters (LOVAZA) 1 g capsule, Take 2 capsules by mouth 2 times daily, Disp: 360 capsule, Rfl: 3    donepezil (ARICEPT) 10 MG tablet, Take 1 tablet by mouth 2 times daily, Disp: 180 tablet, Rfl: 3    metFORMIN (GLUCOPHAGE) 500 MG tablet, Take 1 tablet by mouth daily , Disp: , Rfl:     mirabegron (MYRBETRIQ) 50 MG TB24, Take 1 tablet by mouth daily , Disp: , Rfl:     B Complex Vitamins (B-COMPLEX/B-12 PO), Take by mouth daily LD 12/12/18, Disp: , Rfl:   Allergies   Allergen Reactions    Morphine Itching    Statins      Other reaction(s): Unknown    Statins Depletion Therapy Other (See Comments)     MYALGIA       Past Medical History:   Diagnosis Date    Arthritis     Diabetes mellitus ([address]     Difficult intubation     carries card, copy on chart  Glidescope#3 with ETT size7    Hyperlipidemia     Hypothyroidism     Left sided abdominal pain     Memory problem     still competent    GORDY on CPAP     Pacemaker     Rectal bleeding     Ringing in the ears      Past Surgical History:   Procedure Laterality Date    ABLATION OF DYSRHYTHMIC FOCUS  1996    BREAST SURGERY      implants    COLONOSCOPY  2009? Dr. Cates Edge twisted     COLONOSCOPY  2008? Dr. Yehuda Silva. incomplete.  COLONOSCOPY  12/17/2018    Dr. Brandon Oconnell N/A 12/17/2018    COLONOSCOPY DIAGNOSTIC performed by Sukh Rossi MD at [address] [place] [address]  09/10/2011    POSTERIOR    HYSTERECTOMY      vaginal. uterus only.      KNEE ARTHROPLASTY Right     partial    KNEE ARTHROSCOPY  9/08/2011    right knee    PACEMAKER PLACEMENT  5/23/2014    Medtronic dual chamber    TOTAL HIP ARTHROPLASTY Right 2015     Family History   Problem Relation Age of Onset   [facility] Pacemaker Mother     Heart Attack Father     Cancer Other 49        Bone Marrow Cancer / Had Bone Marrow Transplant     Social History     Tobacco Use    Smoking status: Former Smoker     Packs/day: 1.50     Years: 13.00     Pack years: 19.50     Types: Cigarettes     Start date: 1973     Quit date: 1986     Years since quittin.7    Smokeless tobacco: Never Used    Tobacco comment: quit smoking    Vaping Use    Vaping Use: Never used   Substance Use Topics    Alcohol use: Yes     Comment: socially -- 3 drinks per month at the most    Drug use: No      Social History     Social History Narrative    PMH:    Problem List: Urinary tract infectious disease, Obstructive sleep apnea syndrome, Adult health    examination, Adult health examination, Constipation, Derangement of knee, Vitamin D deficiency,    Hypothyroidism, Conduction disorder of the heart, Hyperlipidemia    Health Maintenance:    Mini Mental Status - (2018)    Colonoscopy - (2018)    Colonoscopy Screening - (2018)    Mammogram Screening - (2018)    Mammogram - (2018)    Colonoscopy - (2010)    Couseled on Home Safety - (2015)    Bone Density Scan - (3/28/2012)    Medical Problems:    Sleep Apnea - Dr Pj Carcamo, cpap    Pneumonia    Hypothyroidism - Dr Shoaib Dallas - Dr Cristal Brito    Hyperlipidemia - intolerance to all statins    Cardiac Arrhythmia - ? type. s/p ablation    cardiac dysrhythmia - pauses - lead to pacemaker    Skin Cancer    Surgical Hx:    Partial Hysterectomy - Still with Both Ovaries. Breast Implants    Pacemaker - Dr Mrailee Jenkins    Knee Replacement, Carpal Tunnel Release, colon-vaginal fistula repair    Bladder Repair - sling    RT Hip Arthroplasty - MARGARITA        FH:    Father:    . (Hx)    Mother:    . (Hx)    Dad - MI 39,  70 MI    Mom - DM , currently living age 80        SH: Marital: .     Personal Habits: Cigarette Use: Former Cigarette Smoker - quit age 36. Occ ETOH, minimal. . 2 kids, 6 step kids. 27 grandkids. .Alcohol: Rarely consumes alcohol        Vitals:    09/27/21 1547   BP: 122/60   Pulse: 58   Temp: 97.2 °F (36.2 °C)   SpO2: 96%   Weight: 190 lb (86.2 kg)     Wt Readings from Last 3 Encounters:   09/27/21 190 lb (86.2 kg)   09/16/21 191 lb (86.6 kg)   09/15/21 191 lb 12.8 oz (87 kg)        Physical Exam    Exam:  Const: Appears comfortable. No signs of acute distress present. Head/Face: Atraumatic, normocephalic on inspection. Eyes: No discharge from the eyes. Sclerae clear. ENMT: Unremarkable  Neck: Supple. Palpation reveals no adenopathy. No masses appreciated. No JVD. Resp: Respirations are unlabored. Clear to auscultation bilaterally. No rales, rhonchi or wheezes appreciated over the  lungs bilaterally. CV: RRR   Extremities: No clubbing or cyanosis. No edema of the lower limbs  bilaterally. No calf inflammation or tenderness. Abdomen: Abdomen is soft, nontender, and nondistended. No abdominal masses  appreciated. No palpable hepatosplenomegaly. Bowel sounds are normoactive. Skin: Dry and warm with no rash. Various lesions and sun damaged skin. Going to see Dr. Afua Gomes soon  Muscular skeletal: No acute joint inflammation. Neuro:Grossly intact without focal deficit  No CVA tenderness    Office Labs This Visit :  No results found for this visit on 09/27/21. Assessment and Plan:    1. Vaginal odor  Counseled extensively. Differential reviewed, including serious etiologies. Discussed possible infectious versus neoplastic causes. The defer exam but would like gynecology referral.  In the meantime they would like empirically treated for bacterial vaginosis. Hygiene reviewed. Precautions of metronidazole reviewed including absolute contraindication alcohol    2. Recurrent urinary tract infection  Counseled. Last culture negative. Should follow-up with urology. Hygiene reviewed    3.  Hematuria, unspecified type  Last check negative. Follow-up urology      No problem-specific Assessment & Plan notes found for this encounter. Plan as above. Counseled extensively and differential diagnoses relevant to above were reviewed, including serious etiologies. Side effects and interactions of medications were reviewed. Refer  to Dr. Flora Grey start Flagyl with precautions. Keep follow-up November sooner if symptoms persist or worsen anyway and as directed. As long as symptoms steadily improve/resolve, and medical conditions follow the expected course, FU as below, sooner PRN. Return for Keep Scheduled FU, SOONER PRN. Signs and symptoms to watch for discussed, serious signs and symptoms reviewed. ER if any. Rommel Polanco MD    Patients are advised to check with insurance company to ensure coverage and to fully understand benefits and cost prior to any testing. This note was created with the assistance of voice recognition software. Document was reviewed however may contain grammatical errors.

## 2021-09-29 ENCOUNTER — TELEPHONE (OUTPATIENT)
Dept: PRIMARY CARE CLINIC | Age: 76
End: 2021-09-29

## 2021-09-29 NOTE — TELEPHONE ENCOUNTER
The pt has had a bladder infection for some time and you recently prescribed her metronidazole 500 mg.  Her  is calling because yesterday there was a good amount of blood in her urine, he can't tell you if it happened after that one time because he knows the pt wouldn't be honest with him because she doesn't want to go to the ER

## 2021-09-29 NOTE — TELEPHONE ENCOUNTER
Gross hematuria needs to be evaluated. At a minimum express care, generally ER is best for gross hematuria. Her last urine culture was negative. These test need repeated. Also it has been advised for her to follow-up with urology ASAP, make sure she has an upcoming appointment. If not please assist in getting him an appointment with urology ASAP. Please refer if necessary. Diagnosis \"gross hematuria and recurrent UTIs. Patient known to them. \"Also important to see gynecologist ASAP as referred last time to make sure not a vaginal source.

## 2021-10-12 ENCOUNTER — TELEPHONE (OUTPATIENT)
Dept: PRIMARY CARE CLINIC | Age: 76
End: 2021-10-12

## 2021-10-12 NOTE — TELEPHONE ENCOUNTER
Not generally unless the joint replacement is new. He can double check with the orthopedic surgeon who did it to be safe but antibiotic prophylaxis with history of arthroplasty for colonoscopy is not a typical protocol.

## 2021-10-12 NOTE — TELEPHONE ENCOUNTER
The pt is scheduled to get a colposcopy tomorrow at 1:50, her  was going over the instructions for the procedure tomorrow and it says that if the pt has any artifical joints to contact the doctor to see if she will need to be placed on an antibiotic. He called the doctors office that is doing the procedure and they told him that they weren't sure if she would need it and suggested him calling her PCP.  So he is calling us to see if you think the pt should be on an antibiotic because she has had a hip and knee replacement

## 2021-11-04 DIAGNOSIS — F03.90 DEMENTIA WITHOUT BEHAVIORAL DISTURBANCE, UNSPECIFIED DEMENTIA TYPE: ICD-10-CM

## 2021-11-05 RX ORDER — DONEPEZIL HYDROCHLORIDE 10 MG/1
TABLET, FILM COATED ORAL
Qty: 180 TABLET | Refills: 3 | Status: SHIPPED
Start: 2021-11-05 | End: 2022-05-11 | Stop reason: SDUPTHER

## 2021-11-26 ENCOUNTER — TELEPHONE (OUTPATIENT)
Dept: PRIMARY CARE CLINIC | Age: 76
End: 2021-11-26

## 2021-11-26 ENCOUNTER — OFFICE VISIT (OUTPATIENT)
Dept: FAMILY MEDICINE CLINIC | Age: 76
End: 2021-11-26
Payer: MEDICARE

## 2021-11-26 VITALS
OXYGEN SATURATION: 95 % | DIASTOLIC BLOOD PRESSURE: 76 MMHG | TEMPERATURE: 97.6 F | WEIGHT: 191 LBS | HEART RATE: 60 BPM | SYSTOLIC BLOOD PRESSURE: 132 MMHG | BODY MASS INDEX: 32.28 KG/M2

## 2021-11-26 DIAGNOSIS — R10.30 LOWER ABDOMINAL PAIN: ICD-10-CM

## 2021-11-26 DIAGNOSIS — R10.30 LOWER ABDOMINAL PAIN: Primary | ICD-10-CM

## 2021-11-26 DIAGNOSIS — R82.90 ABNORMAL URINE ODOR: ICD-10-CM

## 2021-11-26 LAB
APPEARANCE FLUID: CLEAR
BILIRUBIN, POC: NEGATIVE
BLOOD URINE, POC: NORMAL
CLARITY, POC: CLEAR
COLOR, POC: YELLOW
GLUCOSE URINE, POC: NEGATIVE
KETONES, POC: NEGATIVE
LEUKOCYTE EST, POC: NORMAL
NITRITE, POC: POSITIVE
PH, POC: 6
PROTEIN, POC: NEGATIVE
SPECIFIC GRAVITY, POC: 1.03
UROBILINOGEN, POC: 0.2

## 2021-11-26 PROCEDURE — 3017F COLORECTAL CA SCREEN DOC REV: CPT | Performed by: FAMILY MEDICINE

## 2021-11-26 PROCEDURE — G8484 FLU IMMUNIZE NO ADMIN: HCPCS | Performed by: FAMILY MEDICINE

## 2021-11-26 PROCEDURE — G8427 DOCREV CUR MEDS BY ELIG CLIN: HCPCS | Performed by: FAMILY MEDICINE

## 2021-11-26 PROCEDURE — 1090F PRES/ABSN URINE INCON ASSESS: CPT | Performed by: FAMILY MEDICINE

## 2021-11-26 PROCEDURE — 1036F TOBACCO NON-USER: CPT | Performed by: FAMILY MEDICINE

## 2021-11-26 PROCEDURE — 1123F ACP DISCUSS/DSCN MKR DOCD: CPT | Performed by: FAMILY MEDICINE

## 2021-11-26 PROCEDURE — 99214 OFFICE O/P EST MOD 30 MIN: CPT | Performed by: FAMILY MEDICINE

## 2021-11-26 PROCEDURE — G8399 PT W/DXA RESULTS DOCUMENT: HCPCS | Performed by: FAMILY MEDICINE

## 2021-11-26 PROCEDURE — 4040F PNEUMOC VAC/ADMIN/RCVD: CPT | Performed by: FAMILY MEDICINE

## 2021-11-26 PROCEDURE — 81002 URINALYSIS NONAUTO W/O SCOPE: CPT | Performed by: FAMILY MEDICINE

## 2021-11-26 PROCEDURE — G8417 CALC BMI ABV UP PARAM F/U: HCPCS | Performed by: FAMILY MEDICINE

## 2021-11-26 RX ORDER — CIPROFLOXACIN 500 MG/1
500 TABLET, FILM COATED ORAL 2 TIMES DAILY
Qty: 14 TABLET | Refills: 0 | Status: SHIPPED | OUTPATIENT
Start: 2021-11-26 | End: 2021-12-03

## 2021-11-26 ASSESSMENT — ENCOUNTER SYMPTOMS
ABDOMINAL PAIN: 1
ALLERGIC/IMMUNOLOGIC NEGATIVE: 1
RESPIRATORY NEGATIVE: 1
EYES NEGATIVE: 1

## 2021-11-26 NOTE — PROGRESS NOTES
21     [facility]    : 1945 Sex: female   Age: 77 y.o. Chief Complaint   Patient presents with    Abdominal Pain     Started 2 weeks ago.  Nausea       Prior to Admission medications    Medication Sig Start Date End Date Taking? Authorizing Provider   ciprofloxacin (CIPRO) 500 MG tablet Take 1 tablet by mouth 2 times daily for 7 days 11/26/21 12/3/21 Yes Tom Lindsey DO   donepezil (ARICEPT) 10 MG tablet TAKE 1 TABLET BY MOUTH TWICE DAILY 21  Yes Trev Hernandez MD   metoprolol succinate (TOPROL XL) 100 MG extended release tablet Take 1 tablet by mouth 2 times daily 21  Yes Samy Perry MD   levothyroxine (SYNTHROID) 100 MCG tablet Take 1 tablet by mouth daily Ideally on empty stomach 21  Yes Samy Perry MD   memantine (NAMENDA) 10 MG tablet Take 1 tablet by mouth 2 times daily 3/16/21  Yes Trev Hernandez MD   omega-3 acid ethyl esters (LOVAZA) 1 g capsule Take 2 capsules by mouth 2 times daily 21  Yes Samy Perry MD   metFORMIN (GLUCOPHAGE) 500 MG tablet Take 1 tablet by mouth daily  19  Yes Historical Provider, MD   mirabegron (MYRBETRIQ) 50 MG TB24 Take 1 tablet by mouth daily    Yes Historical Provider, MD   B Complex Vitamins (B-COMPLEX/B-12 PO) Take by mouth daily LD 18   Yes Historical Provider, MD          HPI: Patient seen today problems lower abdominal pain and foul-smelling urine. She deals with progressive dementia and I did encourage proper follow-up with her primary medical doctor. Findings are consistent with UTI and this has been a recurring problem and prompts need for further evaluation with urology.  voices understanding. Review of Systems   Constitutional: Negative. HENT: Negative. Eyes: Negative. Respiratory: Negative. Gastrointestinal: Positive for abdominal pain. Endocrine: Negative. Genitourinary: Positive for frequency and urgency. Musculoskeletal: Negative. Skin: Negative. Allergic/Immunologic: Negative. Neurological: Negative. Hematological: Negative. Psychiatric/Behavioral: Negative. Current Outpatient Medications:     ciprofloxacin (CIPRO) 500 MG tablet, Take 1 tablet by mouth 2 times daily for 7 days, Disp: 14 tablet, Rfl: 0    donepezil (ARICEPT) 10 MG tablet, TAKE 1 TABLET BY MOUTH TWICE DAILY, Disp: 180 tablet, Rfl: 3    metoprolol succinate (TOPROL XL) 100 MG extended release tablet, Take 1 tablet by mouth 2 times daily, Disp: 180 tablet, Rfl: 3    levothyroxine (SYNTHROID) 100 MCG tablet, Take 1 tablet by mouth daily Ideally on empty stomach, Disp: 30 tablet, Rfl: 3    memantine (NAMENDA) 10 MG tablet, Take 1 tablet by mouth 2 times daily, Disp: 180 tablet, Rfl: 3    omega-3 acid ethyl esters (LOVAZA) 1 g capsule, Take 2 capsules by mouth 2 times daily, Disp: 360 capsule, Rfl: 3    metFORMIN (GLUCOPHAGE) 500 MG tablet, Take 1 tablet by mouth daily , Disp: , Rfl:     mirabegron (MYRBETRIQ) 50 MG TB24, Take 1 tablet by mouth daily , Disp: , Rfl:     B Complex Vitamins (B-COMPLEX/B-12 PO), Take by mouth daily LD 18, Disp: , Rfl:     Allergies   Allergen Reactions    Morphine Itching    Statins      Other reaction(s): Unknown    Statins Depletion Therapy Other (See Comments)     MYALGIA       Social History     Tobacco Use    Smoking status: Former Smoker     Packs/day: 1.50     Years: 13.00     Pack years: 19.50     Types: Cigarettes     Start date: 1973     Quit date: 1986     Years since quittin.9    Smokeless tobacco: Never Used    Tobacco comment: quit smoking    Vaping Use    Vaping Use: Never used   Substance Use Topics    Alcohol use: Yes     Comment: socially -- 3 drinks per month at the most    Drug use: No      Past Surgical History:   Procedure Laterality Date    ABLATION OF DYSRHYTHMIC FOCUS      BREAST SURGERY      implants    COLONOSCOPY  ?     Dr. Perry Mcconnell twisted    Mikey Albarran COLONOSCOPY  2008? Dr. Norma Ovalles. incomplete.  COLONOSCOPY  12/17/2018    Dr. Ashley Zavala N/A 12/17/2018    COLONOSCOPY DIAGNOSTIC performed by Jonnathan Dennison MD at [address] [place] [address]  09/10/2011    POSTERIOR    HYSTERECTOMY      vaginal. uterus only.  KNEE ARTHROPLASTY Right     partial    KNEE ARTHROSCOPY  9/08/2011    right knee    PACEMAKER PLACEMENT  5/23/2014    Medtronic dual chamber    TOTAL HIP ARTHROPLASTY Right 2015     Family History   Problem Relation Age of Onset    Pacemaker Mother     Heart Attack Father     Cancer Other 48        Bone Marrow Cancer / Had Bone Marrow Transplant     Past Medical History:   Diagnosis Date    Arthritis     Diabetes mellitus ([address]     Difficult intubation     carries card, copy on chart  Glidescope#3 with ETT size7    Hyperlipidemia     Hypothyroidism     Left sided abdominal pain     Memory problem     still competent    GORDY on CPAP     Pacemaker     Rectal bleeding     Ringing in the ears        Vitals:    11/26/21 1302   BP: 132/76   Pulse: 60   Temp: 97.6 °F (36.4 °C)   SpO2: 95%   Weight: 191 lb (86.6 kg)     BP Readings from Last 3 Encounters:   11/26/21 132/76   10/27/21 128/70   09/27/21 122/60        Physical Exam  Vitals and nursing note reviewed. Constitutional:       Appearance: She is well-developed. HENT:      Head: Normocephalic. Right Ear: External ear normal.      Left Ear: External ear normal.      Nose: Nose normal.   Eyes:      Conjunctiva/sclera: Conjunctivae normal.      Pupils: Pupils are equal, round, and reactive to light. Cardiovascular:      Rate and Rhythm: Normal rate. Pulmonary:      Breath sounds: Normal breath sounds. Abdominal:      General: Bowel sounds are normal.      Palpations: Abdomen is soft. Musculoskeletal:         General: Normal range of motion. Cervical back: Normal range of motion and neck supple. Skin:     General: Skin is warm and dry. Neurological:      Mental Status: She is alert and oriented to person, place, and time. Psychiatric:         Behavior: Behavior normal.        Physical exam revealed some mild lower abdominal discomfort. No rebound no guarding. Cardiac status stable. Respiratory stable. Recommendations for Cipro 500 twice daily over the next week and proper follow-up with her primary medical doctor. Plan Per Assessment:  Taran Lindsay was seen today for abdominal pain and nausea. Diagnoses and all orders for this visit:    Lower abdominal pain  -     POCT Urinalysis no Micro  -     Culture, Urine; Future    Abnormal urine odor    Other orders  -     ciprofloxacin (CIPRO) 500 MG tablet; Take 1 tablet by mouth 2 times daily for 7 days            No follow-ups on file. Marita Matos DO    Note was generated with the assistance of voice recognition software. Document was reviewed however may contain grammatical errors.

## 2021-11-28 LAB
ORGANISM: ABNORMAL
URINE CULTURE, ROUTINE: ABNORMAL

## 2021-12-02 ENCOUNTER — HOSPITAL ENCOUNTER (OUTPATIENT)
Age: 76
Discharge: HOME | End: 2021-12-02
Payer: MEDICARE

## 2021-12-02 ENCOUNTER — OFFICE VISIT (OUTPATIENT)
Dept: PRIMARY CARE CLINIC | Age: 76
End: 2021-12-02
Payer: MEDICARE

## 2021-12-02 VITALS
WEIGHT: 191 LBS | DIASTOLIC BLOOD PRESSURE: 70 MMHG | SYSTOLIC BLOOD PRESSURE: 124 MMHG | HEART RATE: 70 BPM | OXYGEN SATURATION: 95 % | TEMPERATURE: 97.8 F | BODY MASS INDEX: 32.28 KG/M2

## 2021-12-02 DIAGNOSIS — K59.00 CONSTIPATION, UNSPECIFIED CONSTIPATION TYPE: ICD-10-CM

## 2021-12-02 DIAGNOSIS — L90.0 LICHEN SCLEROSUS: ICD-10-CM

## 2021-12-02 DIAGNOSIS — N39.0 URINARY TRACT INFECTION WITHOUT HEMATURIA, SITE UNSPECIFIED: ICD-10-CM

## 2021-12-02 DIAGNOSIS — R11.0 POSTPRANDIAL NAUSEA: ICD-10-CM

## 2021-12-02 DIAGNOSIS — R10.84 GENERALIZED ABDOMINAL PAIN: Primary | ICD-10-CM

## 2021-12-02 LAB
ALBUMIN SERPL-MCNC: 4.5 G/DL (ref 3.5–5.2)
ALP BLD-CCNC: 81 U/L (ref 35–104)
ALT SERPL-CCNC: 31 U/L (ref 0–32)
ANION GAP SERPL CALCULATED.3IONS-SCNC: 12 MMOL/L (ref 7–16)
AST SERPL-CCNC: 28 U/L (ref 0–31)
BASOPHILS ABSOLUTE: 0.08 E9/L (ref 0–0.2)
BASOPHILS RELATIVE PERCENT: 0.7 % (ref 0–2)
BILIRUB SERPL-MCNC: 0.5 MG/DL (ref 0–1.2)
BILIRUBIN URINE: NEGATIVE
BLOOD, URINE: NEGATIVE
BUN BLDV-MCNC: 14 MG/DL (ref 6–23)
CALCIUM SERPL-MCNC: 9.8 MG/DL (ref 8.6–10.2)
CHLORIDE BLD-SCNC: 106 MMOL/L (ref 98–107)
CLARITY: CLEAR
CO2: 24 MMOL/L (ref 22–29)
COLOR: YELLOW
CREAT SERPL-MCNC: 0.7 MG/DL (ref 0.5–1)
EOSINOPHILS ABSOLUTE: 0.29 E9/L (ref 0.05–0.5)
EOSINOPHILS RELATIVE PERCENT: 2.7 % (ref 0–6)
GFR AFRICAN AMERICAN: >60
GFR NON-AFRICAN AMERICAN: >60 ML/MIN/1.73
GLUCOSE BLD-MCNC: 100 MG/DL (ref 74–99)
GLUCOSE URINE: NEGATIVE MG/DL
HCT VFR BLD CALC: 47.1 % (ref 34–48)
HEMOGLOBIN: 15.9 G/DL (ref 11.5–15.5)
IMMATURE GRANULOCYTES #: 0.03 E9/L
IMMATURE GRANULOCYTES %: 0.3 % (ref 0–5)
KETONES, URINE: NEGATIVE MG/DL
LEUKOCYTE ESTERASE, URINE: NEGATIVE
LIPASE: 145 U/L (ref 13–60)
LYMPHOCYTES ABSOLUTE: 4.51 E9/L (ref 1.5–4)
LYMPHOCYTES RELATIVE PERCENT: 42.1 % (ref 20–42)
MCH RBC QN AUTO: 31.4 PG (ref 26–35)
MCHC RBC AUTO-ENTMCNC: 33.8 % (ref 32–34.5)
MCV RBC AUTO: 92.9 FL (ref 80–99.9)
MONOCYTES ABSOLUTE: 0.58 E9/L (ref 0.1–0.95)
MONOCYTES RELATIVE PERCENT: 5.4 % (ref 2–12)
NEUTROPHILS ABSOLUTE: 5.22 E9/L (ref 1.8–7.3)
NEUTROPHILS RELATIVE PERCENT: 48.8 % (ref 43–80)
NITRITE, URINE: NEGATIVE
PDW BLD-RTO: 12.5 FL (ref 11.5–15)
PH UA: 6 (ref 5–9)
PLATELET # BLD: 172 E9/L (ref 130–450)
PMV BLD AUTO: 10.7 FL (ref 7–12)
POTASSIUM SERPL-SCNC: 4.4 MMOL/L (ref 3.5–5)
PROTEIN UA: NEGATIVE MG/DL
RBC # BLD: 5.07 E12/L (ref 3.5–5.5)
SODIUM BLD-SCNC: 142 MMOL/L (ref 132–146)
SPECIFIC GRAVITY UA: >=1.03 (ref 1–1.03)
TOTAL PROTEIN: 6.9 G/DL (ref 6.4–8.3)
UROBILINOGEN, URINE: 0.2 E.U./DL
WBC # BLD: 10.7 E9/L (ref 4.5–11.5)

## 2021-12-02 PROCEDURE — 83690 ASSAY OF LIPASE: CPT

## 2021-12-02 PROCEDURE — 1090F PRES/ABSN URINE INCON ASSESS: CPT | Performed by: FAMILY MEDICINE

## 2021-12-02 PROCEDURE — 1036F TOBACCO NON-USER: CPT | Performed by: FAMILY MEDICINE

## 2021-12-02 PROCEDURE — G8399 PT W/DXA RESULTS DOCUMENT: HCPCS | Performed by: FAMILY MEDICINE

## 2021-12-02 PROCEDURE — 85025 COMPLETE CBC W/AUTO DIFF WBC: CPT

## 2021-12-02 PROCEDURE — 99214 OFFICE O/P EST MOD 30 MIN: CPT | Performed by: FAMILY MEDICINE

## 2021-12-02 PROCEDURE — 3017F COLORECTAL CA SCREEN DOC REV: CPT | Performed by: FAMILY MEDICINE

## 2021-12-02 PROCEDURE — G8417 CALC BMI ABV UP PARAM F/U: HCPCS | Performed by: FAMILY MEDICINE

## 2021-12-02 PROCEDURE — 80053 COMPREHEN METABOLIC PANEL: CPT

## 2021-12-02 PROCEDURE — 36415 COLL VENOUS BLD VENIPUNCTURE: CPT

## 2021-12-02 PROCEDURE — 81003 URINALYSIS AUTO W/O SCOPE: CPT

## 2021-12-02 PROCEDURE — G8427 DOCREV CUR MEDS BY ELIG CLIN: HCPCS | Performed by: FAMILY MEDICINE

## 2021-12-02 PROCEDURE — 1123F ACP DISCUSS/DSCN MKR DOCD: CPT | Performed by: FAMILY MEDICINE

## 2021-12-02 PROCEDURE — 86677 HELICOBACTER PYLORI ANTIBODY: CPT

## 2021-12-02 PROCEDURE — 4040F PNEUMOC VAC/ADMIN/RCVD: CPT | Performed by: FAMILY MEDICINE

## 2021-12-02 PROCEDURE — G8484 FLU IMMUNIZE NO ADMIN: HCPCS | Performed by: FAMILY MEDICINE

## 2021-12-02 PROCEDURE — 87088 URINE BACTERIA CULTURE: CPT

## 2021-12-02 RX ORDER — OMEPRAZOLE 20 MG/1
20 CAPSULE, DELAYED RELEASE ORAL DAILY
Qty: 30 CAPSULE | Refills: 1 | Status: SHIPPED
Start: 2021-12-02 | End: 2022-04-12 | Stop reason: SDUPTHER

## 2021-12-02 NOTE — PROGRESS NOTES
21  Leighton Melgar : 1945 Sex: female  Age: 77 y.o. Chief Complaint   Patient presents with    Other     express follow up  UTI    Nausea     after eating        HPI  HPI:      Patient presents today with her . He claims he could \"smell her\" again when walking behind on the steps which is his indication for UTI. She came to express care and was in fact diagnosed With a UTI, positive nitrite small leukocyte and over 100,000 E. coli sensitive to prescribed antibiotic. Urine symptoms resolved but states for 2 weeks she has had intermittent nausea and sometimes complains of upper abdominal discomfort only when she eats. No vomiting no change in bowels diarrhea constipation melena medic easier. However recently saw Dr. Baldo Tapia who did an x-ray and told her she was constipated and gave her magnesium citrate.  states it did not work. It looks like he also did a left labial biopsy showing lichen sclerosis. Counseled. She has an appointment today with him. No fever chills. No other complaints or concerns.     Currently denies headache dizziness fever chills congestion cough chest pain palpitations dysuria urgency or frequency vaginal discharge pain numbness tingling focal weakness slurred speech facial droop or otherwise      Most Recent Labs  CBC  Lab Results   Component Value Date    WBC 10.7 2021    WBC 11.1 2021    WBC 11.3 2021    RBC 5.14 2021    RBC 5.37 2021    RBC 4.96 2021    HGB 15.8 2021    HGB 16.6 2021    HGB 15.4 2021    HCT 46.0 2021    HCT 48.5 2021    HCT 44.5 2021    MCV 89.5 2021    MCV 90.3 2021    MCV 89.7 2021     2021     2021     2021      CMP  Lab Results   Component Value Date     2021     2021     2021    K 4.0 2021    K 4.2 2021    K 3.7 2021     07/13/2021     07/12/2021     05/20/2021    CO2 24 07/13/2021    CO2 23 07/12/2021    CO2 23 05/20/2021    ANIONGAP 15 07/13/2021    ANIONGAP 14 07/12/2021    ANIONGAP 12 05/20/2021    GLUCOSE 104 07/13/2021    GLUCOSE 126 07/12/2021    GLUCOSE 85 05/20/2021    GLUCOSE 104 08/15/2011    GLUCOSE 113 04/25/2011    GLUCOSE 97 04/01/2011    BUN 12 07/13/2021    BUN 13 07/12/2021    BUN 11 05/20/2021    CREATININE 0.8 07/13/2021    CREATININE 0.8 07/12/2021    CREATININE 0.7 05/20/2021    LABGLOM >60 07/13/2021    LABGLOM >60 07/12/2021    LABGLOM >60 05/20/2021    GFRAA >60 07/13/2021    GFRAA >60 07/12/2021    GFRAA >60 05/20/2021    CALCIUM 10.0 07/13/2021    CALCIUM 9.8 07/12/2021    CALCIUM 9.8 05/20/2021    PROT 7.0 07/13/2021    PROT 7.2 07/12/2021    PROT 6.9 05/20/2021    LABALBU 4.4 07/13/2021    LABALBU 4.4 07/12/2021    LABALBU 4.1 05/20/2021    LABALBU 4.4 08/15/2011    LABALBU 4.2 04/25/2011    LABALBU 4.2 03/24/2011    BILITOT 0.6 07/13/2021    BILITOT 0.4 07/12/2021    BILITOT 0.5 05/20/2021    ALKPHOS 78 07/13/2021    ALKPHOS 79 07/12/2021    ALKPHOS 75 05/20/2021    AST 28 07/13/2021    AST 25 07/12/2021    AST 26 05/20/2021    ALT 30 07/13/2021    ALT 33 07/12/2021    ALT 32 05/20/2021     A1C  Lab Results   Component Value Date    LABA1C 5.5 07/13/2021    LABA1C 5.5 07/12/2021    LABA1C 5.6 12/01/2020     TSH  Lab Results   Component Value Date    TSH 1.400 07/13/2021    TSH 1.320 07/12/2021    TSH 0.869 05/20/2021     FREET4  Lab Results   Component Value Date    K7WPHNC 7.1 06/26/2019    Z8RLGJJ 7.6 05/21/2014     LIPID  Lab Results   Component Value Date    CHOL 230 07/13/2021    CHOL 218 05/20/2021    CHOL 233 02/08/2021    HDL 51 07/13/2021    HDL 42 05/20/2021    HDL 46 02/08/2021    LDLCALC 148 07/13/2021    LDLCALC 148 05/20/2021    LDLCALC 134 02/08/2021    TRIG 153 07/13/2021    TRIG 141 05/20/2021    TRIG 267 02/08/2021     VITAMIN D  Lab Results   Component Value Date    VITD25 PROTEINU Negative 09/16/2021    PROTEINU negative 09/16/2021    PROTEINU Negative 08/30/2021    PROTEINU negative 08/09/2021    PROTEINU Negative 07/12/2021    UROBILINOGEN 0.2 09/16/2021    UROBILINOGEN 0.2 08/30/2021    UROBILINOGEN 1.0 07/12/2021    NITRU Negative 09/16/2021    NITRU Negative 08/30/2021    NITRU POSITIVE 07/12/2021    LEUKOCYTESUR small 11/26/2021    LEUKOCYTESUR Negative 09/16/2021    LEUKOCYTESUR small 09/16/2021    LEUKOCYTESUR Negative 08/30/2021    LEUKOCYTESUR negative 08/09/2021    LEUKOCYTESUR TRACE 07/12/2021     Urine Micro/Albumin Ratio  Lab Results   Component Value Date    MALBCR - 08/30/2021    MALBCR - 07/12/2021    MALBCR - 02/08/2021         ROS:  As above      Current Outpatient Medications:     omeprazole (PRILOSEC) 20 MG delayed release capsule, Take 1 capsule by mouth daily Ideally 1hr prior to dinner, Disp: 30 capsule, Rfl: 1    ciprofloxacin (CIPRO) 500 MG tablet, Take 1 tablet by mouth 2 times daily for 7 days, Disp: 14 tablet, Rfl: 0    donepezil (ARICEPT) 10 MG tablet, TAKE 1 TABLET BY MOUTH TWICE DAILY, Disp: 180 tablet, Rfl: 3    metoprolol succinate (TOPROL XL) 100 MG extended release tablet, Take 1 tablet by mouth 2 times daily, Disp: 180 tablet, Rfl: 3    levothyroxine (SYNTHROID) 100 MCG tablet, Take 1 tablet by mouth daily Ideally on empty stomach, Disp: 30 tablet, Rfl: 3    memantine (NAMENDA) 10 MG tablet, Take 1 tablet by mouth 2 times daily, Disp: 180 tablet, Rfl: 3    omega-3 acid ethyl esters (LOVAZA) 1 g capsule, Take 2 capsules by mouth 2 times daily, Disp: 360 capsule, Rfl: 3    metFORMIN (GLUCOPHAGE) 500 MG tablet, Take 1 tablet by mouth daily , Disp: , Rfl:     mirabegron (MYRBETRIQ) 50 MG TB24, Take 1 tablet by mouth daily , Disp: , Rfl:     B Complex Vitamins (B-COMPLEX/B-12 PO), Take by mouth daily LD 12/12/18, Disp: , Rfl:   Allergies   Allergen Reactions    Morphine Itching    Statins      Other reaction(s): Unknown    Statins Depletion Therapy Other (See Comments)     MYALGIA       Past Medical History:   Diagnosis Date    Arthritis     Diabetes mellitus ([address]     Difficult intubation     carries card, copy on chart  Glidescope#3 with ETT size7    Hyperlipidemia     Hypothyroidism     Left sided abdominal pain     Memory problem     still competent    GORDY on CPAP     Pacemaker     Rectal bleeding     Ringing in the ears      Past Surgical History:   Procedure Laterality Date    ABLATION OF DYSRHYTHMIC FOCUS      BREAST SURGERY      implants    COLONOSCOPY  ? Dr. Adelia Garibay twisted     COLONOSCOPY  ? Dr. Jaja Akers. incomplete.  COLONOSCOPY  2018    Dr. Jennifer Armando N/A 2018    COLONOSCOPY DIAGNOSTIC performed by Alan Greenfield MD at [address] [place] [address]  09/10/2011    POSTERIOR    HYSTERECTOMY      vaginal. uterus only.      KNEE ARTHROPLASTY Right     partial    KNEE ARTHROSCOPY  2011    right knee    PACEMAKER PLACEMENT  2014    Medtronic dual chamber    TOTAL HIP ARTHROPLASTY Right 2015     Family History   Problem Relation Age of Onset    Pacemaker Mother     Heart Attack Father     Cancer Other 49        Bone Marrow Cancer / Had Bone Marrow Transplant     Social History     Tobacco Use    Smoking status: Former Smoker     Packs/day: 1.50     Years: 13.00     Pack years: 19.50     Types: Cigarettes     Start date: 1973     Quit date: 1986     Years since quittin.9    Smokeless tobacco: Never Used    Tobacco comment: quit smoking    Vaping Use    Vaping Use: Never used   Substance Use Topics    Alcohol use: Yes     Comment: socially -- 3 drinks per month at the most    Drug use: No      Social History     Social History Narrative    PMH:    Problem List: Urinary tract infectious disease, Obstructive sleep apnea syndrome, Adult health    examination, Adult health examination, Constipation, Derangement of knee, Vitamin D deficiency, Hypothyroidism, Conduction disorder of the heart, Hyperlipidemia    Health Maintenance:    Mini Mental Status - (2018)    Colonoscopy - (2018)    Colonoscopy Screening - (2018)    Mammogram Screening - (2018)    Mammogram - (2018)    Colonoscopy - (2010)    Couseled on Home Safety - (2015)    Bone Density Scan - (3/28/2012)    Medical Problems:    Sleep Apnea - Dr Cedrick Webb, cpap    Pneumonia    Hypothyroidism - Dr Faustino Vazquez - Dr Ida Corbett    Hyperlipidemia - intolerance to all statins    Cardiac Arrhythmia - ? type. s/p ablation    cardiac dysrhythmia - pauses - lead to pacemaker    Skin Cancer    Surgical Hx:    Partial Hysterectomy - Still with Both Ovaries. Breast Implants    Pacemaker - Dr Cheli Jane    Knee Replacement, Carpal Tunnel Release, colon-vaginal fistula repair    Bladder Repair - sling    RT Hip Arthroplasty - MARGARITA        FH:    Father:    . (Hx)    Mother:    . (Hx)    Dad - MI 39,  70 MI    Mom - DM , currently living age 80        SH: Marital: . Personal Habits: Cigarette Use: Former Cigarette Smoker - quit age 36. Occ ETOH, minimal. . 2 kids, 6 step kids. 27 grandkids. .Alcohol: Rarely consumes alcohol        Vitals:    21 1001   BP: 124/70   Pulse: 70   Temp: 97.8 °F (36.6 °C)   SpO2: 95%   Weight: 191 lb (86.6 kg)     Wt Readings from Last 3 Encounters:   21 191 lb (86.6 kg)   21 191 lb (86.6 kg)   10/27/21 190 lb (86.2 kg)        Physical Exam    Exam:  Const: Appears comfortable. No signs of acute distress present. Head/Face: Atraumatic, normocephalic on inspection. Eyes: No discharge from the eyes. Sclerae clear. ENMT: Ears clear nose clear oropharynx clear  Neck: Supple. Palpation reveals no adenopathy. No masses appreciated. No JVD. Resp: Respirations are unlabored. Clear to auscultation bilaterally. No rales, rhonchi or wheezes appreciated over the  lungs bilaterally.   CV: RRR   Extremities: No clubbing or cyanosis. No edema of the lower limbs  bilaterally. No calf inflammation or tenderness. Abdomen: Abdomen is soft,and nondistended. No abdominal masses  appreciated. No palpable hepatosplenomegaly. Bowel sounds are normoactive. Mild left upper quadrant tenderness without rebound or guarding  Skin: Dry and warm with no rash. Various lesions and sun damaged skin. Going to see Dr. Sergey Meneses soon  Muscular skeletal: No acute joint inflammation. Neuro:Grossly intact without focal deficit  No CVA tenderness    Office Labs This Visit :  No results found for this visit on 12/02/21. Assessment and Plan:      Recurrent urinary tract infection  Counseled. Differential reviewed, including serious intra-abdominal pathology, including stones, including vaginal infections. Sees GYN today. Recent UA/culture reviewed. On Cipro tolerating with standard precautions including vascular prolonged QT tendinosis tendon rupture etc.  Repeat urinalysis and culture. Proper hygiene reviewed. Proper hydration reviewed. Rationale behind cranberry juice reviewed. Should follow-up with urology    Hematuria, unspecified type  Last check negative. Follow-up urology     Generalized abdominal pain  Counseled extensively. Differential reviewed, including serious etiologies. They want a proceed as noted outpatient. Check blood work as below. CT with standard precautions including x-ray exposure, contrast exposure, denies contraindication. Ultrasound gallbladder. Start omeprazole empirically. Standard precautions including RI/electrolyte imbalance low threshold for ER/hospitalization. Signs symptoms watch were reviewed proceed immediately if symptoms persist worsen or desire more expedited evaluation   - H. pylori antibody, IgG; Future  - CBC Auto Differential; Future  - Comprehensive Metabolic Panel; Future  - Lipase;  Future  - CT ABDOMEN PELVIS W IV CONTRAST Additional Contrast? Oral; Future  - US GALLBLADDER RUQ; Future  - omeprazole (PRILOSEC) 20 MG delayed release capsule; Take 1 capsule by mouth daily Ideally 1hr prior to dinner  Dispense: 30 capsule; Refill: 1     Postprandial nausea  Counseled extensively. Differential reviewed, including serious etiologies. Proceed as above  - H. pylori antibody, IgG; Future  - CBC Auto Differential; Future  - Comprehensive Metabolic Panel; Future  - Lipase; Future     Lichen sclerosus  On 11/21 labial biopsy through Dr. Kevan Corley, counseled, she follows up with him today  Constipation  Counseled extensively. Differential reviewed, including serious etiologies. Recommended MiraLAX taper        No problem-specific Assessment & Plan notes found for this encounter. Plan as above. Counseled extensively and differential diagnoses relevant to above were reviewed, including serious etiologies. Side effects and interactions of medications were reviewed. Counseled. Proceed as above. My concerns reviewed. Low-fat diet emphasized as well as low residue diet. They simply want follow-up 2 weeks to recheck, sooner as needed. As long as symptoms steadily improve/resolve, and medical conditions follow the expected course, FU as below, sooner PRN. Return in about 2 weeks (around 12/16/2021), or if symptoms worsen or fail to improve. Signs and symptoms to watch for discussed, serious signs and symptoms reviewed. ER if any. Irma Caldwell MD    Patients are advised to check with insurance company to ensure coverage and to fully understand benefits and cost prior to any testing. This note was created with the assistance of voice recognition software. Document was reviewed however may contain grammatical errors.

## 2021-12-02 NOTE — RESULT ENCOUNTER NOTE
Lipase elevated concerning for pancreatitis/pancreatic issue. If acute, severe or other concerning symptoms should be hospitalized. Otherwise continue per OV plan, again stressed low-fat diet, small frequent meals, proper hydration. Await CT scan. May need MRCP. Follow-up as directed sooner as needed.

## 2021-12-06 LAB
HELICOBACTER PYLORI IGG: NORMAL
URINE CULTURE, ROUTINE: NORMAL

## 2021-12-16 ENCOUNTER — OFFICE VISIT (OUTPATIENT)
Dept: PRIMARY CARE CLINIC | Age: 76
End: 2021-12-16
Payer: MEDICARE

## 2021-12-16 VITALS
WEIGHT: 191 LBS | SYSTOLIC BLOOD PRESSURE: 108 MMHG | HEIGHT: 65 IN | TEMPERATURE: 96.6 F | DIASTOLIC BLOOD PRESSURE: 70 MMHG | OXYGEN SATURATION: 96 % | HEART RATE: 60 BPM | BODY MASS INDEX: 31.82 KG/M2

## 2021-12-16 DIAGNOSIS — E03.9 ACQUIRED HYPOTHYROIDISM: ICD-10-CM

## 2021-12-16 DIAGNOSIS — R74.8 ELEVATED LIPASE: ICD-10-CM

## 2021-12-16 DIAGNOSIS — R10.84 GENERALIZED ABDOMINAL PAIN: Primary | ICD-10-CM

## 2021-12-16 DIAGNOSIS — L90.0 LICHEN SCLEROSUS: ICD-10-CM

## 2021-12-16 DIAGNOSIS — E11.65 TYPE 2 DIABETES MELLITUS WITH HYPERGLYCEMIA, WITHOUT LONG-TERM CURRENT USE OF INSULIN (HCC): ICD-10-CM

## 2021-12-16 DIAGNOSIS — F03.90 DEMENTIA WITHOUT BEHAVIORAL DISTURBANCE, UNSPECIFIED DEMENTIA TYPE: ICD-10-CM

## 2021-12-16 DIAGNOSIS — N39.0 RECURRENT UTI: ICD-10-CM

## 2021-12-16 PROCEDURE — G8417 CALC BMI ABV UP PARAM F/U: HCPCS | Performed by: FAMILY MEDICINE

## 2021-12-16 PROCEDURE — 4040F PNEUMOC VAC/ADMIN/RCVD: CPT | Performed by: FAMILY MEDICINE

## 2021-12-16 PROCEDURE — 99214 OFFICE O/P EST MOD 30 MIN: CPT | Performed by: FAMILY MEDICINE

## 2021-12-16 PROCEDURE — G8399 PT W/DXA RESULTS DOCUMENT: HCPCS | Performed by: FAMILY MEDICINE

## 2021-12-16 PROCEDURE — G8484 FLU IMMUNIZE NO ADMIN: HCPCS | Performed by: FAMILY MEDICINE

## 2021-12-16 PROCEDURE — 1090F PRES/ABSN URINE INCON ASSESS: CPT | Performed by: FAMILY MEDICINE

## 2021-12-16 PROCEDURE — 1036F TOBACCO NON-USER: CPT | Performed by: FAMILY MEDICINE

## 2021-12-16 PROCEDURE — 3044F HG A1C LEVEL LT 7.0%: CPT | Performed by: FAMILY MEDICINE

## 2021-12-16 PROCEDURE — 1123F ACP DISCUSS/DSCN MKR DOCD: CPT | Performed by: FAMILY MEDICINE

## 2021-12-16 PROCEDURE — 3017F COLORECTAL CA SCREEN DOC REV: CPT | Performed by: FAMILY MEDICINE

## 2021-12-16 PROCEDURE — 2022F DILAT RTA XM EVC RTNOPTHY: CPT | Performed by: FAMILY MEDICINE

## 2021-12-16 PROCEDURE — G8427 DOCREV CUR MEDS BY ELIG CLIN: HCPCS | Performed by: FAMILY MEDICINE

## 2021-12-16 NOTE — ASSESSMENT & PLAN NOTE
Counseled extensively. Differential reviewed, including serious etiologies. Recheck. Asymptomatic now. Low-fat diet.   Await CT/ultrasound

## 2021-12-16 NOTE — PROGRESS NOTES
21  Ramona Leary : 1945 Sex: female  Age: 77 y.o. Chief Complaint   Patient presents with    Abdominal Pain     seems to be  alot better       HPI:      Patient presents today with her . Symptoms resolved. No current symptoms of abdominal pain nausea vomiting change in bowels urinary symptoms or otherwise. She did follow-up with . Tolerating omeprazole.   CT scan/ultrasound negative, lipase was elevated At 145, CMP otherwise normal except glucose 100 hemoglobin 15.9 CBC otherwise grossly normal differential reviewed H. pylori negative urinalysis negative culture negative        Most Recent Labs  CBC  Lab Results   Component Value Date    WBC 10.7 2021    WBC 10.7 2021    WBC 11.1 2021    RBC 5.07 2021    RBC 5.14 2021    RBC 5.37 2021    HGB 15.9 2021    HGB 15.8 2021    HGB 16.6 2021    HCT 47.1 2021    HCT 46.0 2021    HCT 48.5 2021    MCV 92.9 2021    MCV 89.5 2021    MCV 90.3 2021     2021     2021     2021      CMP  Lab Results   Component Value Date     2021     2021     2021    K 4.4 2021    K 4.0 2021    K 4.2 2021     2021     2021     2021    CO2 24 2021    CO2 24 2021    CO2 23 2021    ANIONGAP 12 2021    ANIONGAP 15 2021    ANIONGAP 14 2021    GLUCOSE 100 2021    GLUCOSE 104 2021    GLUCOSE 126 2021    GLUCOSE 104 08/15/2011    GLUCOSE 113 2011    GLUCOSE 97 2011    BUN 14 2021    BUN 12 2021    BUN 13 2021    CREATININE 0.7 2021    CREATININE 0.8 2021    CREATININE 0.8 2021    LABGLOM >60 2021    LABGLOM >60 2021    LABGLOM >60 2021    GFRAA >60 2021    GFRAA >60 2021    GFRAA >60 2021    CALCIUM 9.8 12/02/2021    CALCIUM 10.0 07/13/2021    CALCIUM 9.8 07/12/2021    PROT 6.9 12/02/2021    PROT 7.0 07/13/2021    PROT 7.2 07/12/2021    LABALBU 4.5 12/02/2021    LABALBU 4.4 07/13/2021    LABALBU 4.4 07/12/2021    LABALBU 4.4 08/15/2011    LABALBU 4.2 04/25/2011    LABALBU 4.2 03/24/2011    BILITOT 0.5 12/02/2021    BILITOT 0.6 07/13/2021    BILITOT 0.4 07/12/2021    ALKPHOS 81 12/02/2021    ALKPHOS 78 07/13/2021    ALKPHOS 79 07/12/2021    AST 28 12/02/2021    AST 28 07/13/2021    AST 25 07/12/2021    ALT 31 12/02/2021    ALT 30 07/13/2021    ALT 33 07/12/2021     A1C  Lab Results   Component Value Date    LABA1C 5.5 07/13/2021    LABA1C 5.5 07/12/2021    LABA1C 5.6 12/01/2020     TSH  Lab Results   Component Value Date    TSH 1.400 07/13/2021    TSH 1.320 07/12/2021    TSH 0.869 05/20/2021     FREET4  Lab Results   Component Value Date    I8GQKWK 7.1 06/26/2019    U6EJOAK 7.6 05/21/2014     LIPID  Lab Results   Component Value Date    CHOL 230 07/13/2021    CHOL 218 05/20/2021    CHOL 233 02/08/2021    HDL 51 07/13/2021    HDL 42 05/20/2021    HDL 46 02/08/2021    LDLCALC 148 07/13/2021    LDLCALC 148 05/20/2021    LDLCALC 134 02/08/2021    TRIG 153 07/13/2021    TRIG 141 05/20/2021    TRIG 267 02/08/2021     VITAMIN D  Lab Results   Component Value Date    VITD25 57 07/13/2021    VITD25 60 07/12/2021    VITD25 94 05/20/2021     MAGNESIUM  Lab Results   Component Value Date    MG 2.2 05/22/2014    MG 2.0 04/28/2014    MG 2.3 03/23/2011      PHOS  No results found for: PHOS   RON   Lab Results   Component Value Date    RON NEGATIVE 11/29/2012     RHEUMATOID FACTOR  No results found for: RF  PSA  No results found for: PSA   HEPATITIS C  Lab Results   Component Value Date    HCVABI Non-Reactive 08/12/2020     HIV  No results found for: CQO0UKX, HIV1QT  UA  Lab Results   Component Value Date    COLORU Yellow 12/02/2021    COLORU yellow 11/26/2021    COLORU Yellow 09/16/2021    COLORU yellow 09/16/2021    COLORU Yellow 08/30/2021    CLARITYU Clear 12/02/2021    CLARITYU clear 11/26/2021    CLARITYU Clear 09/16/2021    CLARITYU cloudy 09/16/2021    CLARITYU Clear 08/30/2021    GLUCOSEU Negative 12/02/2021    GLUCOSEU negative 11/26/2021    GLUCOSEU Negative 09/16/2021    GLUCOSEU negative 09/16/2021    GLUCOSEU Negative 08/30/2021    BILIRUBINUR Negative 12/02/2021    BILIRUBINUR negative 11/26/2021    BILIRUBINUR Negative 09/16/2021    BILIRUBINUR negative 09/16/2021    BILIRUBINUR Negative 08/30/2021    BILIRUBINUR negative 08/09/2021    KETUA Negative 12/02/2021    KETUA negative 11/26/2021    KETUA TRACE 09/16/2021    KETUA negative 09/16/2021    KETUA Negative 08/30/2021    SPECGRAV >=1.030 12/02/2021    SPECGRAV 1.030 11/26/2021    SPECGRAV >=1.030 09/16/2021    SPECGRAV >=1.030 09/16/2021    SPECGRAV >=1.030 08/30/2021    BLOODU Negative 12/02/2021    BLOODU trace 11/26/2021    BLOODU Negative 09/16/2021    BLOODU trace 09/16/2021    BLOODU Negative 08/30/2021    PHUR 6.0 12/02/2021    PHUR 6.0 11/26/2021    PHUR 6.0 09/16/2021    PHUR 6.0 09/16/2021    PHUR 5.5 08/30/2021    PROTEINU Negative 12/02/2021    PROTEINU negative 11/26/2021    PROTEINU Negative 09/16/2021    PROTEINU negative 09/16/2021    PROTEINU Negative 08/30/2021    UROBILINOGEN 0.2 12/02/2021    UROBILINOGEN 0.2 09/16/2021    UROBILINOGEN 0.2 08/30/2021    NITRU Negative 12/02/2021    NITRU Negative 09/16/2021    NITRU Negative 08/30/2021    LEUKOCYTESUR Negative 12/02/2021    LEUKOCYTESUR small 11/26/2021    LEUKOCYTESUR Negative 09/16/2021    LEUKOCYTESUR small 09/16/2021    LEUKOCYTESUR Negative 08/30/2021     Urine Micro/Albumin Ratio  Lab Results   Component Value Date    MALBCR - 08/30/2021    MALBCR - 07/12/2021    MALBCR - 02/08/2021         ROS:  As above      Current Outpatient Medications:     omeprazole (PRILOSEC) 20 MG delayed release capsule, Take 1 capsule by mouth daily Ideally 1hr prior to dinner, Disp: 30 capsule, Rfl: 1    donepezil (ARICEPT) 10 MG tablet, TAKE 1 TABLET BY MOUTH TWICE DAILY, Disp: 180 tablet, Rfl: 3    metoprolol succinate (TOPROL XL) 100 MG extended release tablet, Take 1 tablet by mouth 2 times daily, Disp: 180 tablet, Rfl: 3    levothyroxine (SYNTHROID) 100 MCG tablet, Take 1 tablet by mouth daily Ideally on empty stomach, Disp: 30 tablet, Rfl: 3    memantine (NAMENDA) 10 MG tablet, Take 1 tablet by mouth 2 times daily, Disp: 180 tablet, Rfl: 3    omega-3 acid ethyl esters (LOVAZA) 1 g capsule, Take 2 capsules by mouth 2 times daily, Disp: 360 capsule, Rfl: 3    metFORMIN (GLUCOPHAGE) 500 MG tablet, Take 1 tablet by mouth daily , Disp: , Rfl:     mirabegron (MYRBETRIQ) 50 MG TB24, Take 1 tablet by mouth daily , Disp: , Rfl:     B Complex Vitamins (B-COMPLEX/B-12 PO), Take by mouth daily LD 12/12/18, Disp: , Rfl:   Allergies   Allergen Reactions    Morphine Itching    Statins      Other reaction(s): Unknown    Statins Depletion Therapy Other (See Comments)     MYALGIA       Past Medical History:   Diagnosis Date    Arthritis     Diabetes mellitus ([address]     Difficult intubation     carries card, copy on chart  Glidescope#3 with ETT size7    Hyperlipidemia     Hypothyroidism     Left sided abdominal pain     Memory problem     still competent    GORDY on CPAP     Pacemaker     Rectal bleeding     Ringing in the ears      Past Surgical History:   Procedure Laterality Date    ABLATION OF DYSRHYTHMIC FOCUS  1996    BREAST SURGERY      implants    COLONOSCOPY  2009? Dr. Katty Cerna twisted     COLONOSCOPY  2008? Dr. Singh Moreno. incomplete.  COLONOSCOPY  12/17/2018    Dr. Catherine Huggins N/A 12/17/2018    COLONOSCOPY DIAGNOSTIC performed by Clara Cervantes MD at [address] [place] [address]  09/10/2011    POSTERIOR    HYSTERECTOMY      vaginal. uterus only.      KNEE ARTHROPLASTY Right     partial    KNEE ARTHROSCOPY  9/08/2011    right knee    PACEMAKER PLACEMENT  5/23/2014    Medi[employer]ing dual chamber    TOTAL HIP ARTHROPLASTY Right      Family History   Problem Relation Age of Onset   [facility] Pacemaker Mother     Heart Attack Father     Cancer Other 48        Bone Marrow Cancer / Had Bone Marrow Transplant     Social History     Tobacco Use    Smoking status: Former Smoker     Packs/day: 1.50     Years: 13.00     Pack years: 19.50     Types: Cigarettes     Start date: 1973     Quit date: 1986     Years since quittin.9    Smokeless tobacco: Never Used    Tobacco comment: quit smoking    Vaping Use    Vaping Use: Never used   Substance Use Topics    Alcohol use: Yes     Comment: socially -- 3 drinks per month at the most    Drug use: No      Social History     Social History Narrative    PMH:    Problem List: Urinary tract infectious disease, Obstructive sleep apnea syndrome, Adult health    examination, Adult health examination, Constipation, Derangement of knee, Vitamin D deficiency,    Hypothyroidism, Conduction disorder of the heart, Hyperlipidemia    Health Maintenance:    Mini Mental Status - (2018)    Colonoscopy - (2018)    Colonoscopy Screening - (2018)    Mammogram Screening - (2018)    Mammogram - (2018)    Colonoscopy - (2010)    Couseled on Home Safety - (2015)    Bone Density Scan - (3/28/2012)    Medical Problems:    Sleep Apnea - Dr Marianne Grissom, cpap    Pneumonia    Hypothyroidism - Dr Tara Pedraza - Dr Terry Kruger    Hyperlipidemia - intolerance to all statins    Cardiac Arrhythmia - ? type. s/p ablation    cardiac dysrhythmia - pauses - lead to pacemaker    Skin Cancer    Surgical Hx:    Partial Hysterectomy - Still with Both Ovaries. Breast Implants    Pacemaker - Dr Juan Luis Higuera    Knee Replacement, Carpal Tunnel Release, colon-vaginal fistula repair    Bladder Repair - sling    RT Hip Arthroplasty - MARGARITA        FH:    Father:    . (Hx)    Mother:    .  (Hx)    Dad - MI 39,  70 MI    Mom - DM , currently living age 80        SH: Marital: . Personal Habits: Cigarette Use: Former Cigarette Smoker - quit age 36. Occ ETOH, minimal. . 2 kids, 6 step kids. 27 grandkids. .Alcohol: Rarely consumes alcohol        Vitals:    12/16/21 1516   BP: 108/70   Pulse: 60   Temp: 96.6 °F (35.9 °C)   SpO2: 96%   Weight: 191 lb (86.6 kg)   Height: 5' 4.5\" (1.638 m)     Wt Readings from Last 3 Encounters:   12/16/21 191 lb (86.6 kg)   12/02/21 191 lb (86.6 kg)   11/26/21 191 lb (86.6 kg)        Physical Exam    Exam:  Const: Appears comfortable. No signs of acute distress present. Head/Face: Atraumatic, normocephalic on inspection. Eyes: No discharge from the eyes. Sclerae clear. ENMT: Ears clear nose clear oropharynx clear  Neck: Supple. Palpation reveals no adenopathy. No masses appreciated. No JVD. Resp: Respirations are unlabored. Clear to auscultation bilaterally. No rales, rhonchi or wheezes appreciated over the  lungs bilaterally. CV: RRR   Extremities: No clubbing or cyanosis. No edema of the lower limbs  bilaterally. No calf inflammation or tenderness. Abdomen: Abdomen is soft,and nondistended. No abdominal masses  appreciated. No palpable hepatosplenomegaly. Bowel sounds are normoactive. Nontender   skin: Dry and warm with no rash. Various lesions and sun damaged skin. Going to see Dr. Leslye Sahu soon  Muscular skeletal: No acute joint inflammation. Neuro:Grossly intact without focal deficit  No CVA tenderness    Office Labs This Visit :  No results found for this visit on 12/16/21. Assessment and Plan:   Diagnosis Orders   1. Generalized abdominal pain     2. Elevated lipase     3. Recurrent UTI     4. Lichen sclerosus     5. Dementia without behavioral disturbance, unspecified dementia type ([address]     6. Type 2 diabetes mellitus with hyperglycemia, without long-term current use of insulin (HCC)     7. Acquired hypothyroidism         Elevated lipase    Counseled extensively.  Differential reviewed, including serious etiologies. Recheck. Asymptomatic now. Low-fat diet. Await CT/ultrasound      Recurrent urinary tract infection  Counseled. Differential reviewed, including serious intra-abdominal pathology, including stones, including vaginal infections. Recent UA/culture reviewed. Most recent negative. Recently saw GYN. Should follow-up with urology. Continue proper hydration, proper hygiene rationale behind cranberry juice reviewed. Should follow-up with urology    Hematuria, unspecified type  Last check negative. Follow-up urology     Generalized abdominal pain  Counseled extensively. Differential reviewed, including serious etiologies. Symptoms did resolve, lipase was mildly elevated, continue low-fat diet, taking omeprazole now and tolerating with precautions including RI/electrolyte imbalance. Awaiting CT/ultrasound. Postprandial nausea  Counseled extensively. Differential reviewed, including serious etiologies. Resolved, see above     Lichen sclerosus  On 11/21 labial biopsy through Dr. Lex Cowan, counseled, she recently saw      Constipation  Counseled extensively. Differential reviewed, including serious etiologies. Resolved after MiraLAX taper           Polycythemia  Counseled. Hemoglobin fluctuates, ferritin was borderline high, but improved. Monitor. Counseled on hemochromatosis. Proper hydration. Counseled extensively. Differential reviewed, including serious etiologies. Simply wants basic monitoring at this time. Neoplasm of uncertain behavior of skin  Counseled. Continue per Dr. Dr. Barbara Joseph.           Obstructive sleep apnea syndrome     Counseled.  Doing very well CPAP.  Uses it at least 8 to 10 hours per night 7 nights per week with very good results.  Got a new machine toward the end of 2019. Encourage her machine be checked. She follows up with Dr. Jayme Lang 10/27/2021     Hypothyroidism     TSH elevated on 112mcg qd, 1/2 Sunday.  Was suppressed on 112 qd in the past. Declines d.a.w. Wants to continue generic. Maria Guadalupe Castillo Defers imaging. TSH normal but free T4 elevated,took medicine shortly before blood draw. They states she cannot remember not to do this. Monitor. Asymptomatic              Type 2 diabetes mellitus with hyperglycemia, without long-term current use of insulin ([facility])  Hemoglobin A1c stable, 5.9 to 6.1 -5.65.55.5 on metformin 500 mg twice a day. Previously 1000 twice a day. Precautions reviewed. Risks  reviewed, proper hydration reviewed, standard precautions reviewed including loose bowels, LA. Encouraged  she watch blood sugar ambulatory with parameters reviewed call in if out of range. Hyper and hypoglycemic precautions reviewed. Micro-and macrovascular  complications reviewed.  Importance of at least yearly eye exams and daily foot exams reviewed.  Tolerating metformin . no longer on invokana. Monitor               Dementia without behavioral disturbance  Counseled extensively.  Differential reviewed including various forms of dementia and pseudodementia.  On Aricept 10 mg daily, and Namenda.   Continue per Dr. Jannie Mills were seeing Dr. Elena Zarco specialist from [facility] [employer]. However they re noncompliant without follow-up, feels it is a \"racket\". Follow-up with Dr. Carmen Cm. She is enrolled in a study, nitrate water. She is not driving and we emphasized importance of this. Safety precautions reviewed.    feels her dementia is stable. Saw Dr. Carmen Cm yesterday, he is going to continue current management and she follows up again March 2022.        Mixed hyperlipidemia  not at goal  Significant risk of cerebrovascular/cardiovascular event. Imperative this  be controlled with history of TIA, however adamantly refuses any further treatment at this time. Refuses to try any other statin or Zetia stating that these were not tolerated in the  past. She did not tolerate Niaspan. did not tolerate WelChol. . declines  cholestyramine.  Risk of stroke and heart attack reviewed. lifestyle modification  reviewed. risk of hyperlipidemia reviewed . Did not tolerate fenofibrate  Counseled on repatha, declines.      tolerating Lovaza, declines change in therapy despite counseling     Supraventricular tachycardia ([address]  h/o svt Status post ablation  . Also history of long pauses-since had pacemaker by Dr. Brie Diaz. FU with her. Follow-up Dr Sean Anderson . Asymptomatic sends recordings by phone.  ekg done and reviewed with her     Liver disease  Counseled extensively. Differential reviewed, including serious etiologies. Likely  fatty liver. Precautions reviewed. Lifestyle modification appropriate diet and weight  loss reviewed. Risks of even this leading to cirrhosis reviewed. Other than basic  monitoring on interested in other evaluation or treatment, in-depth blood work,  imaging or otherwise. Hep C 10/18 negative, again was negative 9/20.   LFTs currently normal              Tubular adenoma  Small polyp 7/18. Dr. Ayad Mendez recommended basic screenings if repeated at all.  Asymptomatic     Vitamin D deficiency  On supplementation she was borderline toxic, it has stabilized, continue off vitamin D. Monitor     Health maintenance examination  Health maintenance issues discussed at length  6/21. Encourage yearly.     B12 deficiency  Risk of high and low reviewed. Last time high, discussed backing off. Monitor       Plan as above. Counseled extensively and differential diagnoses relevant to above were reviewed, including serious etiologies. Side effects and interactions of medications were reviewed. Counseled. Proceed as above. Await CT/ultrasound. Continue omeprazole. Continue per specialist.  Otherwise they will repeat blood work as ordered and simply wants to  follow-up in 1 month sooner as needed. As long as symptoms steadily improve/resolve, and medical conditions follow the expected course, FU as below, sooner PRN.     Return in about 1 month (around 1/16/2022), or if symptoms worsen or fail to improve. Signs and symptoms to watch for discussed, serious signs and symptoms reviewed. ER if any. Carlos A Betancur MD    Patients are advised to check with insurance company to ensure coverage and to fully understand benefits and cost prior to any testing. This note was created with the assistance of voice recognition software. Document was reviewed however may contain grammatical errors.

## 2021-12-18 ENCOUNTER — HOSPITAL ENCOUNTER (OUTPATIENT)
Dept: CT IMAGING | Age: 76
Discharge: HOME | End: 2021-12-20
Payer: MEDICARE

## 2021-12-18 ENCOUNTER — HOSPITAL ENCOUNTER (OUTPATIENT)
Dept: ULTRASOUND IMAGING | Age: 76
Discharge: HOME | End: 2021-12-20
Payer: MEDICARE

## 2021-12-18 DIAGNOSIS — R10.84 GENERALIZED ABDOMINAL PAIN: ICD-10-CM

## 2021-12-18 PROCEDURE — 6360000004 HC RX CONTRAST MEDICATION: Performed by: RADIOLOGY

## 2021-12-18 PROCEDURE — 74176 CT ABD & PELVIS W/O CONTRAST: CPT

## 2021-12-18 PROCEDURE — 76705 ECHO EXAM OF ABDOMEN: CPT

## 2021-12-18 RX ADMIN — IOPAMIDOL 75 ML: 755 INJECTION, SOLUTION INTRAVENOUS at 13:33

## 2021-12-18 RX ADMIN — IOHEXOL 50 ML: 240 INJECTION, SOLUTION INTRATHECAL; INTRAVASCULAR; INTRAVENOUS; ORAL at 13:33

## 2022-01-13 ENCOUNTER — OFFICE VISIT (OUTPATIENT)
Dept: PRIMARY CARE CLINIC | Age: 77
End: 2022-01-13
Payer: MEDICARE

## 2022-01-13 ENCOUNTER — HOSPITAL ENCOUNTER (OUTPATIENT)
Age: 77
Discharge: HOME | End: 2022-01-13
Payer: MEDICARE

## 2022-01-13 VITALS
BODY MASS INDEX: 32.45 KG/M2 | DIASTOLIC BLOOD PRESSURE: 64 MMHG | TEMPERATURE: 97.6 F | SYSTOLIC BLOOD PRESSURE: 130 MMHG | WEIGHT: 192 LBS | OXYGEN SATURATION: 94 % | HEART RATE: 76 BPM

## 2022-01-13 DIAGNOSIS — E78.2 MIXED HYPERLIPIDEMIA: ICD-10-CM

## 2022-01-13 DIAGNOSIS — R74.8 ELEVATED LIPASE: ICD-10-CM

## 2022-01-13 DIAGNOSIS — E55.9 VITAMIN D DEFICIENCY: ICD-10-CM

## 2022-01-13 DIAGNOSIS — R11.0 POSTPRANDIAL NAUSEA: ICD-10-CM

## 2022-01-13 DIAGNOSIS — L90.0 LICHEN SCLEROSUS: ICD-10-CM

## 2022-01-13 DIAGNOSIS — R10.84 GENERALIZED ABDOMINAL PAIN: ICD-10-CM

## 2022-01-13 DIAGNOSIS — E03.9 ACQUIRED HYPOTHYROIDISM: ICD-10-CM

## 2022-01-13 DIAGNOSIS — D75.1 POLYCYTHEMIA: ICD-10-CM

## 2022-01-13 DIAGNOSIS — E53.8 VITAMIN B12 DEFICIENCY: ICD-10-CM

## 2022-01-13 DIAGNOSIS — F03.90 DEMENTIA WITHOUT BEHAVIORAL DISTURBANCE, UNSPECIFIED DEMENTIA TYPE: ICD-10-CM

## 2022-01-13 DIAGNOSIS — N39.0 URINARY TRACT INFECTION WITHOUT HEMATURIA, SITE UNSPECIFIED: ICD-10-CM

## 2022-01-13 DIAGNOSIS — R10.84 GENERALIZED ABDOMINAL PAIN: Primary | ICD-10-CM

## 2022-01-13 DIAGNOSIS — N39.0 RECURRENT UTI: ICD-10-CM

## 2022-01-13 DIAGNOSIS — E11.65 TYPE 2 DIABETES MELLITUS WITH HYPERGLYCEMIA, WITHOUT LONG-TERM CURRENT USE OF INSULIN (HCC): ICD-10-CM

## 2022-01-13 LAB
ALBUMIN SERPL-MCNC: 4.4 G/DL (ref 3.5–5.2)
ALP BLD-CCNC: 81 U/L (ref 35–104)
ALT SERPL-CCNC: 23 U/L (ref 0–32)
ANION GAP SERPL CALCULATED.3IONS-SCNC: 10 MMOL/L (ref 7–16)
AST SERPL-CCNC: 22 U/L (ref 0–31)
BASOPHILS ABSOLUTE: 0.06 E9/L (ref 0–0.2)
BASOPHILS RELATIVE PERCENT: 0.6 % (ref 0–2)
BILIRUB SERPL-MCNC: 0.5 MG/DL (ref 0–1.2)
BILIRUBIN URINE: NEGATIVE
BLOOD, URINE: NEGATIVE
BUN BLDV-MCNC: 12 MG/DL (ref 6–23)
CALCIUM SERPL-MCNC: 9.5 MG/DL (ref 8.6–10.2)
CHLORIDE BLD-SCNC: 106 MMOL/L (ref 98–107)
CHOLESTEROL, TOTAL: 211 MG/DL (ref 0–199)
CLARITY: CLEAR
CO2: 26 MMOL/L (ref 22–29)
COLOR: YELLOW
CREAT SERPL-MCNC: 0.8 MG/DL (ref 0.5–1)
EOSINOPHILS ABSOLUTE: 0.2 E9/L (ref 0.05–0.5)
EOSINOPHILS RELATIVE PERCENT: 1.9 % (ref 0–6)
FERRITIN: 136 NG/ML
FOLATE: >20 NG/ML (ref 4.8–24.2)
GFR AFRICAN AMERICAN: >60
GFR NON-AFRICAN AMERICAN: >60 ML/MIN/1.73
GLUCOSE BLD-MCNC: 97 MG/DL (ref 74–99)
GLUCOSE URINE: NEGATIVE MG/DL
HBA1C MFR BLD: 5.6 % (ref 4–5.6)
HCT VFR BLD CALC: 44.8 % (ref 34–48)
HDLC SERPL-MCNC: 45 MG/DL
HEMOGLOBIN: 15.1 G/DL (ref 11.5–15.5)
IMMATURE GRANULOCYTES #: 0.04 E9/L
IMMATURE GRANULOCYTES %: 0.4 % (ref 0–5)
KETONES, URINE: NEGATIVE MG/DL
LDL CHOLESTEROL CALCULATED: 135 MG/DL (ref 0–99)
LEUKOCYTE ESTERASE, URINE: NEGATIVE
LIPASE: 35 U/L (ref 13–60)
LYMPHOCYTES ABSOLUTE: 4.89 E9/L (ref 1.5–4)
LYMPHOCYTES RELATIVE PERCENT: 46.3 % (ref 20–42)
MCH RBC QN AUTO: 31.1 PG (ref 26–35)
MCHC RBC AUTO-ENTMCNC: 33.7 % (ref 32–34.5)
MCV RBC AUTO: 92.4 FL (ref 80–99.9)
MONOCYTES ABSOLUTE: 0.57 E9/L (ref 0.1–0.95)
MONOCYTES RELATIVE PERCENT: 5.4 % (ref 2–12)
NEUTROPHILS ABSOLUTE: 4.8 E9/L (ref 1.8–7.3)
NEUTROPHILS RELATIVE PERCENT: 45.4 % (ref 43–80)
NITRITE, URINE: NEGATIVE
PDW BLD-RTO: 12.3 FL (ref 11.5–15)
PH UA: 6 (ref 5–9)
PLATELET # BLD: 164 E9/L (ref 130–450)
PMV BLD AUTO: 10.7 FL (ref 7–12)
POTASSIUM SERPL-SCNC: 4.3 MMOL/L (ref 3.5–5)
PROTEIN UA: NEGATIVE MG/DL
RBC # BLD: 4.85 E12/L (ref 3.5–5.5)
SODIUM BLD-SCNC: 142 MMOL/L (ref 132–146)
SPECIFIC GRAVITY UA: >=1.03 (ref 1–1.03)
TOTAL PROTEIN: 6.8 G/DL (ref 6.4–8.3)
TRIGL SERPL-MCNC: 155 MG/DL (ref 0–149)
TSH SERPL DL<=0.05 MIU/L-ACNC: 0.65 UIU/ML (ref 0.27–4.2)
UROBILINOGEN, URINE: 0.2 E.U./DL
VITAMIN B-12: 923 PG/ML (ref 211–946)
VITAMIN D 25-HYDROXY: 59 NG/ML (ref 30–100)
VLDLC SERPL CALC-MCNC: 31 MG/DL
WBC # BLD: 10.6 E9/L (ref 4.5–11.5)

## 2022-01-13 PROCEDURE — 87186 SC STD MICRODIL/AGAR DIL: CPT

## 2022-01-13 PROCEDURE — 1123F ACP DISCUSS/DSCN MKR DOCD: CPT | Performed by: FAMILY MEDICINE

## 2022-01-13 PROCEDURE — 80053 COMPREHEN METABOLIC PANEL: CPT

## 2022-01-13 PROCEDURE — 1090F PRES/ABSN URINE INCON ASSESS: CPT | Performed by: FAMILY MEDICINE

## 2022-01-13 PROCEDURE — G8427 DOCREV CUR MEDS BY ELIG CLIN: HCPCS | Performed by: FAMILY MEDICINE

## 2022-01-13 PROCEDURE — 82728 ASSAY OF FERRITIN: CPT

## 2022-01-13 PROCEDURE — 82306 VITAMIN D 25 HYDROXY: CPT

## 2022-01-13 PROCEDURE — 82746 ASSAY OF FOLIC ACID SERUM: CPT

## 2022-01-13 PROCEDURE — 84443 ASSAY THYROID STIM HORMONE: CPT

## 2022-01-13 PROCEDURE — 2022F DILAT RTA XM EVC RTNOPTHY: CPT | Performed by: FAMILY MEDICINE

## 2022-01-13 PROCEDURE — 80061 LIPID PANEL: CPT

## 2022-01-13 PROCEDURE — 87088 URINE BACTERIA CULTURE: CPT

## 2022-01-13 PROCEDURE — 82607 VITAMIN B-12: CPT

## 2022-01-13 PROCEDURE — 85025 COMPLETE CBC W/AUTO DIFF WBC: CPT

## 2022-01-13 PROCEDURE — 4040F PNEUMOC VAC/ADMIN/RCVD: CPT | Performed by: FAMILY MEDICINE

## 2022-01-13 PROCEDURE — 83690 ASSAY OF LIPASE: CPT

## 2022-01-13 PROCEDURE — 83036 HEMOGLOBIN GLYCOSYLATED A1C: CPT

## 2022-01-13 PROCEDURE — G8484 FLU IMMUNIZE NO ADMIN: HCPCS | Performed by: FAMILY MEDICINE

## 2022-01-13 PROCEDURE — 87077 CULTURE AEROBIC IDENTIFY: CPT

## 2022-01-13 PROCEDURE — 36415 COLL VENOUS BLD VENIPUNCTURE: CPT

## 2022-01-13 PROCEDURE — 81003 URINALYSIS AUTO W/O SCOPE: CPT

## 2022-01-13 PROCEDURE — G8399 PT W/DXA RESULTS DOCUMENT: HCPCS | Performed by: FAMILY MEDICINE

## 2022-01-13 PROCEDURE — 1036F TOBACCO NON-USER: CPT | Performed by: FAMILY MEDICINE

## 2022-01-13 PROCEDURE — G8417 CALC BMI ABV UP PARAM F/U: HCPCS | Performed by: FAMILY MEDICINE

## 2022-01-13 PROCEDURE — 99214 OFFICE O/P EST MOD 30 MIN: CPT | Performed by: FAMILY MEDICINE

## 2022-01-13 PROCEDURE — 3017F COLORECTAL CA SCREEN DOC REV: CPT | Performed by: FAMILY MEDICINE

## 2022-01-13 PROCEDURE — 3046F HEMOGLOBIN A1C LEVEL >9.0%: CPT | Performed by: FAMILY MEDICINE

## 2022-01-13 NOTE — PROGRESS NOTES
Zion Hu : 1945 Sex: female  Age: 77 y.o. Chief Complaint   Patient presents with    Abdominal Pain     follow up; still having symtpoms     Other     review CT/US       HPI:      Patient presents today with her . States she is feeling well. They mention she does complain of some abdominal discomfort after eating at times. Has been following with Zofia Cesar, and had ultrasound, saw Dr. Veronica Mcmahan and get an ultrasound through him. Her last blood work was concerning to me and that lipase was elevated 145 But symptoms resolved. Subsequently had CT of the abdomen/pelvis which showed diverticulosis and degenerative changes of the spine without other abnormality, ultrasound of the gallbladder/liver showed no acute abnormality, findings consistent with fatty liver    Did not get repeat blood work. Generally low-fat diet although they do like to eat out.   Enjoys [place]    Most Recent Labs  CBC  Lab Results   Component Value Date    WBC 10.7 2021    WBC 10.7 2021    WBC 11.1 2021    RBC 5.07 2021    RBC 5.14 2021    RBC 5.37 2021    HGB 15.9 2021    HGB 15.8 2021    HGB 16.6 2021    HCT 47.1 2021    HCT 46.0 2021    HCT 48.5 2021    MCV 92.9 2021    MCV 89.5 2021    MCV 90.3 2021     2021     2021     2021      CMP  Lab Results   Component Value Date     2021     2021     2021    K 4.4 2021    K 4.0 2021    K 4.2 2021     2021     2021     2021    CO2 24 2021    CO2 24 2021    CO2 23 2021    ANIONGAP 12 2021    ANIONGAP 15 2021    ANIONGAP 14 2021    GLUCOSE 100 2021    GLUCOSE 104 2021    GLUCOSE 126 2021    GLUCOSE 104 08/15/2011    GLUCOSE 113 2011    GLUCOSE 97 2011    BUN 14 2021    BUN 12 07/13/2021    BUN 13 07/12/2021    CREATININE 0.7 12/02/2021    CREATININE 0.8 07/13/2021    CREATININE 0.8 07/12/2021    LABGLOM >60 12/02/2021    LABGLOM >60 07/13/2021    LABGLOM >60 07/12/2021    GFRAA >60 12/02/2021    GFRAA >60 07/13/2021    GFRAA >60 07/12/2021    CALCIUM 9.8 12/02/2021    CALCIUM 10.0 07/13/2021    CALCIUM 9.8 07/12/2021    PROT 6.9 12/02/2021    PROT 7.0 07/13/2021    PROT 7.2 07/12/2021    LABALBU 4.5 12/02/2021    LABALBU 4.4 07/13/2021    LABALBU 4.4 07/12/2021    LABALBU 4.4 08/15/2011    LABALBU 4.2 04/25/2011    LABALBU 4.2 03/24/2011    BILITOT 0.5 12/02/2021    BILITOT 0.6 07/13/2021    BILITOT 0.4 07/12/2021    ALKPHOS 81 12/02/2021    ALKPHOS 78 07/13/2021    ALKPHOS 79 07/12/2021    AST 28 12/02/2021    AST 28 07/13/2021    AST 25 07/12/2021    ALT 31 12/02/2021    ALT 30 07/13/2021    ALT 33 07/12/2021     A1C  Lab Results   Component Value Date    LABA1C 5.5 07/13/2021    LABA1C 5.5 07/12/2021    LABA1C 5.6 12/01/2020     TSH  Lab Results   Component Value Date    TSH 1.400 07/13/2021    TSH 1.320 07/12/2021    TSH 0.869 05/20/2021     FREET4  Lab Results   Component Value Date    Q1MVWVD 7.1 06/26/2019    Y1ZGKQG 7.6 05/21/2014     LIPID  Lab Results   Component Value Date    CHOL 230 07/13/2021    CHOL 218 05/20/2021    CHOL 233 02/08/2021    HDL 51 07/13/2021    HDL 42 05/20/2021    HDL 46 02/08/2021    LDLCALC 148 07/13/2021    LDLCALC 148 05/20/2021    LDLCALC 134 02/08/2021    TRIG 153 07/13/2021    TRIG 141 05/20/2021    TRIG 267 02/08/2021     VITAMIN D  Lab Results   Component Value Date    VITD25 57 07/13/2021    VITD25 60 07/12/2021    VITD25 94 05/20/2021     MAGNESIUM  Lab Results   Component Value Date    MG 2.2 05/22/2014    MG 2.0 04/28/2014    MG 2.3 03/23/2011      PHOS  No results found for: PHOS   RON   Lab Results   Component Value Date    RON NEGATIVE 11/29/2012     RHEUMATOID FACTOR  No results found for: RF  PSA  No results found for: PSA   HEPATITIS C  Lab Results   Component Value Date    HCVABI Non-Reactive 08/12/2020     HIV  No results found for: FYC8PHR, HIV1QT  UA  Lab Results   Component Value Date    COLORU Yellow 12/02/2021    COLORU yellow 11/26/2021    COLORU Yellow 09/16/2021    COLORU yellow 09/16/2021    COLORU Yellow 08/30/2021    CLARITYU Clear 12/02/2021    CLARITYU clear 11/26/2021    CLARITYU Clear 09/16/2021    CLARITYU cloudy 09/16/2021    CLARITYU Clear 08/30/2021    GLUCOSEU Negative 12/02/2021    GLUCOSEU negative 11/26/2021    GLUCOSEU Negative 09/16/2021    GLUCOSEU negative 09/16/2021    GLUCOSEU Negative 08/30/2021    BILIRUBINUR Negative 12/02/2021    BILIRUBINUR negative 11/26/2021    BILIRUBINUR Negative 09/16/2021    BILIRUBINUR negative 09/16/2021    BILIRUBINUR Negative 08/30/2021    BILIRUBINUR negative 08/09/2021    KETUA Negative 12/02/2021    KETUA negative 11/26/2021    KETUA TRACE 09/16/2021    KETUA negative 09/16/2021    KETUA Negative 08/30/2021    SPECGRAV >=1.030 12/02/2021    SPECGRAV 1.030 11/26/2021    SPECGRAV >=1.030 09/16/2021    SPECGRAV >=1.030 09/16/2021    SPECGRAV >=1.030 08/30/2021    BLOODU Negative 12/02/2021    BLOODU trace 11/26/2021    BLOODU Negative 09/16/2021    BLOODU trace 09/16/2021    BLOODU Negative 08/30/2021    PHUR 6.0 12/02/2021    PHUR 6.0 11/26/2021    PHUR 6.0 09/16/2021    PHUR 6.0 09/16/2021    PHUR 5.5 08/30/2021    PROTEINU Negative 12/02/2021    PROTEINU negative 11/26/2021    PROTEINU Negative 09/16/2021    PROTEINU negative 09/16/2021    PROTEINU Negative 08/30/2021    UROBILINOGEN 0.2 12/02/2021    UROBILINOGEN 0.2 09/16/2021    UROBILINOGEN 0.2 08/30/2021    NITRU Negative 12/02/2021    NITRU Negative 09/16/2021    NITRU Negative 08/30/2021    LEUKOCYTESUR Negative 12/02/2021    LEUKOCYTESUR small 11/26/2021    LEUKOCYTESUR Negative 09/16/2021    LEUKOCYTESUR small 09/16/2021    LEUKOCYTESUR Negative 08/30/2021     Urine Micro/Albumin Ratio  Lab Results   Component Value Date [facility]30[facility]    [facility]    [facility] - 02/08/2021         ROS:   Currently asymptomatic with current review of systems  ROS:  Const: Denies chills, fever, malaise and sweats. Eyes: Denies discharge, pain, redness and visual disturbance. ENMT: Denies earaches, other ear symptoms. Denies nasal or sinus symptoms other than stated  above. Denies mouth and tongue lesions and sore throat. CV: Denies chest discomfort, pain; diaphoresis, dizziness, edema, lightheadedness, orthopnea,  palpitations, syncope and near syncopal episode or any exertional symptoms  Resp: Denies cough, hemoptysis, pleuritic pain, SOB, sputum production and wheezing. GI: Denies abdominal pain, change in bowel habits, hematochezia, melena, nausea and vomiting. : Denies urinary symptoms including dysuria , urgency, frequency or hematuria. Musculo: Denies musculoskeletal symptoms. Skin: Denies bruising and rash.   Neuro: Denies headache, numbness, stiff neck, tingling and focal weakness slurred speech or facial  droop  Hema/Lymph: Denies bleeding/bruising tendency and enlarged lymph nodes      Current Outpatient Medications:     omeprazole (PRILOSEC) 20 MG delayed release capsule, Take 1 capsule by mouth daily Ideally 1hr prior to dinner, Disp: 30 capsule, Rfl: 1    donepezil (ARICEPT) 10 MG tablet, TAKE 1 TABLET BY MOUTH TWICE DAILY, Disp: 180 tablet, Rfl: 3    metoprolol succinate (TOPROL XL) 100 MG extended release tablet, Take 1 tablet by mouth 2 times daily, Disp: 180 tablet, Rfl: 3    levothyroxine (SYNTHROID) 100 MCG tablet, Take 1 tablet by mouth daily Ideally on empty stomach, Disp: 30 tablet, Rfl: 3    memantine (NAMENDA) 10 MG tablet, Take 1 tablet by mouth 2 times daily, Disp: 180 tablet, Rfl: 3    omega-3 acid ethyl esters (LOVAZA) 1 g capsule, Take 2 capsules by mouth 2 times daily, Disp: 360 capsule, Rfl: 3    metFORMIN (GLUCOPHAGE) 500 MG tablet, Take 1 tablet by mouth daily , Disp: , Rfl:    the most    Drug use: No      Social History     Social History Narrative    PMH:    Problem List: Urinary tract infectious disease, Obstructive sleep apnea syndrome, Adult health    examination, Adult health examination, Constipation, Derangement of knee, Vitamin D deficiency,    Hypothyroidism, Conduction disorder of the heart, Hyperlipidemia    Health Maintenance:    Mini Mental Status - (2018)    Colonoscopy - (2018)    Colonoscopy Screening - (2018)    Mammogram Screening - (2018)    Mammogram - (2018)    Colonoscopy - (2010)    Couseled on Home Safety - (2015)    Bone Density Scan - (3/28/2012)    Medical Problems:    Sleep Apnea - Dr Chalo Elizabeth, cpap    Pneumonia    Hypothyroidism - Dr Chantell Cooper - Dr Alva Conway    Hyperlipidemia - intolerance to all statins    Cardiac Arrhythmia - ? type. s/p ablation    cardiac dysrhythmia - pauses - lead to pacemaker    Skin Cancer    Surgical Hx:    Partial Hysterectomy - Still with Both Ovaries. Breast Implants    Pacemaker - Dr Isabel Begum    Knee Replacement, Carpal Tunnel Release, colon-vaginal fistula repair    Bladder Repair - sling    RT Hip Arthroplasty - MARGARITA        FH:    Father:    . (Hx)    Mother:    . (Hx)    Dad - MI 39,  70 MI    Mom - DM , currently living age 80        SH: Marital: . Personal Habits: Cigarette Use: Former Cigarette Smoker - quit age 36. Occ ETOH, minimal. . 2 kids, 6 step kids. 27 grandkids. .Alcohol: Rarely consumes alcohol        Vitals:    22 1356   BP: 130/64   Pulse: 76   Temp: 97.6 °F (36.4 °C)   SpO2: 94%   Weight: 192 lb (87.1 kg)     Wt Readings from Last 3 Encounters:   22 192 lb (87.1 kg)   21 191 lb (86.6 kg)   21 191 lb (86.6 kg)        Physical Exam    Exam:  Const: Appears comfortable. No signs of acute distress present. Head/Face: Atraumatic, normocephalic on inspection. Eyes: No discharge from the eyes. Sclerae clear.   ENMT: Ears clear nose clear oropharynx clear  Neck: Supple. Palpation reveals no adenopathy. No masses appreciated. No JVD. Resp: Respirations are unlabored. Clear to auscultation bilaterally. No rales, rhonchi or wheezes appreciated over the  lungs bilaterally. CV: RRR   Extremities: No clubbing or cyanosis. No edema of the lower limbs  bilaterally. No calf inflammation or tenderness. Abdomen: Abdomen is soft,and nondistended. No abdominal masses  appreciated. No palpable hepatosplenomegaly. Bowel sounds are normoactive. Now with mild epigastric tenderness no rebound or guarding  Skin: Dry and warm with no rash. Various lesions and sun damaged skin. Follows with Dr. Koffi Templeton  Muscular skeletal: No acute joint inflammation. Neuro:Grossly intact without focal deficit  No CVA tenderness    Office Labs This Visit :  No results found for this visit on 01/13/22. Assessment and Plan:   Diagnosis Orders   1. Generalized abdominal pain  Ambulatory referral to General Surgery   2. Elevated lipase  Ambulatory referral to General Surgery   3. Recurrent UTI     4. Dementia without behavioral disturbance, unspecified dementia type ([facility] [address]     5. Lichen sclerosus     6. Type 2 diabetes mellitus with hyperglycemia, without long-term current use of insulin (HCC)     7. Acquired hypothyroidism     8. Postprandial nausea     9. Mixed hyperlipidemia     10. Vitamin B12 deficiency       Abdominal discomfort  Mostly epigastric now. On omeprazole without much results, sooner precautions reviewed including/electrolyte imbalance. Lipase was elevated which is concerning, subsequent ultrasound/CT unrevealing. Repeat blood work. May need MRCP/ERCP. May need EGD. Refer to Dr. [place] Adam      Elevated lipase    Counseled extensively. Differential reviewed, including serious etiologies. Asymptomatic now. Low-fat diet. Ultrasound/CT unrevealing, await repeat blood work, see above.   Emphasize low-fat small frequent meals      Recurrent urinary tract infection  Counseled. Differential reviewed, including serious intra-abdominal pathology, including stones, including vaginal infections. Recent UA/culture reviewed. Most recent negative. Recently saw GYN. Continue proper hydration, proper hygiene rationale behind cranberry juice reviewed. Now following with Dr. Felecia Ross and has ultrasound pending      Hematuria, unspecified type  Last check negative. Following with Dr. Olivas Gall          Lichen sclerosus  On 11/21 labial biopsy through Dr. Sydney Granado, counseled, she recently saw      Constipation  Counseled extensively. Differential reviewed, including serious etiologies. Resolved after MiraLAX taper           Polycythemia  Counseled. Hemoglobin fluctuates, ferritin was borderline high, but improved. Monitor. Counseled on hemochromatosis. Proper hydration. Counseled extensively. Differential reviewed, including serious etiologies. Simply wants basic monitoring at this time. Neoplasm of uncertain behavior of skin  Counseled. Continue per Dr. Dr. Lulu Shcafer.           Obstructive sleep apnea syndrome     Counseled.  Doing very well CPAP.  Uses it at least 8 to 10 hours per night 7 nights per week with very good results.  Got a new machine toward the end of 2019. Encourage her machine be checked. She follows up with Dr. Jayjay Parker 10/27/2021     Hypothyroidism     TSH elevated on 112mcg qd, 1/2 Sunday. Was suppressed on 112 qd in the past. Declines d.a.w. Wants to continue generic. Jayant Case Defers imaging. TSH normal but free T4 elevated,took medicine shortly before blood draw. They states she cannot remember not to do this. Monitor. Asymptomatic              Type 2 diabetes mellitus with hyperglycemia, without long-term current use of insulin ([facility])  Hemoglobin A1c stable, 5.9 to 6.1 -5.65.55.5 on metformin 500 mg twice a day. Previously 1000 twice a day. Precautions reviewed.   Risks  reviewed, proper hydration reviewed, standard precautions reviewed including loose bowels, LA. Encouraged  she watch blood sugar ambulatory with parameters reviewed call in if out of range. Hyper and hypoglycemic precautions reviewed. Micro-and macrovascular  complications reviewed.  Importance of at least yearly eye exams and daily foot exams reviewed.  Tolerating metformin . no longer on invokana. Monitor               Dementia without behavioral disturbance  Counseled extensively.  Differential reviewed including various forms of dementia and pseudodementia.  On Aricept 10 mg daily, and Namenda.   Continue per Dr. Justin Lawrence were seeing Dr. Lennox Elizabeth specialist from [facility]. However they re noncompliant without follow-up, feels it is a \"racket\". Follow-up with Dr. Jaclyn Cason. She is enrolled in a study, nitrate water. She is not driving and we emphasized importance of this. Safety precautions reviewed.    feels her dementia is stable. Saw Dr. Jaclyn Cason yesterday, he is going to continue current management and she follows up again March 2022.        Mixed hyperlipidemia  not at goal  Significant risk of cerebrovascular/cardiovascular event. Imperative this  be controlled with history of TIA, however adamantly refuses any further treatment at this time. Refuses to try any other statin or Zetia stating that these were not tolerated in the  past. She did not tolerate Niaspan. did not tolerate WelChol. . declines  cholestyramine. Risk of stroke and heart attack reviewed. lifestyle modification  reviewed. risk of hyperlipidemia reviewed . Did not tolerate fenofibrate  Counseled on repatha, declines.      tolerating Lovaza, declines change in therapy despite counseling     Supraventricular tachycardia ([address]  h/o svt Status post ablation  . Also history of long pauses-since had pacemaker by Dr. Toño LOPEZ with her. Follow-up Dr Elisabeth Ramirez .  Asymptomatic sends recordings by phone.  ekg done and reviewed with her     Liver disease  Counseled extensively. Differential reviewed, including serious etiologies. Likely  fatty liver. Precautions reviewed. Lifestyle modification appropriate diet and weight  loss reviewed. Risks of even this leading to cirrhosis reviewed. Other than basic  monitoring on interested in other evaluation or treatment, in-depth blood work,  imaging or otherwise. Hep C 10/18 negative, again was negative 9/20.   LFTs currently normal              Tubular adenoma  Small polyp 7/18. Dr. Domenic Wilson recommended basic screenings if repeated at all.  Asymptomatic     Vitamin D deficiency  On supplementation she was borderline toxic, it has stabilized, continue off vitamin D. Monitor     Health maintenance examination  Health maintenance issues discussed at length  6/21. Encourage yearly.     B12 deficiency  Risk of high and low reviewed. Last time high, discussed backing off. Monitor  Postprandial nausea  Lipase elevated as above. Now on omeprazole. Encourage low-fat small frequent meals, bland diet. Refer to Dr. Sheyla Lindsay. CT/ultrasound unrevealing    Plan as above. Counseled extensively and differential diagnoses relevant to above were reviewed, including serious etiologies. Side effects and interactions of medications were reviewed. My concerns reviewed, low-fat small frequent meals. ER for any serious signs or symptoms and watch closely. Await repeat blood work, ordered again today. May need MRCP/ERCP. Refer Dr. Sheyla Lindsay. As long as asymptomatic follow-up 1 to 2 weeks sooner as needed. As long as symptoms steadily improve/resolve, and medical conditions follow the expected course, FU as below, sooner PRN. Return in about 2 weeks (around 1/27/2022). Signs and symptoms to watch for discussed, serious signs and symptoms reviewed. ER if any. Balaji Mckee MD    Patients are advised to check with insurance company to ensure coverage and to fully understand benefits and cost prior to any testing.   This note was created with the assistance of voice recognition software. Document was reviewed however may contain grammatical errors.

## 2022-01-15 LAB
ORGANISM: ABNORMAL
URINE CULTURE, ROUTINE: ABNORMAL

## 2022-01-17 ENCOUNTER — HOSPITAL ENCOUNTER (OUTPATIENT)
Dept: ULTRASOUND IMAGING | Age: 77
Discharge: HOME | End: 2022-01-19
Payer: MEDICARE

## 2022-01-17 DIAGNOSIS — N30.20 OTHER CHRONIC CYSTITIS WITHOUT HEMATURIA: ICD-10-CM

## 2022-01-17 PROCEDURE — 76770 US EXAM ABDO BACK WALL COMP: CPT

## 2022-01-18 NOTE — RESULT ENCOUNTER NOTE
Pt with bacteria in urine. Current guidelines are not to treat asymptomatic bacturia but if sxs of uti should be tx'd. Let me know. .. Abilio Mccollum

## 2022-01-19 DIAGNOSIS — N39.0 RECURRENT UTI: Primary | ICD-10-CM

## 2022-01-19 RX ORDER — NITROFURANTOIN 25; 75 MG/1; MG/1
100 CAPSULE ORAL 2 TIMES DAILY
Qty: 14 CAPSULE | Refills: 0 | Status: SHIPPED | OUTPATIENT
Start: 2022-01-19 | End: 2022-01-26

## 2022-01-19 NOTE — RESULT ENCOUNTER NOTE
Sridhar Malcolm with standard precautions, keep follow-up January 28, repeat urinalysis and culture then, sooner as needed.

## 2022-01-20 ENCOUNTER — OFFICE VISIT (OUTPATIENT)
Dept: SURGERY | Age: 77
End: 2022-01-20
Payer: MEDICARE

## 2022-01-20 VITALS
TEMPERATURE: 97.3 F | BODY MASS INDEX: 32.1 KG/M2 | HEART RATE: 82 BPM | OXYGEN SATURATION: 98 % | DIASTOLIC BLOOD PRESSURE: 76 MMHG | RESPIRATION RATE: 18 BRPM | SYSTOLIC BLOOD PRESSURE: 130 MMHG | HEIGHT: 64 IN | WEIGHT: 188 LBS

## 2022-01-20 DIAGNOSIS — R10.9 ABDOMINAL PAIN, UNSPECIFIED ABDOMINAL LOCATION: Primary | ICD-10-CM

## 2022-01-20 PROCEDURE — 1123F ACP DISCUSS/DSCN MKR DOCD: CPT | Performed by: SURGERY

## 2022-01-20 PROCEDURE — 4040F PNEUMOC VAC/ADMIN/RCVD: CPT | Performed by: SURGERY

## 2022-01-20 PROCEDURE — 99203 OFFICE O/P NEW LOW 30 MIN: CPT | Performed by: SURGERY

## 2022-01-20 PROCEDURE — G8427 DOCREV CUR MEDS BY ELIG CLIN: HCPCS | Performed by: SURGERY

## 2022-01-20 PROCEDURE — G8484 FLU IMMUNIZE NO ADMIN: HCPCS | Performed by: SURGERY

## 2022-01-20 PROCEDURE — G8417 CALC BMI ABV UP PARAM F/U: HCPCS | Performed by: SURGERY

## 2022-01-20 PROCEDURE — G8399 PT W/DXA RESULTS DOCUMENT: HCPCS | Performed by: SURGERY

## 2022-01-20 PROCEDURE — 3017F COLORECTAL CA SCREEN DOC REV: CPT | Performed by: SURGERY

## 2022-01-20 PROCEDURE — 1036F TOBACCO NON-USER: CPT | Performed by: SURGERY

## 2022-01-20 PROCEDURE — 1090F PRES/ABSN URINE INCON ASSESS: CPT | Performed by: SURGERY

## 2022-01-20 RX ORDER — DICYCLOMINE HCL 20 MG
20 TABLET ORAL
Qty: 120 TABLET | Refills: 2 | Status: SHIPPED
Start: 2022-01-20 | End: 2022-08-15

## 2022-01-20 NOTE — PROGRESS NOTES
[facility] Surgery Clinic Note    Assessment/Plan:      Diagnosis Orders   1. Abdominal pain, left abdomen  dicyclomine (BENTYL) 20 MG tablet    Etiology unclear, possibly functional.  Trial Bentyl. Recommended probiotic. We will plan for upper and lower endoscopy. Return for Endoscopy. Chief Complaint   Patient presents with    New Patient     abdominal pain/elevated lipase       PCP: Roxi Stewart MD    HPI: Anaid Vallejo is a 77 y.o. female who presents in consultation for \"stomach issues. \"  Her  say they did not initially really know why they are here. They give conflicting stories throughout the interview. They do not appear to be great historians for her. He does say she has dementia. She complains of abdominal pain apparently. It seems on the left side. He says it is worse with eating and she does not want to eat because of that however she says there is no relation to food. There is no nausea or vomiting. Her there is no diarrhea or constipation that they are aware of. There is no blood that we know about. They state that her last colonoscopy was \"a long time ago. \"  Looking through the chart looks like she had one in 2018 with Dr. Nikki Castano for similar symptoms that was unremarkable. Did not try anything for the pain. It does not seem to be positional and happens when she is sitting or standing. They are unsure if she is ever had an EGD before. She denies any reflux. They have been on Prilosec for about a week or 2 they state that they are not sure if it is helping or not. She had a negative ultrasound and CT evaluation aside from fatty liver and severe diverticulosis. There is no family history of colon cancer or inflammatory bowel disease.       Past Medical History:   Diagnosis Date    Arthritis     Diabetes mellitus ([address]     Difficult intubation     carries card, copy on chart  Glidescope#3 with ETT size7    Hyperlipidemia     Hypothyroidism     Left sided abdominal pain     Memory problem     still competent    GORDY on CPAP     Pacemaker     Rectal bleeding     Ringing in the ears        Past Surgical History:   Procedure Laterality Date    ABLATION OF DYSRHYTHMIC FOCUS  1996    BREAST SURGERY      implants    COLONOSCOPY  2009? Dr. Franny Mccann twisted     COLONOSCOPY  2008? Dr. Maryam Betancourt. incomplete.  COLONOSCOPY  12/17/2018    Dr. Kirsten Sotelo N/A 12/17/2018    COLONOSCOPY DIAGNOSTIC performed by Aaron Armijo MD at [address] [place] [address]  09/10/2011    POSTERIOR    HYSTERECTOMY      vaginal. uterus only.  KNEE ARTHROPLASTY Right     partial    KNEE ARTHROSCOPY  9/08/2011    right knee    PACEMAKER PLACEMENT  5/23/2014    Medtronic dual chamber    TOTAL HIP ARTHROPLASTY Right 2015       Prior to Admission medications    Medication Sig Start Date End Date Taking?  Authorizing Provider   dicyclomine (BENTYL) 20 MG tablet Take 1 tablet by mouth 3 times daily (before meals) 1/20/22  Yes Cristobal Palma MD   nitrofurantoin, macrocrystal-monohydrate, (MACROBID) 100 MG capsule Take 1 capsule by mouth 2 times daily for 7 days 1/19/22 1/26/22 Yes Balaji Mckee MD   omeprazole (PRILOSEC) 20 MG delayed release capsule Take 1 capsule by mouth daily Ideally 1hr prior to dinner 12/2/21  Yes Balaji Mckee MD   donepezil (ARICEPT) 10 MG tablet TAKE 1 TABLET BY MOUTH TWICE DAILY 11/5/21  Yes Charlotte Martell MD   metoprolol succinate (TOPROL XL) 100 MG extended release tablet Take 1 tablet by mouth 2 times daily 7/7/21  Yes Balaji Mckee MD   levothyroxine (SYNTHROID) 100 MCG tablet Take 1 tablet by mouth daily Ideally on empty stomach 5/21/21  Yes Balaji Mckee MD   memantine (NAMENDA) 10 MG tablet Take 1 tablet by mouth 2 times daily 3/16/21  Yes Charlotte Martell MD   omega-3 acid ethyl esters (LOVAZA) 1 g capsule Take 2 capsules by mouth 2 times daily 2/17/21  Yes Balaji Mckee MD   metFORMIN (GLUCOPHAGE) 500 MG tablet Take 1 tablet by mouth daily  19  Yes Historical Provider, MD   mirabegron (MYRBETRIQ) 50 MG TB24 Take 1 tablet by mouth daily    Yes Historical Provider, MD   B Complex Vitamins (B-COMPLEX/B-12 PO) Take by mouth daily LD 18   Yes Historical Provider, MD       Allergies   Allergen Reactions    Morphine Itching    Statins      Other reaction(s): Unknown    Statins Depletion Therapy Other (See Comments)     MYALGIA       Social History     Socioeconomic History    Marital status:      Spouse name: None    Number of children: None    Years of education: None    Highest education level: None   Occupational History    None   Tobacco Use    Smoking status: Former Smoker     Packs/day: 1.50     Years: 13.00     Pack years: 19.50     Types: Cigarettes     Start date: 1973     Quit date: 1986     Years since quittin.0    Smokeless tobacco: Never Used    Tobacco comment: quit smoking    Vaping Use    Vaping Use: Never used   Substance and Sexual Activity    Alcohol use: Yes     Comment: socially -- 3 drinks per month at the most    Drug use: No    Sexual activity: None   Other Topics Concern    None   Social History Narrative    PMH:    Problem List: Urinary tract infectious disease, Obstructive sleep apnea syndrome, Adult health    examination, Adult health examination, Constipation, Derangement of knee, Vitamin D deficiency,    Hypothyroidism, Conduction disorder of the heart, Hyperlipidemia    Health Maintenance:    Mini Mental Status - (2018)    Colonoscopy - (2018)    Colonoscopy Screening - (2018)    Mammogram Screening - (2018)    Mammogram - (2018)    Colonoscopy - (2010)    Couseled on Home Safety - (2015)    Bone Density Scan - (3/28/2012)    Medical Problems:    Sleep Apnea - Dr Verner Fleischer, cpap    Pneumonia    Hypothyroidism - Dr Federico Arce - Dr Zak Styles    Hyperlipidemia - intolerance to all statins    Cardiac Arrhythmia - ? type. s/p ablation    cardiac dysrhythmia - pauses - lead to pacemaker    Skin Cancer    Surgical Hx:    Partial Hysterectomy - Still with Both Ovaries. Breast Implants    Pacemaker - Dr Feli Graf    Knee Replacement, Carpal Tunnel Release, colon-vaginal fistula repair    Bladder Repair - sling    RT Hip Arthroplasty - MARGARITA        FH:    Father:    . (Hx)    Mother:    . (Hx)    Dad - MI 39,  70 MI    Mom - DM , currently living age 80        SH: Marital: . Personal Habits: Cigarette Use: Former Cigarette Smoker - quit age 36. Occ ETOH, minimal. . 2 kids, 6 step kids. 27 grandkids. .Alcohol: Rarely consumes alcohol     Social Determinants of Health     Financial Resource Strain: Unknown    Difficulty of Paying Living Expenses: Patient refused   Food Insecurity: Unknown    Worried About Running Out of Food in the Last Year: Patient refused   [address] in the Last Year: Patient refused   Transportation Needs: Unknown    Lack of Transportation (Medical): Patient refused    Lack of Transportation (Non-Medical): Patient refused   Physical Activity:     Days of Exercise per Week: Not on file   [employer] of Exercise per Session: Not on file   Stress:     Feeling of Stress : Not on file   Social Connections:     Frequency of Communication with Friends and Family: Not on file    Frequency of Social Gatherings with Friends and Family: Not on file    Attends Mandaen Services: Not on file    Active Member of [address] [place] [address] [employer] or Organizations: Not on file    Attends Club or Organization Meetings: Not on file    Marital Status: Not on file   Intimate Partner Violence:     Fear of Current or Ex-Partner: Not on file    Emotionally Abused: Not on file    Physically Abused: Not on file    Sexually Abused: Not on file   Housing Stability:     Unable to Pay for Housing in the Last Year: Not on file    Number of [place] in the Last Year: Not on file    Unstable Housing in the Last Year: Not on file       Family History   Problem Relation Age of Onset   Paulette Lawson Pacemaker Mother     Heart Attack Father     Cancer Other 48        Bone Marrow Cancer / Had Bone Marrow Transplant       Review of Systems   All other systems reviewed and are negative. Objective:  Vitals:    01/20/22 1410   BP: 130/76   Pulse: 82   Resp: 18   Temp: 97.3 °F (36.3 °C)   TempSrc: Temporal   SpO2: 98%   Weight: 188 lb (85.3 kg)   Height: 5' 4\" (1.626 m)          Physical Exam  HENT:      Head: Normocephalic and atraumatic. Eyes:      General:         Right eye: No discharge. Left eye: No discharge. Neck:      Trachea: No tracheal deviation. Cardiovascular:      Rate and Rhythm: Normal rate. Pulmonary:      Effort: Pulmonary effort is normal. No respiratory distress. Abdominal:      General: There is no distension. Palpations: Abdomen is soft. Tenderness: There is no guarding or rebound. Skin:     General: Skin is warm and dry. Neurological:      Mental Status: She is alert and oriented to person, place, and time. Janie Quigley MD      NOTE: This report, in part or full,may have been transcribed using voice recognition software. Every effort was made to ensure accuracy; however, inadvertent computerized transcription errors may be present. Please excuse any transcriptional grammatical or spelling errors that may have escaped my editorial review.     CC: Lowery Primrose, MD

## 2022-01-24 ENCOUNTER — TELEPHONE (OUTPATIENT)
Dept: SURGERY | Age: 77
End: 2022-01-24

## 2022-01-24 NOTE — TELEPHONE ENCOUNTER
Prior Authorization Form:      DEMOGRAPHICS:                     Patient Name:  Daniel Ma  Patient :  1945            Insurance:  Payor: MEDICARE / Plan: MEDICARE PART A AND B / Product Type: *No Product type* /   Insurance ID Number:    Payor/Plan Subscr  Sex Relation Sub. Ins. ID Effective Group Num   1. MEDICARE - Donny [place]* 1945 Female Self [id number] 1/1/15                                    [address] [address]   2.  [address]* 1945 Female Self [id number] 21 Plan F                                   [address]         DIAGNOSIS & PROCEDURE:                       Procedure/Operation: EGD/colonoscopy           CPT Code: 80170/32548    Diagnosis:  Abdominal pain    ICD10 Code: R10.9    Location:  [facility]    Surgeon:  Dr Cher Nogueira INFORMATION:                          Date: 22    Time: 10:30 am              Anesthesia:  [facility]                                                       Status:  Outpatient        Special Comments:         Electronically signed by Todd Soto MA on 2022 at 1:27 PM

## 2022-01-24 NOTE — TELEPHONE ENCOUNTER
Yaima Mora is scheduled for EGD/colonoscopy with Dr Mandi Piña on 01-26-22 at [facility] at 10:30 am. Patient was told to arrive at 9:30 am. Patient needs to be NPO after midnight the night before procedure. All surgery instructions were explained to the patient and a surgery letter was also mailed out. MA informed patient that PAT will also be calling to review pre-op instructions and medications. Patient verbalized understanding.   Electronically signed by Thao Sandoval MA on 1/24/2022 at 1:26 PM

## 2022-01-24 NOTE — PROGRESS NOTES
[address] PRE-ADMISSION TESTING ENDOSCOPY INSTRUCTIONS- [facility]-phone number:[phone]    ENDOSCOPY INSTRUCTIONS:   [x] Bowel prep instructions reviewed. [x] Nothing by mouth after midnight, including gum, candy, mints, or water. Please follow your surgeons instructions if you are required to complete a bowel prep. Colonoscopy- no solid food-only clear liquids the day prior). [x] You may brush your teeth, gargle, but do NOT swallow water. [x] Do not wear makeup, lotions, powders, deodorant. Nail polish as directed by the nurse. [x] Arrange transportation with a responsible adult  to and from the hospital. If you do not have a responsible adult  to transport you, you will need to make arrangements with a medical transportation company (i.e. [employer]. A Uber/taxi/bus is not appropriate unless you are accompanied by a responsible adult ). Arrange for someone to be with you for the remainder of the day and for 24 hours after your procedure due to having had anesthesia. Who will be your  for transportation? __William________   Who will be staying with you for 24 hrs after your procedure?__________________    PARKING INSTRUCTIONS:   [x] Arrival Time:____0930___________  · [x] Parking lot  \"I\" OR 1 is located on [facility] (the corner of [facility]). To enter, press the button and the gate will lift. A free token will be provided to exit the lot. One car per patient is allowed to park in this lot. All other cars are to park on [address] [place] [address] either in the parking garage or the handicap lot. [] To reach the [facility] lobby from [address] [place] [address], upon entering the hospital, take elevator B to the 3rd floor. EDUCATION INSTRUCTIONS:  [x] Bring a complete list of your medications, please write the last time you took the medicine, give this list to the nurse.   [x] Take the following medications the morning of surgery with 1-2 ounces of water:  Metoprolol, Aricept & Namenda  [x] Stop herbal supplements and vitamins 5 days before your surgery. [x] DO NOT take any diabetic medicine the morning of surgery. Follow instructions for insulin the day before surgery. [x] If you are diabetic and your blood sugar is low or you feel symptomatic, you may drink 1-2 ounces of apple juice or take a glucose tablet. The morning of your procedure, you may call the pre-op area if you have concerns about your blood sugar [phone]. [] Use your inhalers the morning of surgery. Bring your emergency inhaler with you day of surgery. [x] Follow physician instructions regarding any blood thinners you may be taking. WHAT TO EXPECT:  [x] The day of your procedure you will be greeted and checked in by the [employer].  In addition, you will be registered in the [place] by a Patient Access Representative. Please bring your photo ID and insurance card. A nurse will greet you in accordance to the time you are needed in the pre-op area to prepare you for surgery. Please do not be discouraged if you are not greeted in the order you arrive as there are many variables that are involved in patient preparation. Your patience is greatly appreciated as you wait for your nurse. Please bring in items such as: books, magazines, newspapers, electronics, or any other items  to occupy your time in the waiting area. [x]  Delays may occur. Staff will make a sincere effort to keep you informed of delays. If any delays occur with your procedure, we apologize ahead of time for your inconvenience as we recognize the value of your time.

## 2022-01-26 ENCOUNTER — ANESTHESIA EVENT (OUTPATIENT)
Dept: ENDOSCOPY | Age: 77
End: 2022-01-26
Payer: MEDICARE

## 2022-01-26 ENCOUNTER — ANESTHESIA (OUTPATIENT)
Dept: ENDOSCOPY | Age: 77
End: 2022-01-26
Payer: MEDICARE

## 2022-01-26 ENCOUNTER — HOSPITAL ENCOUNTER (OUTPATIENT)
Age: 77
Setting detail: OUTPATIENT SURGERY
Discharge: HOME | End: 2022-01-26
Attending: SURGERY | Admitting: SURGERY
Payer: MEDICARE

## 2022-01-26 VITALS
WEIGHT: 188 LBS | HEART RATE: 50 BPM | HEIGHT: 64 IN | SYSTOLIC BLOOD PRESSURE: 137 MMHG | OXYGEN SATURATION: 97 % | BODY MASS INDEX: 32.1 KG/M2 | DIASTOLIC BLOOD PRESSURE: 67 MMHG | TEMPERATURE: 97.7 F | RESPIRATION RATE: 16 BRPM

## 2022-01-26 VITALS — OXYGEN SATURATION: 100 % | DIASTOLIC BLOOD PRESSURE: 51 MMHG | SYSTOLIC BLOOD PRESSURE: 93 MMHG

## 2022-01-26 DIAGNOSIS — Z01.812 PRE-OPERATIVE LABORATORY EXAMINATION: Primary | ICD-10-CM

## 2022-01-26 LAB — METER GLUCOSE: 111 MG/DL (ref 74–99)

## 2022-01-26 PROCEDURE — 6360000002 HC RX W HCPCS: Performed by: NURSE ANESTHETIST, CERTIFIED REGISTERED

## 2022-01-26 PROCEDURE — 82962 GLUCOSE BLOOD TEST: CPT

## 2022-01-26 PROCEDURE — 3609027000 HC COLONOSCOPY: Performed by: SURGERY

## 2022-01-26 PROCEDURE — 7100000010 HC PHASE II RECOVERY - FIRST 15 MIN: Performed by: SURGERY

## 2022-01-26 PROCEDURE — 2709999900 HC NON-CHARGEABLE SUPPLY: Performed by: SURGERY

## 2022-01-26 PROCEDURE — 88305 TISSUE EXAM BY PATHOLOGIST: CPT

## 2022-01-26 PROCEDURE — 3700000001 HC ADD 15 MINUTES (ANESTHESIA): Performed by: SURGERY

## 2022-01-26 PROCEDURE — 45378 DIAGNOSTIC COLONOSCOPY: CPT | Performed by: SURGERY

## 2022-01-26 PROCEDURE — 2580000003 HC RX 258: Performed by: NURSE ANESTHETIST, CERTIFIED REGISTERED

## 2022-01-26 PROCEDURE — 43239 EGD BIOPSY SINGLE/MULTIPLE: CPT | Performed by: SURGERY

## 2022-01-26 PROCEDURE — 3609012400 HC EGD TRANSORAL BIOPSY SINGLE/MULTIPLE: Performed by: SURGERY

## 2022-01-26 PROCEDURE — 7100000011 HC PHASE II RECOVERY - ADDTL 15 MIN: Performed by: SURGERY

## 2022-01-26 PROCEDURE — 88342 IMHCHEM/IMCYTCHM 1ST ANTB: CPT

## 2022-01-26 PROCEDURE — 3700000000 HC ANESTHESIA ATTENDED CARE: Performed by: SURGERY

## 2022-01-26 RX ORDER — PROPOFOL 10 MG/ML
INJECTION, EMULSION INTRAVENOUS PRN
Status: DISCONTINUED | OUTPATIENT
Start: 2022-01-26 | End: 2022-01-26 | Stop reason: SDUPTHER

## 2022-01-26 RX ORDER — SODIUM CHLORIDE 9 MG/ML
INJECTION, SOLUTION INTRAVENOUS CONTINUOUS PRN
Status: DISCONTINUED | OUTPATIENT
Start: 2022-01-26 | End: 2022-01-26 | Stop reason: SDUPTHER

## 2022-01-26 RX ADMIN — PROPOFOL 400 MG: 10 INJECTION, EMULSION INTRAVENOUS at 10:36

## 2022-01-26 RX ADMIN — SODIUM CHLORIDE: 9 INJECTION, SOLUTION INTRAVENOUS at 10:22

## 2022-01-26 ASSESSMENT — PAIN - FUNCTIONAL ASSESSMENT: PAIN_FUNCTIONAL_ASSESSMENT: 0-10

## 2022-01-26 ASSESSMENT — PAIN SCALES - GENERAL
PAINLEVEL_OUTOF10: 0

## 2022-01-26 NOTE — ANESTHESIA PRE PROCEDURE
Department of Anesthesiology  Preprocedure Note       Name:  Abiola Mujica   Age:  77 y.o.  :  1945                                          MRN:  [id number]         Date:  2022      Surgeon: Suma Williamson):  Unruly Bhandari MD    Procedure: Procedure(s):  EGD ESOPHAGOGASTRODUODENOSCOPY  COLONOSCOPY DIAGNOSTIC    Medications prior to admission:   Prior to Admission medications    Medication Sig Start Date End Date Taking? Authorizing Provider   dicyclomine (BENTYL) 20 MG tablet Take 1 tablet by mouth 3 times daily (before meals) 22  Yes Unruly Bhandari MD   nitrofurantoin, macrocrystal-monohydrate, (MACROBID) 100 MG capsule Take 1 capsule by mouth 2 times daily for 7 days 22 Yes Syeda Gomez MD   omeprazole (PRILOSEC) 20 MG delayed release capsule Take 1 capsule by mouth daily Ideally 1hr prior to dinner 21  Yes Syeda Gomez MD   donepezil (ARICEPT) 10 MG tablet TAKE 1 TABLET BY MOUTH TWICE DAILY 21  Yes Ishmael Ang MD   metoprolol succinate (TOPROL XL) 100 MG extended release tablet Take 1 tablet by mouth 2 times daily 21  Yes Syeda Gomez MD   levothyroxine (SYNTHROID) 100 MCG tablet Take 1 tablet by mouth daily Ideally on empty stomach 21  Yes Syeda Gomez MD   memantine (NAMENDA) 10 MG tablet Take 1 tablet by mouth 2 times daily 3/16/21  Yes Ishmael Ang MD   metFORMIN (GLUCOPHAGE) 500 MG tablet Take 1 tablet by mouth daily  19  Yes Historical Provider, MD   mirabegron (MYRBETRIQ) 50 MG TB24 Take 1 tablet by mouth daily    Yes Historical Provider, MD   B Complex Vitamins (B-COMPLEX/B-12 PO) Take by mouth daily LD 18   Yes Historical Provider, MD   omega-3 acid ethyl esters (LOVAZA) 1 g capsule Take 2 capsules by mouth 2 times daily 21   Syeda Gomez MD       Current medications:    No current facility-administered medications for this encounter. Allergies:     Allergies   Allergen Reactions    Morphine Itching  Statins      Other reaction(s): Unknown    Statins Depletion Therapy Other (See Comments)     MYALGIA       Problem List:    Patient Active Problem List   Diagnosis Code    Hypothyroidism E03.9    Type 2 diabetes mellitus with hyperglycemia, without long-term current use of insulin (HCC) E11.65    Dementia without behavioral disturbance (HCC) F03.90    Mixed hyperlipidemia E78.2    Supraventricular tachycardia (HCC) I47.1    Liver disease K76.9    Tubular adenoma D36.9    Vitamin D deficiency E55.9    Cardiac arrhythmia I49.9    Obstructive sleep apnea syndrome G47.33    Urinary tract infection without hematuria N39.0    Neoplasm of uncertain behavior of skin D48.5    Polycythemia D75.1    Leukocytosis D72.829    Vitamin B12 deficiency E53.8    Lower abdominal pain R10.30    Abnormal urine odor R82.90    Elevated lipase R74.8       Past Medical History:        Diagnosis Date    Arthritis     Diabetes mellitus ([address]     Difficult intubation     carries card, copy on chart  Glidescope#3 with ETT size7    Hyperlipidemia     Hypothyroidism     Left sided abdominal pain     Memory problem     still competent    GORDY on CPAP     Pacemaker     Rectal bleeding     Ringing in the ears        Past Surgical History:        Procedure Laterality Date    ABLATION OF DYSRHYTHMIC FOCUS  1996    BREAST SURGERY      implants    COLONOSCOPY  2009? Dr. Geno Pacheco twisted     COLONOSCOPY  2008? Dr. Marcy Nicole. incomplete.  COLONOSCOPY  12/17/2018    Dr. Christiano Sanches N/A 12/17/2018    COLONOSCOPY DIAGNOSTIC performed by Neel Washington MD at [address] [place] [address]  09/10/2011    POSTERIOR    HYSTERECTOMY      vaginal. uterus only.      KNEE ARTHROPLASTY Right     partial    KNEE ARTHROSCOPY  9/08/2011    right knee    PACEMAKER PLACEMENT  5/23/2014    Medtronic dual chamber    TOTAL HIP ARTHROPLASTY Right 2015       Social History:    Social History     Tobacco Use    Smoking status: Former Smoker     Packs/day: 1.50     Years: 13.00     Pack years: 19.50     Types: Cigarettes     Start date: 1973     Quit date: 1986     Years since quittin.0    Smokeless tobacco: Never Used    Tobacco comment: quit smoking    Substance Use Topics    Alcohol use: Yes     Comment: socially -- 3 drinks per month at the most                                Counseling given: Not Answered  Comment: quit smoking       Vital Signs (Current):   Vitals:    22 1427 22 0930 22 0942   BP:   (!) 148/70   Pulse:   60   Resp:   20   Temp:   36.9 °C (98.5 °F)   TempSrc:   Temporal   SpO2:   96%   Weight: 188 lb (85.3 kg) 188 lb (85.3 kg)    Height: 5' 4\" (1.626 m) 5' 4\" (1.626 m)                                               BP Readings from Last 3 Encounters:   22 (!) 148/70   22 130/76   22 130/64       NPO Status: Time of last liquid consumption:                         Time of last solid consumption:                         Date of last liquid consumption: 22                        Date of last solid food consumption: 22    BMI:   Wt Readings from Last 3 Encounters:   22 188 lb (85.3 kg)   22 188 lb (85.3 kg)   22 192 lb (87.1 kg)     Body mass index is 32.27 kg/m².     CBC:   Lab Results   Component Value Date    WBC 10.6 2022    RBC 4.85 2022    HGB 15.1 2022    HCT 44.8 2022    MCV 92.4 2022    RDW 12.3 2022     2022       CMP:   Lab Results   Component Value Date     2022    K 4.3 2022     2022    CO2 26 2022    BUN 12 2022    CREATININE 0.8 2022    GFRAA >60 2022    LABGLOM >60 2022    GLUCOSE 97 2022    GLUCOSE 104 08/15/2011    PROT 6.8 2022    CALCIUM 9.5 2022    BILITOT 0.5 2022    ALKPHOS 81 2022    AST 22 2022    ALT 23 2022       POC Tests: No results for input(s): POCGLU, POCNA, POCK, POCCL, POCBUN, POCHEMO, POCHCT in the last 72 hours. Coags:   Lab Results   Component Value Date    PROTIME 10.5 05/21/2014    INR 1.0 05/21/2014    APTT 29.3 05/21/2014       HCG (If Applicable): No results found for: PREGTESTUR, PREGSERUM, HCG, HCGQUANT     ABGs: No results found for: PHART, PO2ART, AXM1HIK, ZOG7COA, BEART, U4QASDJV     Type & Screen (If Applicable):  No results found for: LABABO, LABRH    Drug/Infectious Status (If Applicable):  No results found for: HIV, HEPCAB    COVID-19 Screening (If Applicable):   Lab Results   Component Value Date    COVID19 Not Detected 10/26/2020           Anesthesia Evaluation  Patient summary reviewed and Nursing notes reviewed   history of anesthetic complications: difficult airway. Airway: Mallampati: II  TM distance: >3 FB   Neck ROM: full  Mouth opening: > = 3 FB Dental: normal exam     Comment: Pt denies any loose/chipped teeth      Pulmonary:   (+) sleep apnea: on CPAP,                             Cardiovascular:    (+) pacemaker:, dysrhythmias: SVT, hyperlipidemia                  Neuro/Psych:                ROS comment: Dementia-A&Ox2   GI/Hepatic/Renal:   (+) liver disease:, bowel prep,           Endo/Other:    (+) DiabetesType II DM, , hypothyroidism: arthritis:., .          Pt had no PAT visit       Abdominal:             Vascular: Other Findings:           Anesthesia Plan      MAC     ASA 3       Induction: intravenous. Anesthetic plan and risks discussed with patient.         Attending anesthesiologist reviewed and agrees with Preprocedure content            MARIAMA Granados - KEE   1/26/2022

## 2022-01-26 NOTE — OP NOTE
EGD/Colonoscopy Op Note    PATIENT: Peter Grajeda    DATE OF PROCEDURE: 1/26/2022     SURGEON: Trevon Mendosa MD    PREOPERATIVE DIAGNOSIS: Abdominal pain    POSTOPERATIVE DIAGNOSIS: Same, mild gastritis, diverticulosis, hemorrhoids    OPERATION: Procedure(s):  EGD BIOPSY  COLONOSCOPY DIAGNOSTIC    ANESTHESIA: Local monitored anesthesia. ESTIMATED BLOOD LOSS: minimal    COMPLICATIONS: None. SPECIMENS:    ID Type Source Tests Collected by Time Destination   A : Antral BX Tissue Tissue SURGICAL PATHOLOGY Trevon Mendosa MD 1/26/2022 1039    B : Duodenal BX Tissue Tissue SURGICAL PATHOLOGY Trevon Mendosa MD 1/26/2022 1039        HISTORY: The patient is a 77y.o. year old female with history of above preop diagnosis. I recommended esophagogastroduodenoscopy and colonoscopy with possible biopsy/polypectomy and I explained the risk, benefits, expected outcome, and alternatives to the procedure. Risks included but are not limited to bleeding, infection, respiratory distress, hypotension, and perforation. Patient understands and is in agreement. PROCEDURE: The patient was given IV conscious sedation per anesthesia. The patient was given supplemental oxygen by nasal cannula. The gastroscope was inserted orally and advanced under direct vision through the esophagus, through the stomach, through the pylorus, and into the duodenum. FINDINGS:    Duodenum: Normal status post biopsies rule out sprue    Stomach: Mild gastritis status post biopsies    Esophagus: normal    Larynx: not examined    The scope was removed and the patient tolerated the procedure well. The colonoscope was inserted per rectum and advanced under direct vision to the cecum without difficulty, identified by appendiceal orifice, ileocecal valve and light transillumination. The prep was good so exam was adequate.     FINDINGS:    YAW: Hemorrhoids    Terminal Ileum: not examined    Colon: Mild diverticulosis    Rectum/Anus: examined in normal and retroflexed positions -hemorrhoids    The colon was decompressed and the scope was removed. The withdraw time was approximately 9 minutes. The patient tolerated the procedure well. ASSESSMENT/PLAN:   1. Follow-up biopsies  2. Fiber diet  3. Colorectal Cancer Screening - recommend repeat colonoscopy in 10 years (may change pending biopsy results). Sooner if issues/concerns.     Tramaine Jose MD  01/26/22  11:04 AM

## 2022-01-26 NOTE — H&P
[address] [facility] Note     Assessment/Plan:        Diagnosis Orders   1. Abdominal pain, left abdomen  dicyclomine (BENTYL) 20 MG tablet     Etiology unclear, possibly functional.  Trial Bentyl. Recommended probiotic. We will plan for upper and lower endoscopy.            Return for Endoscopy.             Chief Complaint   Patient presents with    New Patient       abdominal pain/elevated lipase         PCP: Carmen Linares MD     HPI: Cisco Tyson is a 77 y.o. female who presents in consultation for \"stomach issues. \"  Her  say they did not initially really know why they are here. They give conflicting stories throughout the interview. They do not appear to be great historians for her. He does say she has dementia. She complains of abdominal pain apparently. It seems on the left side. He says it is worse with eating and she does not want to eat because of that however she says there is no relation to food. There is no nausea or vomiting. Her there is no diarrhea or constipation that they are aware of. There is no blood that we know about. They state that her last colonoscopy was \"a long time ago. \"  Looking through the chart looks like she had one in 2018 with Dr. Rajeev Lewis for similar symptoms that was unremarkable. Did not try anything for the pain. It does not seem to be positional and happens when she is sitting or standing. They are unsure if she is ever had an EGD before. She denies any reflux. They have been on Prilosec for about a week or 2 they state that they are not sure if it is helping or not. She had a negative ultrasound and CT evaluation aside from fatty liver and severe diverticulosis.   There is no family history of colon cancer or inflammatory bowel disease.        Past Medical History        Past Medical History:   Diagnosis Date    Arthritis      Diabetes mellitus ([address]      Difficult intubation       carries card, copy on chart  Glidescope#3 with ETT size7    Hyperlipidemia      Hypothyroidism      Left sided abdominal pain      Memory problem       still competent    GORDY on CPAP      Pacemaker      Rectal bleeding      Ringing in the ears              Past Surgical History         Past Surgical History:   Procedure Laterality Date    ABLATION OF DYSRHYTHMIC FOCUS   1996    BREAST SURGERY         implants    COLONOSCOPY   2009?     Dr. Richard Nugent twisted     COLONOSCOPY   2008?     Dr. Adria Ravi. incomplete.  COLONOSCOPY   12/17/2018     Dr. Irene Godinez [facility]    COLONOSCOPY N/A 12/17/2018     COLONOSCOPY DIAGNOSTIC performed by Amy Gutierrez MD at [address] [place] [address]   09/10/2011     POSTERIOR    HYSTERECTOMY         vaginal. uterus only.  KNEE ARTHROPLASTY Right       partial    KNEE ARTHROSCOPY   9/08/2011     right knee    PACEMAKER PLACEMENT   5/23/2014     Medtronic dual chamber    TOTAL HIP ARTHROPLASTY Right 2015            Home Medications           Prior to Admission medications    Medication Sig Start Date End Date Taking?  Authorizing Provider   dicyclomine (BENTYL) 20 MG tablet Take 1 tablet by mouth 3 times daily (before meals) 1/20/22   Yes Janie Quigley MD   nitrofurantoin, macrocrystal-monohydrate, (MACROBID) 100 MG capsule Take 1 capsule by mouth 2 times daily for 7 days 1/19/22 1/26/22 Yes Lowery Primrose, MD   omeprazole (PRILOSEC) 20 MG delayed release capsule Take 1 capsule by mouth daily Ideally 1hr prior to dinner 12/2/21   Yes Lowery Primrose, MD   donepezil (ARICEPT) 10 MG tablet TAKE 1 TABLET BY MOUTH TWICE DAILY 11/5/21   Yes Huseyin Lara MD   metoprolol succinate (TOPROL XL) 100 MG extended release tablet Take 1 tablet by mouth 2 times daily 7/7/21   Yes Lowery Primrose, MD   levothyroxine (SYNTHROID) 100 MCG tablet Take 1 tablet by mouth daily Ideally on empty stomach 5/21/21   Yes Lowery Primrose, MD   memantine (NAMENDA) 10 MG tablet Take 1 tablet by mouth 2 times daily 3/16/21   Yes Huseyin Lara MD omega-3 acid ethyl esters (LOVAZA) 1 g capsule Take 2 capsules by mouth 2 times daily 21   Yes Raji Benavides MD   metFORMIN (GLUCOPHAGE) 500 MG tablet Take 1 tablet by mouth daily  19   Yes Historical Provider, MD   mirabegron (MYRBETRIQ) 50 MG TB24 Take 1 tablet by mouth daily      Yes Historical Provider, MD   B Complex Vitamins (B-COMPLEX/B-12 PO) Take by mouth daily LD 18     Yes Historical Provider, MD                  Allergies   Allergen Reactions    Morphine Itching    Statins         Other reaction(s): Unknown    Statins Depletion Therapy Other (See Comments)       MYALGIA         Social History   Social History            Socioeconomic History    Marital status:        Spouse name: None    Number of children: None    Years of education: None    Highest education level: None   Occupational History    None   Tobacco Use    Smoking status: Former Smoker       Packs/day: 1.50       Years: 13.00       Pack years: 19.50       Types: Cigarettes       Start date: 1973       Quit date: 1986       Years since quittin.0    Smokeless tobacco: Never Used    Tobacco comment: quit smoking    Vaping Use    Vaping Use: Never used   Substance and Sexual Activity    Alcohol use:  Yes       Comment: socially -- 3 drinks per month at the most    Drug use: No    Sexual activity: None   Other Topics Concern    None   Social History Narrative     PMH:     Problem List: Urinary tract infectious disease, Obstructive sleep apnea syndrome, Adult health     examination, Adult health examination, Constipation, Derangement of knee, Vitamin D deficiency,     Hypothyroidism, Conduction disorder of the heart, Hyperlipidemia     Health Maintenance:     Mini Mental Status - (2018)     Colonoscopy - (2018)     Colonoscopy Screening - (2018)     Mammogram Screening - (2018)     Mammogram - (2018)     Colonoscopy - (2010)     Couseled on Home Safety - (2015)     Bone Density Scan - (3/28/2012)     Medical Problems:     Sleep Apnea - Dr Malorie Marcum, cpap     Pneumonia     Hypothyroidism - Dr Joshua Lomax - Dr Lee Hendrix     Hyperlipidemia - intolerance to all statins     Cardiac Arrhythmia - ? type. s/p ablation     cardiac dysrhythmia - pauses - lead to pacemaker     Skin Cancer     Surgical Hx:     Partial Hysterectomy - Still with Both Ovaries.     Breast Implants     Pacemaker - Dr Zulma Alba     Knee Replacement, Carpal Tunnel Release, colon-vaginal fistula repair     Bladder Repair - sling     RT Hip Arthroplasty - MARGARITA           FH:     Father:     . (Hx)     Mother:     . (Hx)     Dad - MI 39,  70 MI     Mom - DM , currently living age 80           SH:     Marital: .     Personal Habits: Cigarette Use: Former Cigarette Smoker - quit age 36. Occ ETOH, minimal. .     2 kids, 6 step kids. 27 grandkids. .Alcohol: Rarely consumes alcohol      Social Determinants of Health          Financial Resource Strain: Unknown    Difficulty of Paying Living Expenses: Patient refused   Food Insecurity: Unknown    Worried About Running Out of Food in the Last Year: Patient refused   [address] in the Last Year: Patient refused   Transportation Needs: Unknown    Lack of Transportation (Medical): Patient refused    Lack of Transportation (Non-Medical): Patient refused   Physical Activity:     Days of Exercise per Week: Not on file   [employer] of Exercise per Session: Not on file   Stress:     Feeling of Stress : Not on file   Social Connections:     Frequency of Communication with Friends and Family: Not on file    Frequency of Social Gatherings with Friends and Family: Not on file    Attends [employer] Services: Not on file    Active Member of Clubs or Organizations: Not on file    Attends Club or Organization Meetings: Not on file    Marital Status: Not on file   Intimate Partner Violence:     Fear of Current or Ex-Partner: Not on file   Blayne Fong Emotionally Abused: Not on file    Physically Abused: Not on file    Sexually Abused: Not on file   Housing Stability:     Unable to Pay for Housing in the Last Year: Not on file    Number of [place] in the Last Year: Not on file    Unstable Housing in the Last Year: Not on file            Family History         Family History   Problem Relation Age of Onset    Pacemaker Mother      Heart Attack Father      Cancer Other 48         Bone Marrow Cancer / Had Bone Marrow Transplant            Review of Systems   All other systems reviewed and are negative.                  Objective:  Vitals       Vitals:     01/20/22 1410   BP: 130/76   Pulse: 82   Resp: 18   Temp: 97.3 °F (36.3 °C)   TempSrc: Temporal   SpO2: 98%   Weight: 188 lb (85.3 kg)   Height: 5' 4\" (1.626 m)            Physical Exam  HENT:      Head: Normocephalic and atraumatic. Eyes:      General:         Right eye: No discharge. Left eye: No discharge. Neck:      Trachea: No tracheal deviation. Cardiovascular:      Rate and Rhythm: Normal rate. Pulmonary:      Effort: Pulmonary effort is normal. No respiratory distress. Abdominal:      General: There is no distension. Palpations: Abdomen is soft. Tenderness: There is no guarding or rebound. Skin:     General: Skin is warm and dry. Neurological:      Mental Status: She is alert and oriented to person, place, and time.                      Brittani Napier MD        NOTE: This report, in part or full,may have been transcribed using voice recognition software. Every effort was made to ensure accuracy; however, inadvertent computerized transcription errors may be present.  Please excuse any transcriptional grammatical or spelling errors that may have escaped my editorial review.     CC: Emmy Coffey MD

## 2022-01-26 NOTE — ANESTHESIA POSTPROCEDURE EVALUATION
Department of Anesthesiology  Postprocedure Note    Patient: Fab Reina  MRN: [id number]  YOB: 1945  Date of evaluation: 1/26/2022  Time:  11:07 AM     Procedure Summary     Date: 01/26/22 Room / Location: [address] [place] [address] / [facility]    Anesthesia Start: 1028 Anesthesia Stop: 1107    Procedures:       EGD BIOPSY (N/A )      COLONOSCOPY DIAGNOSTIC (N/A ) Diagnosis: (ABDOMINAL PAIN)    Surgeons: Stephen Ocampo MD Responsible Provider: Matt Gamez MD    Anesthesia Type: Not recorded ASA Status: 3          Anesthesia Type: No value filed. Emmy Phase I: Emmy Score: 10    Emmy Phase II:      Last vitals: Reviewed and per EMR flowsheets.        Anesthesia Post Evaluation    Patient location during evaluation: PACU  Patient participation: complete - patient participated  Level of consciousness: awake  Airway patency: patent  Nausea & Vomiting: no nausea and no vomiting  Complications: no  Cardiovascular status: hemodynamically stable  Respiratory status: acceptable  Hydration status: euvolemic

## 2022-02-01 NOTE — RESULT ENCOUNTER NOTE
I spoke with Dr. aSncho Ramsey, he will address. Advised patient to keep fu to review in more detail.

## 2022-03-11 NOTE — TELEPHONE ENCOUNTER
----- Message from [employer] sent at 3/11/2022  2:30 PM EST -----  Subject: Refill Request    QUESTIONS  Name of Medication? metFORMIN (GLUCOPHAGE) 500 MG tablet  Patient-reported dosage and instructions? 500mg 2xday  How many days do you have left? 0  Preferred Pharmacy? [id number] phone number (if available)? [phone]  Additional Information for Provider? per , wife needs 90 days   supply, he doesn't care if it's a 1000mg 1xday or 500mg 2xday as long as   he gets 90 days supply, contact#[phone]  ---------------------------------------------------------------------------  --------------  CALL BACK INFO  What is the best way for the office to contact you? OK to leave message on   voicemail  Preferred Call Back Phone Number?  [phone]

## 2022-03-14 DIAGNOSIS — E03.9 ACQUIRED HYPOTHYROIDISM: ICD-10-CM

## 2022-03-14 RX ORDER — LEVOTHYROXINE SODIUM 112 UG/1
112 TABLET ORAL DAILY
COMMUNITY
Start: 2022-01-03 | End: 2022-03-14 | Stop reason: ALTCHOICE

## 2022-03-14 RX ORDER — LEVOTHYROXINE SODIUM 0.1 MG/1
100 TABLET ORAL DAILY
Qty: 90 TABLET | Refills: 1 | Status: SHIPPED
Start: 2022-03-14 | End: 2022-08-15 | Stop reason: SDUPTHER

## 2022-03-14 NOTE — TELEPHONE ENCOUNTER
Sent, thank you. Should be following with Dr. Kelly Kelly as well who I believe manages her Myrbetriq-but I did send.   Thank you

## 2022-03-21 ENCOUNTER — OFFICE VISIT (OUTPATIENT)
Dept: PRIMARY CARE CLINIC | Age: 77
End: 2022-03-21
Payer: MEDICARE

## 2022-03-21 VITALS
BODY MASS INDEX: 32.44 KG/M2 | TEMPERATURE: 96.8 F | DIASTOLIC BLOOD PRESSURE: 68 MMHG | HEART RATE: 66 BPM | OXYGEN SATURATION: 96 % | WEIGHT: 189 LBS | SYSTOLIC BLOOD PRESSURE: 126 MMHG

## 2022-03-21 DIAGNOSIS — I49.9 CARDIAC ARRHYTHMIA, UNSPECIFIED CARDIAC ARRHYTHMIA TYPE: ICD-10-CM

## 2022-03-21 DIAGNOSIS — R21 RASH: ICD-10-CM

## 2022-03-21 DIAGNOSIS — D49.2 NEOPLASM OF SKIN: Primary | ICD-10-CM

## 2022-03-21 DIAGNOSIS — E03.9 ACQUIRED HYPOTHYROIDISM: ICD-10-CM

## 2022-03-21 DIAGNOSIS — F03.90 DEMENTIA WITHOUT BEHAVIORAL DISTURBANCE, UNSPECIFIED DEMENTIA TYPE: ICD-10-CM

## 2022-03-21 DIAGNOSIS — E53.8 VITAMIN B12 DEFICIENCY: ICD-10-CM

## 2022-03-21 DIAGNOSIS — I47.1 SUPRAVENTRICULAR TACHYCARDIA (HCC): ICD-10-CM

## 2022-03-21 DIAGNOSIS — D36.9 TUBULAR ADENOMA: ICD-10-CM

## 2022-03-21 DIAGNOSIS — R74.8 ELEVATED LIPASE: ICD-10-CM

## 2022-03-21 DIAGNOSIS — E11.65 TYPE 2 DIABETES MELLITUS WITH HYPERGLYCEMIA, WITHOUT LONG-TERM CURRENT USE OF INSULIN (HCC): ICD-10-CM

## 2022-03-21 DIAGNOSIS — A04.8 H. PYLORI INFECTION: ICD-10-CM

## 2022-03-21 DIAGNOSIS — E78.2 MIXED HYPERLIPIDEMIA: ICD-10-CM

## 2022-03-21 DIAGNOSIS — N39.0 RECURRENT UTI: ICD-10-CM

## 2022-03-21 PROCEDURE — G8484 FLU IMMUNIZE NO ADMIN: HCPCS | Performed by: FAMILY MEDICINE

## 2022-03-21 PROCEDURE — 3017F COLORECTAL CA SCREEN DOC REV: CPT | Performed by: FAMILY MEDICINE

## 2022-03-21 PROCEDURE — 4040F PNEUMOC VAC/ADMIN/RCVD: CPT | Performed by: FAMILY MEDICINE

## 2022-03-21 PROCEDURE — 1123F ACP DISCUSS/DSCN MKR DOCD: CPT | Performed by: FAMILY MEDICINE

## 2022-03-21 PROCEDURE — 99215 OFFICE O/P EST HI 40 MIN: CPT | Performed by: FAMILY MEDICINE

## 2022-03-21 PROCEDURE — G8427 DOCREV CUR MEDS BY ELIG CLIN: HCPCS | Performed by: FAMILY MEDICINE

## 2022-03-21 PROCEDURE — G8399 PT W/DXA RESULTS DOCUMENT: HCPCS | Performed by: FAMILY MEDICINE

## 2022-03-21 PROCEDURE — 1090F PRES/ABSN URINE INCON ASSESS: CPT | Performed by: FAMILY MEDICINE

## 2022-03-21 PROCEDURE — 1036F TOBACCO NON-USER: CPT | Performed by: FAMILY MEDICINE

## 2022-03-21 PROCEDURE — G8417 CALC BMI ABV UP PARAM F/U: HCPCS | Performed by: FAMILY MEDICINE

## 2022-03-21 PROCEDURE — 3044F HG A1C LEVEL LT 7.0%: CPT | Performed by: FAMILY MEDICINE

## 2022-03-21 PROCEDURE — 2022F DILAT RTA XM EVC RTNOPTHY: CPT | Performed by: FAMILY MEDICINE

## 2022-03-21 RX ORDER — AMMONIUM LACTATE 12 G/100G
LOTION TOPICAL
Qty: 225 G | Refills: 1 | Status: SHIPPED | OUTPATIENT
Start: 2022-03-21

## 2022-03-21 SDOH — ECONOMIC STABILITY: FOOD INSECURITY: WITHIN THE PAST 12 MONTHS, THE FOOD YOU BOUGHT JUST DIDN'T LAST AND YOU DIDN'T HAVE MONEY TO GET MORE.: NEVER TRUE

## 2022-03-21 SDOH — ECONOMIC STABILITY: FOOD INSECURITY: WITHIN THE PAST 12 MONTHS, YOU WORRIED THAT YOUR FOOD WOULD RUN OUT BEFORE YOU GOT MONEY TO BUY MORE.: NEVER TRUE

## 2022-03-21 ASSESSMENT — SOCIAL DETERMINANTS OF HEALTH (SDOH): HOW HARD IS IT FOR YOU TO PAY FOR THE VERY BASICS LIKE FOOD, HOUSING, MEDICAL CARE, AND HEATING?: NOT HARD AT ALL

## 2022-03-21 NOTE — PROGRESS NOTES
Ana Becker : 1945 Sex: female  Age: 77 y.o. Chief Complaint   Patient presents with    Discuss Labs    Rash     back and chest        HPI:      Patient presents today with her . Generally feeling well. Gets dry skin asking her  to scratch her back at night. Previously benefited from [place] [address]. Since seeing me last time she had blood work in January, also endoscopy in January, canceled appointment with Dr. Paris Arias, there was mild gastritis and hemorrhoids, however H. pylori positive. She is asymptomatic now. They will defer to Dr. Paris Arias, they will follow-up ASAP and we will refer to [facility]. Also they follow Dr. Tami Gitelman but we will refer to [facility] follow-up. Blood work from January shows CMP normal HDL 45  triglyceride 155 TSH 0.6 vitamin D 59 CBC grossly normal differential reviewed vitamin T27 834 folic acid greater 20 ferritin 136 urine culture then showed over 100,000 E. coli, treated with Macrobid, without symptoms now.,  Encourage follow-up with Dr. Julia Stoll. Because of recurrent nature repeat urine culture next blood work-defers for a month. Counseled.   Urinalysis was read as -            Most Recent Labs  CBC  Lab Results   Component Value Date    WBC 10.6 2022    WBC 10.7 2021    WBC 10.7 2021    RBC 4.85 2022    RBC 5.07 2021    RBC 5.14 2021    HGB 15.1 2022    HGB 15.9 2021    HGB 15.8 2021    HCT 44.8 2022    HCT 47.1 2021    HCT 46.0 2021    MCV 92.4 2022    MCV 92.9 2021    MCV 89.5 2021     2022     2021     2021      CMP  Lab Results   Component Value Date     2022     2021     2021    K 4.3 2022    K 4.4 2021    K 4.0 2021     2022     2021     2021    CO2 26 2022    CO2 24 2021    CO2 24 07/13/2021    ANIONGAP 10 01/13/2022    ANIONGAP 12 12/02/2021    ANIONGAP 15 07/13/2021    GLUCOSE 97 01/13/2022    GLUCOSE 100 12/02/2021    GLUCOSE 104 07/13/2021    GLUCOSE 104 08/15/2011    GLUCOSE 113 04/25/2011    GLUCOSE 97 04/01/2011    BUN 12 01/13/2022    BUN 14 12/02/2021    BUN 12 07/13/2021    CREATININE 0.8 01/13/2022    CREATININE 0.7 12/02/2021    CREATININE 0.8 07/13/2021    LABGLOM >60 01/13/2022    LABGLOM >60 12/02/2021    LABGLOM >60 07/13/2021    GFRAA >60 01/13/2022    GFRAA >60 12/02/2021    GFRAA >60 07/13/2021    CALCIUM 9.5 01/13/2022    CALCIUM 9.8 12/02/2021    CALCIUM 10.0 07/13/2021    PROT 6.8 01/13/2022    PROT 6.9 12/02/2021    PROT 7.0 07/13/2021    LABALBU 4.4 01/13/2022    LABALBU 4.5 12/02/2021    LABALBU 4.4 07/13/2021    LABALBU 4.4 08/15/2011    LABALBU 4.2 04/25/2011    LABALBU 4.2 03/24/2011    BILITOT 0.5 01/13/2022    BILITOT 0.5 12/02/2021    BILITOT 0.6 07/13/2021    ALKPHOS 81 01/13/2022    ALKPHOS 81 12/02/2021    ALKPHOS 78 07/13/2021    AST 22 01/13/2022    AST 28 12/02/2021    AST 28 07/13/2021    ALT 23 01/13/2022    ALT 31 12/02/2021    ALT 30 07/13/2021     A1C  Lab Results   Component Value Date    LABA1C 5.6 01/13/2022    LABA1C 5.5 07/13/2021    LABA1C 5.5 07/12/2021     TSH  Lab Results   Component Value Date    TSH 0.648 01/13/2022    TSH 1.400 07/13/2021    TSH 1.320 07/12/2021     FREET4  Lab Results   Component Value Date    O2GFDIV 7.1 06/26/2019    D4KZRRW 7.6 05/21/2014     LIPID  Lab Results   Component Value Date    CHOL 211 01/13/2022    CHOL 230 07/13/2021    CHOL 218 05/20/2021    HDL 45 01/13/2022    HDL 51 07/13/2021    HDL 42 05/20/2021    LDLCALC 135 01/13/2022    LDLCALC 148 07/13/2021    LDLCALC 148 05/20/2021    TRIG 155 01/13/2022    TRIG 153 07/13/2021    TRIG 141 05/20/2021     VITAMIN D  Lab Results   Component Value Date    VITD25 59 01/13/2022    VITD25 57 07/13/2021    VITD25 60 07/12/2021     MAGNESIUM  Lab Results   Component Value Date    MG 2.2 05/22/2014    MG 2.0 04/28/2014    MG 2.3 03/23/2011      PHOS  No results found for: PHOS   RON   Lab Results   Component Value Date    RON NEGATIVE 11/29/2012     RHEUMATOID FACTOR  No results found for: RF  PSA  No results found for: PSA   HEPATITIS C  Lab Results   Component Value Date    HCVABI Non-Reactive 08/12/2020     HIV  No results found for: WGJ5YAG, HIV1QT  UA  Lab Results   Component Value Date    COLORU Yellow 01/13/2022    COLORU Yellow 12/02/2021    COLORU yellow 11/26/2021    COLORU Yellow 09/16/2021    COLORU yellow 09/16/2021    CLARITYU Clear 01/13/2022    CLARITYU Clear 12/02/2021    CLARITYU clear 11/26/2021    CLARITYU Clear 09/16/2021    CLARITYU cloudy 09/16/2021    GLUCOSEU Negative 01/13/2022    GLUCOSEU Negative 12/02/2021    GLUCOSEU negative 11/26/2021    GLUCOSEU Negative 09/16/2021    GLUCOSEU negative 09/16/2021    BILIRUBINUR Negative 01/13/2022    BILIRUBINUR Negative 12/02/2021    BILIRUBINUR negative 11/26/2021    BILIRUBINUR Negative 09/16/2021    BILIRUBINUR negative 09/16/2021    BILIRUBINUR negative 08/09/2021    KETUA Negative 01/13/2022    KETUA Negative 12/02/2021    KETUA negative 11/26/2021    KETUA TRACE 09/16/2021    KETUA negative 09/16/2021    SPECGRAV >=1.030 01/13/2022    SPECGRAV >=1.030 12/02/2021    SPECGRAV 1.030 11/26/2021    SPECGRAV >=1.030 09/16/2021    SPECGRAV >=1.030 09/16/2021    BLOODU Negative 01/13/2022    BLOODU Negative 12/02/2021    BLOODU trace 11/26/2021    BLOODU Negative 09/16/2021    BLOODU trace 09/16/2021    PHUR 6.0 01/13/2022    PHUR 6.0 12/02/2021    PHUR 6.0 11/26/2021    PHUR 6.0 09/16/2021    PHUR 6.0 09/16/2021    PROTEINU Negative 01/13/2022    PROTEINU Negative 12/02/2021    PROTEINU negative 11/26/2021    PROTEINU Negative 09/16/2021    PROTEINU negative 09/16/2021    UROBILINOGEN 0.2 01/13/2022    UROBILINOGEN 0.2 12/02/2021    UROBILINOGEN 0.2 09/16/2021    NITRU Negative 01/13/2022    NITRU Negative 12/02/2021    NITRU Negative 09/16/2021    LEUKOCYTESUR Negative 01/13/2022    LEUKOCYTESUR Negative 12/02/2021    LEUKOCYTESUR small 11/26/2021    LEUKOCYTESUR Negative 09/16/2021    LEUKOCYTESUR small 09/16/2021     Urine Micro/Albumin Ratio  Lab Results   Component Value Date    MALBCR - 08/30/2021    MALBCR - 07/12/2021    MALBCR - 02/08/2021         ROS:   Currently asymptomatic with current review of systems  ROS:  Const: Denies chills, fever, malaise and sweats. Eyes: Denies discharge, pain, redness and visual disturbance. ENMT: Denies earaches, other ear symptoms. Denies nasal or sinus symptoms other than stated  above. Denies mouth and tongue lesions and sore throat. CV: Denies chest discomfort, pain; diaphoresis, dizziness, edema, lightheadedness, orthopnea,  palpitations, syncope and near syncopal episode or any exertional symptoms  Resp: Denies cough, hemoptysis, pleuritic pain, SOB, sputum production and wheezing. GI: Denies abdominal pain, change in bowel habits, hematochezia, melena, nausea and vomiting. : Denies urinary symptoms including dysuria , urgency, frequency or hematuria. Musculo: Denies musculoskeletal symptoms. Skin: Denies bruising and rash other than as above.   Neuro: Denies headache, numbness, stiff neck, tingling and focal weakness slurred speech or facial  droop  Hema/Lymph: Denies bleeding/bruising tendency and enlarged lymph nodes      Current Outpatient Medications:     ammonium lactate (LAC-HYDRIN) 12 % lotion, BID PRN, Disp: 225 g, Rfl: 1    mirabegron (MYRBETRIQ) 50 MG TB24, Take 50 mg by mouth daily, Disp: 90 tablet, Rfl: 1    levothyroxine (SYNTHROID) 100 MCG tablet, Take 1 tablet by mouth daily Ideally on empty stomach, Disp: 90 tablet, Rfl: 1    metFORMIN (GLUCOPHAGE) 500 MG tablet, Take 1 tablet by mouth 2 times daily (with meals), Disp: 180 tablet, Rfl: 3    dicyclomine (BENTYL) 20 MG tablet, Take 1 tablet by mouth 3 times daily (before meals), Disp: 120 tablet, Rfl: 2    omeprazole (PRILOSEC) 20 MG delayed release capsule, Take 1 capsule by mouth daily Ideally 1hr prior to dinner, Disp: 30 capsule, Rfl: 1    donepezil (ARICEPT) 10 MG tablet, TAKE 1 TABLET BY MOUTH TWICE DAILY, Disp: 180 tablet, Rfl: 3    metoprolol succinate (TOPROL XL) 100 MG extended release tablet, Take 1 tablet by mouth 2 times daily, Disp: 180 tablet, Rfl: 3    memantine (NAMENDA) 10 MG tablet, Take 1 tablet by mouth 2 times daily, Disp: 180 tablet, Rfl: 3    omega-3 acid ethyl esters (LOVAZA) 1 g capsule, Take 2 capsules by mouth 2 times daily, Disp: 360 capsule, Rfl: 3    B Complex Vitamins (B-COMPLEX/B-12 PO), Take by mouth daily LD 12/12/18, Disp: , Rfl:   Allergies   Allergen Reactions    Morphine Itching    Statins      Other reaction(s): Unknown    Statins Depletion Therapy Other (See Comments)     MYALGIA       Past Medical History:   Diagnosis Date    Arthritis     Diabetes mellitus ([facility] [address]     Difficult intubation     carries card, copy on chart  Glidescope#3 with ETT size7    Hyperlipidemia     Hypothyroidism     Left sided abdominal pain     Memory problem     still competent    GORDY on CPAP     Pacemaker     Rectal bleeding     Ringing in the ears      Past Surgical History:   Procedure Laterality Date    ABLATION OF DYSRHYTHMIC FOCUS  1996    BREAST SURGERY      implants    COLONOSCOPY  2009? Dr. Jamal Mays twisted     COLONOSCOPY  2008? Dr. Grimm Goes. incomplete.  COLONOSCOPY  12/17/2018    Dr. Juwan Anderson N/A 12/17/2018    COLONOSCOPY DIAGNOSTIC performed by Óscar Lopez MD at [address] [place] [address] COLONOSCOPY N/A 1/26/2022    COLONOSCOPY DIAGNOSTIC performed by Juan A Ascencio MD at [facility]  09/10/2011    POSTERIOR    HYSTERECTOMY      vaginal. uterus only.      KNEE ARTHROPLASTY Right     partial    KNEE ARTHROSCOPY  9/08/2011    right knee    PACEMAKER PLACEMENT  5/23/2014    Medtronic dual chamber    TOTAL HIP ARTHROPLASTY Right 2015    UPPER GASTROINTESTINAL ENDOSCOPY N/A 2022    EGD BIOPSY performed by Jasper Villagomez MD at [address] History   Problem Relation Age of Onset   Kenneth Bello Pacemaker Mother     Heart Attack Father     Cancer Other 49        Bone Marrow Cancer / Had Bone Marrow Transplant     Social History     Tobacco Use    Smoking status: Former Smoker     Packs/day: 1.50     Years: 13.00     Pack years: 19.50     Types: Cigarettes     Start date: 1973     Quit date: 1986     Years since quittin.2    Smokeless tobacco: Never Used    Tobacco comment: quit smoking    Vaping Use    Vaping Use: Never used   Substance Use Topics    Alcohol use: Yes     Comment: socially -- 3 drinks per month at the most    Drug use: No      Social History     Social History Narrative    PMH:    Problem List: Urinary tract infectious disease, Obstructive sleep apnea syndrome, Adult health    examination, Adult health examination, Constipation, Derangement of knee, Vitamin D deficiency,    Hypothyroidism, Conduction disorder of the heart, Hyperlipidemia    Health Maintenance:    Mini Mental Status - (2018)    Colonoscopy - (2018)    Colonoscopy Screening - (2018)    Mammogram Screening - (2018)    Mammogram - (2018)    Colonoscopy - (2010)    Couseled on Home Safety - (2015)    Bone Density Scan - (3/28/2012)    Medical Problems:    Sleep Apnea - Dr Amalia Donohue, cpap    Pneumonia    Hypothyroidism - Dr Charity Biggs - Dr Jessica Staley    Hyperlipidemia - intolerance to all statins    Cardiac Arrhythmia - ? type. s/p ablation    cardiac dysrhythmia - pauses - lead to pacemaker    Skin Cancer    Surgical Hx:    Partial Hysterectomy - Still with Both Ovaries. Breast Implants    Pacemaker - Dr Johanna Mckeon    Knee Replacement, Carpal Tunnel Release, colon-vaginal fistula repair    Bladder Repair - sling    RT Hip Arthroplasty - MARGARITA        FH:    Father:    .  (Hx)    Mother: . (Hx)    Dad - MI 39,  70 MI    Mom - DM , currently living age 80        SH: Marital: . Personal Habits: Cigarette Use: Former Cigarette Smoker - quit age 36. Occ ETOH, minimal. . 2 kids, 6 step kids. 27 grandkids. .Alcohol: Rarely consumes alcohol        Vitals:    22 1258   BP: 126/68   Pulse: 66   Temp: 96.8 °F (36 °C)   SpO2: 96%   Weight: 189 lb (85.7 kg)     Wt Readings from Last 3 Encounters:   22 189 lb (85.7 kg)   22 188 lb (85.3 kg)   22 188 lb (85.3 kg)        Physical Exam    Exam:  Const: Appears comfortable. No signs of acute distress present. Head/Face: Atraumatic, normocephalic on inspection. Eyes: No discharge from the eyes. Sclerae clear. ENMT: Ears clear nose clear oropharynx clear  Neck: Supple. Palpation reveals no adenopathy. No masses appreciated. No JVD. Resp: Respirations are unlabored. Clear to auscultation bilaterally. No rales, rhonchi or wheezes appreciated over the  lungs bilaterally. CV: RRR   Extremities: No clubbing or cyanosis. No edema of the lower limbs  bilaterally. No calf inflammation or tenderness. Abdomen: Abdomen is soft,and nondistended. No abdominal masses  appreciated. No palpable hepatosplenomegaly. Bowel sounds are normoactive. Nontender  Skin: Dry and warm with no rash. Various lesions and sun damaged skin, dry skin. Muscular skeletal: No acute joint inflammation. Neuro:Grossly intact without focal deficit  No CVA tenderness    Office Labs This Visit :  No results found for this visit on 22. Assessment and Plan:   Diagnosis Orders   1. Neoplasm of skin  Amb External Referral To Dermatology   2. H. pylori infection  Ambulatory referral to General Surgery   3. Rash  ammonium lactate (LAC-HYDRIN) 12 % lotion    Amb External Referral To Dermatology   4. Supraventricular tachycardia ([address]     5. Acquired hypothyroidism     6. Elevated lipase     7.  Dementia without behavioral disturbance, unspecified dementia type ([facility] [address]     8. Type 2 diabetes mellitus with hyperglycemia, without long-term current use of insulin (HCC)     9. Mixed hyperlipidemia     10. Vitamin B12 deficiency     11. Recurrent UTI  Culture, Urine   12. Tubular adenoma     13. Cardiac arrhythmia, unspecified cardiac arrhythmia type        Neoplasm of skin    sun damaged skin. Several lesions. Follow-up Dr. Philippe Baker ASAP. H. pylori infection    noted on EGD 1/22-they canceled her follow-up with Dr. Saurav Hernandez so not treated yet. Now asymptomatic. They defer treatment to Dr. Yeny Graham and they will follow up with him ASAP-referral placed to [facility]    Rash    dry skin, refer back to Dr. Ella Pittman in the meantime    Recurrent UTI    no symptoms now, check UA/culture next blood work. Should follow with Dr. Fabi Hannon as well            Elevated lipase    Counseled extensively. Differential reviewed, including serious etiologies. Asymptomatic now. Low-fat diet. Ultrasound/CT unrevealing, repeat normalized. Emphasize low-fat small frequent meals        Hematuria, unspecified type  Last check negative. Following with Dr. Alla Donis          Lichen sclerosus  On 11/21 labial biopsy through Dr. Bird Mas, counseled, follow with him      Constipation  Counseled extensively. Differential reviewed, including serious etiologies. Resolved after MiraLAX taper  Colonoscopy 1/22 - except hemorrhoids-Dr. Saurav Hernandez         Polycythemia  Counseled. Hemoglobin fluctuates, ferritin was borderline high, but improved/stable. Monitor. Counseled on hemochromatosis. Proper hydration. Counseled extensively. Differential reviewed, including serious etiologies. Most recent hemoglobin normal              Obstructive sleep apnea syndrome     Counseled.  Doing very well CPAP.  Uses it at least 8 to 10 hours per night 7 nights per week with very good results.  Got a new machine toward the end of 2019.   Encourage her machine be checked. She follows up with Dr. Holland Forward 10/27/2021     Hypothyroidism     TSH elevated on 112mcg qd, 1/2 Sunday. Was suppressed on 112 qd in the past. Declines d.a.w. Wants to continue generic. Armand Walton Defers imaging. TSH normal but free T4 elevated,took medicine shortly before blood draw. They states she cannot remember not to do this. Monitor. Asymptomatic              Type 2 diabetes mellitus with hyperglycemia, without long-term current use of insulin ([facility])  Hemoglobin A1c stable, 5.9 to 6.1 -5.6-5.5-5.5-5.6 on metformin 500 mg twice a day. Previously 1000 twice a day. Precautions reviewed. Risks  reviewed, proper hydration reviewed, standard precautions reviewed including loose bowels, LA. Encouraged  she watch blood sugar ambulatory with parameters reviewed call in if out of range. Hyper and hypoglycemic precautions reviewed. Micro-and macrovascular  complications reviewed.  Importance of at least yearly eye exams and daily foot exams reviewed.  Tolerating metformin . no longer on invokana. Monitor               Dementia without behavioral disturbance  Counseled extensively.  Differential reviewed including various forms of dementia and pseudodementia.  On Aricept 10 mg daily, and Namenda.   Continue per Dr. Mariana Swanson were seeing Dr. Mao Dang specialist from [place]. However they re noncompliant without follow-up, feels it is a \"racket\". Follow-up with Dr. Rosa Isela Dela Cruz. She is enrolled in a study, nitrate water. She is not driving and we emphasized importance of this. Safety precautions reviewed.    feels her dementia is stable. continue current management and she follows up with Dr. Rosa Isela Dela Cruz again March 23rd 2022.        Mixed hyperlipidemia  not at goal  Significant risk of cerebrovascular/cardiovascular event. Imperative this  be controlled with history of TIA, however adamantly refuses any further treatment at this time.   Refuses to try any other statin or Zetia stating that these were not tolerated in the  past. She did not tolerate Niaspan. did not tolerate WelChol. . declines  cholestyramine. Risk of stroke and heart attack reviewed. lifestyle modification  reviewed. risk of hyperlipidemia reviewed . Did not tolerate fenofibrate  Counseled on repatha, declines.      tolerating Lovaza, declines change in therapy despite counseling     Supraventricular tachycardia ([address]  h/o svt Status post ablation  . Also history of long pauses-since had pacemaker by Dr. Raquel Cortes. FU with her. Follow-up Dr Yogi Perez . Asymptomatic sends recordings by phone.  ekg done and reviewed with her     Liver disease  Counseled extensively. Differential reviewed, including serious etiologies. Likely  fatty liver. Precautions reviewed. Lifestyle modification appropriate diet and weight  loss reviewed. Risks of even this leading to cirrhosis reviewed. Other than basic  monitoring on interested in other evaluation or treatment, in-depth blood work,  imaging or otherwise. Hep C 10/18 negative, again was negative 9/20.   LFTs currently normal              Tubular adenoma  Small polyp 7/18. Dr. Desirae Moore recommended basic screenings if repeated at all.  Asymptomatic. Repeat colonoscopy Dr. Ra Ziegler 1/22 showed hemorrhoids otherwise negative     Vitamin D deficiency  On supplementation she was borderline toxic, it has stabilized, continue off vitamin D. Monitor     Health maintenance examination  Health maintenance issues discussed at length  6/21. Encourage yearly.     B12 deficiency  Risk of high and low reviewed. Last time high, discussed backing off. Monitor  Postprandial nausea  Lipase elevated, since resolved. Now on omeprazole. Encourage low-fat small frequent meals, bland diet. Continue per Dr. Ra Ziegler. CT/ultrasound unrevealing. Had EGD 1/22-mild gastritis but positive H. pylori, symptoms resolved, she will follow up with Dr. Ra Ziegler and defer treatment to him. Plan as above.   Counseled extensively and differential diagnoses relevant to above were reviewed, including serious etiologies. Side effects and interactions of medications were reviewed. Emphasized healthy diet, lifestyle. Refer back to Dr. Sheyla Lindsay and Dr. Karl Carias. Wrote for Lac-Hydrin. Otherwise symptoms blood work and follow-up 1 month sooner as needed. Also renewed order for mammogram and bone density scan      As long as symptoms steadily improve/resolve, and medical conditions follow the expected course, FU as below, sooner PRN. Return in about 1 month (around 4/21/2022), or if symptoms worsen or fail to improve. Over 40 minutes  spent with the patient in reviewing records, reviewing with patient/family, counseling, ordering,  prescribing, completing h&p, etc., with over 50% of the time spent face to face counseling. Signs and symptoms to watch for discussed, serious signs and symptoms reviewed. ER if any. Balaji Mckee MD    Patients are advised to check with insurance company to ensure coverage and to fully understand benefits and cost prior to any testing. This note was created with the assistance of voice recognition software. Document was reviewed however may contain grammatical errors.

## 2022-03-23 ENCOUNTER — OFFICE VISIT (OUTPATIENT)
Dept: GERIATRIC MEDICINE | Age: 77
End: 2022-03-23
Payer: MEDICARE

## 2022-03-23 VITALS
SYSTOLIC BLOOD PRESSURE: 128 MMHG | RESPIRATION RATE: 16 BRPM | TEMPERATURE: 97.9 F | BODY MASS INDEX: 31.74 KG/M2 | HEIGHT: 65 IN | WEIGHT: 190.5 LBS | DIASTOLIC BLOOD PRESSURE: 80 MMHG | HEART RATE: 60 BPM

## 2022-03-23 DIAGNOSIS — G31.84 MCI (MILD COGNITIVE IMPAIRMENT): Primary | ICD-10-CM

## 2022-03-23 PROCEDURE — G8417 CALC BMI ABV UP PARAM F/U: HCPCS | Performed by: INTERNAL MEDICINE

## 2022-03-23 PROCEDURE — 1090F PRES/ABSN URINE INCON ASSESS: CPT | Performed by: INTERNAL MEDICINE

## 2022-03-23 PROCEDURE — G8484 FLU IMMUNIZE NO ADMIN: HCPCS | Performed by: INTERNAL MEDICINE

## 2022-03-23 PROCEDURE — 4040F PNEUMOC VAC/ADMIN/RCVD: CPT | Performed by: INTERNAL MEDICINE

## 2022-03-23 PROCEDURE — 99212 OFFICE O/P EST SF 10 MIN: CPT | Performed by: INTERNAL MEDICINE

## 2022-03-23 PROCEDURE — G8399 PT W/DXA RESULTS DOCUMENT: HCPCS | Performed by: INTERNAL MEDICINE

## 2022-03-23 PROCEDURE — 1036F TOBACCO NON-USER: CPT | Performed by: INTERNAL MEDICINE

## 2022-03-23 PROCEDURE — 1123F ACP DISCUSS/DSCN MKR DOCD: CPT | Performed by: INTERNAL MEDICINE

## 2022-03-23 PROCEDURE — 3017F COLORECTAL CA SCREEN DOC REV: CPT | Performed by: INTERNAL MEDICINE

## 2022-03-23 PROCEDURE — G8427 DOCREV CUR MEDS BY ELIG CLIN: HCPCS | Performed by: INTERNAL MEDICINE

## 2022-03-23 NOTE — PROGRESS NOTES
CC Reevaluate memory    Here with   and she sleeps til noon  Had COVID x 2   Not as engaged in conversation   On Donepezil 10 mg and Namenda 20 mg a day   And DGS X 2 HOURS  May have PM nap  Watches TV daily    Lucid hour 6 AM   Plays game of 14 in evening with    Still on H3  With 2 to 3 additives in H3   NO sticks are pinker  Minicog no change with lucid moments  Impression; MCI stable   Plan; Continue same Aricept 10 mg and Namenda 20 mg a day

## 2022-03-23 NOTE — PROGRESS NOTES
Chief Complaint   Patient presents with    6 Month Follow-Up     September [facility] follow up. Here with .

## 2022-03-24 ENCOUNTER — OFFICE VISIT (OUTPATIENT)
Dept: SURGERY | Age: 77
End: 2022-03-24
Payer: MEDICARE

## 2022-03-24 VITALS
BODY MASS INDEX: 32.44 KG/M2 | DIASTOLIC BLOOD PRESSURE: 60 MMHG | TEMPERATURE: 97.5 F | HEIGHT: 64 IN | SYSTOLIC BLOOD PRESSURE: 123 MMHG | RESPIRATION RATE: 16 BRPM | HEART RATE: 54 BPM | WEIGHT: 190 LBS | OXYGEN SATURATION: 96 %

## 2022-03-24 DIAGNOSIS — A04.8 H. PYLORI INFECTION: Primary | ICD-10-CM

## 2022-03-24 DIAGNOSIS — R10.9 ABDOMINAL PAIN, UNSPECIFIED ABDOMINAL LOCATION: ICD-10-CM

## 2022-03-24 PROCEDURE — 1036F TOBACCO NON-USER: CPT | Performed by: SURGERY

## 2022-03-24 PROCEDURE — 3017F COLORECTAL CA SCREEN DOC REV: CPT | Performed by: SURGERY

## 2022-03-24 PROCEDURE — 99213 OFFICE O/P EST LOW 20 MIN: CPT | Performed by: SURGERY

## 2022-03-24 PROCEDURE — 1123F ACP DISCUSS/DSCN MKR DOCD: CPT | Performed by: SURGERY

## 2022-03-24 PROCEDURE — 4040F PNEUMOC VAC/ADMIN/RCVD: CPT | Performed by: SURGERY

## 2022-03-24 PROCEDURE — G8428 CUR MEDS NOT DOCUMENT: HCPCS | Performed by: SURGERY

## 2022-03-24 PROCEDURE — G8417 CALC BMI ABV UP PARAM F/U: HCPCS | Performed by: SURGERY

## 2022-03-24 PROCEDURE — G8399 PT W/DXA RESULTS DOCUMENT: HCPCS | Performed by: SURGERY

## 2022-03-24 PROCEDURE — 1090F PRES/ABSN URINE INCON ASSESS: CPT | Performed by: SURGERY

## 2022-03-24 PROCEDURE — G8484 FLU IMMUNIZE NO ADMIN: HCPCS | Performed by: SURGERY

## 2022-03-24 RX ORDER — PANTOPRAZOLE SODIUM 40 MG/1
40 TABLET, DELAYED RELEASE ORAL
Qty: 28 TABLET | Refills: 0 | Status: SHIPPED
Start: 2022-03-24 | End: 2022-04-26

## 2022-03-24 RX ORDER — CLARITHROMYCIN 500 MG/1
500 TABLET, COATED ORAL 2 TIMES DAILY
Qty: 28 TABLET | Refills: 0 | Status: SHIPPED | OUTPATIENT
Start: 2022-03-24 | End: 2022-04-07

## 2022-03-24 RX ORDER — AMOXICILLIN 500 MG/1
1000 CAPSULE ORAL 2 TIMES DAILY
Qty: 56 CAPSULE | Refills: 0 | Status: SHIPPED | OUTPATIENT
Start: 2022-03-24 | End: 2022-04-07

## 2022-03-24 NOTE — PROGRESS NOTES
[facility] Surgery Clinic Note    Assessment/Plan:     Diagnosis Orders   1. H. pylori infection  amoxicillin (AMOXIL) 500 MG capsule    clarithromycin (BIAXIN) 500 MG tablet    pantoprazole (PROTONIX) 40 MG tablet    We will treat her and follow-up with a breath test.     2. Abdominal pain, left abdomen      Pain seems to have abated. Endoscopies and imaging negative aside from H. pylori which we will treat as above             Chief Complaint   Patient presents with    Post-Op Check     follow up from EGD/colonoscopy       PCP: Delia Cunha MD    HPI: Ignacia Caraballo is a 77 y.o. female here for follow-up of endoscopies for abdominal pain. She is been feeling better overall her  says. She is up and really complaining of pain. He does note that she eats quite poorly and mostly just eats cookies. We discussed that she needs to improve her diet. She is taking Bentyl and her PPI may be helping. She was H. pylori positive. Her bowels move well. There is no diarrhea or constipation. There is no melena or hematochezia    Review of Systems   All other systems reviewed and are negative. The remainder of the past medical, past surgical, family, and psychosocial history, as well as medication and allergy review, were completed and are as documented elsewhere in the chart. Objective:  Vitals:    03/24/22 1531   BP: 123/60   Pulse: 54   Resp: 16   Temp: 97.5 °F (36.4 °C)   SpO2: 96%   Weight: 190 lb (86.2 kg)   Height: 5' 4\" (1.626 m)          Physical Exam  Constitutional:       General: She is not in acute distress. Appearance: She is not diaphoretic. Cardiovascular:      Rate and Rhythm: Normal rate. Pulmonary:      Effort: Pulmonary effort is normal. No respiratory distress. Abdominal:      General: There is no distension. Palpations: Abdomen is soft. Tenderness: There is no abdominal tenderness. There is no guarding or rebound.                Rebeka Chau MD      NOTE: This report, in part or full, may have been transcribed using voice recognition software. Every effort was made to ensure accuracy; however, inadvertent computerized transcription errors may be present. Please excuse any transcriptional grammatical or spelling errors that may have escaped my editorial review.       CC: Ruta Dance, MD

## 2022-03-30 ENCOUNTER — CLINICAL DOCUMENTATION (OUTPATIENT)
Dept: SURGERY | Age: 77
End: 2022-03-30

## 2022-04-11 DIAGNOSIS — R10.84 GENERALIZED ABDOMINAL PAIN: ICD-10-CM

## 2022-04-11 NOTE — TELEPHONE ENCOUNTER
I see Dr. Pawan Vera just prescribed Protonix, should not take Protonix with omeprazole-should take Protonix instead of omeprazole

## 2022-04-11 NOTE — TELEPHONE ENCOUNTER
----- Message from Rina Samaniego sent at 4/9/2022 11:59 AM EDT -----  Subject: Refill Request    QUESTIONS  Name of Medication? omeprazole (PRILOSEC) 20 MG delayed release capsule  Patient-reported dosage and instructions? one per day   How many days do you have left? 3  [id number]  Pharmacy phone number (if available)? [phone]  Additional Information for Provider? 90 days supply   ---------------------------------------------------------------------------  --------------  CALL BACK INFO  What is the best way for the office to contact you? OK to leave message on   voicemail  Preferred Call Back Phone Number? [phone]  ---------------------------------------------------------------------------  --------------  SCRIPT ANSWERS  Relationship to Patient? Other  Representative Name? Marisol Aviles  Is the [facility] on the appropriate HIPAA document in Epic?  Yes

## 2022-04-12 RX ORDER — OMEPRAZOLE 20 MG/1
20 CAPSULE, DELAYED RELEASE ORAL DAILY
Qty: 30 CAPSULE | Refills: 3 | Status: SHIPPED
Start: 2022-04-12 | End: 2022-06-21 | Stop reason: SDUPTHER

## 2022-04-12 NOTE — TELEPHONE ENCOUNTER
1. Daily Anoro Elipta inhaler. 2. Albuterol 2 puffs every 4-6 hours as needed for shortness of breath or wheezing  3. Albuterol nebulized as needed every 4-6 H for shortness of breath or wheezing  4. Chest X ray PA and lateral.  5. BNP level and CBC  6. 6 minutes walk testing. 7. ABG on 3 liters NC.  8. Up to date with flu vaccine and pneumonia vaccine as of 2/2015. 9. Complete pulmonary rehabilitation program.  10. follow up after 3 month. Spoke with pt . protonix was only for 14 days for h. Pylori. She finished it last week.

## 2022-04-26 ENCOUNTER — OFFICE VISIT (OUTPATIENT)
Dept: PRIMARY CARE CLINIC | Age: 77
End: 2022-04-26
Payer: MEDICARE

## 2022-04-26 VITALS
BODY MASS INDEX: 32.44 KG/M2 | DIASTOLIC BLOOD PRESSURE: 76 MMHG | HEART RATE: 56 BPM | TEMPERATURE: 96.7 F | OXYGEN SATURATION: 95 % | WEIGHT: 190 LBS | SYSTOLIC BLOOD PRESSURE: 124 MMHG | HEIGHT: 64 IN

## 2022-04-26 DIAGNOSIS — E11.65 TYPE 2 DIABETES MELLITUS WITH HYPERGLYCEMIA, WITHOUT LONG-TERM CURRENT USE OF INSULIN (HCC): ICD-10-CM

## 2022-04-26 DIAGNOSIS — E03.9 ACQUIRED HYPOTHYROIDISM: ICD-10-CM

## 2022-04-26 DIAGNOSIS — E53.8 VITAMIN B12 DEFICIENCY: ICD-10-CM

## 2022-04-26 DIAGNOSIS — I47.1 SUPRAVENTRICULAR TACHYCARDIA (HCC): ICD-10-CM

## 2022-04-26 DIAGNOSIS — A04.8 H. PYLORI INFECTION: Primary | ICD-10-CM

## 2022-04-26 DIAGNOSIS — E78.2 MIXED HYPERLIPIDEMIA: ICD-10-CM

## 2022-04-26 PROCEDURE — 3044F HG A1C LEVEL LT 7.0%: CPT | Performed by: FAMILY MEDICINE

## 2022-04-26 PROCEDURE — 3017F COLORECTAL CA SCREEN DOC REV: CPT | Performed by: FAMILY MEDICINE

## 2022-04-26 PROCEDURE — 1036F TOBACCO NON-USER: CPT | Performed by: FAMILY MEDICINE

## 2022-04-26 PROCEDURE — 1123F ACP DISCUSS/DSCN MKR DOCD: CPT | Performed by: FAMILY MEDICINE

## 2022-04-26 PROCEDURE — G8427 DOCREV CUR MEDS BY ELIG CLIN: HCPCS | Performed by: FAMILY MEDICINE

## 2022-04-26 PROCEDURE — 99214 OFFICE O/P EST MOD 30 MIN: CPT | Performed by: FAMILY MEDICINE

## 2022-04-26 PROCEDURE — G8399 PT W/DXA RESULTS DOCUMENT: HCPCS | Performed by: FAMILY MEDICINE

## 2022-04-26 PROCEDURE — 2022F DILAT RTA XM EVC RTNOPTHY: CPT | Performed by: FAMILY MEDICINE

## 2022-04-26 PROCEDURE — 4040F PNEUMOC VAC/ADMIN/RCVD: CPT | Performed by: FAMILY MEDICINE

## 2022-04-26 PROCEDURE — G8417 CALC BMI ABV UP PARAM F/U: HCPCS | Performed by: FAMILY MEDICINE

## 2022-04-26 PROCEDURE — 1090F PRES/ABSN URINE INCON ASSESS: CPT | Performed by: FAMILY MEDICINE

## 2022-04-26 NOTE — PROGRESS NOTES
Leon Levin : 1945 Sex: female  Age: 77 y.o. Chief Complaint   Patient presents with    Other     Follow-up chronic conditions       HPI:      Patient presents today with her . Generally feeling well. Did not get blood work yet. Saw Dr. Marek William. Being treated for H. pylori. Tolerating.   Planning follow-up breath test.  Asymptomatic         Most Recent Labs  CBC  Lab Results   Component Value Date    WBC 10.6 2022    WBC 10.7 2021    WBC 10.7 2021    RBC 4.85 2022    RBC 5.07 2021    RBC 5.14 2021    HGB 15.1 2022    HGB 15.9 2021    HGB 15.8 2021    HCT 44.8 2022    HCT 47.1 2021    HCT 46.0 2021    MCV 92.4 2022    MCV 92.9 2021    MCV 89.5 2021     2022     2021     2021      CMP  Lab Results   Component Value Date     2022     2021     2021    K 4.3 2022    K 4.4 2021    K 4.0 2021     2022     2021     2021    CO2 26 2022    CO2 24 2021    CO2 24 2021    ANIONGAP 10 2022    ANIONGAP 12 2021    ANIONGAP 15 2021    GLUCOSE 97 2022    GLUCOSE 100 2021    GLUCOSE 104 2021    GLUCOSE 104 08/15/2011    GLUCOSE 113 2011    GLUCOSE 97 2011    BUN 12 2022    BUN 14 2021    BUN 12 2021    CREATININE 0.8 2022    CREATININE 0.7 2021    CREATININE 0.8 2021    LABGLOM >60 2022    LABGLOM >60 2021    LABGLOM >60 2021    GFRAA >60 2022    GFRAA >60 2021    GFRAA >60 2021    CALCIUM 9.5 2022    CALCIUM 9.8 2021    CALCIUM 10.0 2021    PROT 6.8 2022    PROT 6.9 2021    PROT 7.0 2021    LABALBU 4.4 2022    LABALBU 4.5 2021    LABALBU 4.4 2021    LABALBU 4.4 08/15/2011    LABALBU 4.2 04/25/2011    LABALBU 4.2 03/24/2011    BILITOT 0.5 01/13/2022    BILITOT 0.5 12/02/2021    BILITOT 0.6 07/13/2021    ALKPHOS 81 01/13/2022    ALKPHOS 81 12/02/2021    ALKPHOS 78 07/13/2021    AST 22 01/13/2022    AST 28 12/02/2021    AST 28 07/13/2021    ALT 23 01/13/2022    ALT 31 12/02/2021    ALT 30 07/13/2021     A1C  Lab Results   Component Value Date    LABA1C 5.6 01/13/2022    LABA1C 5.5 07/13/2021    LABA1C 5.5 07/12/2021     TSH  Lab Results   Component Value Date    TSH 0.648 01/13/2022    TSH 1.400 07/13/2021    TSH 1.320 07/12/2021     FREET4  Lab Results   Component Value Date    C3SSBKL 7.1 06/26/2019    V4UYDVR 7.6 05/21/2014     LIPID  Lab Results   Component Value Date    CHOL 211 01/13/2022    CHOL 230 07/13/2021    CHOL 218 05/20/2021    HDL 45 01/13/2022    HDL 51 07/13/2021    HDL 42 05/20/2021    LDLCALC 135 01/13/2022    LDLCALC 148 07/13/2021    LDLCALC 148 05/20/2021    TRIG 155 01/13/2022    TRIG 153 07/13/2021    TRIG 141 05/20/2021     VITAMIN D  Lab Results   Component Value Date    VITD25 59 01/13/2022    VITD25 57 07/13/2021    VITD25 60 07/12/2021     MAGNESIUM  Lab Results   Component Value Date    MG 2.2 05/22/2014    MG 2.0 04/28/2014    MG 2.3 03/23/2011      PHOS  No results found for: PHOS   RON   Lab Results   Component Value Date    RON NEGATIVE 11/29/2012     RHEUMATOID FACTOR  No results found for: RF  PSA  No results found for: PSA   HEPATITIS C  Lab Results   Component Value Date    HCVABI Non-Reactive 08/12/2020     HIV  No results found for: OEA7HZG, HIV1QT  UA  Lab Results   Component Value Date    COLORU Yellow 01/13/2022    COLORU Yellow 12/02/2021    COLORU yellow 11/26/2021    COLORU Yellow 09/16/2021    COLORU yellow 09/16/2021    CLARITYU Clear 01/13/2022    CLARITYU Clear 12/02/2021    CLARITYU clear 11/26/2021    CLARITYU Clear 09/16/2021    CLARITYU cloudy 09/16/2021    GLUCOSEU Negative 01/13/2022    GLUCOSEU Negative 12/02/2021    GLUCOSEU negative 11/26/2021    GLUCOSEU Negative 09/16/2021    GLUCOSEU negative 09/16/2021    BILIRUBINUR Negative 01/13/2022    BILIRUBINUR Negative 12/02/2021    BILIRUBINUR negative 11/26/2021    BILIRUBINUR Negative 09/16/2021    BILIRUBINUR negative 09/16/2021    BILIRUBINUR negative 08/09/2021    KETUA Negative 01/13/2022    KETUA Negative 12/02/2021    KETUA negative 11/26/2021    KETUA TRACE 09/16/2021    KETUA negative 09/16/2021    SPECGRAV >=1.030 01/13/2022    SPECGRAV >=1.030 12/02/2021    SPECGRAV 1.030 11/26/2021    SPECGRAV >=1.030 09/16/2021    SPECGRAV >=1.030 09/16/2021    BLOODU Negative 01/13/2022    BLOODU Negative 12/02/2021    BLOODU trace 11/26/2021    BLOODU Negative 09/16/2021    BLOODU trace 09/16/2021    PHUR 6.0 01/13/2022    PHUR 6.0 12/02/2021    PHUR 6.0 11/26/2021    PHUR 6.0 09/16/2021    PHUR 6.0 09/16/2021    PROTEINU Negative 01/13/2022    PROTEINU Negative 12/02/2021    PROTEINU negative 11/26/2021    PROTEINU Negative 09/16/2021    PROTEINU negative 09/16/2021    UROBILINOGEN 0.2 01/13/2022    UROBILINOGEN 0.2 12/02/2021    UROBILINOGEN 0.2 09/16/2021    NITRU Negative 01/13/2022    NITRU Negative 12/02/2021    NITRU Negative 09/16/2021    LEUKOCYTESUR Negative 01/13/2022    LEUKOCYTESUR Negative 12/02/2021    LEUKOCYTESUR small 11/26/2021    LEUKOCYTESUR Negative 09/16/2021    LEUKOCYTESUR small 09/16/2021     Urine Micro/Albumin Ratio  Lab Results   Component Value Date    MALBCR - 08/30/2021    MALBCR - 07/12/2021    MALBCR - 02/08/2021         ROS:   Currently asymptomatic with current review of systems  ROS:  Const: Denies chills, fever, malaise and sweats. Eyes: Denies discharge, pain, redness and visual disturbance. ENMT: Denies earaches, other ear symptoms. Denies nasal or sinus symptoms other than stated  above. Denies mouth and tongue lesions and sore throat.   CV: Denies chest discomfort, pain; diaphoresis, dizziness, edema, lightheadedness, orthopnea,  palpitations, syncope and near syncopal episode or any exertional symptoms  Resp: Denies cough, hemoptysis, pleuritic pain, SOB, sputum production and wheezing. GI: Denies abdominal pain, change in bowel habits, hematochezia, melena, nausea and vomiting. : Denies urinary symptoms including dysuria , urgency, frequency or hematuria. Musculo: Denies musculoskeletal symptoms.   Skin: Denies bruising and rash  Neuro: Denies headache, numbness, stiff neck, tingling and focal weakness slurred speech or facial  droop  Hema/Lymph: Denies bleeding/bruising tendency and enlarged lymph nodes      Current Outpatient Medications:     omeprazole (PRILOSEC) 20 MG delayed release capsule, Take 1 capsule by mouth daily Ideally 1hr prior to dinner, Disp: 30 capsule, Rfl: 3    ammonium lactate (LAC-HYDRIN) 12 % lotion, BID PRN, Disp: 225 g, Rfl: 1    mirabegron (MYRBETRIQ) 50 MG TB24, Take 50 mg by mouth daily, Disp: 90 tablet, Rfl: 1    levothyroxine (SYNTHROID) 100 MCG tablet, Take 1 tablet by mouth daily Ideally on empty stomach, Disp: 90 tablet, Rfl: 1    metFORMIN (GLUCOPHAGE) 500 MG tablet, Take 1 tablet by mouth 2 times daily (with meals), Disp: 180 tablet, Rfl: 3    donepezil (ARICEPT) 10 MG tablet, TAKE 1 TABLET BY MOUTH TWICE DAILY, Disp: 180 tablet, Rfl: 3    metoprolol succinate (TOPROL XL) 100 MG extended release tablet, Take 1 tablet by mouth 2 times daily, Disp: 180 tablet, Rfl: 3    memantine (NAMENDA) 10 MG tablet, Take 1 tablet by mouth 2 times daily, Disp: 180 tablet, Rfl: 3    omega-3 acid ethyl esters (LOVAZA) 1 g capsule, Take 2 capsules by mouth 2 times daily, Disp: 360 capsule, Rfl: 3    B Complex Vitamins (B-COMPLEX/B-12 PO), Take by mouth daily LD 12/12/18, Disp: , Rfl:     dicyclomine (BENTYL) 20 MG tablet, Take 1 tablet by mouth 3 times daily (before meals) (Patient not taking: Reported on 4/26/2022), Disp: 120 tablet, Rfl: 2  Allergies   Allergen Reactions    Morphine Itching    Statins      Other reaction(s): Unknown    Statins Depletion Therapy Other (See Comments)     MYALGIA       Past Medical History:   Diagnosis Date    Arthritis     Diabetes mellitus ([address]     Difficult intubation     carries card, copy on chart  Glidescope#3 with ETT size7    Hyperlipidemia     Hypothyroidism     Left sided abdominal pain     Memory problem     still competent    GORDY on CPAP     Pacemaker     Rectal bleeding     Ringing in the ears      Past Surgical History:   Procedure Laterality Date    ABLATION OF DYSRHYTHMIC FOCUS      BREAST SURGERY      implants    COLONOSCOPY  ? Dr. Jannie Dumont twisted     COLONOSCOPY  ? Dr. Bertha Pop. incomplete.  COLONOSCOPY  2018    Dr. Murphy Alger N/A 2018    COLONOSCOPY DIAGNOSTIC performed by Ilir Damon MD at [address] [facility] COLONOSCOPY N/A 2022    COLONOSCOPY DIAGNOSTIC performed by Johnny Broussard MD at [facility]  09/10/2011    POSTERIOR    HYSTERECTOMY      vaginal. uterus only.      KNEE ARTHROPLASTY Right     partial    KNEE ARTHROSCOPY  2011    right knee    PACEMAKER PLACEMENT  2014    Medtronic dual chamber    TOTAL HIP ARTHROPLASTY Right 2015    UPPER GASTROINTESTINAL ENDOSCOPY N/A 2022    EGD BIOPSY performed by Johnny Broussard MD at [address] History   Problem Relation Age of Onset   [facility] Pacemaker Mother     Heart Attack Father     Cancer Other 49        Bone Marrow Cancer / Had Bone Marrow Transplant     Social History     Tobacco Use    Smoking status: Former Smoker     Packs/day: 1.50     Years: 13.00     Pack years: 19.50     Types: Cigarettes     Start date: 1973     Quit date: 1986     Years since quittin.3    Smokeless tobacco: Never Used    Tobacco comment: quit smoking    Vaping Use    Vaping Use: Never used   Substance Use Topics    Alcohol use: Yes     Comment: socially -- 3 drinks per month at the most    Drug use: No      Social History     Social History Narrative    PMH:    Problem List: Urinary tract infectious disease, Obstructive sleep apnea syndrome, Adult health    examination, Adult health examination, Constipation, Derangement of knee, Vitamin D deficiency,    Hypothyroidism, Conduction disorder of the heart, Hyperlipidemia    Health Maintenance:    Mini Mental Status - (2018)    Colonoscopy - (2018)    Colonoscopy Screening - (2018)    Mammogram Screening - (2018)    Mammogram - (2018)    Colonoscopy - (2010)    Couseled on Home Safety - (2015)    Bone Density Scan - (3/28/2012)    Medical Problems:    Sleep Apnea - Dr Petra High, cpap    Pneumonia    Hypothyroidism - Dr Raquel Vasquez - Dr Bella Ruiz    Hyperlipidemia - intolerance to all statins    Cardiac Arrhythmia - ? type. s/p ablation    cardiac dysrhythmia - pauses - lead to pacemaker    Skin Cancer    Surgical Hx:    Partial Hysterectomy - Still with Both Ovaries. Breast Implants    Pacemaker - Dr Carlos Donovan    Knee Replacement, Carpal Tunnel Release, colon-vaginal fistula repair    Bladder Repair - sling    RT Hip Arthroplasty - MARGARITA        FH:    Father:    . (Hx)    Mother:    . (Hx)    Dad - MI 39,  70 MI    Mom - DM , currently living age 80        SH: Marital: . Personal Habits: Cigarette Use: Former Cigarette Smoker - quit age 36. Occ ETOH, minimal. . 2 kids, 6 step kids. 27 grandkids. .Alcohol: Rarely consumes alcohol        Vitals:    22 1310   BP: 124/76   Pulse: 56   Temp: 96.7 °F (35.9 °C)   SpO2: 95%   Weight: 190 lb (86.2 kg)   Height: 5' 4\" (1.626 m)     Wt Readings from Last 3 Encounters:   22 190 lb (86.2 kg)   22 190 lb (86.2 kg)   22 190 lb 8 oz (86.4 kg)            Exam:  Const: Appears comfortable. No signs of acute distress present. Head/Face: Atraumatic, normocephalic on inspection. Eyes: No discharge from the eyes. Sclerae clear.   ENMT: Ears clear nose clear oropharynx clear  Neck: Supple. Palpation reveals no adenopathy. No masses appreciated. No JVD. Resp: Respirations are unlabored. Clear to auscultation bilaterally. No rales, rhonchi or wheezes appreciated over the  lungs bilaterally. CV: RRR   Extremities: No clubbing or cyanosis. No edema of the lower limbs  bilaterally. No calf inflammation or tenderness. Abdomen: Abdomen is soft,and nondistended. No abdominal masses  appreciated. No palpable hepatosplenomegaly. Bowel sounds are normoactive. Nontender  Skin: Dry and warm with no rash. Muscular skeletal: No acute joint inflammation. Neuro:Grossly intact without focal deficit  No CVA tenderness    Office Labs This Visit :  No results found for this visit on 04/26/22. Assessment and Plan:   Diagnosis Orders   1. H. pylori infection     2. Supraventricular tachycardia ([address]     3. Acquired hypothyroidism     4. Mixed hyperlipidemia     5. Type 2 diabetes mellitus with hyperglycemia, without long-term current use of insulin (HCC)     6. Vitamin B12 deficiency        Neoplasm of skin    sun damaged skin. Follow-up Dr. Lachelle Elizabeth ASAP. H. pylori infection    noted on EGD 1/22 since followed up with Dr. Benji Ferrer, was treated, symptoms resolved, follow-up breath test pending      Rash    dry skin, better with Lac-Hydrin, should follow Dr. Lachelle Elizabeth      Recurrent UTI    no symptoms now, check UA/culture next blood work. Should follow with Dr. Mari Álvarez as well            Elevated lipase    Counseled extensively. Differential reviewed, including serious etiologies. Asymptomatic now. Low-fat diet. Ultrasound/CT unrevealing, repeat normalized. Emphasize low-fat small frequent meals        Hematuria, unspecified type  Last check negative. Following with Dr. Macho Patel          Lichen sclerosus  On 11/21 labial biopsy through Dr. Lopez, counseled, follow with him      Constipation  Counseled extensively.  Differential reviewed, including serious etiologies. Resolved after MiraLAX taper  Colonoscopy 1/22 - except hemorrhoidsDr. Mary Macy         Polycythemia  Counseled. Hemoglobin fluctuates, ferritin was borderline high, but improved/stable. Monitor. Counseled on hemochromatosis. Proper hydration. Counseled extensively. Differential reviewed, including serious etiologies. Most recent hemoglobin normal              Obstructive sleep apnea syndrome     Counseled.  Doing very well CPAP.  Uses it at least 8 to 10 hours per night 7 nights per week with very good results.  Got a new machine toward the end of 2019. Encourage her machine be checked. She follows up with Dr. Danielle Hoskins 10/27/2021     Hypothyroidism     TSH elevated on 112mcg qd, 1/2 Sunday. Was suppressed on 112 qd in the past. Declines d.a.w. Wants to continue generic. Meseret Lucas Defers imaging. TSH normal but free T4 elevated,took medicine shortly before blood draw. They states she cannot remember not to do this. Monitor. Asymptomatic              Type 2 diabetes mellitus with hyperglycemia, without long-term current use of insulin ([facility])  Hemoglobin A1c stable, 5.9 to 6.1 -5.65.55.55.6 on metformin 500 mg twice a day. Previously 1000 twice a day. Precautions reviewed. Risks  reviewed, proper hydration reviewed, standard precautions reviewed including loose bowels, LA. Encouraged  she watch blood sugar ambulatory with parameters reviewed call in if out of range. Hyper and hypoglycemic precautions reviewed. Micro-and macrovascular  complications reviewed.  Importance of at least yearly eye exams and daily foot exams reviewed.  Tolerating metformin . no longer on invokana. Monitor               Dementia without behavioral disturbance  Counseled extensively.  Differential reviewed including various forms of dementia and pseudodementia.  On Aricept 10 mg daily, and Namenda.   Continue per Dr. Nikole Baumann were seeing Dr. Raphael Bee specialist from [facility] [employer] [facility] [employer].   However they re noncompliant without follow-up, feels it is a \"racket\". Follow-up with Dr. Lily Barahona. She is enrolled in a study, nitrate water. She is not driving and we emphasized importance of this. Safety precautions reviewed.    feels her dementia is stable. continue current management and she follows up with Dr. Lily Barahona again September 2022.        Mixed hyperlipidemia  not at goal  Significant risk of cerebrovascular/cardiovascular event. Imperative this  be controlled with history of TIA, however adamantly refuses any further treatment at this time. Refuses to try any other statin or Zetia stating that these were not tolerated in the  past. She did not tolerate Niaspan. did not tolerate WelChol. . declines  cholestyramine. Risk of stroke and heart attack reviewed. lifestyle modification  reviewed. risk of hyperlipidemia reviewed . Did not tolerate fenofibrate  Counseled on repatha, declines.      tolerating Lovaza, declines change in therapy despite counseling     Supraventricular tachycardia ([address]  h/o svt Status post ablation  . Also history of long pauses-since had pacemaker by Dr. Evone Holter. FU with her. Follow-up Dr Maira Banerjee . Asymptomatic sends recordings by phone.  ekg done and reviewed with her     Liver disease  Counseled extensively. Differential reviewed, including serious etiologies. Likely  fatty liver. Precautions reviewed. Lifestyle modification appropriate diet and weight  loss reviewed. Risks of even this leading to cirrhosis reviewed. Other than basic  monitoring on interested in other evaluation or treatment, in-depth blood work,  imaging or otherwise. Hep C 10/18 negative, again was negative 9/20.   LFTs currently normal              Tubular adenoma  Small polyp 7/18. Dr. Mika Gallardo recommended basic screenings if repeated at all.  Asymptomatic.   Repeat colonoscopy Dr. Gabino Rodriguez 1/22 showed hemorrhoids otherwise negative     Vitamin D deficiency  On supplementation she was borderline toxic, it has stabilized, continue off vitamin D. Monitor     Health maintenance examination  Health maintenance issues discussed at length  6/21. Encourage yearly.     B12 deficiency  Risk of high and low reviewed. Last time high, discussed backing off. Monitor  Postprandial nausea  Lipase elevated, since resolved. Now on omeprazole. Encourage low-fat small frequent meals, bland diet. Continue per Dr. Tiffany Lockwood. CT/ultrasound unrevealing. Had EGD 1/22mild gastritis but positive H. pylori, symptoms resolved, she will follow up with Dr. Tiffany Lockwood and defer treatment to him. Plan as above. Counseled extensively and differential diagnoses relevant to above were reviewed, including serious etiologies. Side effects and interactions of medications were reviewed. Emphasized healthy diet, lifestyle. Continue per specialist most recently including Dr. Tiffany Lockwood and Dr. Beba Husain. Previously noted rash resolved. Awaiting mammogram and bone density scan. Awaiting blood work and order provided again. As long as symptoms steadily improve/resolve, and medical conditions follow the expected course, FU as below, sooner PRN. Return in about 2 weeks (around 5/10/2022). Over 30 minutes  spent with the patient in reviewing records, reviewing with patient/family, counseling, ordering,  prescribing, completing h&p, etc., with over 50% of the time spent face to face counseling. Signs and symptoms to watch for discussed, serious signs and symptoms reviewed. ER if any. Serena Gallardo MD    Patients are advised to check with insurance company to ensure coverage and to fully understand benefits and cost prior to any testing. This note was created with the assistance of voice recognition software. Document was reviewed however may contain grammatical errors.

## 2022-04-28 ENCOUNTER — HOSPITAL ENCOUNTER (OUTPATIENT)
Age: 77
Discharge: HOME | End: 2022-04-28
Payer: MEDICARE

## 2022-04-28 DIAGNOSIS — D75.1 POLYCYTHEMIA: ICD-10-CM

## 2022-04-28 DIAGNOSIS — E11.65 TYPE 2 DIABETES MELLITUS WITH HYPERGLYCEMIA, WITHOUT LONG-TERM CURRENT USE OF INSULIN (HCC): ICD-10-CM

## 2022-04-28 DIAGNOSIS — E03.9 ACQUIRED HYPOTHYROIDISM: ICD-10-CM

## 2022-04-28 DIAGNOSIS — E78.2 MIXED HYPERLIPIDEMIA: ICD-10-CM

## 2022-04-28 DIAGNOSIS — E55.9 VITAMIN D DEFICIENCY: ICD-10-CM

## 2022-04-28 DIAGNOSIS — E53.8 VITAMIN B12 DEFICIENCY: ICD-10-CM

## 2022-04-28 LAB
ALBUMIN SERPL-MCNC: 4.2 G/DL (ref 3.5–5.2)
ALP BLD-CCNC: 82 U/L (ref 35–104)
ALT SERPL-CCNC: 27 U/L (ref 0–32)
ANION GAP SERPL CALCULATED.3IONS-SCNC: 12 MMOL/L (ref 7–16)
AST SERPL-CCNC: 25 U/L (ref 0–31)
BACTERIA: ABNORMAL /HPF
BASOPHILS ABSOLUTE: 0.08 E9/L (ref 0–0.2)
BASOPHILS RELATIVE PERCENT: 0.8 % (ref 0–2)
BILIRUB SERPL-MCNC: 0.5 MG/DL (ref 0–1.2)
BILIRUBIN URINE: NEGATIVE
BLOOD, URINE: NEGATIVE
BUN BLDV-MCNC: 11 MG/DL (ref 6–23)
CALCIUM SERPL-MCNC: 9.6 MG/DL (ref 8.6–10.2)
CHLORIDE BLD-SCNC: 104 MMOL/L (ref 98–107)
CHOLESTEROL, TOTAL: 225 MG/DL (ref 0–199)
CLARITY: CLEAR
CO2: 23 MMOL/L (ref 22–29)
COLOR: YELLOW
CREAT SERPL-MCNC: 0.7 MG/DL (ref 0.5–1)
CREATININE URINE: 102 MG/DL (ref 29–226)
EOSINOPHILS ABSOLUTE: 0.21 E9/L (ref 0.05–0.5)
EOSINOPHILS RELATIVE PERCENT: 2.2 % (ref 0–6)
EPITHELIAL CELLS, UA: ABNORMAL /HPF
FOLATE: >20 NG/ML (ref 4.8–24.2)
GFR AFRICAN AMERICAN: >60
GFR NON-AFRICAN AMERICAN: >60 ML/MIN/1.73
GLUCOSE BLD-MCNC: 107 MG/DL (ref 74–99)
GLUCOSE URINE: NEGATIVE MG/DL
HBA1C MFR BLD: 5.7 % (ref 4–5.6)
HCT VFR BLD CALC: 44.9 % (ref 34–48)
HDLC SERPL-MCNC: 50 MG/DL
HEMOGLOBIN: 15.3 G/DL (ref 11.5–15.5)
IMMATURE GRANULOCYTES #: 0.03 E9/L
IMMATURE GRANULOCYTES %: 0.3 % (ref 0–5)
KETONES, URINE: NEGATIVE MG/DL
LDL CHOLESTEROL CALCULATED: 138 MG/DL (ref 0–99)
LEUKOCYTE ESTERASE, URINE: ABNORMAL
LYMPHOCYTES ABSOLUTE: 4.16 E9/L (ref 1.5–4)
LYMPHOCYTES RELATIVE PERCENT: 44.1 % (ref 20–42)
MCH RBC QN AUTO: 30.9 PG (ref 26–35)
MCHC RBC AUTO-ENTMCNC: 34.1 % (ref 32–34.5)
MCV RBC AUTO: 90.7 FL (ref 80–99.9)
MICROALBUMIN UR-MCNC: <12 MG/L
MICROALBUMIN/CREAT UR-RTO: ABNORMAL (ref 0–30)
MONOCYTES ABSOLUTE: 0.44 E9/L (ref 0.1–0.95)
MONOCYTES RELATIVE PERCENT: 4.7 % (ref 2–12)
NEUTROPHILS ABSOLUTE: 4.51 E9/L (ref 1.8–7.3)
NEUTROPHILS RELATIVE PERCENT: 47.9 % (ref 43–80)
NITRITE, URINE: NEGATIVE
PDW BLD-RTO: 12.4 FL (ref 11.5–15)
PH UA: 6 (ref 5–9)
PLATELET # BLD: 170 E9/L (ref 130–450)
PMV BLD AUTO: 10.7 FL (ref 7–12)
POTASSIUM SERPL-SCNC: 4.1 MMOL/L (ref 3.5–5)
PROTEIN UA: NEGATIVE MG/DL
RBC # BLD: 4.95 E12/L (ref 3.5–5.5)
RBC UA: ABNORMAL /HPF (ref 0–2)
SODIUM BLD-SCNC: 139 MMOL/L (ref 132–146)
SPECIFIC GRAVITY UA: 1.02 (ref 1–1.03)
T4 FREE: 1.92 NG/DL (ref 0.93–1.7)
TOTAL CK: 23 U/L (ref 20–180)
TOTAL PROTEIN: 7.1 G/DL (ref 6.4–8.3)
TRIGL SERPL-MCNC: 183 MG/DL (ref 0–149)
TSH SERPL DL<=0.05 MIU/L-ACNC: 0.79 UIU/ML (ref 0.27–4.2)
UROBILINOGEN, URINE: 0.2 E.U./DL
VITAMIN B-12: 691 PG/ML (ref 211–946)
VITAMIN D 25-HYDROXY: 53 NG/ML (ref 30–100)
VLDLC SERPL CALC-MCNC: 37 MG/DL
WBC # BLD: 9.4 E9/L (ref 4.5–11.5)
WBC UA: ABNORMAL /HPF (ref 0–5)

## 2022-04-28 PROCEDURE — 82044 UR ALBUMIN SEMIQUANTITATIVE: CPT

## 2022-04-28 PROCEDURE — 84439 ASSAY OF FREE THYROXINE: CPT

## 2022-04-28 PROCEDURE — 83036 HEMOGLOBIN GLYCOSYLATED A1C: CPT

## 2022-04-28 PROCEDURE — 80053 COMPREHEN METABOLIC PANEL: CPT

## 2022-04-28 PROCEDURE — 82570 ASSAY OF URINE CREATININE: CPT

## 2022-04-28 PROCEDURE — 36415 COLL VENOUS BLD VENIPUNCTURE: CPT

## 2022-04-28 PROCEDURE — 82306 VITAMIN D 25 HYDROXY: CPT

## 2022-04-28 PROCEDURE — 80061 LIPID PANEL: CPT

## 2022-04-28 PROCEDURE — 82550 ASSAY OF CK (CPK): CPT

## 2022-04-28 PROCEDURE — 81001 URINALYSIS AUTO W/SCOPE: CPT

## 2022-04-28 PROCEDURE — 82746 ASSAY OF FOLIC ACID SERUM: CPT

## 2022-04-28 PROCEDURE — 82607 VITAMIN B-12: CPT

## 2022-04-28 PROCEDURE — 84443 ASSAY THYROID STIM HORMONE: CPT

## 2022-04-28 PROCEDURE — 85025 COMPLETE CBC W/AUTO DIFF WBC: CPT

## 2022-05-11 DIAGNOSIS — F03.90 DEMENTIA WITHOUT BEHAVIORAL DISTURBANCE, UNSPECIFIED DEMENTIA TYPE: ICD-10-CM

## 2022-05-11 RX ORDER — DONEPEZIL HYDROCHLORIDE 10 MG/1
TABLET, FILM COATED ORAL
Qty: 180 TABLET | Refills: 3 | Status: SHIPPED
Start: 2022-05-11 | End: 2022-09-28

## 2022-05-11 RX ORDER — DONEPEZIL HYDROCHLORIDE 10 MG/1
10 TABLET, FILM COATED ORAL 2 TIMES DAILY
Qty: 180 TABLET | Refills: 1 | OUTPATIENT
Start: 2022-05-11

## 2022-05-11 RX ORDER — METOPROLOL SUCCINATE 100 MG/1
100 TABLET, EXTENDED RELEASE ORAL 2 TIMES DAILY
Qty: 180 TABLET | Refills: 1 | Status: SHIPPED
Start: 2022-05-11 | End: 2022-09-27 | Stop reason: SDUPTHER

## 2022-05-11 NOTE — TELEPHONE ENCOUNTER
90 day refill request/verified medication and pharmacy info with the pt's     Last Appointment:  4/26/2022    Future appts:  Future Appointments   Date Time Provider Nini Meyersi   5/16/2022  1:30 PM [facility]   9/21/2022  2:00 PM Mindi Johns MD [facility]   10/27/2022  2:30 PM Holli Crocker MD [facility]

## 2022-06-21 DIAGNOSIS — R10.84 GENERALIZED ABDOMINAL PAIN: ICD-10-CM

## 2022-06-21 RX ORDER — OMEPRAZOLE 20 MG/1
20 CAPSULE, DELAYED RELEASE ORAL DAILY
Qty: 90 CAPSULE | Refills: 1 | Status: SHIPPED | OUTPATIENT
Start: 2022-06-21

## 2022-08-15 ENCOUNTER — OFFICE VISIT (OUTPATIENT)
Dept: PRIMARY CARE CLINIC | Age: 77
End: 2022-08-15
Payer: MEDICARE

## 2022-08-15 VITALS
DIASTOLIC BLOOD PRESSURE: 74 MMHG | SYSTOLIC BLOOD PRESSURE: 132 MMHG | OXYGEN SATURATION: 95 % | WEIGHT: 190 LBS | BODY MASS INDEX: 32.61 KG/M2 | TEMPERATURE: 97.6 F | HEART RATE: 63 BPM

## 2022-08-15 DIAGNOSIS — Z78.0 MENOPAUSE: ICD-10-CM

## 2022-08-15 DIAGNOSIS — E03.9 ACQUIRED HYPOTHYROIDISM: Primary | ICD-10-CM

## 2022-08-15 DIAGNOSIS — R32 URINARY INCONTINENCE, UNSPECIFIED TYPE: ICD-10-CM

## 2022-08-15 DIAGNOSIS — E11.65 TYPE 2 DIABETES MELLITUS WITH HYPERGLYCEMIA, WITHOUT LONG-TERM CURRENT USE OF INSULIN (HCC): ICD-10-CM

## 2022-08-15 DIAGNOSIS — E53.8 VITAMIN B12 DEFICIENCY: ICD-10-CM

## 2022-08-15 DIAGNOSIS — F03.90 DEMENTIA WITHOUT BEHAVIORAL DISTURBANCE, UNSPECIFIED DEMENTIA TYPE: ICD-10-CM

## 2022-08-15 DIAGNOSIS — Z12.31 BREAST CANCER SCREENING BY MAMMOGRAM: ICD-10-CM

## 2022-08-15 DIAGNOSIS — E78.2 MIXED HYPERLIPIDEMIA: ICD-10-CM

## 2022-08-15 DIAGNOSIS — E55.9 VITAMIN D DEFICIENCY: ICD-10-CM

## 2022-08-15 DIAGNOSIS — I47.1 SUPRAVENTRICULAR TACHYCARDIA (HCC): ICD-10-CM

## 2022-08-15 PROCEDURE — 99214 OFFICE O/P EST MOD 30 MIN: CPT | Performed by: FAMILY MEDICINE

## 2022-08-15 PROCEDURE — 1090F PRES/ABSN URINE INCON ASSESS: CPT | Performed by: FAMILY MEDICINE

## 2022-08-15 PROCEDURE — G8399 PT W/DXA RESULTS DOCUMENT: HCPCS | Performed by: FAMILY MEDICINE

## 2022-08-15 PROCEDURE — 3017F COLORECTAL CA SCREEN DOC REV: CPT | Performed by: FAMILY MEDICINE

## 2022-08-15 PROCEDURE — 0509F URINE INCON PLAN DOCD: CPT | Performed by: FAMILY MEDICINE

## 2022-08-15 PROCEDURE — 1036F TOBACCO NON-USER: CPT | Performed by: FAMILY MEDICINE

## 2022-08-15 PROCEDURE — G8417 CALC BMI ABV UP PARAM F/U: HCPCS | Performed by: FAMILY MEDICINE

## 2022-08-15 PROCEDURE — 3044F HG A1C LEVEL LT 7.0%: CPT | Performed by: FAMILY MEDICINE

## 2022-08-15 PROCEDURE — 1123F ACP DISCUSS/DSCN MKR DOCD: CPT | Performed by: FAMILY MEDICINE

## 2022-08-15 PROCEDURE — 2022F DILAT RTA XM EVC RTNOPTHY: CPT | Performed by: FAMILY MEDICINE

## 2022-08-15 PROCEDURE — G8427 DOCREV CUR MEDS BY ELIG CLIN: HCPCS | Performed by: FAMILY MEDICINE

## 2022-08-15 RX ORDER — LEVOTHYROXINE SODIUM 0.1 MG/1
100 TABLET ORAL DAILY
Qty: 90 TABLET | Refills: 1 | Status: SHIPPED | OUTPATIENT
Start: 2022-08-15

## 2022-08-15 ASSESSMENT — PATIENT HEALTH QUESTIONNAIRE - PHQ9
SUM OF ALL RESPONSES TO PHQ QUESTIONS 1-9: 0
SUM OF ALL RESPONSES TO PHQ QUESTIONS 1-9: 0
2. FEELING DOWN, DEPRESSED OR HOPELESS: 0
SUM OF ALL RESPONSES TO PHQ QUESTIONS 1-9: 0
SUM OF ALL RESPONSES TO PHQ9 QUESTIONS 1 & 2: 0
SUM OF ALL RESPONSES TO PHQ QUESTIONS 1-9: 0
1. LITTLE INTEREST OR PLEASURE IN DOING THINGS: 0

## 2022-08-15 NOTE — PROGRESS NOTES
Tosha Walker : 1945 Sex: female  Age: 77 y.o. Chief Complaint   Patient presents with    3 Month Follow-Up    Other     No recent labs. Last albs were done in        HPI:      Patient presents today with her . Generally feeling well.   Last blood work from , LDL was 138 triglyceride 183 glucose 107 hemoglobin A1c 5.7 Free T4 elevated somewhat at 1.92, took meds shortly before blood work TSH was normal at 0.794 vitamin D 53 CBC grossly normal folic acid high [id number] 536 urinalysis showed small leukocyte, asymptomatic           Most Recent Labs  CBC  Lab Results   Component Value Date/Time    WBC 9.4 2022 01:35 PM    WBC 10.6 2022 04:40 PM    WBC 10.7 2021 12:00 PM    RBC 4.95 2022 01:35 PM    RBC 4.85 2022 04:40 PM    RBC 5.07 2021 12:00 PM    HGB 15.3 2022 01:35 PM    HGB 15.1 2022 04:40 PM    HGB 15.9 2021 12:00 PM    HCT 44.9 2022 01:35 PM    HCT 44.8 2022 04:40 PM    HCT 47.1 2021 12:00 PM    MCV 90.7 2022 01:35 PM    MCV 92.4 2022 04:40 PM    MCV 92.9 2021 12:00 PM     2022 01:35 PM     2022 04:40 PM     2021 12:00 PM      CMP  Lab Results   Component Value Date/Time     2022 01:35 PM     2022 04:40 PM     2021 12:00 PM    K 4.1 2022 01:35 PM    K 4.3 2022 04:40 PM    K 4.4 2021 12:00 PM     2022 01:35 PM     2022 04:40 PM     2021 12:00 PM    CO2 23 2022 01:35 PM    CO2 26 2022 04:40 PM    CO2 24 2021 12:00 PM    ANIONGAP 12 2022 01:35 PM    ANIONGAP 10 2022 04:40 PM    ANIONGAP 12 2021 12:00 PM    GLUCOSE 107 2022 01:35 PM    GLUCOSE 97 2022 04:40 PM    GLUCOSE 100 2021 12:00 PM    GLUCOSE 104 08/15/2011 10:50 AM    GLUCOSE 113 2011 02:28 PM    GLUCOSE 97 2011 10:40 AM    BUN 11 01:57 PM    TRIG 183 04/28/2022 01:35 PM    TRIG 155 01/13/2022 04:40 PM    TRIG 153 07/13/2021 01:57 PM     VITAMIN D  Lab Results   Component Value Date/Time    VITD25 53 04/28/2022 01:35 PM    VITD25 59 01/13/2022 04:40 PM    VITD25 57 07/13/2021 01:57 PM     MAGNESIUM  Lab Results   Component Value Date/Time    MG 2.2 05/22/2014 04:40 AM    MG 2.0 04/28/2014 09:37 AM    MG 2.3 03/23/2011 12:45 PM      PHOS  No results found for: PHOS   RON   Lab Results   Component Value Date/Time    RON NEGATIVE 11/29/2012 02:20 PM     RHEUMATOID FACTOR  No results found for: RF  PSA  No results found for: PSA   HEPATITIS C  Lab Results   Component Value Date/Time    HCVABI Non-Reactive 08/12/2020 04:14 PM     HIV  No results found for: AEU8JNA, HIV1QT  UA  Lab Results   Component Value Date/Time    COLORU Yellow 04/28/2022 01:37 PM    COLORU Yellow 01/13/2022 04:42 PM    COLORU Yellow 12/02/2021 11:55 AM    CLARITYU Clear 04/28/2022 01:37 PM    CLARITYU Clear 01/13/2022 04:42 PM    CLARITYU Clear 12/02/2021 11:55 AM    GLUCOSEU Negative 04/28/2022 01:37 PM    GLUCOSEU Negative 01/13/2022 04:42 PM    GLUCOSEU Negative 12/02/2021 11:55 AM    BILIRUBINUR Negative 04/28/2022 01:37 PM    BILIRUBINUR Negative 01/13/2022 04:42 PM    BILIRUBINUR Negative 12/02/2021 11:55 AM    BILIRUBINUR negative 11/26/2021 01:52 PM    BILIRUBINUR negative 09/16/2021 03:18 PM    BILIRUBINUR negative 08/09/2021 03:47 PM    KETUA Negative 04/28/2022 01:37 PM    KETUA Negative 01/13/2022 04:42 PM    KETUA Negative 12/02/2021 11:55 AM    SPECGRAV 1.020 04/28/2022 01:37 PM    SPECGRAV >=1.030 01/13/2022 04:42 PM    SPECGRAV >=1.030 12/02/2021 11:55 AM    BLOODU Negative 04/28/2022 01:37 PM    BLOODU Negative 01/13/2022 04:42 PM    BLOODU Negative 12/02/2021 11:55 AM    PHUR 6.0 04/28/2022 01:37 PM    PHUR 6.0 01/13/2022 04:42 PM    PHUR 6.0 12/02/2021 11:55 AM    PROTEINU Negative 04/28/2022 01:37 PM    PROTEINU Negative 01/13/2022 04:42 PM    PROTEINU Negative 12/02/2021 11:55 AM    UROBILINOGEN 0.2 04/28/2022 01:37 PM    UROBILINOGEN 0.2 01/13/2022 04:42 PM    UROBILINOGEN 0.2 12/02/2021 11:55 AM    NITRU Negative 04/28/2022 01:37 PM    NITRU Negative 01/13/2022 04:42 PM    NITRU Negative 12/02/2021 11:55 AM    LEUKOCYTESUR SMALL 04/28/2022 01:37 PM    LEUKOCYTESUR Negative 01/13/2022 04:42 PM    LEUKOCYTESUR Negative 12/02/2021 11:55 AM     Urine Micro/Albumin Ratio  Lab Results   Component Value Date/Time    MALBCR - 04/28/2022 01:37 PM    MALBCR - 08/30/2021 04:45 PM    MALBCR - 07/12/2021 04:29 PM         ROS:   Currently asymptomatic with current review of systems  ROS:  Const: Denies chills, fever, malaise and sweats. Eyes: Denies discharge, pain, redness and visual disturbance. ENMT: Denies earaches, other ear symptoms. Denies nasal or sinus symptoms other than stated  above. Denies mouth and tongue lesions and sore throat. CV: Denies chest discomfort, pain; diaphoresis, dizziness, edema, lightheadedness, orthopnea,  palpitations, syncope and near syncopal episode or any exertional symptoms  Resp: Denies cough, hemoptysis, pleuritic pain, SOB, sputum production and wheezing. GI: Denies abdominal pain, change in bowel habits, hematochezia, melena, nausea and vomiting. : Denies urinary symptoms including dysuria , urgency, frequency or hematuria. Musculo: Denies musculoskeletal symptoms. Skin: Denies bruising and rash  Neuro: Denies headache, numbness, stiff neck, tingling and focal weakness slurred speech or facial  droop  Hema/Lymph: Denies bleeding/bruising tendency and enlarged lymph nodes      Current Outpatient Medications:     levothyroxine (SYNTHROID) 100 MCG tablet, Take 1 tablet by mouth in the morning.  Ideally on empty stomach., Disp: 90 tablet, Rfl: 1    mirabegron (MYRBETRIQ) 50 MG TB24, Take 50 mg by mouth in the morning., Disp: 90 tablet, Rfl: 1    omeprazole (PRILOSEC) 20 MG delayed release capsule, Take 1 capsule by mouth daily Ideally 1hr prior to dinner, Disp: 90 capsule, Rfl: 1    metoprolol succinate (TOPROL XL) 100 MG extended release tablet, Take 1 tablet by mouth 2 times daily, Disp: 180 tablet, Rfl: 1    donepezil (ARICEPT) 10 MG tablet, TAKE 1 TABLET BY MOUTH TWICE DAILY, Disp: 180 tablet, Rfl: 3    ammonium lactate (LAC-HYDRIN) 12 % lotion, BID PRN, Disp: 225 g, Rfl: 1    metFORMIN (GLUCOPHAGE) 500 MG tablet, Take 1 tablet by mouth 2 times daily (with meals), Disp: 180 tablet, Rfl: 3    memantine (NAMENDA) 10 MG tablet, Take 1 tablet by mouth 2 times daily, Disp: 180 tablet, Rfl: 3    omega-3 acid ethyl esters (LOVAZA) 1 g capsule, Take 2 capsules by mouth 2 times daily, Disp: 360 capsule, Rfl: 3    B Complex Vitamins (B-COMPLEX/B-12 PO), Take by mouth daily LD 12/12/18, Disp: , Rfl:   Allergies   Allergen Reactions    Morphine Itching    Statins      Other reaction(s): Unknown    Statins Depletion Therapy Other (See Comments)     MYALGIA       Past Medical History:   Diagnosis Date    Arthritis     Diabetes mellitus ([facility]     Difficult intubation     carries card, copy on chart  Glidescope#3 with ETT size7    Hyperlipidemia     Hypothyroidism     Left sided abdominal pain     Memory problem     still competent    GORDY on CPAP     Pacemaker     Rectal bleeding     Ringing in the ears      Past Surgical History:   Procedure Laterality Date    ABLATION OF DYSRHYTHMIC FOCUS  1996    BREAST SURGERY      implants    COLONOSCOPY  2009? Dr. Jose Eduardo Tovar twisted     COLONOSCOPY  2008? Dr. Kylie Casey. incomplete. COLONOSCOPY  12/17/2018    Dr. Hayde Serna 12/17/2018    COLONOSCOPY DIAGNOSTIC [facility] N/A 1/26/2022    COLONOSCOPY DIAGNOSTIC performed by Sangeeta Fabian MD at [address]  09/10/2011    POSTERIOR    HYSTERECTOMY      vaginal. uterus only.      KNEE ARTHROPLASTY Right     partial    KNEE ARTHROSCOPY  9/08/2011    right knee    PACEMAKER PLACEMENT  2014    Medtronic dual chamber    TOTAL HIP ARTHROPLASTY Right 2015    UPPER GASTROINTESTINAL ENDOSCOPY N/A 2022    EGD BIOPSY performed by Jesica Cardoso MD at [address] History   Problem Relation Age of Onset    Pacemaker Mother     Heart Attack Father     Cancer Other 48        Bone Marrow Cancer / Had Bone Marrow Transplant     Social History     Tobacco Use    Smoking status: Former     Packs/day: 1.50     Years: 13.00     Pack years: 19.50     Types: Cigarettes     Start date: 1973     Quit date: 1986     Years since quittin.6    Smokeless tobacco: Never    Tobacco comments:     quit smoking    Vaping Use    Vaping Use: Never used   Substance Use Topics    Alcohol use: Yes     Comment: socially -- 3 drinks per month at the most    Drug use: No      Social History     Social History Narrative    PMH:    Problem List: Urinary tract infectious disease, Obstructive sleep apnea syndrome, Adult health    examination, Adult health examination, Constipation, Derangement of knee, Vitamin D deficiency,    Hypothyroidism, Conduction disorder of the heart, Hyperlipidemia    Health Maintenance:    Mini Mental Status - (2018)    Colonoscopy - (2018)    Colonoscopy Screening - (2018)    Mammogram Screening - (2018)    Mammogram - (2018)    Colonoscopy - (2010)    Couseled on Home Safety - (2015)    Bone Density Scan - (3/28/2012)    Medical Problems:    Sleep Apnea - Dr Kleber Felix, cpap    Pneumonia    Hypothyroidism - Dr Patricia Oliveira - Dr Savita Kimble    Hyperlipidemia - intolerance to all statins    Cardiac Arrhythmia - ? type. s/p ablation    cardiac dysrhythmia - pauses - lead to pacemaker    Skin Cancer    Surgical Hx:    Partial Hysterectomy - Still with Both Ovaries.     Breast Implants    Pacemaker - Dr Danis Dennis    Knee Replacement, Carpal Tunnel Release, colon-vaginal fistula repair    Bladder Repair - sling    RT Hip Arthroplasty - [facility] FH:    Father:    . (Hx)    Mother:    . (Hx)    Dad - MI 39,  70 MI    Mom - DM , currently living age 80        SH: Marital: . Personal Habits: Cigarette Use: Former Cigarette Smoker - quit age 36. Occ ETOH, minimal. . 2 kids, 6 step kids. 27 grandkids. .Alcohol: Rarely consumes alcohol        Vitals:    08/15/22 1417   BP: 132/74   Pulse: 63   Temp: 97.6 °F (36.4 °C)   SpO2: 95%   Weight: 190 lb (86.2 kg)     Wt Readings from Last 3 Encounters:   08/15/22 190 lb (86.2 kg)   22 190 lb (86.2 kg)   22 190 lb (86.2 kg)            Exam:  Const: Appears comfortable. No signs of acute distress present. Head/Face: Atraumatic, normocephalic on inspection. Eyes: No discharge from the eyes. Sclerae clear. ENMT: Ears clear nose clear oropharynx clear  Neck: Supple. Palpation reveals no adenopathy. No masses appreciated. No JVD. Resp: Respirations are unlabored. Clear to auscultation bilaterally. No rales, rhonchi or wheezes appreciated over the  lungs bilaterally. CV: RRR   Extremities: No clubbing or cyanosis. No edema of the lower limbs  bilaterally. No calf inflammation or tenderness. Abdomen: Abdomen is soft,and nondistended. No abdominal masses  appreciated. No palpable hepatosplenomegaly. Bowel sounds are normoactive. Nontender  Skin: Dry and warm with no rash. Muscular skeletal: No acute joint inflammation. Neuro:Grossly intact without focal deficit  No CVA tenderness    Office Labs This Visit :  No results found for this visit on 08/15/22. Assessment and Plan:   Diagnosis Orders   1. Acquired hypothyroidism  levothyroxine (SYNTHROID) 100 MCG tablet    TSH    T4, Free      2. Dementia without behavioral disturbance, unspecified dementia type ([address]        3. Supraventricular tachycardia ([address]        4. Mixed hyperlipidemia  Lipid Panel    Comprehensive Metabolic Panel    CBC with Auto Differential    CK      5.  Type 2 diabetes mellitus with hyperglycemia, without long-term current use of insulin (HCC)  CBC with Auto Differential    Hemoglobin A1C    Urinalysis    Microalbumin / Creatinine Urine Ratio      6. Vitamin B12 deficiency  Vitamin B12 & Folate      7. Menopause  DEXA BONE DENSITY AXIAL SKELETON      8. Breast cancer screening by mammogram  SHAKIR DIGITAL SCREEN W OR WO CAD BILATERAL      9. Urinary incontinence, unspecified type  mirabegron (MYRBETRIQ) 50 MG TB24      10. Vitamin D deficiency  Vitamin D 25 Hydroxy         Neoplasm of skin    sun damaged skin. Continue per Dr. Antonio Madrid. pylori infection    noted on EGD 1/22- since followed up with Dr. Carlton Brown, was treated, symptoms resolved, follow-up breath test pending        Recurrent UTI    no symptoms now, check UA/culture next blood work. Should follow with Dr. Roby Gracia as well    Chronic incontinence  Does well with Myrbetriq. Originally prescribed Dr. Colin Campa asked me to refill, risk-benefit is reviewed. Elevated lipase    Counseled extensively. Differential reviewed, including serious etiologies. Asymptomatic now. Low-fat diet. Ultrasound/CT unrevealing, repeat normalized. Emphasize low-fat small frequent meals        Hematuria, unspecified type  Last check negative. Following with Dr. Colin Campa          Lichen sclerosus  On 11/21 labial biopsy through Dr. Lex Cowan, counseled, follow with him      Constipation  Counseled extensively. Differential reviewed, including serious etiologies. Resolved after MiraLAX taper  Colonoscopy 1/22 - except hemorrhoids-Dr. Carlton Brown         Polycythemia  Counseled. Hemoglobin fluctuates, ferritin was borderline high, but improved/stable. Monitor. Counseled on hemochromatosis. Proper hydration. Counseled extensively. Differential reviewed, including serious etiologies. Most recent hemoglobin normal              Obstructive sleep apnea syndrome     Counseled. Doing very well CPAP.   Uses it at least 8 to 10 hours per night 7 nights per week with very good results. Got a new machine toward the end of 2019. Encourage her machine be checked. She follows up with Dr. Rosamaria Stout 10/27/2021     Hypothyroidism     TSH elevated on 112mcg qd, 1/2 Sunday. Was suppressed on 112 qd in the past. Declines d.a.w. Wants to continue generic. Latonia Stratton Defers imaging. TSH normal but free T4 elevated,took medicine shortly before blood draw. They states she cannot remember not to do this. Monitor. Asymptomatic              Type 2 diabetes mellitus with hyperglycemia, without long-term current use of insulin ([facility])  Hemoglobin A1c stable, 5.9 to 6.1 -5.6-5.5-5.5-5.6 on metformin 500 mg twice a day. Previously 1000 twice a day. Precautions reviewed. Risks  reviewed, proper hydration reviewed, standard precautions reviewed including loose bowels, LA. Encouraged  she watch blood sugar ambulatory with parameters reviewed call in if out of range. Hyper and hypoglycemic precautions reviewed. Micro-and macrovascular  complications reviewed. Importance of at least yearly eye exams and daily foot exams reviewed. Tolerating metformin . no longer on invokana. Monitor               Dementia without behavioral disturbance  Counseled extensively. Differential reviewed including various forms of dementia and pseudodementia. On Aricept 10 mg daily, and Namenda. Continue per Dr. Robert Jerez they were seeing Dr. Mari bowers from [facility] [employer] However they re noncompliant without follow-up, feels it is a \"racket\". Follow-up with Dr. Robert Jerez. She is enrolled in a study, nitrate water. She is not driving and we emphasized importance of this. Safety precautions reviewed.  feels her dementia is stable. continue current management and she follows up with Dr. Robert Jerez again September 2022. Mixed hyperlipidemia  not at goal  Significant risk of cerebrovascular/cardiovascular event.  Imperative this  be controlled with history of resolved, she will follow up with Dr. Evelin Brown and defer treatment to him. Menopause  Check bone density    Breast cancer screening  Check mammogram  Plan as above. Counseled extensively and differential diagnoses relevant to above were reviewed, including serious etiologies, risks and complications, especially of left uncontrolled. If relevant, instructions and  alternatives to meds/treatment reviewed, as well as interactions, and  SE's/ADRs reviewed, notify immediately if any, discontinuing new meds if any. Plan made after discussion and shared decision making. Continue per multiple specialist.  Check blood work. Follow-up about 2 weeks to review and for annual wellness sooner as needed  As long as symptoms steadily improve/resolve, and medical conditions follow the expected course, FU as below, sooner PRN. Return in about 2 weeks (around 8/29/2022), or if symptoms worsen or fail to improve, for awv/review bw. Educational materials and/or home exercises printed for patient's review and were included in patient instructions on his/her After Visit Summary and given to patient at the end of visit. After discussion, patient and/or guardian verbalizes understanding, agrees, feels comfortable with and wishes to proceed with above treatment plan. Call for any pending results, FU sooner if abnormal, as needed or if any current symptoms persist/worsen. Advised patient to call with any new medication issues, and read all Rx info from pharmacy to assure aware of all possible risks and side effects of medication before taking. Reviewed age and gender appropriate health screening exams and vaccinations.   Advised patient regarding importance of keeping up with recommended health maintenance and to schedule as soon as possible if overdue, as this is important in assessing for undiagnosed pathology, especially cancer, as well as protecting against potentially harmful/life threatening disease. Patient and/or guardian verbalizes understanding and agrees with above counseling, assessment and plan. All questions answered. Signs and symptoms to watch for discussed, serious signs and symptoms reviewed. ER if any. Samy Perry MD    Patients are advised to check with insurance company to ensure coverage and to fully understand benefits and cost prior to any testing. This note was created with the assistance of voice recognition software. Document was reviewed however may contain grammatical errors.

## 2022-08-22 RX ORDER — MEMANTINE HYDROCHLORIDE 10 MG/1
TABLET ORAL
Qty: 180 TABLET | Refills: 3 | Status: SHIPPED | OUTPATIENT
Start: 2022-08-22

## 2022-08-23 ENCOUNTER — NURSE ONLY (OUTPATIENT)
Dept: NON INVASIVE DIAGNOSTICS | Age: 77
End: 2022-08-23

## 2022-08-23 VITALS — DIASTOLIC BLOOD PRESSURE: 60 MMHG | SYSTOLIC BLOOD PRESSURE: 122 MMHG

## 2022-09-01 ENCOUNTER — OFFICE VISIT (OUTPATIENT)
Dept: PRIMARY CARE CLINIC | Age: 77
End: 2022-09-01
Payer: MEDICARE

## 2022-09-01 VITALS
OXYGEN SATURATION: 96 % | HEART RATE: 61 BPM | BODY MASS INDEX: 32.1 KG/M2 | SYSTOLIC BLOOD PRESSURE: 122 MMHG | HEIGHT: 64 IN | TEMPERATURE: 98.1 F | DIASTOLIC BLOOD PRESSURE: 60 MMHG | WEIGHT: 188 LBS

## 2022-09-01 VITALS — DIASTOLIC BLOOD PRESSURE: 60 MMHG | WEIGHT: 188 LBS | BODY MASS INDEX: 32.27 KG/M2 | SYSTOLIC BLOOD PRESSURE: 122 MMHG

## 2022-09-01 DIAGNOSIS — H61.21 IMPACTED CERUMEN OF RIGHT EAR: ICD-10-CM

## 2022-09-01 DIAGNOSIS — Z00.00 MEDICARE ANNUAL WELLNESS VISIT, SUBSEQUENT: Primary | ICD-10-CM

## 2022-09-01 DIAGNOSIS — R42 DIZZINESS AND GIDDINESS: ICD-10-CM

## 2022-09-01 DIAGNOSIS — G45.9 TIA (TRANSIENT ISCHEMIC ATTACK): Primary | ICD-10-CM

## 2022-09-01 PROCEDURE — 3017F COLORECTAL CA SCREEN DOC REV: CPT | Performed by: FAMILY MEDICINE

## 2022-09-01 PROCEDURE — 99214 OFFICE O/P EST MOD 30 MIN: CPT | Performed by: FAMILY MEDICINE

## 2022-09-01 PROCEDURE — 1090F PRES/ABSN URINE INCON ASSESS: CPT | Performed by: FAMILY MEDICINE

## 2022-09-01 PROCEDURE — 69209 REMOVE IMPACTED EAR WAX UNI: CPT | Performed by: FAMILY MEDICINE

## 2022-09-01 PROCEDURE — G8427 DOCREV CUR MEDS BY ELIG CLIN: HCPCS | Performed by: FAMILY MEDICINE

## 2022-09-01 PROCEDURE — G8417 CALC BMI ABV UP PARAM F/U: HCPCS | Performed by: FAMILY MEDICINE

## 2022-09-01 PROCEDURE — 1036F TOBACCO NON-USER: CPT | Performed by: FAMILY MEDICINE

## 2022-09-01 PROCEDURE — 1123F ACP DISCUSS/DSCN MKR DOCD: CPT | Performed by: FAMILY MEDICINE

## 2022-09-01 PROCEDURE — G8399 PT W/DXA RESULTS DOCUMENT: HCPCS | Performed by: FAMILY MEDICINE

## 2022-09-01 PROCEDURE — G0439 PPPS, SUBSEQ VISIT: HCPCS | Performed by: FAMILY MEDICINE

## 2022-09-01 ASSESSMENT — PATIENT HEALTH QUESTIONNAIRE - PHQ9
1. LITTLE INTEREST OR PLEASURE IN DOING THINGS: 0
SUM OF ALL RESPONSES TO PHQ QUESTIONS 1-9: 0
SUM OF ALL RESPONSES TO PHQ9 QUESTIONS 1 & 2: 0
2. FEELING DOWN, DEPRESSED OR HOPELESS: 0
SUM OF ALL RESPONSES TO PHQ QUESTIONS 1-9: 0

## 2022-09-01 ASSESSMENT — LIFESTYLE VARIABLES
HOW MANY STANDARD DRINKS CONTAINING ALCOHOL DO YOU HAVE ON A TYPICAL DAY: PATIENT DOES NOT DRINK
HOW OFTEN DO YOU HAVE A DRINK CONTAINING ALCOHOL: NEVER

## 2022-09-01 NOTE — PATIENT INSTRUCTIONS
Personalized Preventive Plan for Zari Line - 9/1/2022  Medicare offers a range of preventive health benefits. Some of the tests and screenings are paid in full while other may be subject to a deductible, co-insurance, and/or copay. Some of these benefits include a comprehensive review of your medical history including lifestyle, illnesses that may run in your family, and various assessments and screenings as appropriate. After reviewing your medical record and screening and assessments performed today your provider may have ordered immunizations, labs, imaging, and/or referrals for you. A list of these orders (if applicable) as well as your Preventive Care list are included within your After Visit Summary for your review. Other Preventive Recommendations:    A preventive eye exam performed by an eye specialist is recommended every 1-2 years to screen for glaucoma; cataracts, macular degeneration, and other eye disorders. A preventive dental visit is recommended every 6 months. Try to get at least 150 minutes of exercise per week or 10,000 steps per day on a pedometer . Order or download the FREE \"Exercise & Physical Activity: Your Everyday Guide\" from [employer]. Call 7-533.142.6579 or search [employer] online. You need 5987-2251 mg of calcium and 5174-6655 IU of vitamin D per day. It is possible to meet your calcium requirement with diet alone, but a vitamin D supplement is usually necessary to meet this goal.  When exposed to the sun, use a sunscreen that protects against both UVA and UVB radiation with an SPF of 30 or greater. Reapply every 2 to 3 hours or after sweating, drying off with a towel, or swimming. Always wear a seat belt when traveling in a car. Always wear a helmet when riding a bicycle or motorcycle.

## 2022-09-01 NOTE — PROGRESS NOTES
Ronan Dowd : 1945 Sex: female  Age: 77 y.o. Chief Complaint   Patient presents with    Gait Problem       HPI:      Presents today with her  for annual wellness visit but over the past few days has had intermittent issues with balance that seem short-lived, sometimes a couple minutes at a time. No headaches double vision chest pain palpitation shortness breath. Recently saw electrophysiologist.  Encouraged follow-up with cardiologist, reluctantly agrees but defers other cardiac testing now.  worried that she could be having TIAs. Would like imaging. Has pacemaker so cannot do MRI, we will do CT and carotid ultrasound limitations reviewed. Importance of expedited E/T reviewed and role of ER reviewed-declines our symptoms persist or worsen. She also has significant cerumen impaction and was having some vertiginous symptoms prior to cerumen removal which did resolve after. Hopefully this is contributing or the cause of the problem. Did not get blood work yet but will do so.   Overall wants to stay conservative           Most Recent Labs  CBC  Lab Results   Component Value Date/Time    WBC 9.4 2022 01:35 PM    WBC 10.6 2022 04:40 PM    WBC 10.7 2021 12:00 PM    RBC 4.95 2022 01:35 PM    RBC 4.85 2022 04:40 PM    RBC 5.07 2021 12:00 PM    HGB 15.3 2022 01:35 PM    HGB 15.1 2022 04:40 PM    HGB 15.9 2021 12:00 PM    HCT 44.9 2022 01:35 PM    HCT 44.8 2022 04:40 PM    HCT 47.1 2021 12:00 PM    MCV 90.7 2022 01:35 PM    MCV 92.4 2022 04:40 PM    MCV 92.9 2021 12:00 PM     2022 01:35 PM     2022 04:40 PM     2021 12:00 PM      CMP  Lab Results   Component Value Date/Time     2022 01:35 PM     2022 04:40 PM     2021 12:00 PM    K 4.1 2022 01:35 PM    K 4.3 2022 04:40 PM    K 4.4 2021 12:00 PM    CL 104 04/28/2022 01:35 PM     01/13/2022 04:40 PM     12/02/2021 12:00 PM    CO2 23 04/28/2022 01:35 PM    CO2 26 01/13/2022 04:40 PM    CO2 24 12/02/2021 12:00 PM    ANIONGAP 12 04/28/2022 01:35 PM    ANIONGAP 10 01/13/2022 04:40 PM    ANIONGAP 12 12/02/2021 12:00 PM    GLUCOSE 107 04/28/2022 01:35 PM    GLUCOSE 97 01/13/2022 04:40 PM    GLUCOSE 100 12/02/2021 12:00 PM    GLUCOSE 104 08/15/2011 10:50 AM    GLUCOSE 113 04/25/2011 02:28 PM    GLUCOSE 97 04/01/2011 10:40 AM    BUN 11 04/28/2022 01:35 PM    BUN 12 01/13/2022 04:40 PM    BUN 14 12/02/2021 12:00 PM    CREATININE 0.7 04/28/2022 01:35 PM    CREATININE 0.8 01/13/2022 04:40 PM    CREATININE 0.7 12/02/2021 12:00 PM    LABGLOM >60 04/28/2022 01:35 PM    LABGLOM >60 01/13/2022 04:40 PM    LABGLOM >60 12/02/2021 12:00 PM    GFRAA >60 04/28/2022 01:35 PM    GFRAA >60 01/13/2022 04:40 PM    GFRAA >60 12/02/2021 12:00 PM    CALCIUM 9.6 04/28/2022 01:35 PM    CALCIUM 9.5 01/13/2022 04:40 PM    CALCIUM 9.8 12/02/2021 12:00 PM    PROT 7.1 04/28/2022 01:35 PM    PROT 6.8 01/13/2022 04:40 PM    PROT 6.9 12/02/2021 12:00 PM    LABALBU 4.2 04/28/2022 01:35 PM    LABALBU 4.4 01/13/2022 04:40 PM    LABALBU 4.5 12/02/2021 12:00 PM    LABALBU 4.4 08/15/2011 10:50 AM    LABALBU 4.2 04/25/2011 02:28 PM    LABALBU 4.2 03/24/2011 03:55 AM    BILITOT 0.5 04/28/2022 01:35 PM    BILITOT 0.5 01/13/2022 04:40 PM    BILITOT 0.5 12/02/2021 12:00 PM    ALKPHOS 82 04/28/2022 01:35 PM    ALKPHOS 81 01/13/2022 04:40 PM    ALKPHOS 81 12/02/2021 12:00 PM    AST 25 04/28/2022 01:35 PM    AST 22 01/13/2022 04:40 PM    AST 28 12/02/2021 12:00 PM    ALT 27 04/28/2022 01:35 PM    ALT 23 01/13/2022 04:40 PM    ALT 31 12/02/2021 12:00 PM     A1C  Lab Results   Component Value Date/Time    LABA1C 5.7 04/28/2022 01:35 PM    LABA1C 5.6 01/13/2022 04:40 PM    LABA1C 5.5 07/13/2021 01:57 PM     TSH  Lab Results   Component Value Date/Time    TSH 0.794 04/28/2022 01:35 PM    TSH 0.648 01/13/2022 04:40 PM    TSH 1.400 07/13/2021 01:57 PM     FREET4  Lab Results   Component Value Date/Time    I8WHFBF 7.1 06/26/2019 03:07 PM    V3FFDAC 7.6 05/21/2014 12:13 PM     LIPID  Lab Results   Component Value Date/Time    CHOL 225 04/28/2022 01:35 PM    CHOL 211 01/13/2022 04:40 PM    CHOL 230 07/13/2021 01:57 PM    HDL 50 04/28/2022 01:35 PM    HDL 45 01/13/2022 04:40 PM    HDL 51 07/13/2021 01:57 PM    LDLCALC 138 04/28/2022 01:35 PM    LDLCALC 135 01/13/2022 04:40 PM    LDLCALC 148 07/13/2021 01:57 PM    TRIG 183 04/28/2022 01:35 PM    TRIG 155 01/13/2022 04:40 PM    TRIG 153 07/13/2021 01:57 PM     VITAMIN D  Lab Results   Component Value Date/Time    VITD25 53 04/28/2022 01:35 PM    VITD25 59 01/13/2022 04:40 PM    VITD25 57 07/13/2021 01:57 PM     MAGNESIUM  Lab Results   Component Value Date/Time    MG 2.2 05/22/2014 04:40 AM    MG 2.0 04/28/2014 09:37 AM    MG 2.3 03/23/2011 12:45 PM      PHOS  No results found for: PHOS   RON   Lab Results   Component Value Date/Time    RON NEGATIVE 11/29/2012 02:20 PM     RHEUMATOID FACTOR  No results found for: RF  PSA  No results found for: PSA   HEPATITIS C  Lab Results   Component Value Date/Time    HCVABI Non-Reactive 08/12/2020 04:14 PM     HIV  No results found for: NJY3SKD, HIV1QT  UA  Lab Results   Component Value Date/Time    COLORU Yellow 04/28/2022 01:37 PM    COLORU Yellow 01/13/2022 04:42 PM    COLORU Yellow 12/02/2021 11:55 AM    CLARITYU Clear 04/28/2022 01:37 PM    CLARITYU Clear 01/13/2022 04:42 PM    CLARITYU Clear 12/02/2021 11:55 AM    GLUCOSEU Negative 04/28/2022 01:37 PM    GLUCOSEU Negative 01/13/2022 04:42 PM    GLUCOSEU Negative 12/02/2021 11:55 AM    BILIRUBINUR Negative 04/28/2022 01:37 PM    BILIRUBINUR Negative 01/13/2022 04:42 PM    BILIRUBINUR Negative 12/02/2021 11:55 AM    BILIRUBINUR negative 11/26/2021 01:52 PM    BILIRUBINUR negative 09/16/2021 03:18 PM    BILIRUBINUR negative 08/09/2021 03:47 PM    KETUA Negative 04/28/2022 01:37 PM KETUA Negative 01/13/2022 04:42 PM    KETUA Negative 12/02/2021 11:55 AM    SPECGRAV 1.020 04/28/2022 01:37 PM    SPECGRAV >=1.030 01/13/2022 04:42 PM    SPECGRAV >=1.030 12/02/2021 11:55 AM    BLOODU Negative 04/28/2022 01:37 PM    BLOODU Negative 01/13/2022 04:42 PM    BLOODU Negative 12/02/2021 11:55 AM    PHUR 6.0 04/28/2022 01:37 PM    PHUR 6.0 01/13/2022 04:42 PM    PHUR 6.0 12/02/2021 11:55 AM    PROTEINU Negative 04/28/2022 01:37 PM    PROTEINU Negative 01/13/2022 04:42 PM    PROTEINU Negative 12/02/2021 11:55 AM    UROBILINOGEN 0.2 04/28/2022 01:37 PM    UROBILINOGEN 0.2 01/13/2022 04:42 PM    UROBILINOGEN 0.2 12/02/2021 11:55 AM    NITRU Negative 04/28/2022 01:37 PM    NITRU Negative 01/13/2022 04:42 PM    NITRU Negative 12/02/2021 11:55 AM    LEUKOCYTESUR SMALL 04/28/2022 01:37 PM    LEUKOCYTESUR Negative 01/13/2022 04:42 PM    LEUKOCYTESUR Negative 12/02/2021 11:55 AM     Urine Micro/Albumin Ratio  Lab Results   Component Value Date/Time    MALBCR - 04/28/2022 01:37 PM    MALBCR - 08/30/2021 04:45 PM    MALBCR - 07/12/2021 04:29 PM         Current ROS as below with intermittent symptoms as above  ROS:  Const: Denies chills, fever, malaise and sweats. Eyes: Denies discharge, pain, redness and visual disturbance. ENMT: Denies earaches, other ear symptoms. Denies nasal or sinus symptoms other than stated  above. Denies mouth and tongue lesions and sore throat. CV: Denies chest discomfort, pain; diaphoresis, dizziness, edema, lightheadedness, orthopnea,  palpitations, syncope and near syncopal episode or any exertional symptoms  Resp: Denies cough, hemoptysis, pleuritic pain, SOB, sputum production and wheezing. GI: Denies abdominal pain, change in bowel habits, hematochezia, melena, nausea and vomiting. : Denies urinary symptoms including dysuria , urgency, frequency or hematuria. Musculo: Denies musculoskeletal symptoms.   Skin: Denies bruising and rash  Neuro: Denies headache, numbness, stiff neck, tingling and focal weakness slurred speech or facial  droop  Hema/Lymph: Denies bleeding/bruising tendency and enlarged lymph nodes      Current Outpatient Medications:     memantine (NAMENDA) 10 MG tablet, TAKE 1 TABLET BY MOUTH TWICE DAILY, Disp: 180 tablet, Rfl: 3    levothyroxine (SYNTHROID) 100 MCG tablet, Take 1 tablet by mouth in the morning. Ideally on empty stomach., Disp: 90 tablet, Rfl: 1    mirabegron (MYRBETRIQ) 50 MG TB24, Take 50 mg by mouth in the morning., Disp: 90 tablet, Rfl: 1    omeprazole (PRILOSEC) 20 MG delayed release capsule, Take 1 capsule by mouth daily Ideally 1hr prior to dinner, Disp: 90 capsule, Rfl: 1    metoprolol succinate (TOPROL XL) 100 MG extended release tablet, Take 1 tablet by mouth 2 times daily, Disp: 180 tablet, Rfl: 1    donepezil (ARICEPT) 10 MG tablet, TAKE 1 TABLET BY MOUTH TWICE DAILY, Disp: 180 tablet, Rfl: 3    ammonium lactate (LAC-HYDRIN) 12 % lotion, BID PRN, Disp: 225 g, Rfl: 1    metFORMIN (GLUCOPHAGE) 500 MG tablet, Take 1 tablet by mouth 2 times daily (with meals), Disp: 180 tablet, Rfl: 3    omega-3 acid ethyl esters (LOVAZA) 1 g capsule, Take 2 capsules by mouth 2 times daily, Disp: 360 capsule, Rfl: 3    B Complex Vitamins (B-COMPLEX/B-12 PO), Take by mouth daily LD 12/12/18, Disp: , Rfl:   Allergies   Allergen Reactions    Morphine Itching    Statins      Other reaction(s): Unknown    Statins Depletion Therapy Other (See Comments)     MYALGIA       Past Medical History:   Diagnosis Date    Arthritis     Diabetes mellitus ([facility] [address]     Difficult intubation     carries card, copy on chart  Glidescope#3 with ETT size7    Hyperlipidemia     Hypothyroidism     Left sided abdominal pain     Memory problem     still competent    GORDY on CPAP     Pacemaker     Rectal bleeding     Ringing in the ears      Past Surgical History:   Procedure Laterality Date    ABLATION OF DYSRHYTHMIC FOCUS  1996    BREAST SURGERY      implants    COLONOSCOPY  2009?     Dr. Jean Giordano twisted Apnea - Dr Tennille Hough, cpap    Pneumonia    Hypothyroidism - Dr Rajeev Sotelo - Dr Ritu Orozco    Hyperlipidemia - intolerance to all statins    Cardiac Arrhythmia - ? type. s/p ablation    cardiac dysrhythmia - pauses - lead to pacemaker    Skin Cancer    Surgical Hx:    Partial Hysterectomy - Still with Both Ovaries. Breast Implants    Pacemaker - Dr Alberto Serbetito    Knee Replacement, Carpal Tunnel Release, colon-vaginal fistula repair    Bladder Repair - sling    RT Hip Arthroplasty - MARAGRITA        FH:    Father:    . (Hx)    Mother:    . (Hx)    Dad - MI 39,  70 MI    Mom - DM , currently living age 80        SH: Marital: . Personal Habits: Cigarette Use: Former Cigarette Smoker - quit age 36. Occ ETOH, minimal. . 2 kids, 6 step kids. 27 grandkids. .Alcohol: Rarely consumes alcohol        Vitals:    22 1603   BP: 122/60   Weight: 188 lb (85.3 kg)     Wt Readings from Last 3 Encounters:   22 188 lb (85.3 kg)   22 188 lb (85.3 kg)   08/15/22 190 lb (86.2 kg)            Exam:  Const: Appears comfortable. No signs of acute distress present. Head/Face: Atraumatic, normocephalic on inspection. Eyes: No discharge from the eyes. Sclerae clear. ENMT: Ears show severe completely occluded ear canal cerumen right, left clear. Cerumen partly removed with lighted curette after risk benefits reviewed including TM perforation, agreed to irrigation which was successful with good visualization of TM that was unremarkable as was your canal, previous symptoms resolved at this time; nose clear oropharynx clear  Neck: Supple. Palpation reveals no adenopathy. No masses appreciated. No JVD. Resp: Respirations are unlabored. Clear to auscultation bilaterally. No rales, rhonchi or wheezes appreciated over the  lungs bilaterally. CV: RRR   Extremities: No clubbing or cyanosis. No edema of the lower limbs  bilaterally. No calf inflammation or tenderness. Abdomen: Abdomen is soft,and nondistended.  No follow with Dr. Sreedhar Bruce as well    Chronic incontinence  Does well with Myrbetriq. Originally prescribed Dr. Crissy Wills asked me to refill, risk-benefit is reviewed. Elevated lipase    Counseled extensively. Differential reviewed, including serious etiologies. Asymptomatic now. Low-fat diet. Ultrasound/CT unrevealing, repeat normalized. Emphasize low-fat small frequent meals        Hematuria, unspecified type  Last check negative. Following with Dr. Crissy Wills          Lichen sclerosus  On 11/21 labial biopsy through Dr. Mario Pearson, counseled, follow with him      Constipation  Counseled extensively. Differential reviewed, including serious etiologies. Resolved after MiraLAX taper  Colonoscopy 1/22 - except hemorrhoids-Dr. Dom Jones         Polycythemia  Counseled. Hemoglobin fluctuates, ferritin was borderline high, but improved/stable. Monitor. Counseled on hemochromatosis. Proper hydration. Counseled extensively. Differential reviewed, including serious etiologies. Most recent hemoglobin normal              Obstructive sleep apnea syndrome     Counseled. Doing very well CPAP. Uses it at least 8 to 10 hours per night 7 nights per week with very good results. Got a new machine toward the end of 2019. Encourage her machine be checked. She follows up with Dr. Rosamaria Stout 10/27/2021     Hypothyroidism     TSH elevated on 112mcg qd, 1/2 Sunday. Was suppressed on 112 qd in the past. Declines d.a.w. Wants to continue generic. Latonia Stratton Defers imaging. TSH normal but free T4 elevated,took medicine shortly before blood draw. They states she cannot remember not to do this. Monitor. Asymptomatic              Type 2 diabetes mellitus with hyperglycemia, without long-term current use of insulin ([facility])  Hemoglobin A1c stable, 5.9 to 6.1 -5.6-5.5-5.5-5.6 on metformin 500 mg twice a day. Previously 1000 twice a day. Precautions reviewed.   Risks  reviewed, proper hydration reviewed, standard precautions reviewed including loose bowels, LA. Encouraged  she watch blood sugar ambulatory with parameters reviewed call in if out of range. Hyper and hypoglycemic precautions reviewed. Micro-and macrovascular  complications reviewed. Importance of at least yearly eye exams and daily foot exams reviewed. Tolerating metformin . no longer on invokana. Monitor               Dementia without behavioral disturbance  Counseled extensively. Differential reviewed including various forms of dementia and pseudodementia. On Aricept 10 mg daily, and Namenda. Continue per Dr. Deepak Altamirano they were seeing Dr. Kim Pepe specialist from [facility] [place]. However they re noncompliant without follow-up, feels it is a \"racket\". Follow-up with Dr. Deepak Altamirano. She is enrolled in a study, nitrate water. She is not driving and we emphasized importance of this. Safety precautions reviewed.  feels her dementia is stable. continue current management and she follows up with Dr. Deepak Altamirano again September 2022. Mixed hyperlipidemia  not at goal  Significant risk of cerebrovascular/cardiovascular event. Imperative this  be controlled with history of TIA, however adamantly refuses any further treatment at this time. Refuses to try any other statin or Zetia stating that these were not tolerated in the  past. She did not tolerate Niaspan. did not tolerate WelChol. . declines  cholestyramine. Risk of stroke and heart attack reviewed. lifestyle modification  reviewed. risk of hyperlipidemia reviewed . Did not tolerate fenofibrate  Counseled on repatha, declines. tolerating Lovaza, declines change in therapy despite counseling     Supraventricular tachycardia ([address]  h/o svt Status post ablation  . Also history of long pauses-since had pacemaker by Dr. Jose Officer. FU with her. Follow-up Dr Annalee Harris . Asymptomatic sends recordings by phone.  ekg done and reviewed with her     Liver disease  Counseled extensively.  Differential reviewed, including serious etiologies. Likely  fatty liver. Precautions reviewed. Lifestyle modification appropriate diet and weight  loss reviewed. Risks of even this leading to cirrhosis reviewed. Other than basic  monitoring on interested in other evaluation or treatment, in-depth blood work,  imaging or otherwise. Hep C 10/18 negative, again was negative 9/20. LFTs currently normal              Tubular adenoma  Small polyp 7/18. Dr. Ochoa Ryan recommended basic screenings if repeated at all. Asymptomatic. Repeat colonoscopy Dr. Leatha Lin 1/22 showed hemorrhoids otherwise negative     Vitamin D deficiency  On supplementation she was borderline toxic, it has stabilized, continue off vitamin D. Monitor     Health maintenance examination  Health maintenance issues discussed at length  6/21. Encourage yearly. B12 deficiency  Risk of high and low reviewed. Last time high, discussed backing off. Monitor  Postprandial nausea  Lipase elevated, since resolved. Now on omeprazole. Encourage low-fat small frequent meals, bland diet. Continue per Dr. Leatha Lin. CT/ultrasound unrevealing. Had EGD 1/22-mild gastritis but positive H. pylori, symptoms resolved, she will follow up with Dr. Leatha Lin and defer treatment to him. Menopause  Check bone density    Breast cancer screening  Check mammogram  Plan as above. Counseled extensively and differential diagnoses relevant to above were reviewed, including serious etiologies, risks and complications, especially of left uncontrolled. If relevant, instructions and  alternatives to meds/treatment reviewed, as well as interactions, and  SE's/ADRs reviewed, notify immediately if any, discontinuing new meds if any. Plan made after discussion and shared decision making. My concerns reviewed. They do want to stay conservative.   They would like a CT and ultrasounds above, they are going to start baby aspirin with risk benefits reviewed including GI, hemorrhage and falls precautions. They are going to get blood work as ordered. They simply want follow-up in about 2 weeks sooner if symptoms should recur or other issues. The problems to go to the ER symptoms persist worsen-defer now. As long as symptoms steadily improve/resolve, and medical conditions follow the expected course, FU as below, sooner PRN. Return in about 2 weeks (around 9/15/2022), or if symptoms worsen or fail to improve. Educational materials and/or home exercises printed for patient's review and were included in patient instructions on his/her After Visit Summary and given to patient at the end of visit. After discussion, patient and/or guardian verbalizes understanding, agrees, feels comfortable with and wishes to proceed with above treatment plan. Call for any pending results, FU sooner if abnormal, as needed or if any current symptoms persist/worsen. Advised patient to call with any new medication issues, and read all Rx info from pharmacy to assure aware of all possible risks and side effects of medication before taking. Reviewed age and gender appropriate health screening exams and vaccinations. Advised patient regarding importance of keeping up with recommended health maintenance and to schedule as soon as possible if overdue, as this is important in assessing for undiagnosed pathology, especially cancer, as well as protecting against potentially harmful/life threatening disease. Patient and/or guardian verbalizes understanding and agrees with above counseling, assessment and plan. All questions answered. Signs and symptoms to watch for discussed, serious signs and symptoms reviewed. ER if any. Eunice Bourne MD    Patients are advised to check with insurance company to ensure coverage and to fully understand benefits and cost prior to any testing. This note was created with the assistance of voice recognition software.   Document was reviewed however may contain grammatical errors.

## 2022-09-01 NOTE — PROGRESS NOTES
Medicare Annual Wellness Visit    Antolin Houston is here for Medicare AW    Assessment & Plan   Assessment and Plan:   Diagnosis Orders   1. Medicare annual wellness visit, subsequent               No problem-specific Assessment & Plan notes found for this encounter. Plan as above. Counseled extensively and differential diagnoses relevant to above were reviewed, including serious etiologies. Side effects and interactions of medications were reviewed. Health maintenance issues discussed at length as above, 9/1/2022 . Encouraged yearly physicals. Generally, wants to stay conservative      As long as symptoms steadily improve/resolve, and medical conditions follow the expected course, FU as below, as previously directed and sooner PRN. Return for Medicare Annual Wellness Visit in 1 year. Signs and symptoms to watch for discussed, serious signs and symptoms reviewed. ER if any. Carole Abbott MD    Patients are advised to check with insurance company to ensure coverage and to fully understand benefits and cost prior to any testing. This note was created with the assistance of voice recognition software. Document was reviewed however may contain grammatical errors. Recommendations for Preventive Services Due: see orders and patient instructions/AVS.  Recommended screening schedule for the next 5-10 years is provided to the patient in written form: see Patient Instructions/AVS.     Return for Medicare Annual Wellness Visit in 1 year. Subjective   The following acute and/or chronic problems were also addressed today:  See other note    Patient's complete Health Risk Assessment and screening values have been reviewed and are found in Flowsheets. The following problems were reviewed today and where indicated follow up appointments were made and/or referrals ordered. Health Maintenance:  Proper diet reviewed including Mediterranean and DASH diets.  Counseled on healthy weight, appropriate exercise, avoidance of tobacco, and recommendations for minimal to no alcohol consumption. Counseled on the potential pros and cons of vitamins and supplements. Reviewed the recommendations and risk/benefits of vaccines including phizer x 3, declines booster. Had covid before and after shots;  Td/Tdap (10/11 counseled),  Pneumovax (11/18),  prevnar 13 (10/15; 10/18), flu vaccine (seasonal), Hepatitis vaccines,  and shingrix (had shingrix x 2)  (patient had chicken pox). Deshawn Chong HIV and Hep C screening guidelines were reviewed. Importance of regular eye and dental exams and health reviewed. Risks/Benefits of ASA reviewed and discussed latest guidelines. Sun protection reviewed. Notify if any new or changing moles/skin lesions, etc. Dexa Scan indications/ risk factors for osteoporotic fractures and prevention reviewed. Colonoscopy recommendations reviewed. Pt denies change in bowel habits or [address] of colon polyps/CA. hAD cSCOPE 7/18, DID NOT RECOMMEND FU. Fit test givne. Follows with Dr Jeff Hwang. Reviewed indications for other testing such as  PFT's.and  indications for imaging including brain, carotid, chest, abdominal, aortic .  dasx        Counseled on instructions regarding, and importance of monthly breast exams, yearly physician exams (sooner if sx's). Indications for mammograms reviewed and importance. Dexa Scan indications/ risk factors for osteoporotic fractures (and associated M/M) and preventative measures reviewed. Importance of regular GYN exams reviewed and pt to follow here or with her gynecologist as directed. Ordered mammo, ORDER DEXA, compliance reviewed.          Positive Risk Factor Screenings with Interventions:             General Health and ACP:  General  In general, how would you say your health is?: Fair  In the past 7 days, have you experienced any of the following: New or Increased Pain, New or Increased Fatigue, Loneliness, Social Isolation, Stress or Anger?: No  Do you get the social and emotional support that you need?: Yes  Do you have a Living Will?: Yes    Advance Directives       Power of [address] Will ACP-Advance Directive ACP-Power of     Not on File Not on File Not on File Not on File          General Health Risk Interventions:  [address] Habits/Nutrition:  Physical Activity: Inactive    Days of Exercise per Week: 0 days    Minutes of Exercise per Session: 0 min     Have you lost any weight without trying in the past 3 months?: No  Body mass index: (!) 32.27  Have you seen the dentist within the past year?: Yes  Health Habits/Nutrition Interventions:  Counseled    Hearing/Vision:  Do you or your family notice any trouble with your hearing that hasn't been managed with hearing aids?: No  Do you have difficulty driving, watching TV, or doing any of your daily activities because of your eyesight?: No  Have you had an eye exam within the past year?: (!) No  No results found. Hearing/Vision Interventions:  Vision concerns:  patient encouraged to make appointment with his/her eye specialist            Objective   Vitals:    09/01/22 1510   BP: 122/60   Pulse: 61   Temp: 98.1 °F (36.7 °C)   SpO2: 96%   Weight: 188 lb (85.3 kg)   Height: 5' 4\" (1.626 m)      Body mass index is 32.27 kg/m². Allergies   Allergen Reactions    Morphine Itching    Statins      Other reaction(s): Unknown    Statins Depletion Therapy Other (See Comments)     MYALGIA     Prior to Visit Medications    Medication Sig Taking? Authorizing Provider   memantine (NAMENDA) 10 MG tablet TAKE 1 TABLET BY MOUTH TWICE DAILY Yes Christina Harvey MD   levothyroxine (SYNTHROID) 100 MCG tablet Take 1 tablet by mouth in the morning. Ideally on empty stomach. Yes Randa Nowak MD   mirabegron (MYRBETRIQ) 50 MG TB24 Take 50 mg by mouth in the morning.  Yes Randa Nowak MD   omeprazole (PRILOSEC) 20 MG delayed release capsule Take 1 capsule by mouth daily Ideally 1hr

## 2022-09-09 ENCOUNTER — HOSPITAL ENCOUNTER (OUTPATIENT)
Dept: ULTRASOUND IMAGING | Age: 77
Discharge: HOME | End: 2022-09-11
Payer: MEDICARE

## 2022-09-09 ENCOUNTER — HOSPITAL ENCOUNTER (OUTPATIENT)
Dept: CT IMAGING | Age: 77
Discharge: HOME | End: 2022-09-11
Payer: MEDICARE

## 2022-09-09 ENCOUNTER — HOSPITAL ENCOUNTER (OUTPATIENT)
Dept: MAMMOGRAPHY | Age: 77
Discharge: HOME | End: 2022-09-11
Payer: MEDICARE

## 2022-09-09 ENCOUNTER — HOSPITAL ENCOUNTER (OUTPATIENT)
Age: 77
Discharge: HOME | End: 2022-09-09
Payer: MEDICARE

## 2022-09-09 DIAGNOSIS — E55.9 VITAMIN D DEFICIENCY: ICD-10-CM

## 2022-09-09 DIAGNOSIS — Z78.0 MENOPAUSE: ICD-10-CM

## 2022-09-09 DIAGNOSIS — G45.9 TIA (TRANSIENT ISCHEMIC ATTACK): ICD-10-CM

## 2022-09-09 DIAGNOSIS — E11.65 TYPE 2 DIABETES MELLITUS WITH HYPERGLYCEMIA, WITHOUT LONG-TERM CURRENT USE OF INSULIN (HCC): ICD-10-CM

## 2022-09-09 DIAGNOSIS — Z12.31 BREAST CANCER SCREENING BY MAMMOGRAM: ICD-10-CM

## 2022-09-09 DIAGNOSIS — E03.9 ACQUIRED HYPOTHYROIDISM: ICD-10-CM

## 2022-09-09 DIAGNOSIS — E78.2 MIXED HYPERLIPIDEMIA: ICD-10-CM

## 2022-09-09 DIAGNOSIS — R42 DIZZINESS AND GIDDINESS: ICD-10-CM

## 2022-09-09 DIAGNOSIS — E53.8 VITAMIN B12 DEFICIENCY: ICD-10-CM

## 2022-09-09 LAB
ALBUMIN SERPL-MCNC: 4.3 G/DL (ref 3.5–5.2)
ALP BLD-CCNC: 84 U/L (ref 35–104)
ALT SERPL-CCNC: 19 U/L (ref 0–32)
ANION GAP SERPL CALCULATED.3IONS-SCNC: 12 MMOL/L (ref 7–16)
AST SERPL-CCNC: 20 U/L (ref 0–31)
BACTERIA: ABNORMAL /HPF
BASOPHILS ABSOLUTE: 0.07 E9/L (ref 0–0.2)
BASOPHILS RELATIVE PERCENT: 0.7 % (ref 0–2)
BILIRUB SERPL-MCNC: 0.6 MG/DL (ref 0–1.2)
BILIRUBIN URINE: NEGATIVE
BLOOD, URINE: NEGATIVE
BUN BLDV-MCNC: 9 MG/DL (ref 6–23)
CALCIUM SERPL-MCNC: 9.4 MG/DL (ref 8.6–10.2)
CHLORIDE BLD-SCNC: 106 MMOL/L (ref 98–107)
CHOLESTEROL, TOTAL: 219 MG/DL (ref 0–199)
CLARITY: CLEAR
CO2: 25 MMOL/L (ref 22–29)
COLOR: YELLOW
CREAT SERPL-MCNC: 0.8 MG/DL (ref 0.5–1)
CREATININE URINE: 133 MG/DL (ref 29–226)
EOSINOPHILS ABSOLUTE: 0.17 E9/L (ref 0.05–0.5)
EOSINOPHILS RELATIVE PERCENT: 1.6 % (ref 0–6)
EPITHELIAL CELLS, UA: ABNORMAL /HPF
FOLATE: 10.2 NG/ML (ref 4.8–24.2)
GFR AFRICAN AMERICAN: >60
GFR NON-AFRICAN AMERICAN: >60 ML/MIN/1.73
GLUCOSE BLD-MCNC: 91 MG/DL (ref 74–99)
GLUCOSE URINE: NEGATIVE MG/DL
HBA1C MFR BLD: 5.6 % (ref 4–5.6)
HCT VFR BLD CALC: 48.2 % (ref 34–48)
HDLC SERPL-MCNC: 44 MG/DL
HEMOGLOBIN: 15.7 G/DL (ref 11.5–15.5)
IMMATURE GRANULOCYTES #: 0.04 E9/L
IMMATURE GRANULOCYTES %: 0.4 % (ref 0–5)
KETONES, URINE: NEGATIVE MG/DL
LDL CHOLESTEROL CALCULATED: 132 MG/DL (ref 0–99)
LEUKOCYTE ESTERASE, URINE: ABNORMAL
LYMPHOCYTES ABSOLUTE: 4.7 E9/L (ref 1.5–4)
LYMPHOCYTES RELATIVE PERCENT: 44.3 % (ref 20–42)
MCH RBC QN AUTO: 30.1 PG (ref 26–35)
MCHC RBC AUTO-ENTMCNC: 32.6 % (ref 32–34.5)
MCV RBC AUTO: 92.5 FL (ref 80–99.9)
MICROALBUMIN UR-MCNC: 13.7 MG/L
MICROALBUMIN/CREAT UR-RTO: 10.3 (ref 0–30)
MONOCYTES ABSOLUTE: 0.49 E9/L (ref 0.1–0.95)
MONOCYTES RELATIVE PERCENT: 4.6 % (ref 2–12)
NEUTROPHILS ABSOLUTE: 5.13 E9/L (ref 1.8–7.3)
NEUTROPHILS RELATIVE PERCENT: 48.4 % (ref 43–80)
NITRITE, URINE: POSITIVE
PDW BLD-RTO: 12.3 FL (ref 11.5–15)
PH UA: 6 (ref 5–9)
PLATELET # BLD: 164 E9/L (ref 130–450)
PMV BLD AUTO: 10.8 FL (ref 7–12)
POTASSIUM SERPL-SCNC: 4.1 MMOL/L (ref 3.5–5)
PROTEIN UA: NEGATIVE MG/DL
RBC # BLD: 5.21 E12/L (ref 3.5–5.5)
RBC UA: ABNORMAL /HPF (ref 0–2)
SODIUM BLD-SCNC: 143 MMOL/L (ref 132–146)
SPECIFIC GRAVITY UA: 1.02 (ref 1–1.03)
T4 FREE: 1.49 NG/DL (ref 0.93–1.7)
TOTAL CK: 28 U/L (ref 20–180)
TOTAL PROTEIN: 7.1 G/DL (ref 6.4–8.3)
TRIGL SERPL-MCNC: 216 MG/DL (ref 0–149)
TSH SERPL DL<=0.05 MIU/L-ACNC: 1.53 UIU/ML (ref 0.27–4.2)
UROBILINOGEN, URINE: 0.2 E.U./DL
VITAMIN B-12: 605 PG/ML (ref 211–946)
VITAMIN D 25-HYDROXY: 48 NG/ML (ref 30–100)
VLDLC SERPL CALC-MCNC: 43 MG/DL
WBC # BLD: 10.6 E9/L (ref 4.5–11.5)
WBC UA: ABNORMAL /HPF (ref 0–5)

## 2022-09-09 PROCEDURE — 82550 ASSAY OF CK (CPK): CPT

## 2022-09-09 PROCEDURE — 36415 COLL VENOUS BLD VENIPUNCTURE: CPT

## 2022-09-09 PROCEDURE — 82746 ASSAY OF FOLIC ACID SERUM: CPT

## 2022-09-09 PROCEDURE — 83036 HEMOGLOBIN GLYCOSYLATED A1C: CPT

## 2022-09-09 PROCEDURE — 82607 VITAMIN B-12: CPT

## 2022-09-09 PROCEDURE — 82570 ASSAY OF URINE CREATININE: CPT

## 2022-09-09 PROCEDURE — 82044 UR ALBUMIN SEMIQUANTITATIVE: CPT

## 2022-09-09 PROCEDURE — 82306 VITAMIN D 25 HYDROXY: CPT

## 2022-09-09 PROCEDURE — 93880 EXTRACRANIAL BILAT STUDY: CPT

## 2022-09-09 PROCEDURE — 80061 LIPID PANEL: CPT

## 2022-09-09 PROCEDURE — 84443 ASSAY THYROID STIM HORMONE: CPT

## 2022-09-09 PROCEDURE — 84439 ASSAY OF FREE THYROXINE: CPT

## 2022-09-09 PROCEDURE — 70450 CT HEAD/BRAIN W/O DYE: CPT

## 2022-09-09 PROCEDURE — 81001 URINALYSIS AUTO W/SCOPE: CPT

## 2022-09-09 PROCEDURE — 80053 COMPREHEN METABOLIC PANEL: CPT

## 2022-09-09 PROCEDURE — 85025 COMPLETE CBC W/AUTO DIFF WBC: CPT

## 2022-09-09 NOTE — RESULT ENCOUNTER NOTE
Labs stable so far although urinalysis abnormal.  Check if any UTI symptoms. If so we can empirically treat but should ideally go back to the lab to provide another urine for culture, let me know.   If asymptomatic keep follow-up to review more detail sooner as needed

## 2022-09-12 ENCOUNTER — HOSPITAL ENCOUNTER (OUTPATIENT)
Dept: MAMMOGRAPHY | Age: 77
End: 2022-09-12
Payer: MEDICARE

## 2022-09-12 ENCOUNTER — HOSPITAL ENCOUNTER (OUTPATIENT)
Dept: MAMMOGRAPHY | Age: 77
Discharge: HOME | End: 2022-09-14
Payer: MEDICARE

## 2022-09-12 ENCOUNTER — HOSPITAL ENCOUNTER (OUTPATIENT)
Age: 77
Discharge: HOME | End: 2022-09-12
Payer: MEDICARE

## 2022-09-12 DIAGNOSIS — N39.0 RECURRENT UTI: Primary | ICD-10-CM

## 2022-09-12 PROCEDURE — 77080 DXA BONE DENSITY AXIAL: CPT

## 2022-09-12 PROCEDURE — 77067 SCR MAMMO BI INCL CAD: CPT

## 2022-09-12 PROCEDURE — 87088 URINE BACTERIA CULTURE: CPT

## 2022-09-12 PROCEDURE — 87186 SC STD MICRODIL/AGAR DIL: CPT

## 2022-09-12 NOTE — RESULT ENCOUNTER NOTE
Okay, culture ordered. See sooner as needed. If obvious symptoms I could empirically start antibiotic with standard precautions. Otherwise await culture. Should follow with urology regularly to with recurrent UTIs.

## 2022-09-13 NOTE — RESULT ENCOUNTER NOTE
Mild to moderate osteopenia increasing fracture risk. Standard precautions.   Follow-up to review more detail and to discuss treatment options

## 2022-09-14 DIAGNOSIS — N39.0 RECURRENT UTI: Primary | ICD-10-CM

## 2022-09-14 LAB
ORGANISM: ABNORMAL
URINE CULTURE, ROUTINE: ABNORMAL

## 2022-09-14 RX ORDER — NITROFURANTOIN 25; 75 MG/1; MG/1
100 CAPSULE ORAL 2 TIMES DAILY
Qty: 14 CAPSULE | Refills: 0 | Status: SHIPPED | OUTPATIENT
Start: 2022-09-14 | End: 2022-09-21

## 2022-09-14 NOTE — RESULT ENCOUNTER NOTE
Urine culture again positive. If not allergic I will call in [address]ian [address] with standard precautions. Keep follow-up to review more detail.   Sent to pharmacy

## 2022-09-15 ENCOUNTER — OFFICE VISIT (OUTPATIENT)
Dept: PRIMARY CARE CLINIC | Age: 77
End: 2022-09-15
Payer: MEDICARE

## 2022-09-15 VITALS
OXYGEN SATURATION: 96 % | WEIGHT: 189 LBS | SYSTOLIC BLOOD PRESSURE: 128 MMHG | BODY MASS INDEX: 32.44 KG/M2 | HEART RATE: 50 BPM | DIASTOLIC BLOOD PRESSURE: 64 MMHG | TEMPERATURE: 96.7 F

## 2022-09-15 DIAGNOSIS — R26.9 GAIT DISTURBANCE: ICD-10-CM

## 2022-09-15 DIAGNOSIS — M85.839 OSTEOPENIA OF FOREARM, UNSPECIFIED LATERALITY: ICD-10-CM

## 2022-09-15 DIAGNOSIS — E55.9 VITAMIN D DEFICIENCY: ICD-10-CM

## 2022-09-15 DIAGNOSIS — E53.8 VITAMIN B12 DEFICIENCY: ICD-10-CM

## 2022-09-15 DIAGNOSIS — E11.65 TYPE 2 DIABETES MELLITUS WITH HYPERGLYCEMIA, WITHOUT LONG-TERM CURRENT USE OF INSULIN (HCC): ICD-10-CM

## 2022-09-15 DIAGNOSIS — Z23 ENCOUNTER FOR IMMUNIZATION: ICD-10-CM

## 2022-09-15 DIAGNOSIS — E03.9 ACQUIRED HYPOTHYROIDISM: ICD-10-CM

## 2022-09-15 DIAGNOSIS — E78.2 MIXED HYPERLIPIDEMIA: ICD-10-CM

## 2022-09-15 DIAGNOSIS — I47.1 SUPRAVENTRICULAR TACHYCARDIA (HCC): ICD-10-CM

## 2022-09-15 DIAGNOSIS — F03.90 DEMENTIA WITHOUT BEHAVIORAL DISTURBANCE, UNSPECIFIED DEMENTIA TYPE: ICD-10-CM

## 2022-09-15 DIAGNOSIS — N39.0 RECURRENT UTI: Primary | ICD-10-CM

## 2022-09-15 PROCEDURE — G0008 ADMIN INFLUENZA VIRUS VAC: HCPCS | Performed by: FAMILY MEDICINE

## 2022-09-15 PROCEDURE — 3017F COLORECTAL CA SCREEN DOC REV: CPT | Performed by: FAMILY MEDICINE

## 2022-09-15 PROCEDURE — G8417 CALC BMI ABV UP PARAM F/U: HCPCS | Performed by: FAMILY MEDICINE

## 2022-09-15 PROCEDURE — 3044F HG A1C LEVEL LT 7.0%: CPT | Performed by: FAMILY MEDICINE

## 2022-09-15 PROCEDURE — G8399 PT W/DXA RESULTS DOCUMENT: HCPCS | Performed by: FAMILY MEDICINE

## 2022-09-15 PROCEDURE — 99215 OFFICE O/P EST HI 40 MIN: CPT | Performed by: FAMILY MEDICINE

## 2022-09-15 PROCEDURE — 1123F ACP DISCUSS/DSCN MKR DOCD: CPT | Performed by: FAMILY MEDICINE

## 2022-09-15 PROCEDURE — 1036F TOBACCO NON-USER: CPT | Performed by: FAMILY MEDICINE

## 2022-09-15 PROCEDURE — G8427 DOCREV CUR MEDS BY ELIG CLIN: HCPCS | Performed by: FAMILY MEDICINE

## 2022-09-15 PROCEDURE — 2022F DILAT RTA XM EVC RTNOPTHY: CPT | Performed by: FAMILY MEDICINE

## 2022-09-15 PROCEDURE — 90694 VACC AIIV4 NO PRSRV 0.5ML IM: CPT | Performed by: FAMILY MEDICINE

## 2022-09-15 PROCEDURE — 1090F PRES/ABSN URINE INCON ASSESS: CPT | Performed by: FAMILY MEDICINE

## 2022-09-15 RX ORDER — ALENDRONATE SODIUM 70 MG/1
TABLET ORAL
Qty: 12 TABLET | Refills: 3 | Status: SHIPPED | OUTPATIENT
Start: 2022-09-15

## 2022-09-15 NOTE — PROGRESS NOTES
Sean Sanchez : 1945 Sex: female  Age: 77 y.o. Chief Complaint   Patient presents with    Results     Review US, CT, Mammogram, DEXA    Discuss Labs    Urinary Tract Infection     Did not start antibiotic yet     Shaking    Discuss Medications     Myrbetric; discuss function    Other     Discuss flu vaccine       HPI:    Patient presents today with her  for follow-up/multiple. Continues to have balance issues where she falls back at times. Falls precaution reviewed. Fracture risk reviewed. No headache. No syncope or near syncope. No chest pain or palpitations. Importance of follow cardiologist reviewed. Multiple studies done. Bone density shows osteopenia. Mammogram negative. CT brain and carotid ultrasound negative. Blood work showed BMP normal HDL 44  triglyceride 216 declines further meds, has not tolerated meds in the past, TFTs normal hemoglobin A1c 5.6 vitamin D 48 hemoglobin 15.7 white count 10.6 platelet count 747, greater 100,000 E. coli, notes dysuria urgency frequency. Chronic irritable bladder.  states Myrbetriq prescribed by urology is expensive and not working. CT HEAD WO CONTRAST    Result Date: 2022  EXAMINATION: CT OF THE HEAD WITHOUT CONTRAST  2022 11:54 am TECHNIQUE: CT of the head was performed without the administration of intravenous contrast. Automated exposure control, iterative reconstruction, and/or weight based adjustment of the mA/kV was utilized to reduce the radiation dose to as low as reasonably achievable. COMPARISON: 2019 HISTORY: ORDERING SYSTEM PROVIDED HISTORY: TIA (transient ischemic attack) FINDINGS: BRAIN/VENTRICLES: There is no acute intracranial hemorrhage, mass effect or midline shift. No abnormal extra-axial fluid collection. The gray-white differentiation is maintained without evidence of an acute infarct. There is no evidence of hydrocephalus.  ORBITS: The visualized portion of the orbits demonstrate no acute abnormality. SINUSES: The visualized paranasal sinuses and mastoid air cells demonstrate no acute abnormality. SOFT TISSUES/SKULL:  No acute abnormality of the visualized skull or soft tissues. No acute intracranial abnormality or hemorrhage. DEXA BONE DENSITY AXIAL SKELETON    Result Date: 9/13/2022  EXAMINATION: BONE DENSITOMETRY 9/12/2022 2:21 pm Insert template 29 BONE DENSITOMETRY TECHNIQUE: A bone density DEXA scan was performed of the nondominant forearm, lumbar spine on a [employer] system. COMPARISON: None. HISTORY: ORDERING SYSTEM PROVIDED HISTORY: Menopause FINDINGS: LUMBAR SPINE: The bone mineral density in the lumbar spine including the L1-L4 levels is measured at 1.352 g/cm2, which corresponds to a T-score of 1.4 and a Z-score of 2.6. This is within the normal range by WHO criteria. FOREARM: The bone mineral density of the middle third of the radius of the distal forearm equals 0.579 g/cm2. The T-score and a Z-score are -1.8 and 0.5. This is within the osteopenia range by WHO criteria. Osteopenia by WHO criteria. RECOMMENDATIONS: Follow-up examination in September 2023     SHAKIR DIGITAL SCREEN W OR WO CAD BILATERAL    Result Date: 9/12/2022  EXAMINATION: SCREENING DIGITAL BILATERAL MAMMOGRAM, 9/12/2022 TECHNIQUE: Screening mammography of the bilateral breasts was performed. 2D standard imaging performed through both breasts in the MLO and CC projection. Computer aided detection was utilized in the interpretation of this exam. COMPARISON: Multiple prior studies, the most recent dated January 18, 2018 HISTORY: Screening. No personal or family history of breast cancer. TC score of 3.2%. FINDINGS: Breast tissue is heterogeneously dense which may obscure small masses.   No suspicious mass, microcalcifications, or architectural distortion identified in either breast.     No mammographic evidence of malignancy in either breast. RECOMMENDATION: Annual bilateral mammogram is advised with consideration for annual bilateral screening ultrasound given the patient's breast density. BIRADS: BIRADS - CATEGORY 1 Negative. OVERALL ASSESSMENT - NEGATIVE A letter of notification will be sent to the patient regarding the results. RISK ASSESSMENT: In older women, breast cancer risk factors, used in risk assessment models, that reflect hormonal exposures in the distant past, such as age at first birth or age at menarche, are less predictive of late-life breast cancer than factors indicating recent hormonal exposures such as high bone mass or obesity. Factors such as comorbidities and individualized estimates of life expectancy may play more of a role in guiding patient care than a lifetime risk assessment percentage. Women 76 and older who are in good health and have excellent functional status may benefit from mammography screening, while others who are in poor health and have short life expectancies probably do not. For women with less than a 5-10 year life expectancy, recommendations to stop screening mammography should be framed around how a woman's health problems increase the harms of screening (e.g., overdiagnosis) and shift the focus to interventions likely to be beneficial over a shorter time frame (e.g., falls prevention, depression screening). Many major health organizations, including the [employer], recommend women ages 79 and older continue to get mammograms on a regular basis as long as they are in good health and could benefit from treatment if breast cancer were found. NOTE: Mammography is not 100% accurate in the detection of breast cancer. Accuracy decreases as mammographic density of the breast increases. A negative mammogram should not deter further evaluation (including biopsy) of suspicious physical findings. Recommendations are based on NCCN ([address]) and [employer] of [employer]) guidelines.  Thank you for sending your patient to this ACR and FDA approved facility. If there are any physician concerns regarding this report, a Radiologist can be reached by calling the following number [phone]. Follow up Protocol for the [facility]: BI-RADS 1 or 2:  Center will send a reminder when next mammogram is due. BI-RADS 3:  Center will send a reminder for recommended short term follow up. BI-RADS 0, 4 or 5:  Center will contact patient to schedule all additional views and procedures. US CAROTID ARTERY BILATERAL    Result Date: 9/9/2022  EXAMINATION: ULTRASOUND EVALUATION OF THE CAROTID ARTERIES 9/9/2022 TECHNIQUE: Duplex ultrasound using B-mode/gray scaled imaging, Doppler spectral analysis and color flow Doppler was obtained of the carotid arteries. COMPARISON: None. HISTORY: ORDERING SYSTEM PROVIDED HISTORY: TIA (transient ischemic attack) TECHNOLOGIST PROVIDED HISTORY: What reading provider will be dictating this exam?->CRC FINDINGS: RIGHT: The right common carotid artery demonstrates peak systolic velocities of 74 and 53 cm/sec in the proximal and distal segments respectively. The right internal carotid artery demonstrates the systolic velocities of 50, 61, 71  cm/sec in the proximal, mid and distal segments respectively. The external carotid artery is patent. The vertebral artery demonstrates normal antegrade flow. Mild focal atherosclerotic plaque. ICA/CCA ratio of 1.3 LEFT: The left common carotid artery demonstrates peak systolic velocities of 78, 49 cm/sec in the proximal and distal segments respectively. The left internal carotid artery demonstrates the systolic velocities of 68, 56, 59 cm/sec in the proximal, mid and distal segments respectively. The external carotid artery is patent. The vertebral artery demonstrates normal antegrade flow. Mild focal atherosclerotic plaque. ICA/CCA ratio of 0.4     The right internal carotid artery demonstrates 0-50% stenosis.  The left internal carotid artery demonstrates 0-50% stenosis. Bilateral vertebral arteries are patent with flow in the normal direction. Mild bilateral focal atherosclerotic plaque.              Most Recent Labs  CBC  Lab Results   Component Value Date/Time    WBC 10.6 09/09/2022 01:00 PM    WBC 9.4 04/28/2022 01:35 PM    WBC 10.6 01/13/2022 04:40 PM    RBC 5.21 09/09/2022 01:00 PM    RBC 4.95 04/28/2022 01:35 PM    RBC 4.85 01/13/2022 04:40 PM    HGB 15.7 09/09/2022 01:00 PM    HGB 15.3 04/28/2022 01:35 PM    HGB 15.1 01/13/2022 04:40 PM    HCT 48.2 09/09/2022 01:00 PM    HCT 44.9 04/28/2022 01:35 PM    HCT 44.8 01/13/2022 04:40 PM    MCV 92.5 09/09/2022 01:00 PM    MCV 90.7 04/28/2022 01:35 PM    MCV 92.4 01/13/2022 04:40 PM     09/09/2022 01:00 PM     04/28/2022 01:35 PM     01/13/2022 04:40 PM      CMP  Lab Results   Component Value Date/Time     09/09/2022 01:00 PM     04/28/2022 01:35 PM     01/13/2022 04:40 PM    K 4.1 09/09/2022 01:00 PM    K 4.1 04/28/2022 01:35 PM    K 4.3 01/13/2022 04:40 PM     09/09/2022 01:00 PM     04/28/2022 01:35 PM     01/13/2022 04:40 PM    CO2 25 09/09/2022 01:00 PM    CO2 23 04/28/2022 01:35 PM    CO2 26 01/13/2022 04:40 PM    ANIONGAP 12 09/09/2022 01:00 PM    ANIONGAP 12 04/28/2022 01:35 PM    ANIONGAP 10 01/13/2022 04:40 PM    GLUCOSE 91 09/09/2022 01:00 PM    GLUCOSE 107 04/28/2022 01:35 PM    GLUCOSE 97 01/13/2022 04:40 PM    GLUCOSE 104 08/15/2011 10:50 AM    GLUCOSE 113 04/25/2011 02:28 PM    GLUCOSE 97 04/01/2011 10:40 AM    BUN 9 09/09/2022 01:00 PM    BUN 11 04/28/2022 01:35 PM    BUN 12 01/13/2022 04:40 PM    CREATININE 0.8 09/09/2022 01:00 PM    CREATININE 0.7 04/28/2022 01:35 PM    CREATININE 0.8 01/13/2022 04:40 PM    LABGLOM >60 09/09/2022 01:00 PM    LABGLOM >60 04/28/2022 01:35 PM    LABGLOM >60 01/13/2022 04:40 PM    GFRAA >60 09/09/2022 01:00 PM    GFRAA >60 04/28/2022 01:35 PM    GFRAA >60 01/13/2022 04:40 PM CALCIUM 9.4 09/09/2022 01:00 PM    CALCIUM 9.6 04/28/2022 01:35 PM    CALCIUM 9.5 01/13/2022 04:40 PM    PROT 7.1 09/09/2022 01:00 PM    PROT 7.1 04/28/2022 01:35 PM    PROT 6.8 01/13/2022 04:40 PM    LABALBU 4.3 09/09/2022 01:00 PM    LABALBU 4.2 04/28/2022 01:35 PM    LABALBU 4.4 01/13/2022 04:40 PM    LABALBU 4.4 08/15/2011 10:50 AM    LABALBU 4.2 04/25/2011 02:28 PM    LABALBU 4.2 03/24/2011 03:55 AM    BILITOT 0.6 09/09/2022 01:00 PM    BILITOT 0.5 04/28/2022 01:35 PM    BILITOT 0.5 01/13/2022 04:40 PM    ALKPHOS 84 09/09/2022 01:00 PM    ALKPHOS 82 04/28/2022 01:35 PM    ALKPHOS 81 01/13/2022 04:40 PM    AST 20 09/09/2022 01:00 PM    AST 25 04/28/2022 01:35 PM    AST 22 01/13/2022 04:40 PM    ALT 19 09/09/2022 01:00 PM    ALT 27 04/28/2022 01:35 PM    ALT 23 01/13/2022 04:40 PM     A1C  Lab Results   Component Value Date/Time    LABA1C 5.6 09/09/2022 01:00 PM    LABA1C 5.7 04/28/2022 01:35 PM    LABA1C 5.6 01/13/2022 04:40 PM     TSH  Lab Results   Component Value Date/Time    TSH 1.530 09/09/2022 01:00 PM    TSH 0.794 04/28/2022 01:35 PM    TSH 0.648 01/13/2022 04:40 PM     FREET4  Lab Results   Component Value Date/Time    C1HKWTU 7.1 06/26/2019 03:07 PM    G3LGUOE 7.6 05/21/2014 12:13 PM     LIPID  Lab Results   Component Value Date/Time    CHOL 219 09/09/2022 01:00 PM    CHOL 225 04/28/2022 01:35 PM    CHOL 211 01/13/2022 04:40 PM    HDL 44 09/09/2022 01:00 PM    HDL 50 04/28/2022 01:35 PM    HDL 45 01/13/2022 04:40 PM    LDLCALC 132 09/09/2022 01:00 PM    LDLCALC 138 04/28/2022 01:35 PM    LDLCALC 135 01/13/2022 04:40 PM    TRIG 216 09/09/2022 01:00 PM    TRIG 183 04/28/2022 01:35 PM    TRIG 155 01/13/2022 04:40 PM     VITAMIN D  Lab Results   Component Value Date/Time    VITD25 48 09/09/2022 01:00 PM    VITD25 53 04/28/2022 01:35 PM    VITD25 59 01/13/2022 04:40 PM     MAGNESIUM  Lab Results   Component Value Date/Time    MG 2.2 05/22/2014 04:40 AM    MG 2.0 04/28/2014 09:37 AM    MG 2.3 03/23/2011 12:45 PM      PHOS  No results found for: PHOS   RON   Lab Results   Component Value Date/Time    RON NEGATIVE 11/29/2012 02:20 PM     RHEUMATOID FACTOR  No results found for: RF  PSA  No results found for: PSA   HEPATITIS C  Lab Results   Component Value Date/Time    HCVABI Non-Reactive 08/12/2020 04:14 PM     HIV  No results found for: QUU2OFW, HIV1QT  UA  Lab Results   Component Value Date/Time    COLORU Yellow 09/09/2022 12:46 PM    COLORU Yellow 04/28/2022 01:37 PM    COLORU Yellow 01/13/2022 04:42 PM    CLARITYU Clear 09/09/2022 12:46 PM    CLARITYU Clear 04/28/2022 01:37 PM    CLARITYU Clear 01/13/2022 04:42 PM    GLUCOSEU Negative 09/09/2022 12:46 PM    GLUCOSEU Negative 04/28/2022 01:37 PM    GLUCOSEU Negative 01/13/2022 04:42 PM    BILIRUBINUR Negative 09/09/2022 12:46 PM    BILIRUBINUR Negative 04/28/2022 01:37 PM    BILIRUBINUR Negative 01/13/2022 04:42 PM    BILIRUBINUR negative 11/26/2021 01:52 PM    BILIRUBINUR negative 09/16/2021 03:18 PM    BILIRUBINUR negative 08/09/2021 03:47 PM    KETUA Negative 09/09/2022 12:46 PM    KETUA Negative 04/28/2022 01:37 PM    KETUA Negative 01/13/2022 04:42 PM    SPECGRAV 1.025 09/09/2022 12:46 PM    SPECGRAV 1.020 04/28/2022 01:37 PM    SPECGRAV >=1.030 01/13/2022 04:42 PM    BLOODU Negative 09/09/2022 12:46 PM    BLOODU Negative 04/28/2022 01:37 PM    BLOODU Negative 01/13/2022 04:42 PM    PHUR 6.0 09/09/2022 12:46 PM    PHUR 6.0 04/28/2022 01:37 PM    PHUR 6.0 01/13/2022 04:42 PM    PROTEINU Negative 09/09/2022 12:46 PM    PROTEINU Negative 04/28/2022 01:37 PM    PROTEINU Negative 01/13/2022 04:42 PM    UROBILINOGEN 0.2 09/09/2022 12:46 PM    UROBILINOGEN 0.2 04/28/2022 01:37 PM    UROBILINOGEN 0.2 01/13/2022 04:42 PM    NITRU POSITIVE 09/09/2022 12:46 PM    NITRU Negative 04/28/2022 01:37 PM    NITRU Negative 01/13/2022 04:42 PM    LEUKOCYTESUR MODERATE 09/09/2022 12:46 PM    LEUKOCYTESUR SMALL 04/28/2022 01:37 PM    LEUKOCYTESUR Negative 01/13/2022 04:42 PM     Urine Micro/Albumin Ratio  Lab Results   Component Value Date/Time    [facility] 10.3 09/09/2022 12:46 PM    [facility] - 04/28/2022 01:37 PM    [facility] - 08/30/2021 04:45 PM         Current ROS as below with intermittent symptoms as above  ROS:  Const: Denies chills, fever, malaise and sweats. Eyes: Denies discharge, pain, redness and visual disturbance. ENMT: Denies earaches, other ear symptoms. Denies nasal or sinus symptoms other than stated  above. Denies mouth and tongue lesions and sore throat. CV: Denies chest discomfort, pain; diaphoresis, dizziness, edema, lightheadedness, orthopnea,  palpitations, syncope and near syncopal episode or any exertional symptoms  Resp: Denies cough, hemoptysis, pleuritic pain, SOB, sputum production and wheezing. GI: Denies abdominal pain, change in bowel habits, hematochezia, melena, nausea and vomiting. : Denies urinary symptoms including dysuria , urgency, frequency or hematuria. Musculo: Denies musculoskeletal symptoms. Skin: Denies bruising and rash  Neuro: Denies headache, numbness, stiff neck, tingling and focal weakness slurred speech or facial  droop  Hema/Lymph: Denies bleeding/bruising tendency and enlarged lymph nodes      Current Outpatient Medications:     alendronate (FOSAMAX) 70 MG tablet, 1 po qwk. Take with plain h2o, do not eat, drink or lie down for at least 30min and not until after first food of day., Disp: 12 tablet, Rfl: 3    nitrofurantoin, macrocrystal-monohydrate, (MACROBID) 100 MG capsule, Take 1 capsule by mouth 2 times daily for 7 days, Disp: 14 capsule, Rfl: 0    memantine (NAMENDA) 10 MG tablet, TAKE 1 TABLET BY MOUTH TWICE DAILY, Disp: 180 tablet, Rfl: 3    levothyroxine (SYNTHROID) 100 MCG tablet, Take 1 tablet by mouth in the morning.  Ideally on empty stomach., Disp: 90 tablet, Rfl: 1    omeprazole (PRILOSEC) 20 MG delayed release capsule, Take 1 capsule by mouth daily Ideally 1hr prior to dinner, Disp: 90 capsule, Rfl: 1    metoprolol succinate (TOPROL XL) 100 MG extended release tablet, Take 1 tablet by mouth 2 times daily, Disp: 180 tablet, Rfl: 1    donepezil (ARICEPT) 10 MG tablet, TAKE 1 TABLET BY MOUTH TWICE DAILY, Disp: 180 tablet, Rfl: 3    ammonium lactate (LAC-HYDRIN) 12 % lotion, BID PRN, Disp: 225 g, Rfl: 1    metFORMIN (GLUCOPHAGE) 500 MG tablet, Take 1 tablet by mouth 2 times daily (with meals), Disp: 180 tablet, Rfl: 3    omega-3 acid ethyl esters (LOVAZA) 1 g capsule, Take 2 capsules by mouth 2 times daily, Disp: 360 capsule, Rfl: 3    B Complex Vitamins (B-COMPLEX/B-12 PO), Take by mouth daily LD 12/12/18, Disp: , Rfl:   Allergies   Allergen Reactions    Morphine Itching    Statins      Other reaction(s): Unknown    Statins Depletion Therapy Other (See Comments)     MYALGIA       Past Medical History:   Diagnosis Date    Arthritis     Diabetes mellitus ([facility] [address]     Difficult intubation     carries card, copy on chart  Glidescope#3 with ETT size7    Hyperlipidemia     Hypothyroidism     Left sided abdominal pain     Memory problem     still competent    GORDY on CPAP     Pacemaker     Rectal bleeding     Ringing in the ears      Past Surgical History:   Procedure Laterality Date    ABLATION OF DYSRHYTHMIC FOCUS  1996    BREAST SURGERY      implants    COLONOSCOPY  2009? Dr. Jose Eduardo Tovar twisted     COLONOSCOPY  2008? Dr. Kylie Casey. incomplete. COLONOSCOPY  12/17/2018    Dr. Hayde Serna 12/17/2018    COLONOSCOPY DIAGNOSTIC performed by Teresa Quintero MD at [address] [place] [address] N/A 1/26/2022    COLONOSCOPY DIAGNOSTIC performed by Sangeeta Fabian MD at [address] [facility]  09/10/2011    POSTERIOR    HYSTERECTOMY (CERVIX STATUS UNKNOWN)      vaginal. uterus only.      KNEE ARTHROPLASTY Right     partial    KNEE ARTHROSCOPY  9/08/2011    right knee    PACEMAKER PLACEMENT  5/23/2014    [employer]tronic dual chamber    TOTAL HIP ARTHROPLASTY Right 2015    UPPER GASTROINTESTINAL ENDOSCOPY N/A 1/26/2022    EGD BIOPSY performed by Laz Jones MD at [address] History   Problem Relation Age of Onset    Pacemaker Mother     Heart Attack Father     Cancer Other 48        Bone Marrow Cancer / Had Bone Marrow Transplant     Social History     Tobacco Use    Smoking status: Former     Packs/day: 1.50     Years: 13.00     Pack years: 19.50     Types: Cigarettes     Start date: 1973     Quit date: 1986     Years since quittin.7    Smokeless tobacco: Never    Tobacco comments:     quit smoking    Vaping Use    Vaping Use: Never used   Substance Use Topics    Alcohol use: Yes     Comment: socially -- 3 drinks per month at the most    Drug use: No      Social History     Social History Narrative    PMH:    Problem List: Urinary tract infectious disease, Obstructive sleep apnea syndrome, Adult health    examination, Adult health examination, Constipation, Derangement of knee, Vitamin D deficiency,    Hypothyroidism, Conduction disorder of the heart, Hyperlipidemia    Health Maintenance:    Mini Mental Status - (2018)    Colonoscopy - (2018)    Colonoscopy Screening - (2018)    Mammogram Screening - (2018)    Mammogram - (2018)    Colonoscopy - (2010)    Couseled on Home Safety - (2015)    Bone Density Scan - (3/28/2012)    Medical Problems:    Sleep Apnea - Dr Collin Gutierrez, cpap    Pneumonia    Hypothyroidism - Dr Chuck Grey - Dr Yenny Fong    Hyperlipidemia - intolerance to all statins    Cardiac Arrhythmia - ? type. s/p ablation    cardiac dysrhythmia - pauses - lead to pacemaker    Skin Cancer    Surgical Hx:    Partial Hysterectomy - Still with Both Ovaries. Breast Implants    Pacemaker - Dr Fred Nicole    Knee Replacement, Carpal Tunnel Release, colon-vaginal fistula repair    Bladder Repair - sling    RT Hip Arthroplasty - MARGARITA        FH:    Father:    . (Hx)    Mother:    . (Hx)    Dad - MI 39,  70 MI    Mom - DM , currently living age 80        SH: Marital: . Personal Habits: Cigarette Use: Former Cigarette Smoker - quit age 36. Occ ETOH, minimal. . 2 kids, 6 step kids. 27 grandkids. .Alcohol: Rarely consumes alcohol        Vitals:    09/15/22 1429   BP: 128/64   Pulse: 50   Temp: (!) 96.7 °F (35.9 °C)   SpO2: 96%   Weight: 189 lb (85.7 kg)     Wt Readings from Last 3 Encounters:   09/15/22 189 lb (85.7 kg)   09/01/22 188 lb (85.3 kg)   09/01/22 188 lb (85.3 kg)            Exam:  Const: Appears comfortable. No signs of acute distress present. Head/Face: Atraumatic, normocephalic on inspection. Eyes: No discharge from the eyes. Sclerae clear. ENMT: Ears clear at this time without cerumen, nose clear oropharynx clear. Neck: Supple. Palpation reveals no adenopathy. No masses appreciated. No JVD. Resp: Respirations are unlabored. Clear to auscultation bilaterally. No rales, rhonchi or wheezes appreciated over the  lungs bilaterally. CV: RRR   Extremities: No clubbing or cyanosis. No edema of the lower limbs  bilaterally. No calf inflammation or tenderness. Abdomen: Abdomen is soft,and nondistended. No abdominal masses  appreciated. No palpable hepatosplenomegaly. Bowel sounds are normoactive. Nontender  Skin: Dry and warm with no rash. Muscular skeletal: No acute joint inflammation. Neuro:Grossly intact without focal deficit  No CVA tenderness    Office Labs This Visit :  No results found for this visit on 09/15/22. Assessment and Plan:   Diagnosis Orders   1. Recurrent UTI  Urinalysis    Culture, Urine      2. Osteopenia of forearm, unspecified laterality  alendronate (FOSAMAX) 70 MG tablet      3. Acquired hypothyroidism  TSH    T4, Free      4. Dementia without behavioral disturbance, unspecified dementia type ([facility] [address]        5. Type 2 diabetes mellitus with hyperglycemia, without long-term current use of insulin (HCC)  CBC with Auto Differential    Hemoglobin A1C    Urinalysis    Microalbumin / Creatinine Urine Ratio      6. reviewed. -1.8 forearm. Risk of fracture reviewed. Falls precautions reviewed. M/M related to fracture reviewed. Appropriate calcium/D reviewed. After discussion services making agreeable to starting Fosamax with standard precautions. Denies contraindication. Hold prior to dental procedures. Repeat bone density 1 to 2 years sooner as needed. Hematuria, unspecified type  Last check negative. Following with Dr. Roly Huang          Lichen sclerosus  On 11/21 labial biopsy through Dr. Annetta Dakin, counseled, follow with him      Constipation  Counseled extensively. Differential reviewed, including serious etiologies. Resolved after MiraLAX taper  Colonoscopy 1/22 - except hemorrhoids-Dr. Lee Skinner         Polycythemia  Counseled. Hemoglobin fluctuates, ferritin was borderline high, but improved/stable. Monitor. Counseled on hemochromatosis. Proper hydration. Counseled extensively. Differential reviewed, including serious etiologies. Obstructive sleep apnea syndrome     Counseled. Doing very well CPAP. Uses it at least 8 to 10 hours per night 7 nights per week with very good results. Got a new machine toward the end of 2019. Encourage her machine be checked. She follows up with Dr. Dickson Gallardo 10/27/2021     Hypothyroidism     TSH elevated on 112mcg qd, 1/2 Sunday. Was suppressed on 112 qd in the past. Declines d.a.w. Wants to continue generic. Pinky Angles Defers imaging. TFTs now normal monitor. Asymptomatic              Type 2 diabetes mellitus with hyperglycemia, without long-term current use of insulin ([facility])  Hemoglobin A1c stable, 5.9 to 6.1 -5.6-5.5-5.5-5.6-5.6 on metformin 500 mg twice a day. Previously 1000 twice a day. Precautions reviewed. Risks  reviewed, proper hydration reviewed, standard precautions reviewed including loose bowels, LA. Encouraged  she watch blood sugar ambulatory with parameters reviewed call in if out of range.   Hyper and hypoglycemic precautions reviewed. Micro-and macrovascular  complications reviewed. Importance of at least yearly eye exams and daily foot exams reviewed. Tolerating metformin . no longer on invokana. Monitor               Dementia without behavioral disturbance  Counseled extensively. Differential reviewed including various forms of dementia and pseudodementia. On Aricept 10 mg daily, and Namenda. Continue per Dr. Sarah Swift they were seeing Dr. Estela Maguire specialist from [facility] [employer]. However they re noncompliant without follow-up, feels it is a \"racket\". Follow-up with Dr. Sarah Swift. She is enrolled in a study, nitrate water. She is not driving and we emphasized importance of this. Safety precautions reviewed.  feels her dementia is stable. continue current management and she follows up with Dr. Sarah Swift again September 2022. Mixed hyperlipidemia  not at goal  Significant risk of cerebrovascular/cardiovascular event. Imperative this  be controlled with history of TIA, however adamantly refuses any further treatment at this time. Refuses to try any other statin or Zetia stating that these were not tolerated in the  past. She did not tolerate Niaspan. did not tolerate WelChol. . declines  cholestyramine. Risk of stroke and heart attack reviewed. lifestyle modification  reviewed. risk of hyperlipidemia reviewed . Did not tolerate fenofibrate  Counseled on repatha, declines. No longer on and does not wish Lovaza any longer. Declines change in therapy despite counseling     Supraventricular tachycardia ([address]  h/o svt Status post ablation  . Also history of long pauses-since had pacemaker by Dr. Jeff Christiansen. FU with her. Follow-up Dr Margie Dougherty . Asymptomatic sends recordings by phone.  ekg done and reviewed with her     Liver disease  Counseled extensively. Differential reviewed, including serious etiologies. Likely  fatty liver. Precautions reviewed. Lifestyle modification appropriate diet and weight  loss reviewed. Risks of even this leading to cirrhosis reviewed. Other than basic  monitoring on interested in other evaluation or treatment, in-depth blood work,  imaging or otherwise. Hep C 10/18 negative, again was negative 9/20. LFTs currently normal              Tubular adenoma  Small polyp 7/18. Dr. Yessy Mohamud recommended basic screenings if repeated at all. Asymptomatic. Repeat colonoscopy Dr. Martine Teague 1/22 showed hemorrhoids otherwise negative     Vitamin D deficiency  On supplementation she was borderline toxic, it has stabilized, continue off vitamin D. Monitor     Health maintenance examination  Health maintenance issues discussed at length  6/21. Encourage yearly. B12 deficiency  Risk of high and low reviewed. Last time high, discussed backing off. Monitor          Breast cancer screening  Mammogram was negative 9/22          Plan as above. Counseled extensively and differential diagnoses relevant to above were reviewed, including serious etiologies, risks and complications, especially of left uncontrolled. If relevant, instructions and  alternatives to meds/treatment reviewed, as well as interactions, and  SE's/ADRs reviewed, notify immediately if any, discontinuing new meds if any. Plan made after discussion and shared decision making. My concerns continue to be reviewed. Not interested in ER/hospitalization at this time unless symptoms persist or worsen. Importance of early dx/tx reviewed and limitations of outpatient E/T reviewed. Risk of sudden DF event reviewed. In very brief summary they are reluctantly agreeable to physical therapy and vestibular therapy. Also refer to neurology. They will discuss further with Dr. Della Benedict as well. Hold Myrbetriq as it does not seem to be helping. Treat UTI as above. Falls precautions. Start Fosamax. Flu vaccine. Recheck UA culture and follow-up 2 weeks sooner as needed. Also blood work and follow-up 3 months sooner as needed.       As long as symptoms software. Document was reviewed however may contain grammatical errors.

## 2022-09-15 NOTE — ASSESSMENT & PLAN NOTE
bone density 9/22 showed T score +1.4 L1-L4-risk of false negative reviewed. -1.8 forearm. Risk of fracture reviewed. Falls precautions reviewed. M/M related to fracture reviewed. Appropriate calcium/D reviewed. After discussion services making agreeable to starting Fosamax with standard precautions. Denies contraindication. Hold prior to dental procedures. Repeat bone density 1 to 2 years sooner as needed.

## 2022-09-21 ENCOUNTER — OFFICE VISIT (OUTPATIENT)
Dept: GERIATRIC MEDICINE | Age: 77
End: 2022-09-21
Payer: MEDICARE

## 2022-09-21 VITALS
BODY MASS INDEX: 31.92 KG/M2 | WEIGHT: 187 LBS | HEART RATE: 68 BPM | RESPIRATION RATE: 20 BRPM | HEIGHT: 64 IN | DIASTOLIC BLOOD PRESSURE: 76 MMHG | TEMPERATURE: 98.1 F | SYSTOLIC BLOOD PRESSURE: 130 MMHG

## 2022-09-21 DIAGNOSIS — G31.84 MCI (MILD COGNITIVE IMPAIRMENT): Primary | ICD-10-CM

## 2022-09-21 PROCEDURE — 3017F COLORECTAL CA SCREEN DOC REV: CPT | Performed by: INTERNAL MEDICINE

## 2022-09-21 PROCEDURE — 99212 OFFICE O/P EST SF 10 MIN: CPT | Performed by: INTERNAL MEDICINE

## 2022-09-21 PROCEDURE — 1036F TOBACCO NON-USER: CPT | Performed by: INTERNAL MEDICINE

## 2022-09-21 PROCEDURE — G8427 DOCREV CUR MEDS BY ELIG CLIN: HCPCS | Performed by: INTERNAL MEDICINE

## 2022-09-21 PROCEDURE — G8417 CALC BMI ABV UP PARAM F/U: HCPCS | Performed by: INTERNAL MEDICINE

## 2022-09-21 PROCEDURE — G8399 PT W/DXA RESULTS DOCUMENT: HCPCS | Performed by: INTERNAL MEDICINE

## 2022-09-21 PROCEDURE — 1090F PRES/ABSN URINE INCON ASSESS: CPT | Performed by: INTERNAL MEDICINE

## 2022-09-21 PROCEDURE — 1123F ACP DISCUSS/DSCN MKR DOCD: CPT | Performed by: INTERNAL MEDICINE

## 2022-09-21 ASSESSMENT — PATIENT HEALTH QUESTIONNAIRE - PHQ9
SUM OF ALL RESPONSES TO PHQ QUESTIONS 1-9: 0
SUM OF ALL RESPONSES TO PHQ QUESTIONS 1-9: 0
SUM OF ALL RESPONSES TO PHQ9 QUESTIONS 1 & 2: 0
2. FEELING DOWN, DEPRESSED OR HOPELESS: 0
SUM OF ALL RESPONSES TO PHQ QUESTIONS 1-9: 0
1. LITTLE INTEREST OR PLEASURE IN DOING THINGS: 0
SUM OF ALL RESPONSES TO PHQ QUESTIONS 1-9: 0

## 2022-09-21 ASSESSMENT — LIFESTYLE VARIABLES
HOW OFTEN DO YOU HAVE A DRINK CONTAINING ALCOHOL: MONTHLY OR LESS
HOW MANY STANDARD DRINKS CONTAINING ALCOHOL DO YOU HAVE ON A TYPICAL DAY: 1 OR 2

## 2022-09-21 NOTE — PROGRESS NOTES
Here  with  and still on H3 drink , 2 glasses  Overall stays in bed 12 hours a day   is caregiver  Has 2 Shitshus  Appetite good and eats bid and weight stable  HYM,, On and off  Memory is not worse per   She fears easier over being left alone    does all IADL's and enjoys Charleys and Roxann Tuesday  Tolerating meds fine  Anniversary ? ?  He said July 3rd 1990  Cannot remember [place] in [place]   providing a panoramic view of situation  Plays game of 14 , keep scores and excellent math  Impression: MCI stable   Plan: Continue same

## 2022-09-21 NOTE — PROGRESS NOTES
Chief Complaint   Patient presents with    6 Month Follow-Up     March follow up re: MCI. Here with .

## 2022-09-27 DIAGNOSIS — F03.90 DEMENTIA WITHOUT BEHAVIORAL DISTURBANCE, UNSPECIFIED DEMENTIA TYPE: ICD-10-CM

## 2022-09-27 RX ORDER — METOPROLOL SUCCINATE 100 MG/1
100 TABLET, EXTENDED RELEASE ORAL 2 TIMES DAILY
Qty: 180 TABLET | Refills: 1 | Status: SHIPPED
Start: 2022-09-27 | End: 2022-10-06 | Stop reason: SDUPTHER

## 2022-09-28 ENCOUNTER — TELEPHONE (OUTPATIENT)
Dept: GERIATRIC MEDICINE | Age: 77
End: 2022-09-28

## 2022-09-28 RX ORDER — DONEPEZIL HYDROCHLORIDE 10 MG/1
TABLET, FILM COATED ORAL
Qty: 180 TABLET | Refills: 3 | Status: SHIPPED | OUTPATIENT
Start: 2022-09-28

## 2022-09-28 NOTE — TELEPHONE ENCOUNTER
Dewaynevd a RF request through [facility] for Donepezil to mail order pharmacy. Spoke with  and verified that this is the pharmacy he wants to use (last script went to [facility]). During the call,  states he had recently discussed a study where there was a cure for Alzheimer's in mice, and DR asked him to provide more info. States the study was done by a Professor Gianluca Singleton [facility] in [facility]. States they inserted brain cells into the hippocampus. Wants to know if DR can look into this for him and if they are going to do a human trial, if appropriate for Macy Londono, get her enrolled in the study. Please advise.

## 2022-09-30 ENCOUNTER — OFFICE VISIT (OUTPATIENT)
Dept: PRIMARY CARE CLINIC | Age: 77
End: 2022-09-30
Payer: MEDICARE

## 2022-09-30 VITALS
SYSTOLIC BLOOD PRESSURE: 128 MMHG | HEART RATE: 57 BPM | TEMPERATURE: 97.1 F | DIASTOLIC BLOOD PRESSURE: 64 MMHG | WEIGHT: 189 LBS | OXYGEN SATURATION: 95 % | BODY MASS INDEX: 32.95 KG/M2

## 2022-09-30 DIAGNOSIS — F03.90 DEMENTIA WITHOUT BEHAVIORAL DISTURBANCE, UNSPECIFIED DEMENTIA TYPE: ICD-10-CM

## 2022-09-30 DIAGNOSIS — E53.8 VITAMIN B12 DEFICIENCY: ICD-10-CM

## 2022-09-30 DIAGNOSIS — E55.9 VITAMIN D DEFICIENCY: ICD-10-CM

## 2022-09-30 DIAGNOSIS — E03.9 ACQUIRED HYPOTHYROIDISM: ICD-10-CM

## 2022-09-30 DIAGNOSIS — M85.839 OSTEOPENIA OF FOREARM, UNSPECIFIED LATERALITY: ICD-10-CM

## 2022-09-30 DIAGNOSIS — E11.65 TYPE 2 DIABETES MELLITUS WITH HYPERGLYCEMIA, WITHOUT LONG-TERM CURRENT USE OF INSULIN (HCC): ICD-10-CM

## 2022-09-30 DIAGNOSIS — N39.0 RECURRENT UTI: ICD-10-CM

## 2022-09-30 DIAGNOSIS — E78.2 MIXED HYPERLIPIDEMIA: ICD-10-CM

## 2022-09-30 DIAGNOSIS — N39.0 RECURRENT UTI: Primary | ICD-10-CM

## 2022-09-30 DIAGNOSIS — R42 DIZZINESS: ICD-10-CM

## 2022-09-30 DIAGNOSIS — I47.1 SUPRAVENTRICULAR TACHYCARDIA (HCC): ICD-10-CM

## 2022-09-30 LAB
BILIRUBIN, POC: NEGATIVE
BLOOD URINE, POC: ABNORMAL
CLARITY, POC: CLEAR
COLOR, POC: YELLOW
GLUCOSE URINE, POC: ABNORMAL
KETONES, POC: NEGATIVE
LEUKOCYTE EST, POC: ABNORMAL
NITRITE, POC: NEGATIVE
PH, POC: 7
PROTEIN, POC: ABNORMAL
SPECIFIC GRAVITY, POC: 1.02
UROBILINOGEN, POC: 0.2

## 2022-09-30 PROCEDURE — G8427 DOCREV CUR MEDS BY ELIG CLIN: HCPCS | Performed by: FAMILY MEDICINE

## 2022-09-30 PROCEDURE — G8399 PT W/DXA RESULTS DOCUMENT: HCPCS | Performed by: FAMILY MEDICINE

## 2022-09-30 PROCEDURE — 1123F ACP DISCUSS/DSCN MKR DOCD: CPT | Performed by: FAMILY MEDICINE

## 2022-09-30 PROCEDURE — 99215 OFFICE O/P EST HI 40 MIN: CPT | Performed by: FAMILY MEDICINE

## 2022-09-30 PROCEDURE — G8417 CALC BMI ABV UP PARAM F/U: HCPCS | Performed by: FAMILY MEDICINE

## 2022-09-30 PROCEDURE — 2022F DILAT RTA XM EVC RTNOPTHY: CPT | Performed by: FAMILY MEDICINE

## 2022-09-30 PROCEDURE — 1036F TOBACCO NON-USER: CPT | Performed by: FAMILY MEDICINE

## 2022-09-30 PROCEDURE — 81002 URINALYSIS NONAUTO W/O SCOPE: CPT | Performed by: FAMILY MEDICINE

## 2022-09-30 PROCEDURE — 3044F HG A1C LEVEL LT 7.0%: CPT | Performed by: FAMILY MEDICINE

## 2022-09-30 PROCEDURE — 3017F COLORECTAL CA SCREEN DOC REV: CPT | Performed by: FAMILY MEDICINE

## 2022-09-30 PROCEDURE — 1090F PRES/ABSN URINE INCON ASSESS: CPT | Performed by: FAMILY MEDICINE

## 2022-09-30 NOTE — PROGRESS NOTES
01:00 PM    CO2 23 04/28/2022 01:35 PM    CO2 26 01/13/2022 04:40 PM    ANIONGAP 12 09/09/2022 01:00 PM    ANIONGAP 12 04/28/2022 01:35 PM    ANIONGAP 10 01/13/2022 04:40 PM    GLUCOSE 91 09/09/2022 01:00 PM    GLUCOSE 107 04/28/2022 01:35 PM    GLUCOSE 97 01/13/2022 04:40 PM    GLUCOSE 104 08/15/2011 10:50 AM    GLUCOSE 113 04/25/2011 02:28 PM    GLUCOSE 97 04/01/2011 10:40 AM    BUN 9 09/09/2022 01:00 PM    BUN 11 04/28/2022 01:35 PM    BUN 12 01/13/2022 04:40 PM    CREATININE 0.8 09/09/2022 01:00 PM    CREATININE 0.7 04/28/2022 01:35 PM    CREATININE 0.8 01/13/2022 04:40 PM    LABGLOM >60 09/09/2022 01:00 PM    LABGLOM >60 04/28/2022 01:35 PM    LABGLOM >60 01/13/2022 04:40 PM    GFRAA >60 09/09/2022 01:00 PM    GFRAA >60 04/28/2022 01:35 PM    GFRAA >60 01/13/2022 04:40 PM    CALCIUM 9.4 09/09/2022 01:00 PM    CALCIUM 9.6 04/28/2022 01:35 PM    CALCIUM 9.5 01/13/2022 04:40 PM    PROT 7.1 09/09/2022 01:00 PM    PROT 7.1 04/28/2022 01:35 PM    PROT 6.8 01/13/2022 04:40 PM    LABALBU 4.3 09/09/2022 01:00 PM    LABALBU 4.2 04/28/2022 01:35 PM    LABALBU 4.4 01/13/2022 04:40 PM    LABALBU 4.4 08/15/2011 10:50 AM    LABALBU 4.2 04/25/2011 02:28 PM    LABALBU 4.2 03/24/2011 03:55 AM    BILITOT 0.6 09/09/2022 01:00 PM    BILITOT 0.5 04/28/2022 01:35 PM    BILITOT 0.5 01/13/2022 04:40 PM    ALKPHOS 84 09/09/2022 01:00 PM    ALKPHOS 82 04/28/2022 01:35 PM    ALKPHOS 81 01/13/2022 04:40 PM    AST 20 09/09/2022 01:00 PM    AST 25 04/28/2022 01:35 PM    AST 22 01/13/2022 04:40 PM    ALT 19 09/09/2022 01:00 PM    ALT 27 04/28/2022 01:35 PM    ALT 23 01/13/2022 04:40 PM     A1C  Lab Results   Component Value Date/Time    LABA1C 5.6 09/09/2022 01:00 PM    LABA1C 5.7 04/28/2022 01:35 PM    LABA1C 5.6 01/13/2022 04:40 PM     TSH  Lab Results   Component Value Date/Time    TSH 1.530 09/09/2022 01:00 PM    TSH 0.794 04/28/2022 01:35 PM    TSH 0.648 01/13/2022 04:40 PM     FREET4  Lab Results   Component Value Date/Time    T2UUEHK 7.1 06/26/2019 03:07 PM    B5UQKTX 7.6 05/21/2014 12:13 PM     LIPID  Lab Results   Component Value Date/Time    CHOL 219 09/09/2022 01:00 PM    CHOL 225 04/28/2022 01:35 PM    CHOL 211 01/13/2022 04:40 PM    HDL 44 09/09/2022 01:00 PM    HDL 50 04/28/2022 01:35 PM    HDL 45 01/13/2022 04:40 PM    LDLCALC 132 09/09/2022 01:00 PM    LDLCALC 138 04/28/2022 01:35 PM    LDLCALC 135 01/13/2022 04:40 PM    TRIG 216 09/09/2022 01:00 PM    TRIG 183 04/28/2022 01:35 PM    TRIG 155 01/13/2022 04:40 PM     VITAMIN D  Lab Results   Component Value Date/Time    VITD25 48 09/09/2022 01:00 PM    VITD25 53 04/28/2022 01:35 PM    VITD25 59 01/13/2022 04:40 PM     MAGNESIUM  Lab Results   Component Value Date/Time    MG 2.2 05/22/2014 04:40 AM    MG 2.0 04/28/2014 09:37 AM    MG 2.3 03/23/2011 12:45 PM      PHOS  No results found for: PHOS   RON   Lab Results   Component Value Date/Time    RON NEGATIVE 11/29/2012 02:20 PM     RHEUMATOID FACTOR  No results found for: RF  PSA  No results found for: PSA   HEPATITIS C  Lab Results   Component Value Date/Time    HCVABI Non-Reactive 08/12/2020 04:14 PM     HIV  No results found for: QPJ9SOV, HIV1QT  UA  Lab Results   Component Value Date/Time    COLORU yellow 09/30/2022 04:02 PM    COLORU Yellow 09/09/2022 12:46 PM    COLORU Yellow 04/28/2022 01:37 PM    COLORU Yellow 01/13/2022 04:42 PM    CLARITYU clear 09/30/2022 04:02 PM    CLARITYU Clear 09/09/2022 12:46 PM    CLARITYU Clear 04/28/2022 01:37 PM    CLARITYU Clear 01/13/2022 04:42 PM    GLUCOSEU negataive 09/30/2022 04:02 PM    GLUCOSEU Negative 09/09/2022 12:46 PM    GLUCOSEU Negative 04/28/2022 01:37 PM    GLUCOSEU Negative 01/13/2022 04:42 PM    BILIRUBINUR negative 09/30/2022 04:02 PM    BILIRUBINUR Negative 09/09/2022 12:46 PM    BILIRUBINUR Negative 04/28/2022 01:37 PM    BILIRUBINUR Negative 01/13/2022 04:42 PM    BILIRUBINUR negative 11/26/2021 01:52 PM    BILIRUBINUR negative 09/16/2021 03:18 PM    KETUA negative 09/30/2022 04:02 PM    KETUA Negative 09/09/2022 12:46 PM    KETUA Negative 04/28/2022 01:37 PM    KETUA Negative 01/13/2022 04:42 PM    SPECGRAV 1.025 09/30/2022 04:02 PM    SPECGRAV 1.025 09/09/2022 12:46 PM    SPECGRAV 1.020 04/28/2022 01:37 PM    SPECGRAV >=1.030 01/13/2022 04:42 PM    BLOODU small 09/30/2022 04:02 PM    BLOODU Negative 09/09/2022 12:46 PM    BLOODU Negative 04/28/2022 01:37 PM    BLOODU Negative 01/13/2022 04:42 PM    PHUR 7.0 09/30/2022 04:02 PM    PHUR 6.0 09/09/2022 12:46 PM    PHUR 6.0 04/28/2022 01:37 PM    PHUR 6.0 01/13/2022 04:42 PM    PROTEINU trace 09/30/2022 04:02 PM    PROTEINU Negative 09/09/2022 12:46 PM    PROTEINU Negative 04/28/2022 01:37 PM    PROTEINU Negative 01/13/2022 04:42 PM    UROBILINOGEN 0.2 09/09/2022 12:46 PM    UROBILINOGEN 0.2 04/28/2022 01:37 PM    UROBILINOGEN 0.2 01/13/2022 04:42 PM    NITRU POSITIVE 09/09/2022 12:46 PM    NITRU Negative 04/28/2022 01:37 PM    NITRU Negative 01/13/2022 04:42 PM    LEUKOCYTESUR small 09/30/2022 04:02 PM    LEUKOCYTESUR MODERATE 09/09/2022 12:46 PM    LEUKOCYTESUR SMALL 04/28/2022 01:37 PM    LEUKOCYTESUR Negative 01/13/2022 04:42 PM     Urine Micro/Albumin Ratio  Lab Results   Component Value Date/Time    MALBCR 10.3 09/09/2022 12:46 PM    MALBCR - 04/28/2022 01:37 PM    MALBCR - 08/30/2021 04:45 PM         Current ROS as below with intermittent symptoms as above  ROS:  Const: Denies chills, fever, malaise and sweats. Eyes: Denies discharge, pain, redness and visual disturbance. ENMT: Denies earaches, other ear symptoms. Denies nasal or sinus symptoms other than stated  above. Denies mouth and tongue lesions and sore throat. CV: Denies chest discomfort, pain; diaphoresis, dizziness, edema, lightheadedness, orthopnea,  palpitations, syncope and near syncopal episode or any exertional symptoms  Resp: Denies cough, hemoptysis, pleuritic pain, SOB, sputum production and wheezing.   GI: Denies abdominal pain, change in bowel habits, hematochezia, melena, nausea and vomiting. : Denies urinary symptoms including dysuria , urgency, frequency or hematuria. Musculo: Denies musculoskeletal symptoms. Skin: Denies bruising and rash  Neuro: Denies headache, numbness, stiff neck, tingling and focal weakness slurred speech or facial  droop  Hema/Lymph: Denies bleeding/bruising tendency and enlarged lymph nodes      Current Outpatient Medications:     donepezil (ARICEPT) 10 MG tablet, TAKE 1 TABLET BY MOUTH  TWICE DAILY, Disp: 180 tablet, Rfl: 3    metoprolol succinate (TOPROL XL) 100 MG extended release tablet, Take 1 tablet by mouth 2 times daily, Disp: 180 tablet, Rfl: 1    alendronate (FOSAMAX) 70 MG tablet, 1 po qwk. Take with plain h2o, do not eat, drink or lie down for at least 30min and not until after first food of day., Disp: 12 tablet, Rfl: 3    memantine (NAMENDA) 10 MG tablet, TAKE 1 TABLET BY MOUTH TWICE DAILY, Disp: 180 tablet, Rfl: 3    levothyroxine (SYNTHROID) 100 MCG tablet, Take 1 tablet by mouth in the morning.  Ideally on empty stomach., Disp: 90 tablet, Rfl: 1    omeprazole (PRILOSEC) 20 MG delayed release capsule, Take 1 capsule by mouth daily Ideally 1hr prior to dinner, Disp: 90 capsule, Rfl: 1    ammonium lactate (LAC-HYDRIN) 12 % lotion, BID PRN, Disp: 225 g, Rfl: 1    metFORMIN (GLUCOPHAGE) 500 MG tablet, Take 1 tablet by mouth 2 times daily (with meals), Disp: 180 tablet, Rfl: 3    omega-3 acid ethyl esters (LOVAZA) 1 g capsule, Take 2 capsules by mouth 2 times daily, Disp: 360 capsule, Rfl: 3    B Complex Vitamins (B-COMPLEX/B-12 PO), Take by mouth daily LD 12/12/18, Disp: , Rfl:   Allergies   Allergen Reactions    Morphine Itching    Statins      Other reaction(s): Unknown    Statins Depletion Therapy Other (See Comments)     MYALGIA       Past Medical History:   Diagnosis Date    Arthritis     Diabetes mellitus ([facility] [address]     Difficult intubation     carries card, copy on chart  Glidescope#3 with ETT size7    Hyperlipidemia Hypothyroidism     Left sided abdominal pain     Memory problem     still competent    GORDY on CPAP     Pacemaker     Rectal bleeding     Ringing in the ears      Past Surgical History:   Procedure Laterality Date    ABLATION OF DYSRHYTHMIC FOCUS      BREAST SURGERY      implants    COLONOSCOPY  ? Dr. Mary Serrato twisted     COLONOSCOPY  ? Dr. Braulio Cosby. incomplete. COLONOSCOPY  2018    Dr. Lillian Escobar 2018    COLONOSCOPY DIAGNOSTIC performed by Ya Mahan MD at [address] N/A 2022    COLONOSCOPY DIAGNOSTIC performed by Bret Gil MD at [address] [facility]  09/10/2011    POSTERIOR    HYSTERECTOMY (CERVIX STATUS UNKNOWN)      vaginal. uterus only.      KNEE ARTHROPLASTY Right     partial    KNEE ARTHROSCOPY  2011    right knee    PACEMAKER PLACEMENT  2014    Medtronic dual chamber    TOTAL HIP ARTHROPLASTY Right 2015    UPPER GASTROINTESTINAL ENDOSCOPY N/A 2022    EGD BIOPSY performed by Bret Gil MD at [facility] History   Problem Relation Age of Onset    Pacemaker Mother     Heart Attack Father     Cancer Other 48        Bone Marrow Cancer / Had Bone Marrow Transplant     Social History     Tobacco Use    Smoking status: Former     Packs/day: 1.50     Years: 13.00     Pack years: 19.50     Types: Cigarettes     Start date: 1973     Quit date: 1986     Years since quittin.7    Smokeless tobacco: Never    Tobacco comments:     quit smoking    Vaping Use    Vaping Use: Never used   Substance Use Topics    Alcohol use: Yes     Comment: socially -- 3 drinks per month at the most    Drug use: No      Social History     Social History Narrative    PMH:    Problem List: Urinary tract infectious disease, Obstructive sleep apnea syndrome, Adult health    examination, Adult health examination, Constipation, Derangement of knee, Vitamin D deficiency,    Hypothyroidism, Conduction disorder of the heart, Hyperlipidemia    Health Maintenance:    Mini Mental Status - (2018)    Colonoscopy - (2018)    Colonoscopy Screening - (2018)    Mammogram Screening - (2018)    Mammogram - (2018)    Colonoscopy - (2010)    Couseled on Home Safety - (2015)    Bone Density Scan - (3/28/2012)    Medical Problems:    Sleep Apnea - Dr Dorie Seymour, cpap    Pneumonia    Hypothyroidism - Dr Anisha Stokes - Dr Mario Rivero    Hyperlipidemia - intolerance to all statins    Cardiac Arrhythmia - ? type. s/p ablation    cardiac dysrhythmia - pauses - lead to pacemaker    Skin Cancer    Surgical Hx:    Partial Hysterectomy - Still with Both Ovaries. Breast Implants    Pacemaker - Dr Janeen Valerio    Knee Replacement, Carpal Tunnel Release, colon-vaginal fistula repair    Bladder Repair - sling    RT Hip Arthroplasty - MARGARITA        FH:    Father:    . (Hx)    Mother:    . (Hx)    Dad - MI 39,  70 MI    Mom - DM , currently living age 80        SH: Marital: . Personal Habits: Cigarette Use: Former Cigarette Smoker - quit age 36. Occ ETOH, minimal. . 2 kids, 6 step kids. 27 grandkids. .Alcohol: Rarely consumes alcohol        Vitals:    22 1528 22 1556 22 1557 22 1558   BP: 124/62 128/62 128/64 128/64   Position:  Supine Sitting Standing   Pulse: 60 58 57 57   Temp: 97.1 °F (36.2 °C)      SpO2: 95%      Weight: 189 lb (85.7 kg)        Wt Readings from Last 3 Encounters:   22 189 lb (85.7 kg)   22 187 lb (84.8 kg)   09/15/22 189 lb (85.7 kg)            Exam:  Const: Appears comfortable. No signs of acute distress present. Head/Face: Atraumatic, normocephalic on inspection. Eyes: No discharge from the eyes. Sclerae clear. ENMT: Ears clear at this time without cerumen, nose boggy oropharynx watery postnasal drainage cobblestoning  Neck: Supple. Palpation reveals no adenopathy. No masses appreciated. No JVD. Resp: Respirations are unlabored.  Clear to auscultation bilaterally. No rales, rhonchi or wheezes appreciated over the  lungs bilaterally. CV: RRR   Extremities: No clubbing or cyanosis. No edema of the lower limbs  bilaterally. No calf inflammation or tenderness. Abdomen: Abdomen is soft,and nondistended. No abdominal masses  appreciated. No palpable hepatosplenomegaly. Bowel sounds are normoactive. Nontender  Skin: Dry and warm with no rash. Muscular skeletal: No acute joint inflammation. Neuro:Grossly intact without focal deficit  No CVA tenderness    Office Labs This Visit :  Results for orders placed or performed in visit on 09/30/22   POCT Urinalysis no Micro   Result Value Ref Range    Color, UA yellow     Clarity, UA clear     Glucose, UA POC negataive     Bilirubin, UA negative     Ketones, UA negative     Spec Grav, UA 1.025     Blood, UA POC small (H)     pH, UA 7.0     Protein, UA POC trace (H)     Urobilinogen, UA 0.2     Leukocytes, UA small (H)     Nitrite, UA negative                     Assessment and Plan:   Diagnosis Orders   1. Recurrent UTI  POCT Urinalysis no Micro    Culture, Urine    Amb External Referral To Urology      2. Dizziness        3. Supraventricular tachycardia ([facility] [address]        4. Type 2 diabetes mellitus with hyperglycemia, without long-term current use of insulin (HCC)        5. Osteopenia of forearm, unspecified laterality        6. Acquired hypothyroidism        7. Dementia without behavioral disturbance, unspecified dementia type        8. Mixed hyperlipidemia        9. Vitamin B12 deficiency        10. Vitamin D deficiency            Unsteady gait  Counseled extensively. Differential reviewed, including serious etiologies. Questionable vertigo but difficult historian. Does not seem transient. Doubt TIA at this time. Cannot have MRI because of pacemaker. CT brain/carotid ultrasound negative 9/9/2022. Risk benefits aspirin reviewed. CT last sensitive for neoplasm/CVA and reviewed.   They were going to discuss with Dr. Iker Buckley but did not. They are holding the Myrbetriq. Awaiting consultation with neurology. Awaiting physical therapy/vestibular therapy which was previously written last visit. Again treat UTI. Reduce metoprolol for possible orthostasis although not confirmed on testing today. Falls precautions emphasized, fx risk reviewed and M/M related to fx reviewed t   Neoplasm of skin    sun damaged skin. Continue per Dr. Davion Posey. pylori infection    noted on EGD 1/22- since followed up with Dr. Noemi Rice, was treated, symptoms resolved, follow-up breath test pending        Recurrent UTI  Symptomatic again, urine as above. Last urine showed over 100,000 E. coli, was treated with Macrobid, we will send culture today, cephalexin with precautions including C. difficile, risk benefits probiotic reviewed. Refer back to Dr. Kareem Encarnacion. Proper hydration, hygiene. Probiotic. Microscopic hematuria  Refer back to Dr. Kareem Encarnacion. Fluctuates    Chronic incontinence/irritable bladder  Was started on Myrbetriq through Dr. Kareem Encarnacion, no significant benefit that they can tell and expensive. Could be contributing to gait dysfunction. Discontinued last time, mid September 2022 to see if this was contributing to balance issues    Osteopenia of forearm    bone density 9/22 showed T score +1.4 L1-L4-risk of false negative reviewed. -1.8 forearm. Risk of fracture reviewed. Falls precautions reviewed. M/M related to fracture reviewed. Appropriate calcium/D reviewed. After discussion services making agreeable to starting Fosamax with standard precautions. Denies contraindication. Hold prior to dental procedures. Repeat bone density 1 to 2 years sooner as needed. Lichen sclerosus  On 11/21 labial biopsy through Dr. Hadley Meckel, counseled, follow with him      Constipation  Counseled extensively. Differential reviewed, including serious etiologies.   Resolved after MiraLAX taper  Colonoscopy 1/22 - except hemorrhoids-Dr. Raul Garrido         Polycythemia  Counseled. Hemoglobin fluctuates, ferritin was borderline high, but improved/stable. Monitor. Counseled on hemochromatosis. Proper hydration. Counseled extensively. Differential reviewed, including serious etiologies. Obstructive sleep apnea syndrome     Counseled. Doing very well CPAP. Uses it at least 8 to 10 hours per night 7 nights per week with very good results. Got a new machine toward the end of 2019. Encourage her machine be checked. She follows up with Dr. Andie Riggs 10/27/2021     Hypothyroidism     TSH elevated on 112mcg qd, 1/2 Sunday. Was suppressed on 112 qd in the past. Declines d.a.w. Wants to continue generic. Katrin Altamirano Defers imaging. TFTs now normal monitor. Asymptomatic              Type 2 diabetes mellitus with hyperglycemia, without long-term current use of insulin ([facility])  Hemoglobin A1c stable, 5.9 to 6.1 -5.6-5.5-5.5-5.6-5.6 on metformin 500 mg twice a day. Previously 1000 twice a day. Precautions reviewed. Risks  reviewed, proper hydration reviewed, standard precautions reviewed including loose bowels, LA. Encouraged  she watch blood sugar ambulatory with parameters reviewed call in if out of range. Hyper and hypoglycemic precautions reviewed. Micro-and macrovascular  complications reviewed. Importance of at least yearly eye exams and daily foot exams reviewed. Tolerating metformin . no longer on invokana. Monitor               Dementia without behavioral disturbance  Counseled extensively. Differential reviewed including various forms of dementia and pseudodementia. On Aricept 10 mg daily, and Namenda. Continue per Dr. Ruthie Lantigua they were seeing Dr. Madhavi Patrick specialist from [facility]. However they re noncompliant without follow-up, feels it is a \"racket\". Follow-up with Dr. Ruthie Lantigua. She is enrolled in a study, nitrate water. She is not driving and we emphasized importance of this.  Safety precautions reviewed.  feels her dementia is stable. continue current management and she follows up with Dr. Anthony Snow, recently saw   Mixed hyperlipidemia  not at goal  Significant risk of cerebrovascular/cardiovascular event. Imperative this  be controlled with history of TIA, however adamantly refuses any further treatment at this time. Refuses to try any other statin or Zetia stating that these were not tolerated in the  past. She did not tolerate Niaspan. did not tolerate WelChol. . declines  cholestyramine. Risk of stroke and heart attack reviewed. lifestyle modification  reviewed. risk of hyperlipidemia reviewed . Did not tolerate fenofibrate  Counseled on repatha, declines. No longer on and does not wish Lovaza any longer. Declines change in therapy despite counseling     Supraventricular tachycardia ([address]  h/o svt Status post ablation  . Also history of long pauses-since had pacemaker by Dr. Salvador Singh. FU with her. Follow-up Dr Gracia Sullivan . Asymptomatic sends recordings by phone.  ekg done and reviewed with her     Liver disease  Counseled extensively. Differential reviewed, including serious etiologies. Likely  fatty liver. Precautions reviewed. Lifestyle modification appropriate diet and weight  loss reviewed. Risks of even this leading to cirrhosis reviewed. Other than basic  monitoring on interested in other evaluation or treatment, in-depth blood work,  imaging or otherwise. Hep C 10/18 negative, again was negative 9/20. LFTs currently normal              Tubular adenoma  Small polyp 7/18. Dr. Maribel Richardson recommended basic screenings if repeated at all. Asymptomatic. Repeat colonoscopy Dr. Michael Short 1/22 showed hemorrhoids otherwise negative     Vitamin D deficiency  On supplementation she was borderline toxic, it has stabilized, continue off vitamin D. Monitor     Health maintenance examination  Health maintenance issues discussed at length  6/21. Encourage yearly.      B12 deficiency  Risk of high and low reviewed. Last time high, discussed backing off. Monitor          Breast cancer screening  Mammogram was negative 9/22          Plan as above. Counseled extensively and differential diagnoses relevant to above were reviewed, including serious etiologies, risks and complications, especially of left uncontrolled. If relevant, instructions and  alternatives to meds/treatment reviewed, as well as interactions, and  SE's/ADRs reviewed, notify immediately if any, discontinuing new meds if any. Plan made after discussion and shared decision making. My concerns continue to be reviewed. Not interested in ER/hospitalization at this time unless symptoms persist or worsen. Importance of early dx/tx reviewed and limitations of outpatient E/T reviewed. Risk of sudden DF event reviewed. In very brief summary they were reluctantly agreeable to physical therapy and vestibular therapy last time but have not scheduled yet. They were going to discuss with Dr. Leonard Goff but did not. They are holding the Myrbetriq. They have not gotten an appointment with neurology yet as referred last time. Given recurrent UTI, refer back to Dr. Malik Peterson. Treat again as above, cephalexin.  does mention chronic seasonal nasal congestion, they we will start Flonase. Signs and symptoms of infection watch were reviewed. Reduce metoprolol as above, 100 mg twice a day to 50 mg twice a day, if Monday blood pressure remains stable, may reduce to once a day but they state they do not have a blood pressure machine today we will follow-up early to mid week sooner as needed         As long as symptoms steadily improve/resolve, and medical conditions follow the expected course, FU as below, sooner PRN. Return for mid week.     Over 40 minutes  spent with the patient in reviewing records, reviewing with patient/family, counseling, ordering,  prescribing, completing h&p, etc., with over 50% of the time spent face to face counseling. Educational materials and/or home exercises printed for patient's review and were included in patient instructions on his/her After Visit Summary and given to patient at the end of visit. After discussion, patient and/or guardian verbalizes understanding, agrees, feels comfortable with and wishes to proceed with above treatment plan. Call for any pending results, FU sooner if abnormal, as needed or if any current symptoms persist/worsen. Advised patient to call with any new medication issues, and read all Rx info from pharmacy to assure aware of all possible risks and side effects of medication before taking. Reviewed age and gender appropriate health screening exams and vaccinations. Advised patient regarding importance of keeping up with recommended health maintenance and to schedule as soon as possible if overdue, as this is important in assessing for undiagnosed pathology, especially cancer, as well as protecting against potentially harmful/life threatening disease. Patient and/or guardian verbalizes understanding and agrees with above counseling, assessment and plan. All questions answered. Signs and symptoms to watch for discussed, serious signs and symptoms reviewed. ER if any. Defers at this time. Keyon Aquino MD    Patients are advised to check with insurance company to ensure coverage and to fully understand benefits and cost prior to any testing. This note was created with the assistance of voice recognition software. Document was reviewed however may contain grammatical errors.

## 2022-10-02 LAB — URINE CULTURE, ROUTINE: NORMAL

## 2022-10-04 ENCOUNTER — HOSPITAL ENCOUNTER (OUTPATIENT)
Age: 77
Discharge: HOME | End: 2022-10-04
Payer: MEDICARE

## 2022-10-04 ENCOUNTER — HOSPITAL ENCOUNTER (OUTPATIENT)
Dept: PHYSICAL THERAPY | Age: 77
Setting detail: THERAPIES SERIES
Discharge: HOME | End: 2022-10-04
Payer: MEDICARE

## 2022-10-04 DIAGNOSIS — E53.8 VITAMIN B12 DEFICIENCY: ICD-10-CM

## 2022-10-04 DIAGNOSIS — E55.9 VITAMIN D DEFICIENCY: ICD-10-CM

## 2022-10-04 DIAGNOSIS — N39.0 RECURRENT UTI: ICD-10-CM

## 2022-10-04 DIAGNOSIS — E78.2 MIXED HYPERLIPIDEMIA: ICD-10-CM

## 2022-10-04 DIAGNOSIS — E03.9 ACQUIRED HYPOTHYROIDISM: ICD-10-CM

## 2022-10-04 DIAGNOSIS — E11.65 TYPE 2 DIABETES MELLITUS WITH HYPERGLYCEMIA, WITHOUT LONG-TERM CURRENT USE OF INSULIN (HCC): ICD-10-CM

## 2022-10-04 LAB
BACTERIA: ABNORMAL /HPF
BASOPHILS ABSOLUTE: 0.07 E9/L (ref 0–0.2)
BASOPHILS RELATIVE PERCENT: 0.7 % (ref 0–2)
EOSINOPHILS ABSOLUTE: 0.17 E9/L (ref 0.05–0.5)
EOSINOPHILS RELATIVE PERCENT: 1.7 % (ref 0–6)
FOLATE: 9 NG/ML (ref 4.8–24.2)
HBA1C MFR BLD: 5.6 % (ref 4–5.6)
HCT VFR BLD CALC: 47.7 % (ref 34–48)
HEMOGLOBIN: 15.4 G/DL (ref 11.5–15.5)
IMMATURE GRANULOCYTES #: 0.03 E9/L
IMMATURE GRANULOCYTES %: 0.3 % (ref 0–5)
LYMPHOCYTES ABSOLUTE: 4.3 E9/L (ref 1.5–4)
LYMPHOCYTES RELATIVE PERCENT: 42.7 % (ref 20–42)
MCH RBC QN AUTO: 30.3 PG (ref 26–35)
MCHC RBC AUTO-ENTMCNC: 32.3 % (ref 32–34.5)
MCV RBC AUTO: 93.9 FL (ref 80–99.9)
MONOCYTES ABSOLUTE: 0.53 E9/L (ref 0.1–0.95)
MONOCYTES RELATIVE PERCENT: 5.3 % (ref 2–12)
NEUTROPHILS ABSOLUTE: 4.97 E9/L (ref 1.8–7.3)
NEUTROPHILS RELATIVE PERCENT: 49.3 % (ref 43–80)
PDW BLD-RTO: 12.4 FL (ref 11.5–15)
PLATELET # BLD: 185 E9/L (ref 130–450)
PMV BLD AUTO: 10.3 FL (ref 7–12)
RBC # BLD: 5.08 E12/L (ref 3.5–5.5)
RBC UA: ABNORMAL /HPF (ref 0–2)
TOTAL CK: 28 U/L (ref 20–180)
VITAMIN B-12: 492 PG/ML (ref 211–946)
VITAMIN D 25-HYDROXY: 55 NG/ML (ref 30–100)
WBC # BLD: 10.1 E9/L (ref 4.5–11.5)
WBC UA: ABNORMAL /HPF (ref 0–5)

## 2022-10-04 PROCEDURE — 82746 ASSAY OF FOLIC ACID SERUM: CPT

## 2022-10-04 PROCEDURE — 82306 VITAMIN D 25 HYDROXY: CPT

## 2022-10-04 PROCEDURE — 87088 URINE BACTERIA CULTURE: CPT

## 2022-10-04 PROCEDURE — 85025 COMPLETE CBC W/AUTO DIFF WBC: CPT

## 2022-10-04 PROCEDURE — 97162 PT EVAL MOD COMPLEX 30 MIN: CPT

## 2022-10-04 PROCEDURE — 80061 LIPID PANEL: CPT

## 2022-10-04 PROCEDURE — 81001 URINALYSIS AUTO W/SCOPE: CPT

## 2022-10-04 PROCEDURE — 80053 COMPREHEN METABOLIC PANEL: CPT

## 2022-10-04 PROCEDURE — 82570 ASSAY OF URINE CREATININE: CPT

## 2022-10-04 PROCEDURE — 36415 COLL VENOUS BLD VENIPUNCTURE: CPT

## 2022-10-04 PROCEDURE — 82044 UR ALBUMIN SEMIQUANTITATIVE: CPT

## 2022-10-04 PROCEDURE — 83036 HEMOGLOBIN GLYCOSYLATED A1C: CPT

## 2022-10-04 PROCEDURE — 84443 ASSAY THYROID STIM HORMONE: CPT

## 2022-10-04 PROCEDURE — 87077 CULTURE AEROBIC IDENTIFY: CPT

## 2022-10-04 PROCEDURE — 82607 VITAMIN B-12: CPT

## 2022-10-04 PROCEDURE — 81015 MICROSCOPIC EXAM OF URINE: CPT

## 2022-10-04 PROCEDURE — 84439 ASSAY OF FREE THYROXINE: CPT

## 2022-10-04 PROCEDURE — 82550 ASSAY OF CK (CPK): CPT

## 2022-10-04 PROCEDURE — 87186 SC STD MICRODIL/AGAR DIL: CPT

## 2022-10-04 NOTE — PLAN OF CARE
[address] [facility] PHYSICAL THERAPY  [facility] [address]  Dept: [phone]  FAX: [phone]    PHYSICAL THERAPY PLAN OF CARE: INITIAL EVALUATION    Patient: Drake Ha (32 y.o. female)   Examination Date:   Plan of Care Certification Period: 10/4/2022 to  2023      :  1945  MRN: [id number]  [facility]: [id number]   Insurance: Payor: MEDICARE / Plan: MEDICARE PART A AND B / Product Type: *No Product type* /   Insurance ID: [id number] - (Medicare) Secondary Insurance (if applicable): [employer]   Referring Physician: Meggan Boland MD     PCP: Camryn Grewal MD Visits to Date/Visits Approved:   /      No Show/Cancelled Appts:   /       Medical Diagnosis: Gait disturbance [R26.9]    No data recorded           TREATMENT PLAN       Requires PT Follow-Up: Yes    Pt. actively involved in establishing Plan of Care and Goals: Yes  Patient/ Caregiver education and instruction: Goals, PT Role, Plan of Care, Evaluative findings, Functional Mobility Training, Fall prevention strategies             Treatment may include any combination of the following: Strengthening, Balance training, Functional mobility training, Transfer training, Gait training, Vestibular rehab, Therapeutic activities     Frequency / Duration:  Patient to be seen 2-3 times a week for 4-6 weeks weeks       Patient Status: [x] Continue / Initiate Plan of Care     Signature: Electronically signed by Joaquim Bowman PT, DPT License [id number] on  at 4:19 PM.     If you have any questions or concerns, please don't hesitate to call.   Thank you for your referral!

## 2022-10-04 NOTE — PROGRESS NOTES
Physical Therapy    Physical Therapy: Initial Evaluation    Patient: Librado Sandoval (74 y.o. female)   Examination Date: 10/52/1858  Plan of Care Certification Period: 10/4/2022 to  2023      :  1945 ;    Confirmed: Yes MRN: [id number]  CSN: [id number]   Insurance: Payor: MEDICARE / Plan: MEDICARE PART A AND B / Product Type: *No Product type* /   Insurance ID: [id number] - (Medicare) Secondary Insurance (if applicable): [facility] [employer]   Referring Physician: Ellen Tripathi MD     PCP: Rosezena Hamman, MD Visits to Date/Visits Approved:   /      No Show/Cancelled Appts:   /       Medical Diagnosis: Gait disturbance [R26.9]    Treatment Diagnosis:       PERTINENT MEDICAL HISTORY   Patient Assessed for Rehabilitation Services: Yes  Self reported health status[de-identified] Fair    Medical History: Chart Reviewed: Yes   Past Medical History:   Diagnosis Date    Arthritis     Diabetes mellitus ([facility] [address]     Difficult intubation     carries card, copy on chart  Glidescope#3 with ETT size7    Hyperlipidemia     Hypothyroidism     Left sided abdominal pain     Memory problem     still competent    GORDY on CPAP     Pacemaker     Rectal bleeding     Ringing in the ears      Surgical History:   Past Surgical History:   Procedure Laterality Date    ABLATION OF DYSRHYTHMIC FOCUS      BREAST SURGERY      implants    COLONOSCOPY  ? Dr. Mary Moraes twisted     COLONOSCOPY  ? Dr. Rashida Garcia. incomplete. COLONOSCOPY  2018    Dr. Parul Lagunas 2018    COLONOSCOPY DIAGNOSTIC performed by Ernst Ruffin MD at [address] [place] [address]  N/A 2022    COLONOSCOPY DIAGNOSTIC performed by Blaise Ventura MD at [address]  09/10/2011    POSTERIOR    HYSTERECTOMY (CERVIX STATUS UNKNOWN)      vaginal. uterus only.      KNEE ARTHROPLASTY Right     partial    KNEE ARTHROSCOPY  2011    right knee    PACEMAKER PLACEMENT  2014    Medtronic dual chamber    TOTAL HIP ARTHROPLASTY Right 2015    UPPER GASTROINTESTINAL ENDOSCOPY N/A 1/26/2022    EGD BIOPSY performed by Moo Bradford MD at [address] [place] [address]       Medications:   Current Outpatient Medications:     donepezil (ARICEPT) 10 MG tablet, TAKE 1 TABLET BY MOUTH  TWICE DAILY, Disp: 180 tablet, Rfl: 3    metoprolol succinate (TOPROL XL) 100 MG extended release tablet, Take 1 tablet by mouth 2 times daily, Disp: 180 tablet, Rfl: 1    alendronate (FOSAMAX) 70 MG tablet, 1 po qwk. Take with plain h2o, do not eat, drink or lie down for at least 30min and not until after first food of day., Disp: 12 tablet, Rfl: 3    memantine (NAMENDA) 10 MG tablet, TAKE 1 TABLET BY MOUTH TWICE DAILY, Disp: 180 tablet, Rfl: 3    levothyroxine (SYNTHROID) 100 MCG tablet, Take 1 tablet by mouth in the morning. Ideally on empty stomach., Disp: 90 tablet, Rfl: 1    omeprazole (PRILOSEC) 20 MG delayed release capsule, Take 1 capsule by mouth daily Ideally 1hr prior to dinner, Disp: 90 capsule, Rfl: 1    ammonium lactate (LAC-HYDRIN) 12 % lotion, BID PRN, Disp: 225 g, Rfl: 1    metFORMIN (GLUCOPHAGE) 500 MG tablet, Take 1 tablet by mouth 2 times daily (with meals), Disp: 180 tablet, Rfl: 3    omega-3 acid ethyl esters (LOVAZA) 1 g capsule, Take 2 capsules by mouth 2 times daily, Disp: 360 capsule, Rfl: 3    B Complex Vitamins (B-COMPLEX/B-12 PO), Take by mouth daily LD 12/12/18, Disp: , Rfl:   Allergies: Morphine, Statins, and Statins depletion therapy      SUBJECTIVE EXAMINATION     History obtained from[de-identified] (s),  (s): Spouse ( provided all history. Pt able to provide answers, but unsure of accuracy.)   Family/Caregiver Present: Yes    Subjective History:    Subjective: Pt's  reports that the pt is having balance issues for the past 2 weeks.   reports that UTI meds may have been causing the imbalance and she has since been taken off the meds for the past week as well as Metoporal also decreased in half for the past week. Pt has dementia and  providing all history. Pt has had dementia for the past 2 years and is in an experimental study with Dr. Sheila Jacques.  reports pt taking a liquid with nitrate daily and is noticing improvement. Pt has poor short term memory deficits, but long term memory not affected. When asked, pt reports feeling dizzy \"sometimes, but not all the time\" when she stands. Pt denies dizziness with positional changes in bed, or with head turns. Pt denies having any falls,  is not aware of pt having any falls. Pt's  reports having similar symptoms of dizziness with looking up. Pt and  both have sinus issues. Additional Pertinent Hx (if applicable):            Learning/Language: Learning  Does the patient/guardian have any barriers to learning?: Cognitive  Will there be a co-learner?: Yes  Co-learner name (if applicable): Germaine Escamilla  Does the co-learner have any barriers to learning?: Hearing (Able to hear with louder speech.)  What is the preferred language of the co-learner?: English  How does the co-learner prefer to learn new concepts?: Listening, Reading, Demonstration  What is the preferred language of the patient/guardian?: English  Is an  required?: No     Pain Screening    Pain Screening  Patient Currently in Pain: No    Functional Status         Social History:    Social History  Lives With: Spouse  Type of Home: House  Home Layout: Two level, Bed/Bath upstairs, 1/2 bath on main level  Home Access: Stairs to enter with rails  Entrance Stairs - Rails: None (Pt able to use the wall for balance on the stairs.)  Entrance Stairs - Number of Steps: 2+1  Bathroom Shower/Tub: Walk-in shower  Bathroom Equipment: Grab bars in shower, Shower chair    Occupation/Interests:   Occupation: Retired    Prior Level of Function:     Independent        Current Level of Function:    reports pt primarily sedentary since having dementia.  Pt sleeps 12 hours from midnight to noon.  reports only leaving her while she is sleeping. Receives Help From: Family  ADL Assistance: Independent  Homemaking Assistance: Needs assistance  Homemaking Responsibilities: No  Ambulation Assistance: Independent  Transfer Assistance: Independent  Active : No    OBJECTIVE EXAMINATION     Review of Systems:  Vision: Within Functional Limits  Hearing: Within functional limits    Ambulation/Gait (if applicable):   Ambulation  Surface: Level tile  Device: No Device  Assistance: Supervision  Gait Deviations: Slow Eve, Decreased step length, Decreased arm swing, Decreased head and trunk rotation  Distance: 75 feet  Comments: Slower gait speed, no overt gait deviations noted. Mild forward head, fixed gaze forward.   Stairs/Curb  Stairs?: Yes  Stairs  # Steps : 12  Stairs Height: 6\"  Rails: Bilateral  Assistance: Supervision    Balance Screen:   Overall Balance Right Perturbations   Stepping strategy: Poor  Romberg/Narrow Base of Support  Sway: Minimal  Strategy: Ankle  Sway: Max  Strategy: Step  Sharpened Romberg/Tandem  Sway: Max  Strategy: Step  Sway: Max  Strategy: Step  Left Strength  Right Strength         Strength LUE  Strength LUE: WFL  Strength LLE  Strength LLE: WFL    Strength RUE  Strength RUE: WFL  Strength RLE  Strength RLE: WFL     Balance/Gait Assessment(s) Performed:   Tinetti Balance    Sitting Balance: Steady, safe  Arises: Able, uses arms to help  Attempts to Arise: Able to arise, one attempt  Immediate Standing Balance (First 5 Seconds): Steady without walker or other support  Standing Balance: Narrow stance without support  Nudged: Staggers, grabs, catches self  Eyes Closed: Unsteady  Turned 360 Degrees: Steadiness: Unsteady (grabs, staggers)  Turned 360 Degrees: Continuity of Steps: Discontinuous steps  Sitting Down: Uses arms or not a smooth motion  Balance Score: 10/16 Initiation of Gait: Any hesitancy or multiple attempts to start  Step Height: R Swing Foot: Right foot complete clears floor  Step Length: R Swing Foot: Passes left stance foot  Step Height: L Swing Foot: Left foot complete clears floor  Step Length: L Swing Foot: Passes right stance foot  Step Symmetry: Right and left step appear equal  Step Continuity: Steps appear continuous  Path: Straight without walking aid  Trunk: No sway but flexion of knees or back or spreads arms out while walking  Walking Time: Heels almost touching while walking  Gait Score: 10/12     Current Score: 20 / 28 (Date: 10/4/2022)    Interpretation of Score: Tinetti is  into a gait score and balance score. The higher the patient's score, the more independent/lower fall risk. A total score of 27 or more indicates low fall risk, 20-26 is moderate fall risk, and 19 or less is indicative of high fall risk. Dynamic Gait Index   Gait Level Surface: Normal: Walks 20', no AD, good speed, no evidence for imbalance, normal gait pattern  Change in Gait Speed: Moderate Impairment:Makes only minor adjustments to walking speed, or accomplishes a change in speed with significant gait deviations, or changes speed, but loses significant gait deviations,or changes speed but loses balance,but is able to recover  Gait With Horizontal Head Turns: Mild Impairment:  Performs head turns smoothly with a slight change in gait velocity (i.e. minor disruption to smooth gait path or uses AD  Gait With Vertical Head Turns: Mild Impairment: Performs task with slight change in gait velocity (i.e. minor disruption to smooth gait path or uses AD  Gait and Pivot Turn: Mild Impairment:  Pivot turns safely in > 3 seconds and stops with no loss of balance  Step Over Obstacle: Normal:  Is able to step over the box without changing gait speed, no evidence of imbalance. Step Around Obstacles: Normal:  Is able to walk around cones safely without changing gait speed, no evidence of imbalance.   Steps: Mild Impairment:  Alternating feet, must use rail  Dynamic Gait Total Score: 18  Dynamic Disability Index: 20-39%  Dynamic CMS Modifier: PRICE    Current Score: 18 / 24 (Date: 10/4/2022)    Interpretation of Score: Dynamic Gait Index is score on a 0-3 scale, 0 representing severe impairment with task and 3 representing ability to perform task without difficulty. A score of 21-24/24 indicates minimal to no fall risk. Scores below 21 indicates risk for falls and the lower the score, the higher the risk for falling. Common score for moderate stage Parkinson's Disease 9-11/24. ASSESSMENT     Impression: Assessment: Pt presents with reported dizziness and imbalance and per  is a recent event. Pt's  reports changes in certain medications for UTI and blood pressure and dizziness and imbalance may be directly related to these medication changes. Pt did not present with vestibular complaints and did not perform canalith repositioning this date, but focused more on balance tests that predict fall risk. Pt does have dementia, and per  has poor ST recall/memory. Pt did need frequent verbal cues and simple commands to follow through with functional assessment and balance tools. Pt scored a moderate fall risk for the DGI and Tinetti balance tests, and had difficulty standing up from an armless chair without using 's chair to assist. Pt did demonstrate gait deviations with environmental distractions, and unable to maintain balance with moderate perturbations. Pt will benefit from high level balance training to improve safety with functional mobility. Statement of Medical Necessity: Physical Therapy is both indicated and medically necessary as outlined in the POC to increase the likelihood of meeting the functionally related goals stated below.      Patient's Activity Tolerance:      Fair plus  Patient's rehabilitation potential/prognosis is considered to be: Good    Factors which may impact rehabilitation potential include: Cognitive function        GOALS   Patient Goal(s): To be less dizzy. To have better balance. Short Term Goals Completed by 2 weeks Goal Status   Improve Tinetti score to mild fall risk New   Improve DGI to >20 New   Improve strength 1/3 muscle grade New   Pt to report 50% less dizziness with positional changes New   Pt to ambulate with improved gait quality and 50% decreased sway/misstep New       Long Term Goals Completed by 4-6 weeks Goal Status   Improve Tinetti Balance score to low fall risk New   Improve DGI score to 24/24 New   Improve strength to 4+/5 grossly New   Pt to report no dizziness with positional changes New   Pt to ambulate with normal gait quality/posture with no sway or imbalance New        TREATMENT PLAN       Requires PT Follow-Up: Yes    Pt. actively involved in establishing Plan of Care and Goals: Yes  Patient/ Caregiver education and instruction: Goals, PT Role, Plan of Care, Evaluative findings, Functional Mobility Training, Fall prevention strategies             Treatment may include any combination of the following: Strengthening, Balance training, Functional mobility training, Transfer training, Gait training, Vestibular rehab, Therapeutic activities     Frequency / Duration:  Patient to be seen 2-3 times a week for 4-6 weeks weeks      Eval Complexity: Overall Evaluation : Medium       PT Treatment Completed:  N/A - Evaluation Only    Therapy Time  Individual Time In: 1330       Individual Time Out: 1430  Minutes: 61         Therapist Signature: Randa Aparicio PT, DPT License [id number]   Date: 40/8/4843     I certify that the above Therapy Services are being furnished while the patient is under my care. I agree with the treatment plan and certify that this therapy is necessary.       Physician's Signature:  ___________________________   Date:_______                                                                   Kali Bartlett MD        Physician Comments: _______________________________________________    Please sign and return to [facility] PHYSICAL THERAPY. Please fax to the location listed below.  Beni Carrasco for this referral!    [address] [facility] PHYSICAL THERAPY  [facility] [address]  Dept: [phone]  Fax: [phone]       POC NOTE

## 2022-10-05 NOTE — RESULT ENCOUNTER NOTE
These labs were not due until December, expected date 12/15/2022. Why did they go early, a lot of the lab draw her over 2 months early? Please have somebody look into. These labs will need to be written off, reordered etc. etc.  LDL did go up significantly from 3 weeks ago, watch diet.   Follow-up as directed sooner as needed

## 2022-10-06 ENCOUNTER — OFFICE VISIT (OUTPATIENT)
Dept: PRIMARY CARE CLINIC | Age: 77
End: 2022-10-06
Payer: MEDICARE

## 2022-10-06 VITALS
OXYGEN SATURATION: 93 % | DIASTOLIC BLOOD PRESSURE: 64 MMHG | TEMPERATURE: 97.6 F | SYSTOLIC BLOOD PRESSURE: 130 MMHG | BODY MASS INDEX: 32.95 KG/M2 | WEIGHT: 189 LBS | HEART RATE: 82 BPM

## 2022-10-06 DIAGNOSIS — I10 ESSENTIAL HYPERTENSION: ICD-10-CM

## 2022-10-06 DIAGNOSIS — R42 DIZZINESS: ICD-10-CM

## 2022-10-06 DIAGNOSIS — N39.0 RECURRENT UTI: Primary | ICD-10-CM

## 2022-10-06 DIAGNOSIS — E78.2 MIXED HYPERLIPIDEMIA: ICD-10-CM

## 2022-10-06 DIAGNOSIS — E11.65 TYPE 2 DIABETES MELLITUS WITH HYPERGLYCEMIA, WITHOUT LONG-TERM CURRENT USE OF INSULIN (HCC): ICD-10-CM

## 2022-10-06 DIAGNOSIS — I47.1 SUPRAVENTRICULAR TACHYCARDIA (HCC): ICD-10-CM

## 2022-10-06 LAB
ALBUMIN SERPL-MCNC: 4.3 G/DL (ref 3.5–5.2)
ALP BLD-CCNC: 85 U/L (ref 35–104)
ALT SERPL-CCNC: 20 U/L (ref 0–32)
ANION GAP SERPL CALCULATED.3IONS-SCNC: 11 MMOL/L (ref 7–16)
AST SERPL-CCNC: 22 U/L (ref 0–31)
BILIRUB SERPL-MCNC: 0.6 MG/DL (ref 0–1.2)
BILIRUBIN URINE: NEGATIVE
BLOOD, URINE: NEGATIVE
BUN BLDV-MCNC: 9 MG/DL (ref 6–23)
CALCIUM SERPL-MCNC: 9.6 MG/DL (ref 8.6–10.2)
CHLORIDE BLD-SCNC: 104 MMOL/L (ref 98–107)
CHOLESTEROL, TOTAL: 243 MG/DL (ref 0–199)
CLARITY: CLEAR
CO2: 26 MMOL/L (ref 22–29)
COLOR: YELLOW
CREAT SERPL-MCNC: 0.8 MG/DL (ref 0.5–1)
GFR AFRICAN AMERICAN: >60
GFR NON-AFRICAN AMERICAN: >60 ML/MIN/1.73
GLUCOSE BLD-MCNC: 89 MG/DL (ref 74–99)
GLUCOSE URINE: NEGATIVE MG/DL
HDLC SERPL-MCNC: 44 MG/DL
KETONES, URINE: NEGATIVE MG/DL
LDL CHOLESTEROL CALCULATED: 159 MG/DL (ref 0–99)
LEUKOCYTE ESTERASE, URINE: ABNORMAL
NITRITE, URINE: POSITIVE
ORGANISM: ABNORMAL
PH UA: 6 (ref 5–9)
POTASSIUM SERPL-SCNC: 4.4 MMOL/L (ref 3.5–5)
PROTEIN UA: NEGATIVE MG/DL
SODIUM BLD-SCNC: 141 MMOL/L (ref 132–146)
SPECIFIC GRAVITY UA: 1.01 (ref 1–1.03)
T4 FREE: 1.62 NG/DL (ref 0.93–1.7)
TOTAL PROTEIN: 7 G/DL (ref 6.4–8.3)
TRIGL SERPL-MCNC: 201 MG/DL (ref 0–149)
TSH SERPL DL<=0.05 MIU/L-ACNC: 1.14 UIU/ML (ref 0.27–4.2)
URINE CULTURE, ROUTINE: ABNORMAL
UROBILINOGEN, URINE: 0.2 E.U./DL
VLDLC SERPL CALC-MCNC: 40 MG/DL

## 2022-10-06 PROCEDURE — G8417 CALC BMI ABV UP PARAM F/U: HCPCS | Performed by: FAMILY MEDICINE

## 2022-10-06 PROCEDURE — G8484 FLU IMMUNIZE NO ADMIN: HCPCS | Performed by: FAMILY MEDICINE

## 2022-10-06 PROCEDURE — 3044F HG A1C LEVEL LT 7.0%: CPT | Performed by: FAMILY MEDICINE

## 2022-10-06 PROCEDURE — 99214 OFFICE O/P EST MOD 30 MIN: CPT | Performed by: FAMILY MEDICINE

## 2022-10-06 PROCEDURE — 1123F ACP DISCUSS/DSCN MKR DOCD: CPT | Performed by: FAMILY MEDICINE

## 2022-10-06 PROCEDURE — 1090F PRES/ABSN URINE INCON ASSESS: CPT | Performed by: FAMILY MEDICINE

## 2022-10-06 PROCEDURE — 1036F TOBACCO NON-USER: CPT | Performed by: FAMILY MEDICINE

## 2022-10-06 PROCEDURE — 3017F COLORECTAL CA SCREEN DOC REV: CPT | Performed by: FAMILY MEDICINE

## 2022-10-06 PROCEDURE — G8399 PT W/DXA RESULTS DOCUMENT: HCPCS | Performed by: FAMILY MEDICINE

## 2022-10-06 PROCEDURE — 2022F DILAT RTA XM EVC RTNOPTHY: CPT | Performed by: FAMILY MEDICINE

## 2022-10-06 PROCEDURE — G8427 DOCREV CUR MEDS BY ELIG CLIN: HCPCS | Performed by: FAMILY MEDICINE

## 2022-10-06 RX ORDER — CIPROFLOXACIN 250 MG/1
250 TABLET, FILM COATED ORAL 2 TIMES DAILY
Qty: 6 TABLET | Refills: 0 | Status: SHIPPED | OUTPATIENT
Start: 2022-10-06 | End: 2022-10-09

## 2022-10-06 RX ORDER — METOPROLOL SUCCINATE 50 MG/1
50 TABLET, EXTENDED RELEASE ORAL 2 TIMES DAILY
Qty: 60 TABLET | Refills: 3
Start: 2022-10-06

## 2022-10-06 NOTE — PROGRESS NOTES
Jung Mujica : 1945 Sex: female  Age: 77 y.o. Chief Complaint   Patient presents with    Urinary Tract Infection     Follow up    Discuss Labs       HPI:    Presents today for follow-up of UTI, and follow-up after reduce metoprolol. She has since started physical therapy and has another appointment Ileana Ricks. Has not heard from the neurologist or neurologist yet, we did provide their phone numbers today and  will follow-up. Tolerating lower dose of metoprolol, dizziness resolved. Urine cultures have fluctuated, again over 100,000 E. coli. Emphasize importance of clean-catch. Proper hygiene. Awaiting urology. Risk of antibiotic reviewed including recurrent use, C. difficile, resistance, prolonged QT tendinosis tendon rupture vascular/aortic etc.  Because cultures are not always positive and with continued dysuria urgency frequency we did agree to a 3-day course of Cipro. Been off Myrbetriq         Falls precautions again reviewed. Fracture risk reviewed. No headache. No syncope or near syncope. No chest pain or palpitations. Importance of follow cardiologist reviewed. Tolerating Fosamax    Although  states he told the lab she was only there for UA/culture which was appropriate they insist that she was due for blood work he states and in fact sanna her December blood work early. She had blood work about 3 weeks prior.   BMP normal B12 492 LDL went up from 132 on her 62 is simply want monitored HDL 44 hemoglobin A1c 5.6 TFTs normal vitamin D 55 CBC grossly normal differential reviewed urinalysis positive nitrite small leukocyte with over 100,000 E. coli sensitive to all, microalbumin creatinine ratio 21.3       Most Recent Labs  CBC  Lab Results   Component Value Date/Time    WBC 10.1 10/04/2022 04:05 PM    WBC 10.6 2022 01:00 PM    WBC 9.4 2022 01:35 PM    RBC 5.08 10/04/2022 04:05 PM    RBC 5.21 2022 01:00 PM    RBC 4.95 2022 01:35 PM    HGB 15.4 10/04/2022 04:05 PM    HGB 15.7 09/09/2022 01:00 PM    HGB 15.3 04/28/2022 01:35 PM    HCT 47.7 10/04/2022 04:05 PM    HCT 48.2 09/09/2022 01:00 PM    HCT 44.9 04/28/2022 01:35 PM    MCV 93.9 10/04/2022 04:05 PM    MCV 92.5 09/09/2022 01:00 PM    MCV 90.7 04/28/2022 01:35 PM     10/04/2022 04:05 PM     09/09/2022 01:00 PM     04/28/2022 01:35 PM      CMP  Lab Results   Component Value Date/Time     10/04/2022 04:05 PM     09/09/2022 01:00 PM     04/28/2022 01:35 PM    K 4.4 10/04/2022 04:05 PM    K 4.1 09/09/2022 01:00 PM    K 4.1 04/28/2022 01:35 PM     10/04/2022 04:05 PM     09/09/2022 01:00 PM     04/28/2022 01:35 PM    CO2 26 10/04/2022 04:05 PM    CO2 25 09/09/2022 01:00 PM    CO2 23 04/28/2022 01:35 PM    ANIONGAP 11 10/04/2022 04:05 PM    ANIONGAP 12 09/09/2022 01:00 PM    ANIONGAP 12 04/28/2022 01:35 PM    GLUCOSE 89 10/04/2022 04:05 PM    GLUCOSE 91 09/09/2022 01:00 PM    GLUCOSE 107 04/28/2022 01:35 PM    GLUCOSE 104 08/15/2011 10:50 AM    GLUCOSE 113 04/25/2011 02:28 PM    GLUCOSE 97 04/01/2011 10:40 AM    BUN 9 10/04/2022 04:05 PM    BUN 9 09/09/2022 01:00 PM    BUN 11 04/28/2022 01:35 PM    CREATININE 0.8 10/04/2022 04:05 PM    CREATININE 0.8 09/09/2022 01:00 PM    CREATININE 0.7 04/28/2022 01:35 PM    LABGLOM >60 10/04/2022 04:05 PM    LABGLOM >60 09/09/2022 01:00 PM    LABGLOM >60 04/28/2022 01:35 PM    GFRAA >60 10/04/2022 04:05 PM    GFRAA >60 09/09/2022 01:00 PM    GFRAA >60 04/28/2022 01:35 PM    CALCIUM 9.6 10/04/2022 04:05 PM    CALCIUM 9.4 09/09/2022 01:00 PM    CALCIUM 9.6 04/28/2022 01:35 PM    PROT 7.0 10/04/2022 04:05 PM    PROT 7.1 09/09/2022 01:00 PM    PROT 7.1 04/28/2022 01:35 PM    LABALBU 4.3 10/04/2022 04:05 PM    LABALBU 4.3 09/09/2022 01:00 PM    LABALBU 4.2 04/28/2022 01:35 PM    LABALBU 4.4 08/15/2011 10:50 AM    LABALBU 4.2 04/25/2011 02:28 PM    LABALBU 4.2 03/24/2011 03:55 AM    BILITOT 0.6 10/04/2022 04:05 PM BILITOT 0.6 09/09/2022 01:00 PM    BILITOT 0.5 04/28/2022 01:35 PM    ALKPHOS 85 10/04/2022 04:05 PM    ALKPHOS 84 09/09/2022 01:00 PM    ALKPHOS 82 04/28/2022 01:35 PM    AST 22 10/04/2022 04:05 PM    AST 20 09/09/2022 01:00 PM    AST 25 04/28/2022 01:35 PM    ALT 20 10/04/2022 04:05 PM    ALT 19 09/09/2022 01:00 PM    ALT 27 04/28/2022 01:35 PM     A1C  Lab Results   Component Value Date/Time    LABA1C 5.6 10/04/2022 04:05 PM    LABA1C 5.6 09/09/2022 01:00 PM    LABA1C 5.7 04/28/2022 01:35 PM     TSH  Lab Results   Component Value Date/Time    TSH 1.140 10/04/2022 04:05 PM    TSH 1.530 09/09/2022 01:00 PM    TSH 0.794 04/28/2022 01:35 PM     FREET4  Lab Results   Component Value Date/Time    Y4KTXRI 7.1 06/26/2019 03:07 PM    X3XZYCM 7.6 05/21/2014 12:13 PM     LIPID  Lab Results   Component Value Date/Time    CHOL 243 10/04/2022 04:05 PM    CHOL 219 09/09/2022 01:00 PM    CHOL 225 04/28/2022 01:35 PM    HDL 44 10/04/2022 04:05 PM    HDL 44 09/09/2022 01:00 PM    HDL 50 04/28/2022 01:35 PM    LDLCALC 159 10/04/2022 04:05 PM    LDLCALC 132 09/09/2022 01:00 PM    LDLCALC 138 04/28/2022 01:35 PM    TRIG 201 10/04/2022 04:05 PM    TRIG 216 09/09/2022 01:00 PM    TRIG 183 04/28/2022 01:35 PM     VITAMIN D  Lab Results   Component Value Date/Time    VITD25 55 10/04/2022 04:05 PM    VITD25 48 09/09/2022 01:00 PM    VITD25 53 04/28/2022 01:35 PM     MAGNESIUM  Lab Results   Component Value Date/Time    MG 2.2 05/22/2014 04:40 AM    MG 2.0 04/28/2014 09:37 AM    MG 2.3 03/23/2011 12:45 PM      PHOS  No results found for: PHOS   RON   Lab Results   Component Value Date/Time    RON NEGATIVE 11/29/2012 02:20 PM     RHEUMATOID FACTOR  No results found for: RF  PSA  No results found for: PSA   HEPATITIS C  Lab Results   Component Value Date/Time    HCVABI Non-Reactive 08/12/2020 04:14 PM     HIV  No results found for: VGV3GSM, HIV1QT  UA  Lab Results   Component Value Date/Time    COLORU Yellow 10/04/2022 03:00 PM    COLORU yellow 09/30/2022 04:02 PM    COLORU Yellow 09/09/2022 12:46 PM    COLORU Yellow 04/28/2022 01:37 PM    CLARITYU Clear 10/04/2022 03:00 PM    CLARITYU clear 09/30/2022 04:02 PM    CLARITYU Clear 09/09/2022 12:46 PM    CLARITYU Clear 04/28/2022 01:37 PM    GLUCOSEU Negative 10/04/2022 03:00 PM    GLUCOSEU negataive 09/30/2022 04:02 PM    GLUCOSEU Negative 09/09/2022 12:46 PM    GLUCOSEU Negative 04/28/2022 01:37 PM    BILIRUBINUR Negative 10/04/2022 03:00 PM    BILIRUBINUR negative 09/30/2022 04:02 PM    BILIRUBINUR Negative 09/09/2022 12:46 PM    BILIRUBINUR Negative 04/28/2022 01:37 PM    BILIRUBINUR negative 11/26/2021 01:52 PM    BILIRUBINUR negative 09/16/2021 03:18 PM    KETUA Negative 10/04/2022 03:00 PM    KETUA negative 09/30/2022 04:02 PM    KETUA Negative 09/09/2022 12:46 PM    KETUA Negative 04/28/2022 01:37 PM    SPECGRAV 1.010 10/04/2022 03:00 PM    SPECGRAV 1.025 09/30/2022 04:02 PM    SPECGRAV 1.025 09/09/2022 12:46 PM    SPECGRAV 1.020 04/28/2022 01:37 PM    BLOODU Negative 10/04/2022 03:00 PM    BLOODU small 09/30/2022 04:02 PM    BLOODU Negative 09/09/2022 12:46 PM    BLOODU Negative 04/28/2022 01:37 PM    PHUR 6.0 10/04/2022 03:00 PM    PHUR 7.0 09/30/2022 04:02 PM    PHUR 6.0 09/09/2022 12:46 PM    PHUR 6.0 04/28/2022 01:37 PM    PROTEINU Negative 10/04/2022 03:00 PM    PROTEINU trace 09/30/2022 04:02 PM    PROTEINU Negative 09/09/2022 12:46 PM    PROTEINU Negative 04/28/2022 01:37 PM    UROBILINOGEN 0.2 10/04/2022 03:00 PM    UROBILINOGEN 0.2 09/09/2022 12:46 PM    UROBILINOGEN 0.2 04/28/2022 01:37 PM    NITRU POSITIVE 10/04/2022 03:00 PM    NITRU POSITIVE 09/09/2022 12:46 PM    NITRU Negative 04/28/2022 01:37 PM    LEUKOCYTESUR SMALL 10/04/2022 03:00 PM    LEUKOCYTESUR small 09/30/2022 04:02 PM    LEUKOCYTESUR MODERATE 09/09/2022 12:46 PM    LEUKOCYTESUR SMALL 04/28/2022 01:37 PM     Urine Micro/Albumin Ratio  Lab Results   Component Value Date/Time    MALBCR 21.3 10/04/2022 03:00 PM    MALBCR 10.3 09/09/2022 12:46 PM    MALBCR - 04/28/2022 01:37 PM         Current ROS as below with intermittent symptoms as above  ROS:  Const: Denies chills, fever, malaise and sweats. Eyes: Denies discharge, pain, redness and visual disturbance. ENMT: Denies earaches, other ear symptoms. Denies nasal or sinus symptoms other than stated  above. Denies mouth and tongue lesions and sore throat. CV: Denies chest discomfort, pain; diaphoresis, dizziness, edema, lightheadedness, orthopnea,  palpitations, syncope and near syncopal episode or any exertional symptoms  Resp: Denies cough, hemoptysis, pleuritic pain, SOB, sputum production and wheezing. GI: Denies abdominal pain, change in bowel habits, hematochezia, melena, nausea and vomiting. : Dysuria urgency frequency, no hematuria  Musculo: Denies musculoskeletal symptoms. Skin: Denies bruising and rash  Neuro: Denies headache, numbness, stiff neck, tingling and focal weakness slurred speech or facial  droop  Hema/Lymph: Denies bleeding/bruising tendency and enlarged lymph nodes      Current Outpatient Medications:     metoprolol succinate (TOPROL XL) 50 MG extended release tablet, Take 1 tablet by mouth 2 times daily, Disp: 60 tablet, Rfl: 3    ciprofloxacin (CIPRO) 250 MG tablet, Take 1 tablet by mouth 2 times daily for 3 days, Disp: 6 tablet, Rfl: 0    donepezil (ARICEPT) 10 MG tablet, TAKE 1 TABLET BY MOUTH  TWICE DAILY, Disp: 180 tablet, Rfl: 3    alendronate (FOSAMAX) 70 MG tablet, 1 po qwk. Take with plain h2o, do not eat, drink or lie down for at least 30min and not until after first food of day., Disp: 12 tablet, Rfl: 3    memantine (NAMENDA) 10 MG tablet, TAKE 1 TABLET BY MOUTH TWICE DAILY, Disp: 180 tablet, Rfl: 3    levothyroxine (SYNTHROID) 100 MCG tablet, Take 1 tablet by mouth in the morning.  Ideally on empty stomach., Disp: 90 tablet, Rfl: 1    omeprazole (PRILOSEC) 20 MG delayed release capsule, Take 1 capsule by mouth daily Ideally 1hr prior to dinner, Disp: 90 capsule, Rfl: 1    ammonium lactate (LAC-HYDRIN) 12 % lotion, BID PRN, Disp: 225 g, Rfl: 1    metFORMIN (GLUCOPHAGE) 500 MG tablet, Take 1 tablet by mouth 2 times daily (with meals), Disp: 180 tablet, Rfl: 3    omega-3 acid ethyl esters (LOVAZA) 1 g capsule, Take 2 capsules by mouth 2 times daily, Disp: 360 capsule, Rfl: 3    B Complex Vitamins (B-COMPLEX/B-12 PO), Take by mouth daily LD 12/12/18, Disp: , Rfl:   Allergies   Allergen Reactions    Morphine Itching    Statins      Other reaction(s): Unknown    Statins Depletion Therapy Other (See Comments)     MYALGIA       Past Medical History:   Diagnosis Date    Arthritis     Diabetes mellitus ([address]     Difficult intubation     carries card, copy on chart  Glidescope#3 with ETT size7    Hyperlipidemia     Hypothyroidism     Left sided abdominal pain     Memory problem     still competent    GORDY on CPAP     Pacemaker     Rectal bleeding     Ringing in the ears      Past Surgical History:   Procedure Laterality Date    ABLATION OF DYSRHYTHMIC FOCUS  1996    BREAST SURGERY      implants    COLONOSCOPY  2009? Dr. Lincoln Ramirez twisted     COLONOSCOPY  2008? Dr. Donna Huffman. incomplete. COLONOSCOPY  12/17/2018    Dr. Juma Murphy 12/17/2018    COLONOSCOPY DIAGNOSTIC performed by Desirae Mcmahan MD at [address] [place] [address] N/A 1/26/2022    COLONOSCOPY DIAGNOSTIC performed by Kevon Redd MD at [facility]  09/10/2011    POSTERIOR    HYSTERECTOMY (CERVIX STATUS UNKNOWN)      vaginal. uterus only.      KNEE ARTHROPLASTY Right     partial    KNEE ARTHROSCOPY  9/08/2011    right knee    PACEMAKER PLACEMENT  5/23/2014    Medtronic dual chamber    TOTAL HIP ARTHROPLASTY Right 2015    UPPER GASTROINTESTINAL ENDOSCOPY N/A 1/26/2022    EGD BIOPSY performed by Kevon Redd MD at [facility] History   Problem Relation Age of Onset    Pacemaker Mother     Heart Attack Father     Cancer Other 48        Bone Marrow Cancer / Had Bone Marrow Transplant     Social History     Tobacco Use    Smoking status: Former     Packs/day: 1.50     Years: 13.00     Pack years: 19.50     Types: Cigarettes     Start date: 1973     Quit date: 1986     Years since quittin.7    Smokeless tobacco: Never    Tobacco comments:     quit smoking    Vaping Use    Vaping Use: Never used   Substance Use Topics    Alcohol use: Yes     Comment: socially -- 3 drinks per month at the most    Drug use: No      Social History     Social History Narrative    PMH:    Problem List: Urinary tract infectious disease, Obstructive sleep apnea syndrome, Adult health    examination, Adult health examination, Constipation, Derangement of knee, Vitamin D deficiency,    Hypothyroidism, Conduction disorder of the heart, Hyperlipidemia    Health Maintenance:    Mini Mental Status - (2018)    Colonoscopy - (2018)    Colonoscopy Screening - (2018)    Mammogram Screening - (2018)    Mammogram - (2018)    Colonoscopy - (2010)    Couseled on Home Safety - (2015)    Bone Density Scan - (3/28/2012)    Medical Problems:    Sleep Apnea - Dr Ruthie Gillis, cpap    Pneumonia    Hypothyroidism - Dr Boy Downing - Dr Dawkins Hathorne    Hyperlipidemia - intolerance to all statins    Cardiac Arrhythmia - ? type. s/p ablation    cardiac dysrhythmia - pauses - lead to pacemaker    Skin Cancer    Surgical Hx:    Partial Hysterectomy - Still with Both Ovaries. Breast Implants    Pacemaker - Dr Alma Delia Solis    Knee Replacement, Carpal Tunnel Release, colon-vaginal fistula repair    Bladder Repair - sling    RT Hip Arthroplasty - MARGARITA        FH:    Father:    . (Hx)    Mother:    . (Hx)    Dad - MI 39,  70 MI    Mom - DM , currently living age 80        SH: Marital: . Personal Habits: Cigarette Use: Former Cigarette Smoker - quit age 36. Occ ETOH, minimal. . 2 kids, 6 step kids. 27 grandkids. .Alcohol: Rarely consumes alcohol Vitals:    10/06/22 1504   BP: 130/64   Pulse: 82   Temp: 97.6 °F (36.4 °C)   SpO2: 93%   Weight: 189 lb (85.7 kg)     Wt Readings from Last 3 Encounters:   10/06/22 189 lb (85.7 kg)   09/30/22 189 lb (85.7 kg)   09/21/22 187 lb (84.8 kg)            Exam:  Const: Appears comfortable. No signs of acute distress present. Head/Face: Atraumatic, normocephalic on inspection. Eyes: No discharge from the eyes. Sclerae clear. ENMT: Ears clear at this time without cerumen, nose boggy oropharynx watery postnasal drainage cobblestoning  Neck: Supple. Palpation reveals no adenopathy. No masses appreciated. No JVD. Resp: Respirations are unlabored. Clear to auscultation bilaterally. No rales, rhonchi or wheezes appreciated over the  lungs bilaterally. CV: RRR   Extremities: No clubbing or cyanosis. No edema of the lower limbs  bilaterally. No calf inflammation or tenderness. Abdomen: Abdomen is soft,and nondistended. No abdominal masses  appreciated. No palpable hepatosplenomegaly. Bowel sounds are normoactive. Nontender  Skin: Dry and warm with no rash. Muscular skeletal: No acute joint inflammation. Neuro:Grossly intact without focal deficit  No CVA tenderness    Office Labs This Visit :  No results found for this visit on 10/06/22. Assessment and Plan:   Diagnosis Orders   1. Recurrent UTI  ciprofloxacin (CIPRO) 250 MG tablet      2. Essential hypertension  metoprolol succinate (TOPROL XL) 50 MG extended release tablet      3. Dizziness        4. Type 2 diabetes mellitus with hyperglycemia, without long-term current use of insulin (HCC)        5. Mixed hyperlipidemia        6. Supraventricular tachycardia (HCC)              Unsteady gait  Counseled extensively. Differential reviewed, including serious etiologies. Previous thoughts =  Questionable vertigo but difficult historian. Does not seem transient. Doubt TIA at this time. Cannot have MRI because of pacemaker.   CT brain/carotid ultrasound negative 9/9/2022. Risk benefits aspirin reviewed. CT last sensitive for neoplasm/CVA and reviewed. .  They are holding the Myrbetriq. Awaiting consultation with neurology show number provided risk. Only started physical therapy/vestibular therapy, has appointment again tomorrow. Again treat UTI. Falls precautions emphasized, fx risk reviewed and M/M related to fx reviewed    Symptoms actually resolved after lowering metoprolol from 100 mg twice a day to 50 mg twice a day. If ongoing symptoms consider lowering to once a day       Neoplasm of skin    sun damaged skin. Continue per Dr. Vera Bee. pylori infection    noted on EGD 1/22- since followed up with Dr. Gail Cohen, was treated, symptoms resolved, follow-up breath test pending        Recurrent UTI  Symptomatic again, urine as above. Urine shows over 100,000 E. coli, was recently treated with Macrobid. Has tolerated most anabiotic's. Has done best with Cipro, risks reviewed including tendinosis tendinitis C. difficile vascular issues etc., agree to 2 and 50 mg twice a day for 3 days. Awaiting appointment with Dr. Isabel Inman, their phone number provided. Counseled on not treating asymptomatic bacteria in urine. Proper hygiene reviewed. Make sure her urine is a clean-catch. Repeat UA/culture follow-up 2 weeks sooner as needed      Microscopic hematuria  Refer back to Dr. Isabel Inman. Fluctuates    Chronic incontinence/irritable bladder  Was started on Myrbetriq through Dr. Isabel Inman, no significant benefit that they can tell and expensive. Could be contributing to gait dysfunction. Discontinued last time, mid September 2022 to see if this was contributing to balance issues    Osteopenia of forearm    bone density 9/22 showed T score +1.4 L1-L4-risk of false negative reviewed. -1.8 forearm. Risk of fracture reviewed. Falls precautions reviewed. M/M related to fracture reviewed. Appropriate calcium/D reviewed.   After discussion Differential reviewed including various forms of dementia and pseudodementia. On Aricept 10 mg daily, and Namenda. Continue per Dr. Phuong Gregory they were seeing Dr. Patrick Huang specialist from [place]. However they re noncompliant without follow-up, feels it is a \"racket\". Follow-up with Dr. Phuong Gregory. She is enrolled in a study, nitrate water. She is not driving and we emphasized importance of this. Safety precautions reviewed.  feels her dementia is stable. continue current management and she follows up with Dr. Phuong Gregory, recently saw     Mixed hyperlipidemia  not at goal  Significant risk of cerebrovascular/cardiovascular event. Imperative this  be controlled with history of TIA, however adamantly refuses any further treatment at this time. Refuses to try any other statin or Zetia stating that these were not tolerated in the  past. She did not tolerate Niaspan. did not tolerate WelChol. . declines  cholestyramine. Risk of stroke and heart attack reviewed. lifestyle modification  reviewed. risk of hyperlipidemia reviewed . Did not tolerate fenofibrate  Counseled on repatha, declines. No longer on and does not wish Lovaza any longer. Declines change in therapy despite counseling    LDL went up quite a bit and only 3 weeks, labs were erroneously drawn early. Nonetheless does not wish change in therapy     Supraventricular tachycardia ([address]  h/o svt Status post ablation  . Also history of long pauses-since had pacemaker by Dr. Felicia Story. FU with her. Follow-up Dr Parul Mckeon . Asymptomatic sends recordings by phone.  ekg done and reviewed with her     Liver disease  Counseled extensively. Differential reviewed, including serious etiologies. Likely  fatty liver. Precautions reviewed. Lifestyle modification appropriate diet and weight  loss reviewed. Risks of even this leading to cirrhosis reviewed.  Other than basic  monitoring on interested in other evaluation or treatment, in-depth blood work,  imaging or otherwise. Hep C 10/18 negative, again was negative 9/20. LFTs currently normal              Tubular adenoma  Small polyp 7/18. Dr. Cathy Ledezma recommended basic screenings if repeated at all. Asymptomatic. Repeat colonoscopy Dr. Eda Escamilla 1/22 showed hemorrhoids otherwise negative     Vitamin D deficiency  On supplementation she was borderline toxic, it has stabilized, continue off vitamin D. Monitor     Health maintenance examination  Health maintenance issues discussed at length  6/21. Encourage yearly. B12 deficiency  Risk of high and low reviewed. Last time high, discussed backing off. Monitor          Breast cancer screening  Mammogram was negative 9/22          Plan as above. Counseled extensively and differential diagnoses relevant to above were reviewed, including serious etiologies, risks and complications, especially of left uncontrolled. If relevant, instructions and  alternatives to meds/treatment reviewed, as well as interactions, and  SE's/ADRs reviewed, notify immediately if any, discontinuing new meds if any. Plan made after discussion and shared decision making. My concerns continue to be reviewed. She is feeling much better. Cipro as above with concerns reviewed and they understand and accept risk. Phone numbers provided for both neurology and Dr. Judith Louis, previous referrals in place. Following with physical/vestibular therapy. Symptoms of the gait disturbance/dizziness resolved on lower metoprolol. Currently on 50 mg twice a day. Consider once a day if symptoms recur. Proper hydration. Proper hygiene. Repeat UA/culture follow-up 2 weeks sooner as needed. Cont per multiple specialists. PG              As long as symptoms steadily improve/resolve, and medical conditions follow the expected course, FU as below, sooner PRN. Return in about 2 weeks (around 10/20/2022).              Educational materials and/or home exercises printed for patient's review and were included in patient instructions on his/her After Visit Summary and given to patient at the end of visit. After discussion, patient and/or guardian verbalizes understanding, agrees, feels comfortable with and wishes to proceed with above treatment plan. Call for any pending results, FU sooner if abnormal, as needed or if any current symptoms persist/worsen. Advised patient to call with any new medication issues, and read all Rx info from pharmacy to assure aware of all possible risks and side effects of medication before taking. Reviewed age and gender appropriate health screening exams and vaccinations. Advised patient regarding importance of keeping up with recommended health maintenance and to schedule as soon as possible if overdue, as this is important in assessing for undiagnosed pathology, especially cancer, as well as protecting against potentially harmful/life threatening disease. Patient and/or guardian verbalizes understanding and agrees with above counseling, assessment and plan. All questions answered. Signs and symptoms to watch for discussed, serious signs and symptoms reviewed. ER if any. Defers at this time. Hemalatha Camp MD    Patients are advised to check with insurance company to ensure coverage and to fully understand benefits and cost prior to any testing. This note was created with the assistance of voice recognition software. Document was reviewed however may contain grammatical errors.

## 2022-10-06 NOTE — RESULT ENCOUNTER NOTE
Still bacteria in urine. Sensitive to all antibiotics, not sure why not resolving. Consider fistula. Would not treat if asymptomatic. Make sure appointment is made with Dr. Harshal Garcia as referred, needs to see ASAP. Keep appointment today as well.   Proper hydration

## 2022-10-07 ENCOUNTER — HOSPITAL ENCOUNTER (OUTPATIENT)
Dept: PHYSICAL THERAPY | Age: 77
Setting detail: THERAPIES SERIES
Discharge: HOME | End: 2022-10-07
Payer: MEDICARE

## 2022-10-07 LAB
CREATININE URINE: 61 MG/DL (ref 29–226)
MICROALBUMIN UR-MCNC: 13 MG/L
MICROALBUMIN/CREAT UR-RTO: 21.3 (ref 0–30)

## 2022-10-07 PROCEDURE — 97530 THERAPEUTIC ACTIVITIES: CPT

## 2022-10-07 NOTE — PROGRESS NOTES
[address] [facility]          Phone: [phone] Fax: [phone]    Physical Therapy Daily Treatment Note    Date: 10/7/2022  Patient Name: Jeana Mercedes  MRN: [id number]     :   1945    Referring Physician: Jolie Naqvi MD     PCP: Thuy King MD    Medical Diagnosis: Gait disturbance [R26.9]    No data recorded  Insurance: Payor: Chad Alexander / Plan: MEDICARE PART A AND B / Product Type: *No Product type* /   Insurance ID: [id number] - (Medicare)  Restrictions/Precautions:  Dementia, poor ST memory, Safety deficits  Visit# / total visits:    Pain level: 0/10   Time In:  1400  Time Out:  1251    Subjective:  Pt with no c/o. When asked, pt denies dizziness or falls. Pt's  did not stay with pt during PT session. OBJECTIVE:     Functional Activities:   Transfers (sit to stand ):  10x from mat table    Gait Testing:   Gait Deviations (firm surface/linoleum): Yee gait speed, equal stride length, no LOB or lateral sway noted  Assistive Device Used: None  Steps: NT    Strength Testing: NT  ROM: WFL    Exercises:  Exercise/Equipment Resistance/Repetitions Other comments     Romberg 1 min on floor  2 min on 3\" foam EO/EC Increased sway on foam, with grasping // bars with EC     Tandem stance 1 min on floor  2 min on 3\" foam Increased LOB on foam     Tandem gait fwd/bkwd 8 feet x 2 ea Unable to consistently place feet properly     [place]  8 feet x 4 ea  R over L, L over R  R behind L, L behind R   Difficulty with sequencing and proper foot placement.  Increased imbalance with behind     Balance activity on green discs 2 min x 2 Moderate imbalance     Bouncing/catching yellow  ball  1 min No LOB noted     Ambulation with horizontal/ vertical head turns 220 feet No LOB noted     Ambulation with intermittent speed changes 220 feet No LOB noted     Picking up cones from the floor 8 cones 5 feet apart No difficulty or reported dizziness      STS  10x from mat table                                                                      Other Therapeutic Activities:  Performed Danella Sheryl - negative for vertiginous symptoms. Home Exercise Program:  NA    Manual Treatments:  NA    Modalities:  NA    Comments:  Pt able to perform all aspects of session without reported dizziness. Pt did have difficulty following commands of higher balance tasks, but able to perform with cues and hands on assist. Pt reported low back pain with Danella Sheryl, but no reported dizziness or observable nystagmus noted. Will focus on balance activities to improve safety with mobility.      Time-in Time-out Total Time   69942  Ther Ex      [id number]  Neuro Re-ed        17183  Ther Activities   [id number]   70526  Manual Therapy       90418  E-stim       29009  Ultrasound            Session   45       Treatment/Activity Tolerance:  [x] Patient tolerated treatment well [] Patient limited by fatigue  [] Patient limited by pain  [] Patient limited by other medical complications  [] Other:     Prognosis: [x] Good [] Fair  [] Poor    Patient Requires Follow-up: [x] Yes  [] No    Plan:   [x] Continue per plan of care [] Alter current plan (see comments)  [] Plan of care initiated [] Hold pending MD visit [] Discharge  Plan for Next Session:        Electronically signed by:    Riri Curtis PT, DPT  License [id number]

## 2022-10-11 ENCOUNTER — HOSPITAL ENCOUNTER (OUTPATIENT)
Dept: PHYSICAL THERAPY | Age: 77
Setting detail: THERAPIES SERIES
Discharge: HOME | End: 2022-10-11
Payer: MEDICARE

## 2022-10-11 NOTE — PROGRESS NOTES
[facility]  Phone: [phone] Fax: [phone]     Physical Therapy  Cancellation/No-show Note  Patient Name:  Savanah Lawrence  :  1945   Date:  10/11/2022    For today's appointment patient:  []  Cancelled  []  Rescheduled appointment  [x]  No-show     Reason given by patient:  []  Patient ill  []  Conflicting appointment  []  No transportation    []  Conflict with work  []  No reason given  []  Other:     Comments:      Electronically signed by:    Lang Koehler PT, DPT  License [id number]

## 2022-10-11 NOTE — TELEPHONE ENCOUNTER
Left a VM message for Prof Olga Dickey earlier this afternoon to check if there were any human trials being done. MA reports she rcvd a call back -- no human trials.

## 2022-10-14 ENCOUNTER — HOSPITAL ENCOUNTER (OUTPATIENT)
Dept: PHYSICAL THERAPY | Age: 77
Setting detail: THERAPIES SERIES
Discharge: HOME | End: 2022-10-14
Payer: MEDICARE

## 2022-10-14 PROCEDURE — 97530 THERAPEUTIC ACTIVITIES: CPT

## 2022-10-18 ENCOUNTER — HOSPITAL ENCOUNTER (OUTPATIENT)
Dept: PHYSICAL THERAPY | Age: 77
Setting detail: THERAPIES SERIES
Discharge: HOME | End: 2022-10-18
Payer: MEDICARE

## 2022-10-18 PROCEDURE — 97530 THERAPEUTIC ACTIVITIES: CPT

## 2022-10-18 NOTE — PROGRESS NOTES
[address] [facility]          Phone: [phone] Fax: [phone]    Physical Therapy Daily Treatment Note    Date: 10/18/2022  Patient Name: Constance Troncoso  MRN: [id number]     :   1945    Referring Physician: Yahaira Monaco MD     PCP: Willis Flaherty MD    Medical Diagnosis: Gait disturbance [R26.9]    No data recorded  Insurance: Payor: Zak Covert / Plan: MEDICARE PART A AND B / Product Type: *No Product type* /   Insurance ID: [id number] - (Medicare)  Restrictions/Precautions:  Dementia, poor ST memory, Safety deficits  Visit# / total visits:    Pain level: 0/10   Time In:  1400  Time Out:  1430    Subjective:  Pt with no c/o. When asked, pt denies dizziness or falls. Pt's  reports her balance has improved, but she had several instances of imbalance over the weekend. OBJECTIVE:     Functional Activities:   Transfers (sit to stand ):  10x from armless chair  Timed Up & Go:  10/14/2022: 15.06 seconds. >/=12 sec is indicative of a fall risk. Dynamic Gait Index: 10/14/2022: 23/24   Gait Testing:   Gait Deviations (firm surface/linoleum): Yee gait speed, equal stride length, no LOB or lateral sway noted  Assistive Device Used: None  Steps: NT  Strength Testing: NT  ROM: WFL    Exercises:  Exercise/Equipment Resistance/Repetitions Other comments     [place]berg 1 min on floor  2 min on 3\" foam EO/EC Increased sway on foam, with grasping // bars with EC     Tandem stance 1 min on floor  2 min on 3\" foam Increased LOB on foam     Tandem gait fwd/bkwd 8 feet x 2 ea Unable to consistently place feet properly     [place]  8 feet x 4 ea  R over L, L over R  R behind L, L behind R  Improved sequencing and foot placement.  Increased imbalance with behind     Balance activity on green discs 2 min x 2 Moderate imbalance with anterior lean     Bouncing/catching yellow  ball    Ambulation with horizontal/ vertical head turns 220 feet x 2 No LOB noted     Ambulation with intermittent speed changes 220 feet x 2 No LOB noted     Picking up cones from the floor 8 cones 5 feet apart  6 cones 2 feet apart No reported dizziness      STS  10x from armless chair       Ambulating/weaving between 6 cones 2 feet apart Repeated 4x  No imbalance noted     Ambulating stepping over 6 cones 2 feet apart Repeated 4x No imbalance noted                                 Other Therapeutic Activities:  NT    Home Exercise Program:  NA    Manual Treatments:  NA    Modalities:  NA    Comments:  Pt good balance during all ambulation and cone tasks this date. Pt did sit and rest more frequently today, but briefly and able to complete all tasks well.  present, but did not observe full session.      Time-in Time-out Total Time   85900  Ther Ex      [id number]  Neuro Re-ed        22070  Ther Activities   [id number]   11916  Manual Therapy       69389  E-stim       48012  Ultrasound            Session   30       Treatment/Activity Tolerance:  [x] Patient tolerated treatment well [] Patient limited by fatigue  [] Patient limited by pain  [] Patient limited by other medical complications  [] Other:     Prognosis: [x] Good [] Fair  [] Poor    Patient Requires Follow-up: [x] Yes  [] No    Plan:   [x] Continue per plan of care [] Alter current plan (see comments)  [] Plan of care initiated [] Hold pending MD visit [] Discharge  Plan for Next Session:        Electronically signed by:    Ale Bianchi, PT, DPT  License [id number]

## 2022-10-19 ENCOUNTER — OFFICE VISIT (OUTPATIENT)
Dept: PRIMARY CARE CLINIC | Age: 77
End: 2022-10-19
Payer: MEDICARE

## 2022-10-19 VITALS
WEIGHT: 188 LBS | DIASTOLIC BLOOD PRESSURE: 82 MMHG | BODY MASS INDEX: 32.78 KG/M2 | SYSTOLIC BLOOD PRESSURE: 122 MMHG | OXYGEN SATURATION: 97 % | TEMPERATURE: 97.9 F | HEART RATE: 63 BPM

## 2022-10-19 DIAGNOSIS — E55.9 VITAMIN D DEFICIENCY: ICD-10-CM

## 2022-10-19 DIAGNOSIS — E11.65 TYPE 2 DIABETES MELLITUS WITH HYPERGLYCEMIA, WITHOUT LONG-TERM CURRENT USE OF INSULIN (HCC): ICD-10-CM

## 2022-10-19 DIAGNOSIS — N39.0 RECURRENT UTI: Primary | ICD-10-CM

## 2022-10-19 DIAGNOSIS — M85.839 OSTEOPENIA OF FOREARM, UNSPECIFIED LATERALITY: ICD-10-CM

## 2022-10-19 DIAGNOSIS — N39.0 RECURRENT UTI: ICD-10-CM

## 2022-10-19 DIAGNOSIS — I47.1 SUPRAVENTRICULAR TACHYCARDIA (HCC): ICD-10-CM

## 2022-10-19 DIAGNOSIS — E53.8 VITAMIN B12 DEFICIENCY: ICD-10-CM

## 2022-10-19 DIAGNOSIS — E78.2 MIXED HYPERLIPIDEMIA: ICD-10-CM

## 2022-10-19 DIAGNOSIS — E03.9 ACQUIRED HYPOTHYROIDISM: ICD-10-CM

## 2022-10-19 DIAGNOSIS — I10 ESSENTIAL HYPERTENSION: ICD-10-CM

## 2022-10-19 LAB
BACTERIA: ABNORMAL /HPF
BILIRUBIN URINE: NEGATIVE
BLOOD, URINE: NEGATIVE
CLARITY: CLEAR
COLOR: YELLOW
EPITHELIAL CELLS, UA: ABNORMAL /HPF
GLUCOSE URINE: NEGATIVE MG/DL
KETONES, URINE: NEGATIVE MG/DL
LEUKOCYTE ESTERASE, URINE: ABNORMAL
NITRITE, URINE: POSITIVE
PH UA: 6 (ref 5–9)
PROTEIN UA: NEGATIVE MG/DL
RBC UA: ABNORMAL /HPF (ref 0–2)
SPECIFIC GRAVITY UA: 1.02 (ref 1–1.03)
UROBILINOGEN, URINE: 0.2 E.U./DL
WBC UA: ABNORMAL /HPF (ref 0–5)

## 2022-10-19 PROCEDURE — 1090F PRES/ABSN URINE INCON ASSESS: CPT | Performed by: FAMILY MEDICINE

## 2022-10-19 PROCEDURE — G8484 FLU IMMUNIZE NO ADMIN: HCPCS | Performed by: FAMILY MEDICINE

## 2022-10-19 PROCEDURE — G8399 PT W/DXA RESULTS DOCUMENT: HCPCS | Performed by: FAMILY MEDICINE

## 2022-10-19 PROCEDURE — 2022F DILAT RTA XM EVC RTNOPTHY: CPT | Performed by: FAMILY MEDICINE

## 2022-10-19 PROCEDURE — 1036F TOBACCO NON-USER: CPT | Performed by: FAMILY MEDICINE

## 2022-10-19 PROCEDURE — G8417 CALC BMI ABV UP PARAM F/U: HCPCS | Performed by: FAMILY MEDICINE

## 2022-10-19 PROCEDURE — G8427 DOCREV CUR MEDS BY ELIG CLIN: HCPCS | Performed by: FAMILY MEDICINE

## 2022-10-19 PROCEDURE — 99214 OFFICE O/P EST MOD 30 MIN: CPT | Performed by: FAMILY MEDICINE

## 2022-10-19 PROCEDURE — 1123F ACP DISCUSS/DSCN MKR DOCD: CPT | Performed by: FAMILY MEDICINE

## 2022-10-19 PROCEDURE — 3017F COLORECTAL CA SCREEN DOC REV: CPT | Performed by: FAMILY MEDICINE

## 2022-10-19 PROCEDURE — 3044F HG A1C LEVEL LT 7.0%: CPT | Performed by: FAMILY MEDICINE

## 2022-10-19 NOTE — PROGRESS NOTES
Greta Adhikari : 1945 Sex: female  Age: 77 y.o. Chief Complaint   Patient presents with    Diabetes    Hypothyroidism       HPI:    Presents today for follow-up of UTI, and follow-up after reduce metoprolol. Generally doing much better on reduced dose metoprolol, still gets some mild remittent dizzy spells but nothing like she was having. He is going to try to reduce the metoprolol once more to daily. Does not feel she is benefiting from physical therapy, states they are doing very little and he is going to stop going. Did not end up getting the Cipro, states the pharmacy never called him. No urinary symptoms at this time. Recheck UA/culture. She will follow with urology. Has been off Myrbetriq         Falls precautions again reviewed. Fracture risk reviewed. No headache. No syncope or near syncope. No chest pain or palpitations. Importance of follow cardiologist reviewed.   Tolerating Fosamax       Most Recent Labs  CBC  Lab Results   Component Value Date/Time    WBC 10.1 10/04/2022 04:05 PM    WBC 10.6 2022 01:00 PM    WBC 9.4 2022 01:35 PM    RBC 5.08 10/04/2022 04:05 PM    RBC 5.21 2022 01:00 PM    RBC 4.95 2022 01:35 PM    HGB 15.4 10/04/2022 04:05 PM    HGB 15.7 2022 01:00 PM    HGB 15.3 2022 01:35 PM    HCT 47.7 10/04/2022 04:05 PM    HCT 48.2 2022 01:00 PM    HCT 44.9 2022 01:35 PM    MCV 93.9 10/04/2022 04:05 PM    MCV 92.5 2022 01:00 PM    MCV 90.7 2022 01:35 PM     10/04/2022 04:05 PM     2022 01:00 PM     2022 01:35 PM      CMP  Lab Results   Component Value Date/Time     10/04/2022 04:05 PM     2022 01:00 PM     2022 01:35 PM    K 4.4 10/04/2022 04:05 PM    K 4.1 2022 01:00 PM    K 4.1 2022 01:35 PM     10/04/2022 04:05 PM     2022 01:00 PM     2022 01:35 PM    CO2 26 10/04/2022 04:05 PM    CO2 25 2022 01:00 PM    CO2 23 04/28/2022 01:35 PM    ANIONGAP 11 10/04/2022 04:05 PM    ANIONGAP 12 09/09/2022 01:00 PM    ANIONGAP 12 04/28/2022 01:35 PM    GLUCOSE 89 10/04/2022 04:05 PM    GLUCOSE 91 09/09/2022 01:00 PM    GLUCOSE 107 04/28/2022 01:35 PM    GLUCOSE 104 08/15/2011 10:50 AM    GLUCOSE 113 04/25/2011 02:28 PM    GLUCOSE 97 04/01/2011 10:40 AM    BUN 9 10/04/2022 04:05 PM    BUN 9 09/09/2022 01:00 PM    BUN 11 04/28/2022 01:35 PM    CREATININE 0.8 10/04/2022 04:05 PM    CREATININE 0.8 09/09/2022 01:00 PM    CREATININE 0.7 04/28/2022 01:35 PM    LABGLOM >60 10/04/2022 04:05 PM    LABGLOM >60 09/09/2022 01:00 PM    LABGLOM >60 04/28/2022 01:35 PM    GFRAA >60 10/04/2022 04:05 PM    GFRAA >60 09/09/2022 01:00 PM    GFRAA >60 04/28/2022 01:35 PM    CALCIUM 9.6 10/04/2022 04:05 PM    CALCIUM 9.4 09/09/2022 01:00 PM    CALCIUM 9.6 04/28/2022 01:35 PM    PROT 7.0 10/04/2022 04:05 PM    PROT 7.1 09/09/2022 01:00 PM    PROT 7.1 04/28/2022 01:35 PM    LABALBU 4.3 10/04/2022 04:05 PM    LABALBU 4.3 09/09/2022 01:00 PM    LABALBU 4.2 04/28/2022 01:35 PM    LABALBU 4.4 08/15/2011 10:50 AM    LABALBU 4.2 04/25/2011 02:28 PM    LABALBU 4.2 03/24/2011 03:55 AM    BILITOT 0.6 10/04/2022 04:05 PM    BILITOT 0.6 09/09/2022 01:00 PM    BILITOT 0.5 04/28/2022 01:35 PM    ALKPHOS 85 10/04/2022 04:05 PM    ALKPHOS 84 09/09/2022 01:00 PM    ALKPHOS 82 04/28/2022 01:35 PM    AST 22 10/04/2022 04:05 PM    AST 20 09/09/2022 01:00 PM    AST 25 04/28/2022 01:35 PM    ALT 20 10/04/2022 04:05 PM    ALT 19 09/09/2022 01:00 PM    ALT 27 04/28/2022 01:35 PM     A1C  Lab Results   Component Value Date/Time    LABA1C 5.6 10/04/2022 04:05 PM    LABA1C 5.6 09/09/2022 01:00 PM    LABA1C 5.7 04/28/2022 01:35 PM     TSH  Lab Results   Component Value Date/Time    TSH 1.140 10/04/2022 04:05 PM    TSH 1.530 09/09/2022 01:00 PM    TSH 0.794 04/28/2022 01:35 PM     FREET4  Lab Results   Component Value Date/Time    W1HSGDK 7.1 06/26/2019 03:07 PM V4QHNJK 7.6 05/21/2014 12:13 PM     LIPID  Lab Results   Component Value Date/Time    CHOL 243 10/04/2022 04:05 PM    CHOL 219 09/09/2022 01:00 PM    CHOL 225 04/28/2022 01:35 PM    HDL 44 10/04/2022 04:05 PM    HDL 44 09/09/2022 01:00 PM    HDL 50 04/28/2022 01:35 PM    LDLCALC 159 10/04/2022 04:05 PM    LDLCALC 132 09/09/2022 01:00 PM    LDLCALC 138 04/28/2022 01:35 PM    TRIG 201 10/04/2022 04:05 PM    TRIG 216 09/09/2022 01:00 PM    TRIG 183 04/28/2022 01:35 PM     VITAMIN D  Lab Results   Component Value Date/Time    VITD25 55 10/04/2022 04:05 PM    VITD25 48 09/09/2022 01:00 PM    VITD25 53 04/28/2022 01:35 PM     MAGNESIUM  Lab Results   Component Value Date/Time    MG 2.2 05/22/2014 04:40 AM    MG 2.0 04/28/2014 09:37 AM    MG 2.3 03/23/2011 12:45 PM      PHOS  No results found for: PHOS   RON   Lab Results   Component Value Date/Time    RON NEGATIVE 11/29/2012 02:20 PM     RHEUMATOID FACTOR  No results found for: RF  PSA  No results found for: PSA   HEPATITIS C  Lab Results   Component Value Date/Time    HCVABI Non-Reactive 08/12/2020 04:14 PM     HIV  No results found for: YRE4LAG, HIV1QT  UA  Lab Results   Component Value Date/Time    COLORU Yellow 10/04/2022 03:00 PM    COLORU yellow 09/30/2022 04:02 PM    COLORU Yellow 09/09/2022 12:46 PM    COLORU Yellow 04/28/2022 01:37 PM    CLARITYU Clear 10/04/2022 03:00 PM    CLARITYU clear 09/30/2022 04:02 PM    CLARITYU Clear 09/09/2022 12:46 PM    CLARITYU Clear 04/28/2022 01:37 PM    GLUCOSEU Negative 10/04/2022 03:00 PM    GLUCOSEU negataive 09/30/2022 04:02 PM    GLUCOSEU Negative 09/09/2022 12:46 PM    GLUCOSEU Negative 04/28/2022 01:37 PM    BILIRUBINUR Negative 10/04/2022 03:00 PM    BILIRUBINUR negative 09/30/2022 04:02 PM    BILIRUBINUR Negative 09/09/2022 12:46 PM    BILIRUBINUR Negative 04/28/2022 01:37 PM    BILIRUBINUR negative 11/26/2021 01:52 PM    BILIRUBINUR negative 09/16/2021 03:18 PM    KETUA Negative 10/04/2022 03:00 PM    KETUA negative 09/30/2022 04:02 PM    KETUA Negative 09/09/2022 12:46 PM    KETUA Negative 04/28/2022 01:37 PM    SPECGRAV 1.010 10/04/2022 03:00 PM    SPECGRAV 1.025 09/30/2022 04:02 PM    SPECGRAV 1.025 09/09/2022 12:46 PM    SPECGRAV 1.020 04/28/2022 01:37 PM    BLOODU Negative 10/04/2022 03:00 PM    BLOODU small 09/30/2022 04:02 PM    BLOODU Negative 09/09/2022 12:46 PM    BLOODU Negative 04/28/2022 01:37 PM    PHUR 6.0 10/04/2022 03:00 PM    PHUR 7.0 09/30/2022 04:02 PM    PHUR 6.0 09/09/2022 12:46 PM    PHUR 6.0 04/28/2022 01:37 PM    PROTEINU Negative 10/04/2022 03:00 PM    PROTEINU trace 09/30/2022 04:02 PM    PROTEINU Negative 09/09/2022 12:46 PM    PROTEINU Negative 04/28/2022 01:37 PM    UROBILINOGEN 0.2 10/04/2022 03:00 PM    UROBILINOGEN 0.2 09/09/2022 12:46 PM    UROBILINOGEN 0.2 04/28/2022 01:37 PM    NITRU POSITIVE 10/04/2022 03:00 PM    NITRU POSITIVE 09/09/2022 12:46 PM    NITRU Negative 04/28/2022 01:37 PM    LEUKOCYTESUR SMALL 10/04/2022 03:00 PM    LEUKOCYTESUR small 09/30/2022 04:02 PM    LEUKOCYTESUR MODERATE 09/09/2022 12:46 PM    LEUKOCYTESUR SMALL 04/28/2022 01:37 PM     Urine Micro/Albumin Ratio  Lab Results   Component Value Date/Time    MALBCR 21.3 10/04/2022 03:00 PM    MALBCR 10.3 09/09/2022 12:46 PM    MALBCR - 04/28/2022 01:37 PM         Current ROS as below with intermittent symptoms as above  ROS:  Const: Denies chills, fever, malaise and sweats. Eyes: Denies discharge, pain, redness and visual disturbance. ENMT: Denies earaches, other ear symptoms. Denies nasal or sinus symptoms other than stated  above. Denies mouth and tongue lesions and sore throat. CV: Denies chest discomfort, pain; diaphoresis, dizziness, edema, lightheadedness, orthopnea,  palpitations, syncope and near syncopal episode or any exertional symptoms  Resp: Denies cough, hemoptysis, pleuritic pain, SOB, sputum production and wheezing.   GI: Denies abdominal pain, change in bowel habits, hematochezia, melena, nausea and vomiting. : Dysuria urgency frequency, no hematuria  Musculo: Denies musculoskeletal symptoms. Skin: Denies bruising and rash  Neuro: Denies headache, numbness, stiff neck, tingling and focal weakness slurred speech or facial  droop  Hema/Lymph: Denies bleeding/bruising tendency and enlarged lymph nodes      Current Outpatient Medications:     metoprolol succinate (TOPROL XL) 50 MG extended release tablet, Take 1 tablet by mouth 2 times daily, Disp: 60 tablet, Rfl: 3    donepezil (ARICEPT) 10 MG tablet, TAKE 1 TABLET BY MOUTH  TWICE DAILY, Disp: 180 tablet, Rfl: 3    alendronate (FOSAMAX) 70 MG tablet, 1 po qwk. Take with plain h2o, do not eat, drink or lie down for at least 30min and not until after first food of day., Disp: 12 tablet, Rfl: 3    memantine (NAMENDA) 10 MG tablet, TAKE 1 TABLET BY MOUTH TWICE DAILY, Disp: 180 tablet, Rfl: 3    levothyroxine (SYNTHROID) 100 MCG tablet, Take 1 tablet by mouth in the morning.  Ideally on empty stomach., Disp: 90 tablet, Rfl: 1    omeprazole (PRILOSEC) 20 MG delayed release capsule, Take 1 capsule by mouth daily Ideally 1hr prior to dinner, Disp: 90 capsule, Rfl: 1    ammonium lactate (LAC-HYDRIN) 12 % lotion, BID PRN, Disp: 225 g, Rfl: 1    metFORMIN (GLUCOPHAGE) 500 MG tablet, Take 1 tablet by mouth 2 times daily (with meals), Disp: 180 tablet, Rfl: 3    omega-3 acid ethyl esters (LOVAZA) 1 g capsule, Take 2 capsules by mouth 2 times daily, Disp: 360 capsule, Rfl: 3    B Complex Vitamins (B-COMPLEX/B-12 PO), Take by mouth daily LD 12/12/18, Disp: , Rfl:   Allergies   Allergen Reactions    Morphine Itching    Statins      Other reaction(s): Unknown    Statins Depletion Therapy Other (See Comments)     MYALGIA       Past Medical History:   Diagnosis Date    Arthritis     Diabetes mellitus ([address]     Difficult intubation     carries card, copy on chart  Glidescope#3 with ETT size7    Hyperlipidemia     Hypothyroidism     Left sided abdominal pain     Memory problem still competent    GORDY on CPAP     Pacemaker     Rectal bleeding     Ringing in the ears      Past Surgical History:   Procedure Laterality Date    ABLATION OF DYSRHYTHMIC FOCUS      BREAST SURGERY      implants    COLONOSCOPY  ? Dr. Ivette Ansari twisted     COLONOSCOPY  ? Dr. Marco A Garcia. incomplete. COLONOSCOPY  2018    Dr. Cristina Rutherford 2018    COLONOSCOPY DIAGNOSTIC performed by Nael Bragg MD at [facility] N/A 2022    COLONOSCOPY DIAGNOSTIC performed by Fernanda Rain MD at [address] [facility]  09/10/2011    POSTERIOR    HYSTERECTOMY (CERVIX STATUS UNKNOWN)      vaginal. uterus only.      KNEE ARTHROPLASTY Right     partial    KNEE ARTHROSCOPY  2011    right knee    PACEMAKER PLACEMENT  2014    Medtronic dual chamber    TOTAL HIP ARTHROPLASTY Right 2015    UPPER GASTROINTESTINAL ENDOSCOPY N/A 2022    EGD BIOPSY performed by Fernanda Rain MD at [facility] History   Problem Relation Age of Onset    Pacemaker Mother     Heart Attack Father     Cancer Other 48        Bone Marrow Cancer / Had Bone Marrow Transplant     Social History     Tobacco Use    Smoking status: Former     Packs/day: 1.50     Years: 13.00     Pack years: 19.50     Types: Cigarettes     Start date: 1973     Quit date: 1986     Years since quittin.8    Smokeless tobacco: Never    Tobacco comments:     quit smoking    Vaping Use    Vaping Use: Never used   Substance Use Topics    Alcohol use: Yes     Comment: socially -- 3 drinks per month at the most    Drug use: No      Social History     Social History Narrative    PMH:    Problem List: Urinary tract infectious disease, Obstructive sleep apnea syndrome, Adult health    examination, Adult health examination, Constipation, Derangement of knee, Vitamin D deficiency,    Hypothyroidism, Conduction disorder of the heart, Hyperlipidemia    Health Maintenance:    Mini Mental Status - (2018)    Colonoscopy - (2018)    Colonoscopy Screening - (2018)    Mammogram Screening - (2018)    Mammogram - (2018)    Colonoscopy - (2010)    Couseled on Home Safety - (2015)    Bone Density Scan - (3/28/2012)    Medical Problems:    Sleep Apnea - Dr Dylan Barreto, cpap    Pneumonia    Hypothyroidism - Dr Viral Low - Dr Radha Obando    Hyperlipidemia - intolerance to all statins    Cardiac Arrhythmia - ? type. s/p ablation    cardiac dysrhythmia - pauses - lead to pacemaker    Skin Cancer    Surgical Hx:    Partial Hysterectomy - Still with Both Ovaries. Breast Implants    Pacemaker - Dr Mar Acosta    Knee Replacement, Carpal Tunnel Release, colon-vaginal fistula repair    Bladder Repair - sling    RT Hip Arthroplasty - MARGARITA        FH:    Father:    . (Hx)    Mother:    . (Hx)    Dad - MI 39,  70 MI    Mom - DM , currently living age 80        SH: Marital: . Personal Habits: Cigarette Use: Former Cigarette Smoker - quit age 36. Occ ETOH, minimal. . 2 kids, 6 step kids. 27 grandkids. .Alcohol: Rarely consumes alcohol        Vitals:    10/19/22 1525   BP: 122/82   Site: Right Upper Arm   Position: Sitting   Pulse: 63   Temp: 97.9 °F (36.6 °C)   TempSrc: Temporal   SpO2: 97%   Weight: 188 lb (85.3 kg)     Wt Readings from Last 3 Encounters:   10/19/22 188 lb (85.3 kg)   10/06/22 189 lb (85.7 kg)   22 189 lb (85.7 kg)            Exam:  Const: Appears comfortable. No signs of acute distress present. Head/Face: Atraumatic, normocephalic on inspection. Eyes: No discharge from the eyes. Sclerae clear. ENMT: Ears clear at this time without cerumen, nose boggy oropharynx watery postnasal drainage cobblestoning  Neck: Supple. Palpation reveals no adenopathy. No masses appreciated. No JVD. Resp: Respirations are unlabored. Clear to auscultation bilaterally. No rales, rhonchi or wheezes appreciated over the  lungs bilaterally.   CV: RRR   Extremities: No clubbing or cyanosis. No edema of the lower limbs  bilaterally. No calf inflammation or tenderness. Abdomen: Abdomen is soft,and nondistended. No abdominal masses  appreciated. No palpable hepatosplenomegaly. Bowel sounds are normoactive. Nontender  Skin: Dry and warm with no rash. Muscular skeletal: No acute joint inflammation. Neuro:Grossly intact without focal deficit  No CVA tenderness    Office Labs This Visit :  No results found for this visit on 10/19/22. Assessment and Plan:   Diagnosis Orders   1. Recurrent UTI  Urinalysis    Culture, Urine    CBC with Auto Differential    Urinalysis    Culture, Urine      2. Essential hypertension  CBC with Auto Differential      3. Type 2 diabetes mellitus with hyperglycemia, without long-term current use of insulin (HCC)  CBC with Auto Differential    Hemoglobin A1C    Microalbumin / Creatinine Urine Ratio      4. Mixed hyperlipidemia  CK    Comprehensive Metabolic Panel    Lipid Panel      5. Supraventricular tachycardia ([address]        6. Osteopenia of forearm, unspecified laterality        7. Acquired hypothyroidism  TSH    T4, Free      8. Vitamin B12 deficiency  Vitamin B12 & Folate      9. Vitamin D deficiency  Vitamin D 25 Hydroxy              Unsteady gait  Counseled extensively. Differential reviewed, including serious etiologies. Previous thoughts =  Questionable vertigo but difficult historian. Does not seem transient. Doubt TIA at this time. Cannot have MRI because of pacemaker. CT brain/carotid ultrasound negative 9/9/2022. Risk benefits aspirin reviewed. CT last sensitive for neoplasm/CVA and reviewed. .  They are holding the Myrbetriq. Awaiting consultation with neurology. Recurrent UTIs. Falls precautions emphasized, fx risk reviewed and M/M related to fx reviewed.   Does not feel physical therapy is doing much and they are going to stop    Symptoms actually resolved after lowering metoprolol from 100 mg twice a day to 50 mg twice a day. If ongoing symptoms going to lower to once a day       Neoplasm of skin    sun damaged skin. Continue per Dr. Desi Bass. pylori infection    noted on EGD 1/22- since followed up with Dr. Regine Roque, was treated, symptoms resolved, follow-up breath test pending        Recurrent UTI  No symptoms now. Last visit however she did complain of UTI symptoms and had over 100,000 E. coli, was previously treated with Macrobid, has tolerated most antibiotics, risk benefits of Cipro reviewed and 3-day course sent but they never picked it up, said the pharmacy never called them. No symptoms today. Repeat UA/culture. Proper hygiene reviewed. Counseled on cranberry/Azo. Await Dr. Yvonne Russell. Call for pending results      Microscopic hematuria  Refer back to Dr. Yvonne Russell. Fluctuates    Chronic incontinence/irritable bladder  Was started on Myrbetriq through Dr. Yvonne Russell, no significant benefit that they can tell and expensive. Could be contributing to gait dysfunction. Discontinued last time, mid September 2022 to see if this was contributing to balance issues    Osteopenia of forearm    bone density 9/22 showed T score +1.4 L1-L4-risk of false negative reviewed. -1.8 forearm. Risk of fracture reviewed. Falls precautions reviewed. M/M related to fracture reviewed. Appropriate calcium/D reviewed. After discussion services making agreeable to starting Fosamax with standard precautions. Denies contraindication. Hold prior to dental procedures. Repeat bone density 1 to 2 years sooner as needed. Lichen sclerosus  On 11/21 labial biopsy through Dr. Kelechi Castillo, counseled, follow with him      Constipation  Counseled extensively. Differential reviewed, including serious etiologies. Resolved after MiraLAX taper  Colonoscopy 1/22 - except hemorrhoids-Dr. Regine Roque         Polycythemia  Counseled. Hemoglobin fluctuates, ferritin was borderline high, but improved/stable. Monitor.   Counseled on hemochromatosis. Proper hydration. Counseled extensively. Differential reviewed, including serious etiologies. Obstructive sleep apnea syndrome     Counseled. Doing very well CPAP. Uses it at least 8 to 10 hours per night 7 nights per week with very good results. Got a new machine toward the end of 2019. Encourage her machine be checked. She follows up with Dr. Brook Buckner 10/27/2021     Hypothyroidism     TSH elevated on 112mcg qd, 1/2 Sunday. Was suppressed on 112 qd in the past. Declines d.a.w. Wants to continue generic. Scot Javier Defers imaging. TFTs now normal monitor. Asymptomatic              Type 2 diabetes mellitus with hyperglycemia, without long-term current use of insulin ([facility])  Hemoglobin A1c stable, 5.9 to 6.1 -5.6-5.5-5.5-5.6-5.6 on metformin 500 mg twice a day. Previously 1000 twice a day. Precautions reviewed. Risks  reviewed, proper hydration reviewed, standard precautions reviewed including loose bowels, LA. Encouraged  she watch blood sugar ambulatory with parameters reviewed call in if out of range. Hyper and hypoglycemic precautions reviewed. Micro-and macrovascular  complications reviewed. Importance of at least yearly eye exams and daily foot exams reviewed. Tolerating metformin . no longer on invokana. Monitor               Dementia without behavioral disturbance  Counseled extensively. Differential reviewed including various forms of dementia and pseudodementia. On Aricept 10 mg daily, and Namenda. Continue per Dr. Tiffanie Bello they were seeing Dr. Chio Gunderson specialist from [place]. However they re noncompliant without follow-up, feels it is a \"racket\". Follow-up with Dr. Tiffanie Bello. She is enrolled in a study, nitrate water. She is not driving and we emphasized importance of this. Safety precautions reviewed.  feels her dementia is stable.    continue current management and she follows up with Dr. Tiffanie Bello, recently saw     Mixed hyperlipidemia  not at goal  Significant risk of cerebrovascular/cardiovascular event. Imperative this  be controlled with history of TIA, however adamantly refuses any further treatment at this time. Refuses to try any other statin or Zetia stating that these were not tolerated in the  past. She did not tolerate Niaspan. did not tolerate WelChol. . declines  cholestyramine. Risk of stroke and heart attack reviewed. lifestyle modification  reviewed. risk of hyperlipidemia reviewed . Did not tolerate fenofibrate  Counseled on repatha, declines. No longer on and does not wish Lovaza any longer. Declines change in therapy despite counseling    LDL went up quite a bit and only 3 weeks, labs were erroneously drawn early. Nonetheless does not wish change in therapy     Supraventricular tachycardia ([address]  h/o svt Status post ablation  . Also history of long pauses-since had pacemaker by Dr. Ivonne Peguero. FU with her. Follow-up Dr Kathleen Stockton . Asymptomatic sends recordings by phone.  ekg done and reviewed with her     Liver disease  Counseled extensively. Differential reviewed, including serious etiologies. Likely  fatty liver. Precautions reviewed. Lifestyle modification appropriate diet and weight  loss reviewed. Risks of even this leading to cirrhosis reviewed. Other than basic  monitoring on interested in other evaluation or treatment, in-depth blood work,  imaging or otherwise. Hep C 10/18 negative, again was negative 9/20. LFTs currently normal              Tubular adenoma  Small polyp 7/18. Dr. Dalton Sloan recommended basic screenings if repeated at all. Asymptomatic. Repeat colonoscopy Dr. Yinka Wall 1/22 showed hemorrhoids otherwise negative     Vitamin D deficiency  On supplementation she was borderline toxic, it has stabilized, continue off vitamin D. Monitor     Health maintenance examination  Health maintenance issues discussed at length  6/21. Encourage yearly. B12 deficiency  Risk of high and low reviewed. Last time high, discussed backing off. Monitor          Breast cancer screening  Mammogram was negative 9/22          Plan as above. Counseled extensively and differential diagnoses relevant to above were reviewed, including serious etiologies, risks and complications, especially of left uncontrolled. If relevant, instructions and  alternatives to meds/treatment reviewed, as well as interactions, and  SE's/ADRs reviewed, notify immediately if any, discontinuing new meds if any. Plan made after discussion and shared decision making. Hello my concerns continue to be noted, she is feeling significantly better. Did not  Cipro as called in last time and asymptomatic now. Repeat UA/culture. Continue per specialist.  Falls precautions. Otherwise simply wants blood work and follow-up in 3 months sooner as needed. PG             As long as symptoms steadily improve/resolve, and medical conditions follow the expected course, FU as below, sooner PRN. Return in about 3 months (around 1/19/2023), or if symptoms worsen or fail to improve. Educational materials and/or home exercises printed for patient's review and were included in patient instructions on his/her After Visit Summary and given to patient at the end of visit. After discussion, patient and/or guardian verbalizes understanding, agrees, feels comfortable with and wishes to proceed with above treatment plan. Call for any pending results, FU sooner if abnormal, as needed or if any current symptoms persist/worsen. Advised patient to call with any new medication issues, and read all Rx info from pharmacy to assure aware of all possible risks and side effects of medication before taking. Reviewed age and gender appropriate health screening exams and vaccinations.   Advised patient regarding importance of keeping up with recommended health maintenance and to schedule as soon as possible if overdue, as this is important in assessing for undiagnosed pathology, especially cancer, as well as protecting against potentially harmful/life threatening disease. Patient and/or guardian verbalizes understanding and agrees with above counseling, assessment and plan. All questions answered. Signs and symptoms to watch for discussed, serious signs and symptoms reviewed. ER if any. Defers at this time. Over 30 minutes  spent with the patient in reviewing records, reviewing with patient/family, counseling, ordering,  prescribing, completing h&p, etc., with over 50% of the time spent face to face counseling. Charity Barreto MD    Patients are advised to check with insurance company to ensure coverage and to fully understand benefits and cost prior to any testing. This note was created with the assistance of voice recognition software. Document was reviewed however may contain grammatical errors.

## 2022-10-21 ENCOUNTER — HOSPITAL ENCOUNTER (OUTPATIENT)
Dept: PHYSICAL THERAPY | Age: 77
Setting detail: THERAPIES SERIES
Discharge: HOME | End: 2022-10-21
Payer: MEDICARE

## 2022-10-21 PROCEDURE — 97530 THERAPEUTIC ACTIVITIES: CPT

## 2022-10-21 NOTE — PROGRESS NOTES
[address] [facility]          Phone: [phone] Fax: [phone]    Physical Therapy Daily Treatment Note    Date: 10/21/2022  Patient Name: Juhi Mckeon  MRN: [id number]     :   1945    Referring Physician: French Soto MD     PCP: Africa Lang MD    Medical Diagnosis: Gait disturbance [R26.9]    No data recorded  Insurance: Payor: Philippe Iraheta / Plan: MEDICARE PART A AND B / Product Type: *No Product type* /   Insurance ID: [id number] - (Medicare)  Restrictions/Precautions:  Dementia, poor ST memory, Safety deficits  Visit# / total visits:    Pain level: Pt reports low back pain with mat table rolling and with SLS on foam. Did not quantify. Time In:  1400  Time Out:  1430    Subjective:  Pt reports no dizziness since last visit. Pt did report back pain during session, but not able to quantify when asked. OBJECTIVE:     Functional Activities:   Transfers (sit to stand ):  2x10 from armless chair  Timed Up & Go:  10/14/2022: 15.06 seconds. >/=12 sec is indicative of a fall risk. Dynamic Gait Index: 10/14/2022: 23/24   Gait Testing:   Gait Deviations (firm surface/linoleum): Yee gait speed, equal stride length, no LOB or lateral sway noted  Assistive Device Used: None  Steps: NT  Strength Testing: NT  ROM: WFL    Exercises:  Exercise/Equipment Resistance/Repetitions Other comments     [place] 1 min on floor  2 min on 3\" foam EO/EC Increased sway on foam, with grasping // bars with EC     Tandem stance 1 min on floor  2 min on 3\" foam Increased LOB on foam and reported back pain     Tandem gait fwd/bkwd 8 feet x 2 ea Unable to consistently place feet properly     [place]  8 feet x 2 ea  R over L, L over R  R behind L, L behind R  Improved sequencing and foot placement.  Increased imbalance with behind     Balance activity on green discs 2 min x 2 Moderate imbalance with anterior lean     Ambulating while bouncing/catching yellow  ball  200 feet No LOB noted     Ambulation with horizontal/ vertical head turns 220 feet x 2 No LOB noted     Ambulation with intermittent speed changes 220 feet  No LOB noted     Picking up cones from the floor NT      STS  2x10 from armless chair       Ambulating/weaving between 6 cones 2 feet apart Repeated 4x  No imbalance noted     Ambulating stepping over 6 cones 2 feet apart Repeated 4x No imbalance noted     Rolling habituation 5x ea R/L                           Other Therapeutic Activities:  NT    Home Exercise Program:  NA    Manual Treatments:  NA    Modalities:  NA    Comments:  Pt demonstrated good balance with all ambulation and cone tasks. Pt did report slight dizziness and temporal pressure with rolling to the right and with sitting up from right side lying. Symptoms resolved within 30 seconds once seated. Pt doing well with all functional tasks. Today was first time pt reported back pain.  informed of back pain complaints.      Time-in Time-out Total Time   09473  Ther Ex      [id number]  Neuro Re-ed        51732  Ther Activities   8780 0830 14   [id number]  Manual Therapy       21789  E-stim       03561  Ultrasound            Session   30       Treatment/Activity Tolerance:  [x] Patient tolerated treatment well [] Patient limited by fatigue  [] Patient limited by pain  [] Patient limited by other medical complications  [] Other:     Prognosis: [x] Good [] Fair  [] Poor    Patient Requires Follow-up: [x] Yes  [] No    Plan:   [x] Continue per plan of care [] Alter current plan (see comments)  [] Plan of care initiated [] Hold pending MD visit [] Discharge  Plan for Next Session:        Electronically signed by:    Lang Koehler PT, DPT  License [id number]

## 2022-10-22 LAB
ORGANISM: ABNORMAL
URINE CULTURE, ROUTINE: ABNORMAL

## 2022-10-24 RX ORDER — AMOXICILLIN AND CLAVULANATE POTASSIUM 875; 125 MG/1; MG/1
1 TABLET, FILM COATED ORAL 2 TIMES DAILY
Qty: 14 TABLET | Refills: 0 | Status: SHIPPED | OUTPATIENT
Start: 2022-10-24 | End: 2022-10-31

## 2022-10-24 NOTE — RESULT ENCOUNTER NOTE
Considering risk factors Augmentin will be called in with standard precautions twice a day for 7 days, take with probiotic. Take with food.   Make sure  knows that this is called in, last time he states pharmacy did not call him so he did not

## 2022-10-24 NOTE — RESULT ENCOUNTER NOTE
Bacteria in urine. If asymptomatic, not supposed to treat. If symptoms consistent with UTI then we will have her start the Cipro as previously prescribed. Let me know.   Standard precautions

## 2022-11-04 ENCOUNTER — TELEPHONE (OUTPATIENT)
Dept: PRIMARY CARE CLINIC | Age: 77
End: 2022-11-04

## 2022-11-04 NOTE — TELEPHONE ENCOUNTER
The pt's  is calling because the pt is complaining about some dysuria and she has already finished the cipro you prescribed her last

## 2022-11-04 NOTE — TELEPHONE ENCOUNTER
Since finished powerful antibiotic and given the recurrent nature should be seen again today with repeat UA/culture. Express care for UTI symptoms if necessary.   Also they should contact and make sure they are following with Dr. Clarisse De La O for the recurrent symptoms/infection

## 2022-11-30 ENCOUNTER — NURSE ONLY (OUTPATIENT)
Dept: NON INVASIVE DIAGNOSTICS | Age: 77
End: 2022-11-30

## 2022-12-20 ENCOUNTER — OFFICE VISIT (OUTPATIENT)
Dept: FAMILY MEDICINE CLINIC | Age: 77
End: 2022-12-20
Payer: MEDICARE

## 2022-12-20 VITALS
HEART RATE: 56 BPM | WEIGHT: 187.8 LBS | DIASTOLIC BLOOD PRESSURE: 70 MMHG | BODY MASS INDEX: 31.74 KG/M2 | TEMPERATURE: 97.7 F | OXYGEN SATURATION: 96 % | SYSTOLIC BLOOD PRESSURE: 110 MMHG

## 2022-12-20 DIAGNOSIS — R09.81 NASAL CONGESTION: ICD-10-CM

## 2022-12-20 DIAGNOSIS — R05.9 COUGH, UNSPECIFIED TYPE: ICD-10-CM

## 2022-12-20 DIAGNOSIS — J06.9 ACUTE UPPER RESPIRATORY INFECTION, UNSPECIFIED: Primary | ICD-10-CM

## 2022-12-20 DIAGNOSIS — J01.90 ACUTE NON-RECURRENT SINUSITIS, UNSPECIFIED LOCATION: ICD-10-CM

## 2022-12-20 PROCEDURE — 1123F ACP DISCUSS/DSCN MKR DOCD: CPT | Performed by: PHYSICIAN ASSISTANT

## 2022-12-20 PROCEDURE — G8427 DOCREV CUR MEDS BY ELIG CLIN: HCPCS | Performed by: PHYSICIAN ASSISTANT

## 2022-12-20 PROCEDURE — 1090F PRES/ABSN URINE INCON ASSESS: CPT | Performed by: PHYSICIAN ASSISTANT

## 2022-12-20 PROCEDURE — G8484 FLU IMMUNIZE NO ADMIN: HCPCS | Performed by: PHYSICIAN ASSISTANT

## 2022-12-20 PROCEDURE — 1036F TOBACCO NON-USER: CPT | Performed by: PHYSICIAN ASSISTANT

## 2022-12-20 PROCEDURE — 99214 OFFICE O/P EST MOD 30 MIN: CPT | Performed by: PHYSICIAN ASSISTANT

## 2022-12-20 PROCEDURE — 3017F COLORECTAL CA SCREEN DOC REV: CPT | Performed by: PHYSICIAN ASSISTANT

## 2022-12-20 PROCEDURE — G8399 PT W/DXA RESULTS DOCUMENT: HCPCS | Performed by: PHYSICIAN ASSISTANT

## 2022-12-20 PROCEDURE — G8417 CALC BMI ABV UP PARAM F/U: HCPCS | Performed by: PHYSICIAN ASSISTANT

## 2022-12-20 RX ORDER — BENZONATATE 100 MG/1
100 CAPSULE ORAL 3 TIMES DAILY PRN
Qty: 21 CAPSULE | Refills: 0 | Status: SHIPPED | OUTPATIENT
Start: 2022-12-20 | End: 2022-12-27

## 2022-12-20 RX ORDER — DOXYCYCLINE HYCLATE 100 MG
100 TABLET ORAL 2 TIMES DAILY
Qty: 20 TABLET | Refills: 0 | Status: SHIPPED | OUTPATIENT
Start: 2022-12-20 | End: 2022-12-30

## 2022-12-20 NOTE — PROGRESS NOTES
22  Jung Mujica : 1945 Sex: female  Age 77 y.o. Subjective:  Chief Complaint   Patient presents with    Cough     X1 week    Drainage         HPI:   Jung Mujica , 77 y.o. female presents to [facility] care for evaluation of cough, congestion    HPI  CC:    Chief Complaint   Patient presents with    Cough     X1 week    Drainage       Onset: 1 weeks    Fever:  No     Vaccine: Yes   Booster:  Yes   Flu shot:  Yes   Exposure:  Yes has been exposed to  who is here    Treatments:  None    Aggravating or Alleviating factors: The patient has nasal congestion, drainage, here with  who has similar presentation. Chest pain:  No SOB:  No Myalgias: No Loss of smell:  No   Loss of Taste:  No Dizziness:  No  Fatigue:  No Headache:  No      COVID previously: Yes   Miscellaneous:  x2        ROS:   Unless otherwise stated in this report the patient's positive and negative responses for review of systems for constitutional, eyes, ENT, cardiovascular, respiratory, gastrointestinal, neurological, , musculoskeletal, and integument systems and related systems to the presenting problem are either stated in the history of present illness or were not pertinent or were negative for the symptoms and/or complaints related to the presenting medical problem. Positives and pertinent negatives as per HPI. All others reviewed and are negative. PMH:     Past Medical History:   Diagnosis Date    Arthritis     Diabetes mellitus ([address]     Difficult intubation     carries card, copy on chart  Glidescope#3 with ETT size7    Hyperlipidemia     Hypothyroidism     Left sided abdominal pain     Memory problem     still competent    GORDY on CPAP     Pacemaker     Rectal bleeding     Ringing in the ears        Past Surgical History:   Procedure Laterality Date    ABLATION OF DYSRHYTHMIC FOCUS      BREAST SURGERY      implants    COLONOSCOPY  ? Dr. Queen Wolf twisted     COLONOSCOPY  ? Dr. Austin Avelar. incomplete. COLONOSCOPY  12/17/2018    Dr. Jared De Oliveira 12/17/2018    COLONOSCOPY DIAGNOSTIC performed by Esther Fay MD at [facility] N/A 1/26/2022    COLONOSCOPY DIAGNOSTIC performed by Berna Vazquez MD at [address] [facility]  09/10/2011    POSTERIOR    HYSTERECTOMY (CERVIX STATUS UNKNOWN)      vaginal. uterus only. KNEE ARTHROPLASTY Right     partial    KNEE ARTHROSCOPY  9/08/2011    right knee    PACEMAKER PLACEMENT  5/23/2014    Medtronic dual chamber    TOTAL HIP ARTHROPLASTY Right 2015    UPPER GASTROINTESTINAL ENDOSCOPY N/A 1/26/2022    EGD BIOPSY performed by Berna Vazquez MD at [place] [address] History   Problem Relation Age of Onset    Pacemaker Mother     Heart Attack Father     Cancer Other 48        Bone Marrow Cancer / Had Bone Marrow Transplant       Medications:     Current Outpatient Medications:     doxycycline hyclate (VIBRA-TABS) 100 MG tablet, Take 1 tablet by mouth 2 times daily for 10 days, Disp: 20 tablet, Rfl: 0    benzonatate (TESSALON) 100 MG capsule, Take 1 capsule by mouth 3 times daily as needed for Cough, Disp: 21 capsule, Rfl: 0    metoprolol succinate (TOPROL XL) 50 MG extended release tablet, Take 1 tablet by mouth 2 times daily, Disp: 60 tablet, Rfl: 3    donepezil (ARICEPT) 10 MG tablet, TAKE 1 TABLET BY MOUTH  TWICE DAILY, Disp: 180 tablet, Rfl: 3    memantine (NAMENDA) 10 MG tablet, TAKE 1 TABLET BY MOUTH TWICE DAILY, Disp: 180 tablet, Rfl: 3    levothyroxine (SYNTHROID) 100 MCG tablet, Take 1 tablet by mouth in the morning.  Ideally on empty stomach., Disp: 90 tablet, Rfl: 1    omeprazole (PRILOSEC) 20 MG delayed release capsule, Take 1 capsule by mouth daily Ideally 1hr prior to dinner, Disp: 90 capsule, Rfl: 1    ammonium lactate (LAC-HYDRIN) 12 % lotion, BID PRN, Disp: 225 g, Rfl: 1    metFORMIN (GLUCOPHAGE) 500 MG tablet, Take 1 tablet by mouth 2 times daily (with meals), Disp: 180 tablet, Rfl: 3    omega-3 acid ethyl esters (LOVAZA) 1 g capsule, Take 2 capsules by mouth 2 times daily (Patient taking differently: Take 2 g by mouth daily), Disp: 360 capsule, Rfl: 3    Allergies: Allergies   Allergen Reactions    Morphine Itching    Statins      Other reaction(s): Unknown    Statins Depletion Therapy Other (See Comments)     MYALGIA       Social History:     Social History     Tobacco Use    Smoking status: Former     Packs/day: 1.50     Years: 13.00     Pack years: 19.50     Types: Cigarettes     Start date: 1973     Quit date: 1986     Years since quittin.9    Smokeless tobacco: Never    Tobacco comments:     quit smoking    Vaping Use    Vaping Use: Never used   Substance Use Topics    Alcohol use: Yes     Comment: socially -- 3 drinks per month at the most    Drug use: No       Patient lives at home. Physical Exam:     Vitals:    22 1000   BP: 110/70   Site: Right Upper Arm   Position: Sitting   Pulse: 56   Temp: 97.7 °F (36.5 °C)   TempSrc: Temporal   SpO2: 96%   Weight: 187 lb 12.8 oz (85.2 kg)       Exam:  Physical Exam  Nurse's notes and vital signs reviewed. The patient is not hypoxic. ? General: Alert, no acute distress, patient resting comfortably Patient is not toxic or lethargic. Skin: Warm, intact, no pallor noted. There is no evidence of rash at this time. Head: Normocephalic, atraumatic  Eye: Normal conjunctiva  Ears, Nose, Throat: Right tympanic membrane clear, left tympanic membrane clear. No drainage or discharge noted. No pre- or post-auricular tenderness, erythema, or swelling noted. Nasal congestion, rhinorrhea, no epistaxis  Posterior oropharynx shows no erythema, tonsillar hypertrophy, or exudate. the uvula is midline. No trismus or drooling is noted. Moist mucous membranes. Neck: No anterior/posterior lymphadenopathy noted. No erythema, no masses, no fluctuance or induration noted. No meningeal signs.   Cardiovascular: Regular Rate and Rhythm  Respiratory: No acute distress, no rhonchi, wheezing or crackles noted. No stridor or retractions are noted. Neurological: A&O x4, normal speech  Psychiatric: Cooperative       Testing:           Medical Decision Making:     Vital signs reviewed    Past medical history reviewed. Allergies reviewed. Medications reviewed. Patient on arrival does not appear to be in any apparent distress or discomfort. The patient has been seen and evaluated. The patient does not appear to be toxic or lethargic. Patient will be sent for chest x-ray. It is pending at this time. We will treat the patient with doxycycline and Tessalon Perles. The patient was educated on the proper dosage of motrin and tylenol and the appropriate intervals of each. The patient is to increase fluid intake over the next several days. The patient is to use OTC decongestant as needed. The patient is to return to express care or go directly to the emergency department should any of the signs or symptoms worsen. The patient is to followup with primary care physician in 2-3 days for repeat evaluation. The patient has no other questions or concerns at this time the patient will be discharged home. Clinical Impression:   Iban Geiger was seen today for cough and drainage. Diagnoses and all orders for this visit:    Acute upper respiratory infection, unspecified  -     XR CHEST STANDARD (2 VW); Future    Acute non-recurrent sinusitis, unspecified location    Nasal congestion    Cough, unspecified type    Other orders  -     doxycycline hyclate (VIBRA-TABS) 100 MG tablet; Take 1 tablet by mouth 2 times daily for 10 days  -     benzonatate (TESSALON) 100 MG capsule; Take 1 capsule by mouth 3 times daily as needed for Cough      The patient is to call for any concerns or return if any of the signs or symptoms worsen.  The patient is to follow-up with PCP in the next 2-3 days for repeat evaluation repeat assessment or go directly to the emergency department.      SIGNATURE: Sandrine Andersen III, PA-C

## 2023-02-01 ENCOUNTER — HOSPITAL ENCOUNTER (EMERGENCY)
Age: 78
Discharge: HOME | End: 2023-02-01
Attending: EMERGENCY MEDICINE
Payer: MEDICARE

## 2023-02-01 ENCOUNTER — OFFICE VISIT (OUTPATIENT)
Dept: PRIMARY CARE CLINIC | Age: 78
End: 2023-02-01

## 2023-02-01 ENCOUNTER — APPOINTMENT (OUTPATIENT)
Dept: CT IMAGING | Age: 78
End: 2023-02-01
Payer: MEDICARE

## 2023-02-01 VITALS
DIASTOLIC BLOOD PRESSURE: 64 MMHG | BODY MASS INDEX: 31.6 KG/M2 | TEMPERATURE: 97.8 F | SYSTOLIC BLOOD PRESSURE: 128 MMHG | HEART RATE: 57 BPM | WEIGHT: 187 LBS | OXYGEN SATURATION: 95 %

## 2023-02-01 VITALS
WEIGHT: 187 LBS | DIASTOLIC BLOOD PRESSURE: 69 MMHG | HEART RATE: 59 BPM | TEMPERATURE: 97.4 F | OXYGEN SATURATION: 96 % | BODY MASS INDEX: 31.16 KG/M2 | SYSTOLIC BLOOD PRESSURE: 136 MMHG | HEIGHT: 65 IN | RESPIRATION RATE: 14 BRPM

## 2023-02-01 DIAGNOSIS — F03.90 DEMENTIA WITHOUT BEHAVIORAL DISTURBANCE (HCC): ICD-10-CM

## 2023-02-01 DIAGNOSIS — E11.65 TYPE 2 DIABETES MELLITUS WITH HYPERGLYCEMIA, WITHOUT LONG-TERM CURRENT USE OF INSULIN (HCC): Primary | ICD-10-CM

## 2023-02-01 DIAGNOSIS — N30.00 ACUTE CYSTITIS WITHOUT HEMATURIA: ICD-10-CM

## 2023-02-01 DIAGNOSIS — K62.89 RECTAL PAIN: Primary | ICD-10-CM

## 2023-02-01 DIAGNOSIS — L03.90 CELLULITIS, UNSPECIFIED CELLULITIS SITE: ICD-10-CM

## 2023-02-01 DIAGNOSIS — Z86.79 HISTORY OF PAROXYSMAL SUPRAVENTRICULAR TACHYCARDIA: ICD-10-CM

## 2023-02-01 LAB
ALBUMIN SERPL-MCNC: 3.8 G/DL (ref 3.5–5.2)
ALP BLD-CCNC: 69 U/L (ref 35–104)
ALT SERPL-CCNC: 17 U/L (ref 0–32)
ANION GAP SERPL CALCULATED.3IONS-SCNC: 10 MMOL/L (ref 7–16)
AST SERPL-CCNC: 22 U/L (ref 0–31)
BACTERIA: ABNORMAL /HPF
BASOPHILS ABSOLUTE: 0.06 E9/L (ref 0–0.2)
BASOPHILS RELATIVE PERCENT: 0.5 % (ref 0–2)
BILIRUB SERPL-MCNC: 0.4 MG/DL (ref 0–1.2)
BILIRUBIN URINE: NEGATIVE
BLOOD, URINE: NEGATIVE
BUN BLDV-MCNC: 13 MG/DL (ref 6–23)
CALCIUM SERPL-MCNC: 9.3 MG/DL (ref 8.6–10.2)
CHLORIDE BLD-SCNC: 107 MMOL/L (ref 98–107)
CLARITY: CLEAR
CO2: 22 MMOL/L (ref 22–29)
COLOR: YELLOW
CREAT SERPL-MCNC: 0.7 MG/DL (ref 0.5–1)
EOSINOPHILS ABSOLUTE: 0.19 E9/L (ref 0.05–0.5)
EOSINOPHILS RELATIVE PERCENT: 1.5 % (ref 0–6)
GFR SERPL CREATININE-BSD FRML MDRD: >60 ML/MIN/1.73
GLUCOSE BLD-MCNC: 85 MG/DL (ref 74–99)
GLUCOSE URINE: NEGATIVE MG/DL
HCT VFR BLD CALC: 45.1 % (ref 34–48)
HEMOGLOBIN: 15.1 G/DL (ref 11.5–15.5)
IMMATURE GRANULOCYTES #: 0.04 E9/L
IMMATURE GRANULOCYTES %: 0.3 % (ref 0–5)
KETONES, URINE: NEGATIVE MG/DL
LACTIC ACID: 2.5 MMOL/L (ref 0.5–2.2)
LEUKOCYTE ESTERASE, URINE: ABNORMAL
LIPASE: 29 U/L (ref 13–60)
LYMPHOCYTES ABSOLUTE: 4.58 E9/L (ref 1.5–4)
LYMPHOCYTES RELATIVE PERCENT: 36.3 % (ref 20–42)
MCH RBC QN AUTO: 30.6 PG (ref 26–35)
MCHC RBC AUTO-ENTMCNC: 33.5 % (ref 32–34.5)
MCV RBC AUTO: 91.5 FL (ref 80–99.9)
MONOCYTES ABSOLUTE: 0.84 E9/L (ref 0.1–0.95)
MONOCYTES RELATIVE PERCENT: 6.7 % (ref 2–12)
NEUTROPHILS ABSOLUTE: 6.9 E9/L (ref 1.8–7.3)
NEUTROPHILS RELATIVE PERCENT: 54.7 % (ref 43–80)
NITRITE, URINE: POSITIVE
PDW BLD-RTO: 12.7 FL (ref 11.5–15)
PH UA: 6 (ref 5–9)
PLATELET # BLD: 167 E9/L (ref 130–450)
PMV BLD AUTO: 10.7 FL (ref 7–12)
POTASSIUM SERPL-SCNC: 4.1 MMOL/L (ref 3.5–5)
PROTEIN UA: NEGATIVE MG/DL
RBC # BLD: 4.93 E12/L (ref 3.5–5.5)
RBC UA: ABNORMAL /HPF (ref 0–2)
SODIUM BLD-SCNC: 139 MMOL/L (ref 132–146)
SPECIFIC GRAVITY UA: 1.02 (ref 1–1.03)
TOTAL PROTEIN: 6.5 G/DL (ref 6.4–8.3)
UROBILINOGEN, URINE: 0.2 E.U./DL
WBC # BLD: 12.6 E9/L (ref 4.5–11.5)
WBC UA: ABNORMAL /HPF (ref 0–5)

## 2023-02-01 PROCEDURE — 6370000000 HC RX 637 (ALT 250 FOR IP): Performed by: STUDENT IN AN ORGANIZED HEALTH CARE EDUCATION/TRAINING PROGRAM

## 2023-02-01 PROCEDURE — 74177 CT ABD & PELVIS W/CONTRAST: CPT | Performed by: RADIOLOGY

## 2023-02-01 PROCEDURE — 6360000004 HC RX CONTRAST MEDICATION: Performed by: RADIOLOGY

## 2023-02-01 PROCEDURE — 2580000003 HC RX 258: Performed by: STUDENT IN AN ORGANIZED HEALTH CARE EDUCATION/TRAINING PROGRAM

## 2023-02-01 PROCEDURE — 74177 CT ABD & PELVIS W/CONTRAST: CPT

## 2023-02-01 PROCEDURE — 83690 ASSAY OF LIPASE: CPT

## 2023-02-01 PROCEDURE — 80053 COMPREHEN METABOLIC PANEL: CPT

## 2023-02-01 PROCEDURE — 99285 EMERGENCY DEPT VISIT HI MDM: CPT

## 2023-02-01 PROCEDURE — 85025 COMPLETE CBC W/AUTO DIFF WBC: CPT

## 2023-02-01 PROCEDURE — 83605 ASSAY OF LACTIC ACID: CPT

## 2023-02-01 PROCEDURE — 81001 URINALYSIS AUTO W/SCOPE: CPT

## 2023-02-01 RX ORDER — FENTANYL CITRATE 50 UG/ML
50 INJECTION, SOLUTION INTRAMUSCULAR; INTRAVENOUS ONCE
Status: DISCONTINUED | OUTPATIENT
Start: 2023-02-01 | End: 2023-02-01 | Stop reason: HOSPADM

## 2023-02-01 RX ORDER — 0.9 % SODIUM CHLORIDE 0.9 %
1000 INTRAVENOUS SOLUTION INTRAVENOUS ONCE
Status: COMPLETED | OUTPATIENT
Start: 2023-02-01 | End: 2023-02-01

## 2023-02-01 RX ORDER — CEFDINIR 300 MG/1
300 CAPSULE ORAL ONCE
Status: COMPLETED | OUTPATIENT
Start: 2023-02-01 | End: 2023-02-01

## 2023-02-01 RX ORDER — HYDROCORTISONE ACETATE 25 MG/1
25 SUPPOSITORY RECTAL 4 TIMES DAILY PRN
Qty: 30 SUPPOSITORY | Refills: 1 | Status: SHIPPED | OUTPATIENT
Start: 2023-02-01 | End: 2023-02-15

## 2023-02-01 RX ORDER — CEFDINIR 300 MG/1
300 CAPSULE ORAL 2 TIMES DAILY
Qty: 14 CAPSULE | Refills: 0 | Status: SHIPPED | OUTPATIENT
Start: 2023-02-01 | End: 2023-02-08

## 2023-02-01 RX ADMIN — IOPAMIDOL 75 ML: 755 INJECTION, SOLUTION INTRAVENOUS at 18:26

## 2023-02-01 RX ADMIN — SODIUM CHLORIDE 1000 ML: 9 INJECTION, SOLUTION INTRAVENOUS at 17:15

## 2023-02-01 RX ADMIN — CEFDINIR 300 MG: 300 CAPSULE ORAL at 20:26

## 2023-02-01 SDOH — ECONOMIC STABILITY: HOUSING INSECURITY
IN THE LAST 12 MONTHS, WAS THERE A TIME WHEN YOU DID NOT HAVE A STEADY PLACE TO SLEEP OR SLEPT IN A SHELTER (INCLUDING NOW)?: NO

## 2023-02-01 SDOH — ECONOMIC STABILITY: INCOME INSECURITY: HOW HARD IS IT FOR YOU TO PAY FOR THE VERY BASICS LIKE FOOD, HOUSING, MEDICAL CARE, AND HEATING?: NOT HARD AT ALL

## 2023-02-01 SDOH — ECONOMIC STABILITY: FOOD INSECURITY: WITHIN THE PAST 12 MONTHS, YOU WORRIED THAT YOUR FOOD WOULD RUN OUT BEFORE YOU GOT MONEY TO BUY MORE.: NEVER TRUE

## 2023-02-01 SDOH — ECONOMIC STABILITY: FOOD INSECURITY: WITHIN THE PAST 12 MONTHS, THE FOOD YOU BOUGHT JUST DIDN'T LAST AND YOU DIDN'T HAVE MONEY TO GET MORE.: NEVER TRUE

## 2023-02-01 ASSESSMENT — PATIENT HEALTH QUESTIONNAIRE - PHQ9: DEPRESSION UNABLE TO ASSESS: FUNCTIONAL CAPACITY MOTIVATION LIMITS ACCURACY

## 2023-02-01 ASSESSMENT — PAIN SCALES - GENERAL: PAINLEVEL_OUTOF10: 5

## 2023-02-01 ASSESSMENT — PAIN - FUNCTIONAL ASSESSMENT: PAIN_FUNCTIONAL_ASSESSMENT: 0-10

## 2023-02-01 NOTE — PROGRESS NOTES
Mike Montenegro : 1945 Sex: female  Age: 69 y.o. Chief Complaint   Patient presents with    Rectal Pain       HPI  HPI:      Patient was here at her 's appointment, he asked me to check her and that she has been complaining of severe perineal pain for 2 days. Wincing in pain. He notices an odor with pussy discharge. Not aware of any fever. Behind on blood work and he will have her get before follow-up. History of recurrent UTIs yeast infections and bacterial vaginosis. History of diabetes but last A1c only 5.6   dementia getting worse and following with specialists. History of but no symptoms consistent with SVT at this time. ROS:  As above      Current Outpatient Medications:     metoprolol succinate (TOPROL XL) 50 MG extended release tablet, Take 1 tablet by mouth 2 times daily, Disp: 60 tablet, Rfl: 3    donepezil (ARICEPT) 10 MG tablet, TAKE 1 TABLET BY MOUTH  TWICE DAILY, Disp: 180 tablet, Rfl: 3    memantine (NAMENDA) 10 MG tablet, TAKE 1 TABLET BY MOUTH TWICE DAILY, Disp: 180 tablet, Rfl: 3    levothyroxine (SYNTHROID) 100 MCG tablet, Take 1 tablet by mouth in the morning.  Ideally on empty stomach., Disp: 90 tablet, Rfl: 1    omeprazole (PRILOSEC) 20 MG delayed release capsule, Take 1 capsule by mouth daily Ideally 1hr prior to dinner, Disp: 90 capsule, Rfl: 1    ammonium lactate (LAC-HYDRIN) 12 % lotion, BID PRN, Disp: 225 g, Rfl: 1    metFORMIN (GLUCOPHAGE) 500 MG tablet, Take 1 tablet by mouth 2 times daily (with meals), Disp: 180 tablet, Rfl: 3    omega-3 acid ethyl esters (LOVAZA) 1 g capsule, Take 2 capsules by mouth 2 times daily (Patient taking differently: Take 2 g by mouth daily), Disp: 360 capsule, Rfl: 3  Allergies   Allergen Reactions    Morphine Itching    Statins      Other reaction(s): Unknown    Statins Depletion Therapy Other (See Comments)     MYALGIA       Past Medical History:   Diagnosis Date    Arthritis     Diabetes mellitus ([facility] [address]     Difficult intubation     carries card, copy on chart  Glidescope#3 with ETT size7    Hyperlipidemia     Hypothyroidism     Left sided abdominal pain     Memory problem     still competent    GORDY on CPAP     Pacemaker     Rectal bleeding     Ringing in the ears      Past Surgical History:   Procedure Laterality Date    ABLATION OF DYSRHYTHMIC FOCUS      BREAST SURGERY      implants    COLONOSCOPY  ? Dr. Rufino Hoskins twisted     COLONOSCOPY  ? Dr. Gabi Tucker. incomplete. COLONOSCOPY  2018    Dr. Rosado Anchors 2018    COLONOSCOPY DIAGNOSTIC performed by Ponciano Peabody, MD at [facility] N/A 2022    COLONOSCOPY DIAGNOSTIC performed by Odalis Patiño MD at [facility]  09/10/2011    POSTERIOR    HYSTERECTOMY (CERVIX STATUS UNKNOWN)      vaginal. uterus only.      KNEE ARTHROPLASTY Right     partial    KNEE ARTHROSCOPY  2011    right knee    PACEMAKER PLACEMENT  2014    Medtronic dual chamber    TOTAL HIP ARTHROPLASTY Right 2015    UPPER GASTROINTESTINAL ENDOSCOPY N/A 2022    EGD BIOPSY performed by Odalis Patiño MD at [facility] History   Problem Relation Age of Onset    Pacemaker Mother     Heart Attack Father     Cancer Other 48        Bone Marrow Cancer / Had Bone Marrow Transplant     Social History     Tobacco Use    Smoking status: Former     Packs/day: 1.50     Years: 13.00     Pack years: 19.50     Types: Cigarettes     Start date: 1973     Quit date: 1986     Years since quittin.1    Smokeless tobacco: Never    Tobacco comments:     quit smoking    Vaping Use    Vaping Use: Never used   Substance Use Topics    Alcohol use: Yes     Comment: socially -- 3 drinks per month at the most    Drug use: No      Social History     Social History Narrative    PMH:    Problem List: Urinary tract infectious disease, Obstructive sleep apnea syndrome, Adult health    examination, Adult health examination, Constipation, Derangement of knee, Vitamin D deficiency,    Hypothyroidism, Conduction disorder of the heart, Hyperlipidemia    Health Maintenance:    Mini Mental Status - (2018)    Colonoscopy - (2018)    Colonoscopy Screening - (2018)    Mammogram Screening - (2018)    Mammogram - (2018)    Colonoscopy - (2010)    Couseled on Home Safety - (2015)    Bone Density Scan - (3/28/2012)    Medical Problems:    Sleep Apnea - Dr Kamron Shannon, cpap    Pneumonia    Hypothyroidism - Dr Jagdeep Gotti - Dr Marcin Bueno    Hyperlipidemia - intolerance to all statins    Cardiac Arrhythmia - ? type. s/p ablation    cardiac dysrhythmia - pauses - lead to pacemaker    Skin Cancer    Surgical Hx:    Partial Hysterectomy - Still with Both Ovaries. Breast Implants    Pacemaker - Dr Geno Phipps    Knee Replacement, Carpal Tunnel Release, colon-vaginal fistula repair    Bladder Repair - sling    RT Hip Arthroplasty - MARGARITA        FH:    Father:    . (Hx)    Mother:    . (Hx)    Dad - MI 39,  70 MI    Mom - DM , currently living age 80        SH: Marital: . Personal Habits: Cigarette Use: Former Cigarette Smoker - quit age 36. Occ ETOH, minimal. . 2 kids, 6 step kids. 27 grandkids. .Alcohol: Rarely consumes alcohol        Vitals:    23 1524   BP: 128/64   Pulse: 57   Temp: 97.8 °F (36.6 °C)   SpO2: 95%   Weight: 187 lb (84.8 kg)     Wt Readings from Last 3 Encounters:   23 187 lb (84.8 kg)   22 187 lb 12.8 oz (85.2 kg)   10/27/22 188 lb (85.3 kg)        Physical Exam    Exam:  Const: Appears comfortable. No signs of acute distress present. Head/Face: Atraumatic, normocephalic on inspection. Eyes: No discharge from the eyes. Sclerae clear. ENMT: Remarkable  Neck: Supple. Palpation reveals no adenopathy. No masses appreciated. No JVD. Resp: Respirations are unlabored. Clear to auscultation bilaterally.  No rales, rhonchi or wheezes appreciated over the  lungs bilaterally. CV: RRR   Extremities: No clubbing or cyanosis. No edema of the lower limbs  bilaterally. No calf inflammation or tenderness. Abdomen: Abdomen is soft, nontender, and nondistended. No abdominal masses  appreciated. No palpable hepatosplenomegaly. Bowel sounds are normoactive. Skin: Patient examined with  MAG Zacarias left lateral decubitus position . Significant excoriation noted vaginal perineal perianal and bilateral buttocks. Significant tenderness. Questionable purulence versus vaginal discharge. Difficult historian. Needs imaging/blood work for further assessment to rule out necrotizing fasciitis/bacterial cellulitis   muscular skeletal: No acute joint inflammation. Neuro:Grossly intact without focal deficit      Office Labs This Visit :  No results found for this visit on 02/01/23. Assessment and Plan:     Diagnosis Orders   1. Type 2 diabetes mellitus with hyperglycemia, without long-term current use of insulin ([address]        2. Dementia without behavioral disturbance ([facility] [address]        3. History of paroxysmal supraventricular tachycardia        4. Cellulitis, unspecified cellulitis site              No problem-specific Assessment & Plan notes found for this encounter. Cellulitis  Counseled extensively. Differential reviewed, including serious etiologies. Poor historian. Significant tenderness/erythema on exam, questionable purulence versus vaginal discharge. Diabetic. Cannot rule out necrotizing fasciitis/bacterial cellulitis without imaging/blood work.  going to take her to ER now for further evaluation/treatment. Type 2 diabetes mellitus with hyperglycemia, without long-term current use of insulin ([facility])  Hemoglobin A1c stable, 5.9 to 6.1 -5.6-5.5-5.5-5.6-5.6 on metformin 500 mg twice a day. Previously 1000 twice a day. Precautions reviewed. Risks  reviewed, proper hydration reviewed, standard precautions reviewed including loose bowels, LA.  Encouraged  she watch blood sugar ambulatory with parameters reviewed call in if out of range. Hyper and hypoglycemic precautions reviewed. Micro-and macrovascular  complications reviewed. Importance of at least yearly eye exams and daily foot exams reviewed. Tolerating metformin . no longer on invokana. Monitor. Awaiting blood work. Increasing risk of serious infection        History of SVT   h/o svt Status post ablation  . Also history of long pauses-since had pacemaker by Dr. Tutu Michelle. FU with her. Follow-up Dr Micaela Yen . Asymptomatic sends recordings by phone.  ekg done and reviewed with her     Dementia without behavioral disturbance  Counseled extensively. Differential reviewed including various forms of dementia and pseudodementia. On Aricept 10 mg daily, and Namenda. Continue per Dr. Ruddy Mcnally they were seeing Dr. Lisa Tristan specialist from [employer] [facility] [employer]. However they re noncompliant without follow-up, feels it is a \"racket\". Follow-up with Dr. Ruddy Mcnally. She is enrolled in a study, nitrate water. She is not driving and we emphasized importance of this. Safety precautions reviewed.  feels her dementia is stable. continue current management and she follows up with Dr. Ruddy Mcnally, recently saw      Plan as above. Counseled extensively and differential diagnoses relevant to above were reviewed, including serious etiologies, risks and complications, especially of left uncontrolled. If relevant, instructions and  alternatives to meds/treatment reviewed, as well as interactions, and  SE's/ADRs reviewed, notify immediately if any, discontinuing new meds if any. Plan made after discussion and shared decision making. Rule out bacterial cellulitis/necrotizing fasciitis. Sent to ER. Follow-up later this week with blood work as ordered sooner as needed        As long as symptoms steadily improve/resolve, and medical conditions follow the expected course, FU as below, sooner PRN.     Return in about 1 week (around 2/8/2023). Educational materials and/or home exercises printed for patient's review and were included in patient instructions on his/her After Visit Summary and given to patient at the end of visit. After discussion, patient and/or guardian verbalizes understanding, agrees, feels comfortable with and wishes to proceed with above treatment plan. Call for any pending results, FU sooner if abnormal, as needed or if any current symptoms persist/worsen. Advised patient to call with any new medication issues, and read all Rx info from pharmacy to assure aware of all possible risks and side effects of medication before taking. Reviewed age and gender appropriate health screening exams and vaccinations. Advised patient regarding importance of keeping up with recommended health maintenance and to schedule as soon as possible if overdue, as this is important in assessing for undiagnosed pathology, especially cancer, as well as protecting against potentially harmful/life threatening disease. Patient and/or guardian verbalizes understanding and agrees with above counseling, assessment and plan. All questions answered. Signs and symptoms to watch for discussed, serious signs and symptoms reviewed. ER if any. hTuy King MD    Patients are advised to check with insurance company to ensure coverage and to fully understand benefits and cost prior to any testing. This note was created with the assistance of voice recognition software. Document was reviewed however may contain grammatical errors.

## 2023-02-01 NOTE — ED NOTES
Department of Emergency Medicine  FIRST PROVIDER TRIAGE NOTE             Independent MLP           2/1/23  4:13 PM EST    Date of Encounter: 2/1/23   MRN: [id number]      HPI: Sharda Soto is a 69 y.o. female who presents to the ED for Rectal Pain (Onset last night, sent in by Dr. Sugar Murphy)     ROS: Negative for fever, vomiting, or diarrhea. PE: Gen Appearance/Constitutional: alert     Initial Plan of Care: All treatment areas with department are currently occupied. Plan to order/Initiate the following while awaiting opening in ED: labs and imaging studies.   Initiate Treatment-Testing, Proceed toTreatment Area When Bed Available for ED Attending/MLP to Continue Care    Electronically signed by Yajaira Blunt PA-C   DD: 2/1/23       Yajaira Blunt PA-C  02/01/23 [id number]

## 2023-02-02 NOTE — DISCHARGE INSTRUCTIONS
Take all antibiotics as prescribed  Follow-up primary care doctor  Follow-up with general surgery  If you notice any new worrisome symptoms please return to emergency department for evaluation
,

## 2023-02-03 NOTE — ED PROVIDER NOTES
[facility] ENCOUNTER        Pt Name: Jung Mujica  MRN: [id number]  [place] 1945  Date of evaluation: 2/1/2023  Provider: Urvashi Carter DO  PCP: Benny Amos MD  Note Started: 2:16 PM EST 2/3/23    CHIEF COMPLAINT       Chief Complaint   Patient presents with    Rectal Pain     Onset last night, sent in by Dr. Merline Rife: 1 or more Elements   History From: Patient and significant other     Limitations to history : Dementia    Jung Mujica is a 69 y.o. female Past medical history of hypothyroidism, type 2 diabetes, hyperlipidemia, dementia. Patient presents with chief complaint of rectal pain with concern for possible abscess. Patient states that she has had rectal pain since last night. She describes the pain as a sharp burning sensation. She currently rates pain a 5 out of 10. Patient states the pain is worsened with palpation she denies any relieving factors. She denies any similar episodes in the past.  Patient states that she was at her primary care doctor's office earlier today where he was concerned that she may have an infection and sent her into the emergency department for further evaluation. Patient denies any fevers, chills, chest pain, cough, abdominal pain, constipation or diarrhea. Nursing Notes were all reviewed and agreed with or any disagreements were addressed in the HPI. REVIEW OF SYSTEMS :           Positives and Pertinent negatives as per HPI. SURGICAL HISTORY     Past Surgical History:   Procedure Laterality Date    ABLATION OF DYSRHYTHMIC FOCUS  1996    BREAST SURGERY      implants    COLONOSCOPY  2009? Dr. Queen Wolf twisted     COLONOSCOPY  2008? Dr. Etelvina Hernadez. incomplete.      COLONOSCOPY  12/17/2018    Dr. Naga Ramey [facility]    COLONOSCOPY N/A 12/17/2018    COLONOSCOPY DIAGNOSTIC performed by Srinivasan Ramos MD at [address] [place] [address] COLONOSCOPY N/A 1/26/2022    COLONOSCOPY DIAGNOSTIC performed by Lynn Angeles MD at [address]  09/10/2011    POSTERIOR    HYSTERECTOMY (CERVIX STATUS UNKNOWN)      vaginal. uterus only. KNEE ARTHROPLASTY Right     partial    KNEE ARTHROSCOPY  9/08/2011    right knee    PACEMAKER PLACEMENT  5/23/2014    [employer] dual chamber    TOTAL HIP ARTHROPLASTY Right 2015    UPPER GASTROINTESTINAL ENDOSCOPY N/A 1/26/2022    EGD BIOPSY performed by Lynn Angeles MD at [facility]       Discharge Medication List as of 2/1/2023  8:18 PM        CONTINUE these medications which have NOT CHANGED    Details   metoprolol succinate (TOPROL XL) 50 MG extended release tablet Take 1 tablet by mouth 2 times daily, Disp-60 tablet, R-3NO PRINT      donepezil (ARICEPT) 10 MG tablet TAKE 1 TABLET BY MOUTH  TWICE DAILY, Disp-180 tablet, R-3Requesting 1 year supplyNormal      memantine (NAMENDA) 10 MG tablet TAKE 1 TABLET BY MOUTH TWICE DAILY, Disp-180 tablet, R-3Normal      levothyroxine (SYNTHROID) 100 MCG tablet Take 1 tablet by mouth in the morning.  Ideally on empty stomach., Disp-90 tablet, R-1Normal      omeprazole (PRILOSEC) 20 MG delayed release capsule Take 1 capsule by mouth daily Ideally 1hr prior to dinner, Disp-90 capsule, R-1Normal      ammonium lactate (LAC-HYDRIN) 12 % lotion BID PRN, Disp-225 g, R-1, Normal      metFORMIN (GLUCOPHAGE) 500 MG tablet Take 1 tablet by mouth 2 times daily (with meals), Disp-180 tablet, R-3Normal      omega-3 acid ethyl esters (LOVAZA) 1 g capsule Take 2 capsules by mouth 2 times daily, Disp-360 capsule, R-3Normal             ALLERGIES     Morphine, Statins, and Statins depletion therapy    FAMILYHISTORY       Family History   Problem Relation Age of Onset    Pacemaker Mother     Heart Attack Father     Cancer Other 48        Bone Marrow Cancer / Had Bone Marrow Transplant        SOCIAL HISTORY       Social History     Tobacco Use    Smoking status: Former     Packs/day: 1.50     Years: 13.00     Pack years: 19.50     Types: Cigarettes     Start date: 1973     Quit date: 1986     Years since quittin.1    Smokeless tobacco: Never    Tobacco comments:     quit smoking    Vaping Use    Vaping Use: Never used   Substance Use Topics    Alcohol use: Yes     Comment: socially -- 3 drinks per month at the most    Drug use: No       SCREENINGS        [place] Coma Scale  Eye Opening: Spontaneous  Best Verbal Response: Oriented  Best Motor Response: Obeys commands  [place] Coma Scale Score: 15                CIWA Assessment  BP: 136/69  Heart Rate: 59           PHYSICAL EXAM  1 or more Elements     ED Triage Vitals [23 1613]   BP Temp Temp src Heart Rate Resp SpO2 Height Weight   136/69 97.4 °F (36.3 °C) -- 59 14 96 % 5' 4.5\" (1.638 m) 187 lb (84.8 kg)            Constitutional/General: Alert and oriented x3  Head: Normocephalic and atraumatic  Eyes: PERRL, EOMI, conjunctiva normal, sclera non icteric  ENT:  Oropharynx clear, handling secretions, no trismus, no asymmetry of the posterior oropharynx or uvular edema  Neck: Supple, full ROM, no stridor, no meningeal signs  Respiratory: Lungs clear to auscultation bilaterally, no wheezes, rales, or rhonchi. Not in respiratory distress  Cardiovascular:  Regular rate. Regular rhythm. No murmurs, no gallops, no rubs. 2+ distal pulses. Equal extremity pulses. Chest: No chest wall tenderness  GI:  Abdomen Soft, Non tender, Non distended. +BS. No rebound, guarding, or rigidity. No pulsatile masses. Rectal exam performed, small anal tear noted, no significant hemorrhoids identified. No fluctuance or drainage noted  Musculoskeletal: Moves all extremities x 4. Warm and well perfused, no clubbing, no cyanosis, no edema. Capillary refill <3 seconds  Integument: skin warm and dry. No rashes.    Neurologic: GCS 15, no focal deficits, symmetric strength 5/5 in the upper and lower extremities bilaterally  Psychiatric: Normal Affect            DIAGNOSTIC RESULTS   LABS:    Labs Reviewed   CBC WITH AUTO DIFFERENTIAL - Abnormal; Notable for the following components:       Result Value    WBC 12.6 (*)     Lymphocytes Absolute 4.58 (*)     All other components within normal limits   URINALYSIS WITH MICROSCOPIC - Abnormal; Notable for the following components:    Nitrite, Urine POSITIVE (*)     Leukocyte Esterase, Urine SMALL (*)     Bacteria, UA MANY (*)     All other components within normal limits   LACTIC ACID - Abnormal; Notable for the following components:    Lactic Acid 2.5 (*)     All other components within normal limits   CULTURE, URINE   COMPREHENSIVE METABOLIC PANEL   LIPASE       As interpreted by me, the above displayed labs are abnormal. All other labs obtained during this visit were within normal range or not returned as of this dictation. RADIOLOGY:   Non-plain film images such as CT, Ultrasound and MRI are read by the radiologist. Plain radiographic images are visualized and preliminarily interpreted by the ED Provider with the below findings:      Interpretation per the Radiologist below, if available at the time of this note:    CT ABDOMEN PELVIS W IV CONTRAST Additional Contrast? None   Final Result   Stable mild diffuse fatty liver. Moderate diverticulosis of distal descending and sigmoid colon. Stable subcentimeter left renal cortical simple cyst.           CT ABDOMEN PELVIS W IV CONTRAST Additional Contrast? None    Result Date: 2/1/2023  EXAMINATION: CT OF THE ABDOMEN AND PELVIS WITH CONTRAST 2/1/2023 5:29 pm TECHNIQUE: CT of the abdomen and pelvis was performed with the administration of intravenous contrast. Multiplanar reformatted images are provided for review. Automated exposure control, iterative reconstruction, and/or weight based adjustment of the mA/kV was utilized to reduce the radiation dose to as low as reasonably achievable.  COMPARISON: CT abdomen pelvis, 12/18/2021. HISTORY: ORDERING SYSTEM PROVIDED HISTORY: rectal pain TECHNOLOGIST PROVIDED HISTORY: Additional Contrast?->None Reason for exam:->rectal pain Decision Support Exception - unselect if not a suspected or confirmed emergency medical condition->Emergency Medical Condition (MA) FINDINGS: Lower Chest: There are intact bilateral breast prostheses with extra capsular calcification. Liver: There is mild diffuse fatty liver. Normal size and contour. No space-occupying lesion. Gallbladder/biliary tree: No gallstones, gallbladder wall thickening, or pericholecystic fluid. No significant intrahepatic or extrahepatic biliary dilatation. Spleen: Normal size and contour. Pancreas: No significant findings. Adrenal glands: Appropriate size and configuration. Kidneys: Normal anatomic position. There is stable subcentimeter simple cyst in the posterior upper cortex of left kidney. No significant genitourinary tract calculi or hydronephrosis. GI/Bowel: No significant bowel distention. There is stable moderate diverticulosis of the distal descending and sigmoid colon. No evidence for diverticulitis. Appendix: Unremarkable. Pelvis: There is presumed total abdominal hysterectomy and bilateral salpingo oophorectomy. The urinary bladder is moderately distended. Peritoneum/Retroperitoneum: There is moderate aortoiliac atherosclerotic disease. The IVC is within normal limits. No free fluid or free air. Bones/Soft Tissues: The bones are osteopenic. There are total bilateral hip arthroplasties. There is moderate-severe thoracolumbar spine degenerative change and mild scoliosis. Stable mild diffuse fatty liver. Moderate diverticulosis of distal descending and sigmoid colon. Stable subcentimeter left renal cortical simple cyst.       No results found.     PROCEDURES   Unless otherwise noted below, none         PAST MEDICAL HISTORY/Chronic Conditions Affecting Care      has a past medical history of Arthritis, Diabetes mellitus ([facility] [address] Difficult intubation, Hyperlipidemia, Hypothyroidism, Left sided abdominal pain, Memory problem, GORDY on CPAP, Pacemaker, Rectal bleeding, and Ringing in the ears. EMERGENCY DEPARTMENT COURSE    Vitals:    Vitals:    02/01/23 1613   BP: 136/69   Pulse: 59   Resp: 14   Temp: 97.4 °F (36.3 °C)   SpO2: 96%   Weight: 187 lb (84.8 kg)   Height: 5' 4.5\" (1.638 m)       Patient was given the following medications:  Medications   0.9 % sodium chloride bolus (0 mLs IntraVENous Stopped 2/1/23 1942)   iopamidol (ISOVUE-370) 76 % injection 75 mL (75 mLs IntraVENous Given 2/1/23 1826)   cefdinir (OMNICEF) capsule 300 mg (300 mg Oral Given 2/1/23 2026)           Is this patient to be included in the SEP-1 Core Measure due to severe sepsis or septic shock? No Exclusion criteria - the patient is NOT to be included for SEP-1 Core Measure due to: 2+ SIRS criteria are not met        Medical Decision Making/Differential Diagnosis:    CC/HPI Summary, Social Determinants of health, Records Reviewed, DDx, testing done/not done, ED Course, Reassessment, disposition considerations/shared decision making with patient, consults, disposition:          History from : Patient and Significant Other      Limitations to history : Dementia    Chronic Conditions: Hypothyroidism, type 2 diabetes, hyperlipidemia and dementia. CONSULTS: (Who and What was discussed)  None    Discussion with Other Profesionals : None    Social Determinants : None    Records Reviewed : Outpatient Notes outpatient primary care doctor's visit the same day    CC/HPI Summary, DDx, ED Course, and Reassessment: Patient is a 45-year-old female past medical history of hypothyroidism, type 2 diabetes, hyperlipidemia and dementia. Patient presented chief complaint of rectal pain. Vital signs stable presentation. On physical exam heart regular rate and rhythm, lungs clear to auscultation bilaterally, abdomen soft nontender.   Rectal exam was performed there is small anal fissure noted no hemorrhoids no area of fluctuance no drainage. Eventual diagnosis includes perirectal abscess, anal fissure, internal hemorrhoids. After work obtained CBC demonstrated minimal cytosis 12.6, CMP unremarkable, lipase 29, lactic acid minimally elevated 2.5. Urinalysis obtained was somewhat indicative infection positive nitrate small leukocyte esterase with many bacteria. Due to high risk nature of diabetes as well as reported discharge CT scan of the abdomen pelvis was obtained demonstrating no acute abnormalities. Patient was given 1 L of IV fluids as well as cefdinir 300 mg p.o. On repeat evaluation patient does note some improvement in symptoms. Findings consistent anal fissure in the setting of UTI. Patient was given prescription for Anusol as well as p.o. cefdinir. Patient was instructed to follow-up with primary care doctor. In addition the patient notes any new worrisome symptoms she was instructed to return to emergency department for evaluation. Plan of care discussed with patient including discharge, all questions were answered, she was in agreement plan of care patient was discharged home in stable condition. Disposition Considerations (Tests not ordered but considered, Shared Decision Making, Pt Expectation of Test or Tx.): She had decision-making discussed with patient as well as significant other at bedside. CT scan is reassuring she does have evidence of small urinary tract infection on UA. Will treat with antibiotics and recommended close outpatient follow-up with primary care doctor. FINAL IMPRESSION      1. Rectal pain    2.  Acute cystitis without hematuria          DISPOSITION/PLAN     DISPOSITION Decision To Discharge 02/01/2023 08:13:03 PM      PATIENT REFERRED TO:  Kylie uW MD  [address] [place] [address]  [phone]    Schedule an appointment as soon as possible for a visit       [address] [place] [address] [facility] [[email or web address]] Emergency Department  [address] [address]  [phone]  Go to   If symptoms worsen    Rahat Cain MD  [address] [place] [address]  [phone]    Schedule an appointment as soon as possible for a visit         DISCHARGE MEDICATIONS:  Discharge Medication List as of 2/1/2023  8:18 PM        START taking these medications    Details   cefdinir (OMNICEF) 300 MG capsule Take 1 capsule by mouth 2 times daily for 7 days, Disp-14 capsule, R-0Normal      hydrocortisone (ANUSOL-HC) 25 MG suppository Place 1 suppository rectally 4 times daily as needed for Hemorrhoids, Disp-30 suppository, R-1Normal             DISCONTINUED MEDICATIONS:  Discharge Medication List as of 2/1/2023  8:18 PM                 (Please note that portions of this note were completed with a voice recognition program.  Efforts were made to edit the dictations but occasionally words are mis-transcribed.)    Regla Avalos DO (electronically signed)           Chase Flores DO  Resident  02/03/23 [id number]

## 2023-02-08 ENCOUNTER — OFFICE VISIT (OUTPATIENT)
Dept: PRIMARY CARE CLINIC | Age: 78
End: 2023-02-08
Payer: MEDICARE

## 2023-02-08 VITALS
WEIGHT: 187 LBS | TEMPERATURE: 97.5 F | HEART RATE: 66 BPM | SYSTOLIC BLOOD PRESSURE: 128 MMHG | OXYGEN SATURATION: 96 % | BODY MASS INDEX: 31.6 KG/M2 | DIASTOLIC BLOOD PRESSURE: 64 MMHG

## 2023-02-08 DIAGNOSIS — K60.2 ANAL FISSURE: ICD-10-CM

## 2023-02-08 DIAGNOSIS — D72.829 LEUKOCYTOSIS, UNSPECIFIED TYPE: ICD-10-CM

## 2023-02-08 DIAGNOSIS — L30.9 DERMATITIS: ICD-10-CM

## 2023-02-08 DIAGNOSIS — F03.90 DEMENTIA WITHOUT BEHAVIORAL DISTURBANCE (HCC): ICD-10-CM

## 2023-02-08 DIAGNOSIS — I47.1 SUPRAVENTRICULAR TACHYCARDIA (HCC): ICD-10-CM

## 2023-02-08 DIAGNOSIS — N39.0 URINARY TRACT INFECTION WITHOUT HEMATURIA, SITE UNSPECIFIED: Primary | ICD-10-CM

## 2023-02-08 PROBLEM — I47.10 SUPRAVENTRICULAR TACHYCARDIA: Status: RESOLVED | Noted: 2023-02-08 | Resolved: 2023-02-08

## 2023-02-08 PROBLEM — I47.10 SUPRAVENTRICULAR TACHYCARDIA: Status: ACTIVE | Noted: 2023-02-08

## 2023-02-08 LAB
BILIRUBIN URINE: NEGATIVE
BLOOD, URINE: NEGATIVE
CLARITY: CLEAR
COLOR: YELLOW
GLUCOSE URINE: NEGATIVE MG/DL
KETONES, URINE: NEGATIVE MG/DL
LEUKOCYTE ESTERASE, URINE: NEGATIVE
NITRITE, URINE: NEGATIVE
PH UA: 6 (ref 5–9)
PROTEIN UA: NEGATIVE MG/DL
SPECIFIC GRAVITY UA: 1.01 (ref 1–1.03)
UROBILINOGEN, URINE: 0.2 E.U./DL

## 2023-02-08 PROCEDURE — G8427 DOCREV CUR MEDS BY ELIG CLIN: HCPCS | Performed by: FAMILY MEDICINE

## 2023-02-08 PROCEDURE — 1036F TOBACCO NON-USER: CPT | Performed by: FAMILY MEDICINE

## 2023-02-08 PROCEDURE — G8417 CALC BMI ABV UP PARAM F/U: HCPCS | Performed by: FAMILY MEDICINE

## 2023-02-08 PROCEDURE — 1123F ACP DISCUSS/DSCN MKR DOCD: CPT | Performed by: FAMILY MEDICINE

## 2023-02-08 PROCEDURE — 99214 OFFICE O/P EST MOD 30 MIN: CPT | Performed by: FAMILY MEDICINE

## 2023-02-08 PROCEDURE — G8399 PT W/DXA RESULTS DOCUMENT: HCPCS | Performed by: FAMILY MEDICINE

## 2023-02-08 PROCEDURE — G8484 FLU IMMUNIZE NO ADMIN: HCPCS | Performed by: FAMILY MEDICINE

## 2023-02-08 PROCEDURE — 1090F PRES/ABSN URINE INCON ASSESS: CPT | Performed by: FAMILY MEDICINE

## 2023-02-08 RX ORDER — FLUCONAZOLE 150 MG/1
TABLET ORAL
Qty: 2 TABLET | Refills: 0 | Status: SHIPPED | OUTPATIENT
Start: 2023-02-08

## 2023-02-08 ASSESSMENT — PATIENT HEALTH QUESTIONNAIRE - PHQ9
SUM OF ALL RESPONSES TO PHQ9 QUESTIONS 1 & 2: 0
SUM OF ALL RESPONSES TO PHQ QUESTIONS 1-9: 0
1. LITTLE INTEREST OR PLEASURE IN DOING THINGS: 0
SUM OF ALL RESPONSES TO PHQ QUESTIONS 1-9: 0
SUM OF ALL RESPONSES TO PHQ QUESTIONS 1-9: 0
2. FEELING DOWN, DEPRESSED OR HOPELESS: 0
SUM OF ALL RESPONSES TO PHQ QUESTIONS 1-9: 0

## 2023-02-08 NOTE — PROGRESS NOTES
Linda Torres : 1945 Sex: female  Age: 69 y.o. Chief Complaint   Patient presents with    Follow-Up from Hospital     ER visit 23 rectal pain was referral to general surgeon. No appt schedule    Feeling better        HPI  HPI:      Presents today with  for follow-up. She is feeling much better. Denies pain now. Chronic urinary frequency but denies dysuria and no odor since treatment. Diagnosed with anal fissure and UTI. Has been using hydrocortisone cream did not get the suppositories. No fever chills. No chest pain shortness breath abdominal pain nausea or vomiting. In the ER she did have a lactic acid 2.5, asymptomatic, proper hydration reviewed CMP normal white count up to 12.6 lymphocytes 4.58 urinalysis positive nitrite small leukocyte, culture not done as ordered, CT showed moderate diverticulosis stable subcentimeter left renal cortical simple cyst and stable mild diffuse fatty liver. She was given cefdinir for 7 days, tolerated and again feeling much better. Probiotic usage reviewed        History of but no symptoms consistent with SVT at this time. Computer continues to pull the diagnosis despite me correcting it to \"history of\"      ROS:  As above      Current Outpatient Medications:     fluconazole (DIFLUCAN) 150 MG tablet, Take 1 po qd x 1.  May repeat  x 1 in 7 days if needed, Disp: 2 tablet, Rfl: 0    cefdinir (OMNICEF) 300 MG capsule, Take 1 capsule by mouth 2 times daily for 7 days, Disp: 14 capsule, Rfl: 0    hydrocortisone (ANUSOL-HC) 25 MG suppository, Place 1 suppository rectally 4 times daily as needed for Hemorrhoids, Disp: 30 suppository, Rfl: 1    metoprolol succinate (TOPROL XL) 50 MG extended release tablet, Take 1 tablet by mouth 2 times daily, Disp: 60 tablet, Rfl: 3    donepezil (ARICEPT) 10 MG tablet, TAKE 1 TABLET BY MOUTH  TWICE DAILY, Disp: 180 tablet, Rfl: 3    memantine (NAMENDA) 10 MG tablet, TAKE 1 TABLET BY MOUTH TWICE DAILY, Disp: 180 tablet, Rfl: 3    levothyroxine (SYNTHROID) 100 MCG tablet, Take 1 tablet by mouth in the morning. Ideally on empty stomach., Disp: 90 tablet, Rfl: 1    omeprazole (PRILOSEC) 20 MG delayed release capsule, Take 1 capsule by mouth daily Ideally 1hr prior to dinner, Disp: 90 capsule, Rfl: 1    ammonium lactate (LAC-HYDRIN) 12 % lotion, BID PRN, Disp: 225 g, Rfl: 1    metFORMIN (GLUCOPHAGE) 500 MG tablet, Take 1 tablet by mouth 2 times daily (with meals), Disp: 180 tablet, Rfl: 3    omega-3 acid ethyl esters (LOVAZA) 1 g capsule, Take 2 capsules by mouth 2 times daily (Patient taking differently: Take 2 g by mouth daily), Disp: 360 capsule, Rfl: 3  Allergies   Allergen Reactions    Morphine Itching    Statins      Other reaction(s): Unknown    Statins Depletion Therapy Other (See Comments)     MYALGIA       Past Medical History:   Diagnosis Date    Arthritis     Diabetes mellitus ([facility] [address]     Difficult intubation     carries card, copy on chart  Glidescope#3 with ETT size7    Hyperlipidemia     Hypothyroidism     Left sided abdominal pain     Memory problem     still competent    GORDY on CPAP     Pacemaker     Rectal bleeding     Ringing in the ears      Past Surgical History:   Procedure Laterality Date    ABLATION OF DYSRHYTHMIC FOCUS  1996    BREAST SURGERY      implants    COLONOSCOPY  2009? Dr. Pebbles Soler twisted     COLONOSCOPY  2008? Dr. Gabriel Nicolas. incomplete. COLONOSCOPY  12/17/2018    Dr. Key Reaves 12/17/2018    COLONOSCOPY DIAGNOSTIC performed by Herlinda Bradley MD at [address] [place] [address]  N/A 1/26/2022    COLONOSCOPY DIAGNOSTIC performed by Cain Joshi MD at [address] [facility]  09/10/2011    POSTERIOR    HYSTERECTOMY (CERVIX STATUS UNKNOWN)      vaginal. uterus only.      KNEE ARTHROPLASTY Right     partial    KNEE ARTHROSCOPY  9/08/2011    right knee    PACEMAKER PLACEMENT  5/23/2014    Medtronic dual chamber    TOTAL HIP ARTHROPLASTY Right 2015    UPPER GASTROINTESTINAL ENDOSCOPY N/A 2022    EGD BIOPSY performed by Pravin Naqvi MD at [address] History   Problem Relation Age of Onset    Pacemaker Mother     Heart Attack Father     Cancer Other 48        Bone Marrow Cancer / Had Bone Marrow Transplant     Social History     Tobacco Use    Smoking status: Former     Packs/day: 1.50     Years: 13.00     Pack years: 19.50     Types: Cigarettes     Start date: 1973     Quit date: 1986     Years since quittin.1    Smokeless tobacco: Never    Tobacco comments:     quit smoking    Vaping Use    Vaping Use: Never used   Substance Use Topics    Alcohol use: Yes     Comment: socially -- 3 drinks per month at the most    Drug use: No      Social History     Social History Narrative    PMH:    Problem List: Urinary tract infectious disease, Obstructive sleep apnea syndrome, Adult health    examination, Adult health examination, Constipation, Derangement of knee, Vitamin D deficiency,    Hypothyroidism, Conduction disorder of the heart, Hyperlipidemia    Health Maintenance:    Mini Mental Status - (2018)    Colonoscopy - (2018)    Colonoscopy Screening - (2018)    Mammogram Screening - (2018)    Mammogram - (2018)    Colonoscopy - (2010)    Couseled on Home Safety - (2015)    Bone Density Scan - (3/28/2012)    Medical Problems:    Sleep Apnea - Dr Albert Shin, cpap    Pneumonia    Hypothyroidism - Dr Kamala Moran - Dr Dre Agrawal    Hyperlipidemia - intolerance to all statins    Cardiac Arrhythmia - ? type. s/p ablation    cardiac dysrhythmia - pauses - lead to pacemaker    Skin Cancer    Surgical Hx:    Partial Hysterectomy - Still with Both Ovaries. Breast Implants    Pacemaker - Dr Karey Durán    Knee Replacement, Carpal Tunnel Release, colon-vaginal fistula repair    Bladder Repair - sling    RT Hip Arthroplasty - MARGARITA        FH:    Father:    . (Hx)    Mother:    .  (Hx)    Dad - MI 39,  70 MI    Mom - DM , currently living age 80        SH: Marital: . Personal Habits: Cigarette Use: Former Cigarette Smoker - quit age 36. Occ ETOH, minimal. . 2 kids, 6 step kids. 27 grandkids. .Alcohol: Rarely consumes alcohol        Vitals:    02/08/23 1459   BP: 128/64   Pulse: 66   Temp: 97.5 °F (36.4 °C)   SpO2: 96%   Weight: 187 lb (84.8 kg)     Wt Readings from Last 3 Encounters:   02/08/23 187 lb (84.8 kg)   02/01/23 187 lb (84.8 kg)   02/01/23 187 lb (84.8 kg)        Physical Exam    Exam:  Const: Appears comfortable. No signs of acute distress present. Head/Face: Atraumatic, normocephalic on inspection. Eyes: No discharge from the eyes. Sclerae clear. ENMT: Unremarkable  Neck: Supple. Palpation reveals no adenopathy. No masses appreciated. No JVD. Resp: Respirations are unlabored. Clear to auscultation bilaterally. No rales, rhonchi or wheezes appreciated over the  lungs bilaterally. CV: RRR   Extremities: No clubbing or cyanosis. No edema of the lower limbs  bilaterally. No calf inflammation or tenderness. Abdomen: Abdomen is soft, nontender, and nondistended. No abdominal masses  appreciated. No palpable hepatosplenomegaly. Bowel sounds are normoactive. Skin: Perianal, external rectal and vaginal exam shows some mild redness with satellite borders but significantly smaller, significantly improved, nontender, no obvious anal fissure  muscular skeletal: No acute joint inflammation. Neuro:Grossly intact without focal deficit      Office Labs This Visit :  No results found for this visit on 02/08/23. Assessment and Plan:     Diagnosis Orders   1. Urinary tract infection without hematuria, site unspecified  Urinalysis    Culture, Urine      2. Dermatitis  fluconazole (DIFLUCAN) 150 MG tablet      3. Anal fissure        4. Dementia without behavioral disturbance (HCC)        5. Leukocytosis, unspecified type        6.  Supraventricular tachycardia ([address]              No problem-specific Assessment & Plan notes found for this encounter. 1. Urinary tract infection without hematuria, site unspecified  Symptoms resolved, repeat UA/culture. She will follow-up Dr. Cee Miner  2. Dermatitis  No evidence of necrotizing fasciitis. Still evidence of candidal rash but much improved. Probiotic recommended. Recommend Gyne-Lotrimin cream.  Prescribe Diflucan 150 mg x 1 may repeat in 1 week if necessary. Notify persists or worsens    3. Anal fissure  Was referred to general surgeon but they defer further intervention now as symptoms resolved. We did discuss role of colonoscopy to rule out underlying conditions both inflammatory and neoplastic etc., declines. Hydrocortisone cream sparingly as directed max 2 weeks    . Leukocytosis, unspecified type  Awaiting blood work, repeat       Type 2 diabetes mellitus with hyperglycemia, without long-term current use of insulin ([facility])  Hemoglobin A1c stable, 5.9 to 6.1 -5.6-5.5-5.5-5.6-5.6 on metformin 500 mg twice a day. Previously 1000 twice a day. Precautions reviewed. Risks  reviewed, proper hydration reviewed, standard precautions reviewed including loose bowels, LA. Encouraged  she watch blood sugar ambulatory with parameters reviewed call in if out of range. Hyper and hypoglycemic precautions reviewed. Micro-and macrovascular  complications reviewed. Importance of at least yearly eye exams and daily foot exams reviewed. Tolerating metformin . no longer on invokana. Monitor. Awaiting blood work. Increasing risk of serious infection        History of SVT   See comments above. H/o svt Status post ablation  . Also history of long pauses-since had pacemaker by Dr. Jj Cha. FU with her. Follow-up Dr Trice Victoria . Asymptomatic sends recordings by phone.  ekg done and reviewed with her     Dementia without behavioral disturbance  Counseled extensively. Differential reviewed including various forms of dementia and pseudodementia.   On Aricept 10 mg daily, and Namenda. Continue per Dr. Rosaura Maldonado they were seeing Dr. Sharad Ambrosio specialist from [place]. However they re noncompliant without follow-up, feels it is a \"racket\". Follow-up with Dr. Rosaura Maldonado. She is enrolled in a study, nitrate water. She is not driving and we emphasized importance of this. Safety precautions reviewed.  feels her dementia is stable. continue current management and she follows up with Dr. Rosaura Maldonado, recently saw      Plan as above. Counseled extensively and differential diagnoses relevant to above were reviewed, including serious etiologies, risks and complications, especially of left uncontrolled. If relevant, instructions and  alternatives to meds/treatment reviewed, as well as interactions, and  SE's/ADRs reviewed, notify immediately if any, discontinuing new meds if any. Plan made after discussion and shared decision making. Still awaiting blood work, reordered, they will get soon follow-up in 2 to 3 weeks or as needed. Order Diflucan, recommended Gyne-Lotrimin cream, repeat UA/culture. As long as symptoms steadily improve/resolve, and medical conditions follow the expected course, FU as below, sooner PRN. Return for 2-3 wks, sooner prn. Educational materials and/or home exercises printed for patient's review and were included in patient instructions on his/her After Visit Summary and given to patient at the end of visit. After discussion, patient and/or guardian verbalizes understanding, agrees, feels comfortable with and wishes to proceed with above treatment plan. Call for any pending results, FU sooner if abnormal, as needed or if any current symptoms persist/worsen. Advised patient to call with any new medication issues, and read all Rx info from pharmacy to assure aware of all possible risks and side effects of medication before taking. Reviewed age and gender appropriate health screening exams and vaccinations. Advised patient regarding importance of keeping up with recommended health maintenance and to schedule as soon as possible if overdue, as this is important in assessing for undiagnosed pathology, especially cancer, as well as protecting against potentially harmful/life threatening disease. Patient and/or guardian verbalizes understanding and agrees with above counseling, assessment and plan. All questions answered. Signs and symptoms to watch for discussed, serious signs and symptoms reviewed. ER if any. Addis Bocanegra MD    Patients are advised to check with insurance company to ensure coverage and to fully understand benefits and cost prior to any testing. This note was created with the assistance of voice recognition software. Document was reviewed however may contain grammatical errors.

## 2023-02-10 LAB — URINE CULTURE, ROUTINE: NORMAL

## 2023-02-15 ENCOUNTER — TELEPHONE (OUTPATIENT)
Dept: PRIMARY CARE CLINIC | Age: 78
End: 2023-02-15

## 2023-02-15 DIAGNOSIS — I44.2 COMPLETE HEART BLOCK (HCC): ICD-10-CM

## 2023-02-15 DIAGNOSIS — I47.1 SUPRAVENTRICULAR TACHYCARDIA (HCC): Primary | ICD-10-CM

## 2023-02-15 NOTE — TELEPHONE ENCOUNTER
is going to cardiac rehab at [facility]. He is wanting his wife to go also for strengthening.  Per  he spoke with someone at the facility and states its not a problem to have her participate as long as there is a referral.   Fax: [phone]

## 2023-02-22 ENCOUNTER — HOSPITAL ENCOUNTER (OUTPATIENT)
Age: 78
Discharge: HOME | End: 2023-02-22
Payer: MEDICARE

## 2023-02-22 ENCOUNTER — OFFICE VISIT (OUTPATIENT)
Dept: PRIMARY CARE CLINIC | Age: 78
End: 2023-02-22
Payer: MEDICARE

## 2023-02-22 VITALS
WEIGHT: 188 LBS | HEART RATE: 61 BPM | TEMPERATURE: 97.8 F | OXYGEN SATURATION: 96 % | HEIGHT: 65 IN | RESPIRATION RATE: 20 BRPM | SYSTOLIC BLOOD PRESSURE: 106 MMHG | DIASTOLIC BLOOD PRESSURE: 64 MMHG | BODY MASS INDEX: 31.32 KG/M2

## 2023-02-22 DIAGNOSIS — E55.9 VITAMIN D DEFICIENCY: ICD-10-CM

## 2023-02-22 DIAGNOSIS — Z86.79 HISTORY OF PAROXYSMAL SUPRAVENTRICULAR TACHYCARDIA: ICD-10-CM

## 2023-02-22 DIAGNOSIS — N39.0 URINARY TRACT INFECTION WITHOUT HEMATURIA, SITE UNSPECIFIED: Primary | ICD-10-CM

## 2023-02-22 DIAGNOSIS — E03.9 ACQUIRED HYPOTHYROIDISM: ICD-10-CM

## 2023-02-22 DIAGNOSIS — I10 ESSENTIAL HYPERTENSION: ICD-10-CM

## 2023-02-22 DIAGNOSIS — N39.0 RECURRENT UTI: ICD-10-CM

## 2023-02-22 DIAGNOSIS — I44.2 COMPLETE HEART BLOCK (HCC): ICD-10-CM

## 2023-02-22 DIAGNOSIS — E78.2 MIXED HYPERLIPIDEMIA: ICD-10-CM

## 2023-02-22 DIAGNOSIS — R42 VERTIGO: ICD-10-CM

## 2023-02-22 DIAGNOSIS — J30.89 ALLERGIC RHINITIS DUE TO OTHER ALLERGIC TRIGGER, UNSPECIFIED SEASONALITY: ICD-10-CM

## 2023-02-22 DIAGNOSIS — E53.8 VITAMIN B12 DEFICIENCY: ICD-10-CM

## 2023-02-22 DIAGNOSIS — E11.65 TYPE 2 DIABETES MELLITUS WITH HYPERGLYCEMIA, WITHOUT LONG-TERM CURRENT USE OF INSULIN (HCC): ICD-10-CM

## 2023-02-22 PROBLEM — I47.10 SUPRAVENTRICULAR TACHYCARDIA: Status: RESOLVED | Noted: 2023-02-08 | Resolved: 2023-02-22

## 2023-02-22 PROBLEM — I47.1 SUPRAVENTRICULAR TACHYCARDIA (HCC): Status: RESOLVED | Noted: 2023-02-08 | Resolved: 2023-02-22

## 2023-02-22 LAB
ALBUMIN SERPL-MCNC: 4.2 G/DL (ref 3.5–5.2)
ALP BLD-CCNC: 72 U/L (ref 35–104)
ALT SERPL-CCNC: 19 U/L (ref 0–32)
ANION GAP SERPL CALCULATED.3IONS-SCNC: 15 MMOL/L (ref 7–16)
AST SERPL-CCNC: 24 U/L (ref 0–31)
BACTERIA: ABNORMAL /HPF
BASOPHILS ABSOLUTE: 0.08 E9/L (ref 0–0.2)
BASOPHILS RELATIVE PERCENT: 0.7 % (ref 0–2)
BILIRUB SERPL-MCNC: 0.3 MG/DL (ref 0–1.2)
BILIRUBIN URINE: NEGATIVE
BLOOD, URINE: NEGATIVE
BUN BLDV-MCNC: 12 MG/DL (ref 6–23)
CALCIUM SERPL-MCNC: 9.6 MG/DL (ref 8.6–10.2)
CHLORIDE BLD-SCNC: 103 MMOL/L (ref 98–107)
CHOLESTEROL, TOTAL: 228 MG/DL (ref 0–199)
CLARITY: CLEAR
CO2: 20 MMOL/L (ref 22–29)
COLOR: YELLOW
CREAT SERPL-MCNC: 0.7 MG/DL (ref 0.5–1)
EOSINOPHILS ABSOLUTE: 0.18 E9/L (ref 0.05–0.5)
EOSINOPHILS RELATIVE PERCENT: 1.6 % (ref 0–6)
FOLATE: 8.9 NG/ML (ref 4.8–24.2)
GFR SERPL CREATININE-BSD FRML MDRD: >60 ML/MIN/1.73
GLUCOSE BLD-MCNC: 85 MG/DL (ref 74–99)
GLUCOSE URINE: NEGATIVE MG/DL
HBA1C MFR BLD: 5.8 % (ref 4–5.6)
HCT VFR BLD CALC: 44.7 % (ref 34–48)
HDLC SERPL-MCNC: 48 MG/DL
HEMOGLOBIN: 15.4 G/DL (ref 11.5–15.5)
IMMATURE GRANULOCYTES #: 0.04 E9/L
IMMATURE GRANULOCYTES %: 0.3 % (ref 0–5)
KETONES, URINE: NEGATIVE MG/DL
LDL CHOLESTEROL CALCULATED: 123 MG/DL (ref 0–99)
LEUKOCYTE ESTERASE, URINE: ABNORMAL
LYMPHOCYTES ABSOLUTE: 5.17 E9/L (ref 1.5–4)
LYMPHOCYTES RELATIVE PERCENT: 45.1 % (ref 20–42)
MCH RBC QN AUTO: 29.8 PG (ref 26–35)
MCHC RBC AUTO-ENTMCNC: 34.5 % (ref 32–34.5)
MCV RBC AUTO: 86.5 FL (ref 80–99.9)
MONOCYTES ABSOLUTE: 0.64 E9/L (ref 0.1–0.95)
MONOCYTES RELATIVE PERCENT: 5.6 % (ref 2–12)
NEUTROPHILS ABSOLUTE: 5.35 E9/L (ref 1.8–7.3)
NEUTROPHILS RELATIVE PERCENT: 46.7 % (ref 43–80)
NITRITE, URINE: POSITIVE
PDW BLD-RTO: 12.4 FL (ref 11.5–15)
PH UA: 5.5 (ref 5–9)
PLATELET # BLD: 178 E9/L (ref 130–450)
PMV BLD AUTO: 10.4 FL (ref 7–12)
POTASSIUM SERPL-SCNC: 4 MMOL/L (ref 3.5–5)
PROTEIN UA: NEGATIVE MG/DL
RBC # BLD: 5.17 E12/L (ref 3.5–5.5)
RBC UA: ABNORMAL /HPF (ref 0–2)
SODIUM BLD-SCNC: 138 MMOL/L (ref 132–146)
SPECIFIC GRAVITY UA: 1.02 (ref 1–1.03)
T4 FREE: 1.76 NG/DL (ref 0.93–1.7)
TOTAL CK: 27 U/L (ref 20–180)
TOTAL PROTEIN: 7 G/DL (ref 6.4–8.3)
TRIGL SERPL-MCNC: 283 MG/DL (ref 0–149)
TSH SERPL DL<=0.05 MIU/L-ACNC: 1.42 UIU/ML (ref 0.27–4.2)
UROBILINOGEN, URINE: 0.2 E.U./DL
VITAMIN B-12: 427 PG/ML (ref 211–946)
VITAMIN D 25-HYDROXY: 32 NG/ML (ref 30–100)
VLDLC SERPL CALC-MCNC: 57 MG/DL
WBC # BLD: 11.5 E9/L (ref 4.5–11.5)
WBC UA: ABNORMAL /HPF (ref 0–5)

## 2023-02-22 PROCEDURE — 82306 VITAMIN D 25 HYDROXY: CPT

## 2023-02-22 PROCEDURE — 82550 ASSAY OF CK (CPK): CPT

## 2023-02-22 PROCEDURE — 84443 ASSAY THYROID STIM HORMONE: CPT

## 2023-02-22 PROCEDURE — 80061 LIPID PANEL: CPT

## 2023-02-22 PROCEDURE — G8484 FLU IMMUNIZE NO ADMIN: HCPCS | Performed by: FAMILY MEDICINE

## 2023-02-22 PROCEDURE — 1090F PRES/ABSN URINE INCON ASSESS: CPT | Performed by: FAMILY MEDICINE

## 2023-02-22 PROCEDURE — G8417 CALC BMI ABV UP PARAM F/U: HCPCS | Performed by: FAMILY MEDICINE

## 2023-02-22 PROCEDURE — 87088 URINE BACTERIA CULTURE: CPT

## 2023-02-22 PROCEDURE — 1036F TOBACCO NON-USER: CPT | Performed by: FAMILY MEDICINE

## 2023-02-22 PROCEDURE — 3074F SYST BP LT 130 MM HG: CPT | Performed by: FAMILY MEDICINE

## 2023-02-22 PROCEDURE — 36415 COLL VENOUS BLD VENIPUNCTURE: CPT

## 2023-02-22 PROCEDURE — 82607 VITAMIN B-12: CPT

## 2023-02-22 PROCEDURE — 1123F ACP DISCUSS/DSCN MKR DOCD: CPT | Performed by: FAMILY MEDICINE

## 2023-02-22 PROCEDURE — 81001 URINALYSIS AUTO W/SCOPE: CPT

## 2023-02-22 PROCEDURE — 83036 HEMOGLOBIN GLYCOSYLATED A1C: CPT

## 2023-02-22 PROCEDURE — 84439 ASSAY OF FREE THYROXINE: CPT

## 2023-02-22 PROCEDURE — 99214 OFFICE O/P EST MOD 30 MIN: CPT | Performed by: FAMILY MEDICINE

## 2023-02-22 PROCEDURE — G8399 PT W/DXA RESULTS DOCUMENT: HCPCS | Performed by: FAMILY MEDICINE

## 2023-02-22 PROCEDURE — 82746 ASSAY OF FOLIC ACID SERUM: CPT

## 2023-02-22 PROCEDURE — 80053 COMPREHEN METABOLIC PANEL: CPT

## 2023-02-22 PROCEDURE — 85025 COMPLETE CBC W/AUTO DIFF WBC: CPT

## 2023-02-22 PROCEDURE — G8427 DOCREV CUR MEDS BY ELIG CLIN: HCPCS | Performed by: FAMILY MEDICINE

## 2023-02-22 PROCEDURE — 3078F DIAST BP <80 MM HG: CPT | Performed by: FAMILY MEDICINE

## 2023-02-22 NOTE — PROGRESS NOTES
Lourdes Worley : 1945 Sex: female  Age: 69 y.o. Chief Complaint   Patient presents with    Results       HPI  HPI:      Presents today with  for follow-up. Urine symptoms resolved. Last UA negative, culture mixed. Asymptomatic. Did not get blood work yet that we are planning to review today.  states that both he and she have had some mild vertigo symptoms with mild congestion. No fever chills malaise. Dec COVID testing. They are going to get blood work as ordered. The fact that they both have it is hopeful that it is either viral/allergies and they feel allergies, going to start Flonase. However  has had history of recent heart disease/stents as well as history of CVA and we discussed all of this being the differential and recommendations for ER reviewed for urgent testing, declines now with symptoms rest worsen. Risk of sudden DF event reviewed. Of note patient denying the symptoms,  states she has them as well as he does. At this point they simply want to follow-up in a week with blood work sooner as needed    Denies headache lightheadedness syncope near syncope, chest pain palpitations numbness tingling focal weakness slurred speech facial droop abdominal pain nausea vomiting change in bowels or urinary symptoms. Some mild nasal congestion. No sore throat fever chills malaise  ROS:  As above      Current Outpatient Medications:     fluconazole (DIFLUCAN) 150 MG tablet, Take 1 po qd x 1. May repeat  x 1 in 7 days if needed, Disp: 2 tablet, Rfl: 0    metoprolol succinate (TOPROL XL) 50 MG extended release tablet, Take 1 tablet by mouth 2 times daily, Disp: 60 tablet, Rfl: 3    donepezil (ARICEPT) 10 MG tablet, TAKE 1 TABLET BY MOUTH  TWICE DAILY, Disp: 180 tablet, Rfl: 3    memantine (NAMENDA) 10 MG tablet, TAKE 1 TABLET BY MOUTH TWICE DAILY, Disp: 180 tablet, Rfl: 3    levothyroxine (SYNTHROID) 100 MCG tablet, Take 1 tablet by mouth in the morning. Ideally on empty stomach., Disp: 90 tablet, Rfl: 1    omeprazole (PRILOSEC) 20 MG delayed release capsule, Take 1 capsule by mouth daily Ideally 1hr prior to dinner, Disp: 90 capsule, Rfl: 1    ammonium lactate (LAC-HYDRIN) 12 % lotion, BID PRN, Disp: 225 g, Rfl: 1    metFORMIN (GLUCOPHAGE) 500 MG tablet, Take 1 tablet by mouth 2 times daily (with meals), Disp: 180 tablet, Rfl: 3    omega-3 acid ethyl esters (LOVAZA) 1 g capsule, Take 2 capsules by mouth 2 times daily (Patient taking differently: Take 2 g by mouth daily), Disp: 360 capsule, Rfl: 3  Allergies   Allergen Reactions    Morphine Itching    Statins      Other reaction(s): Unknown    Statins Depletion Therapy Other (See Comments)     MYALGIA       Past Medical History:   Diagnosis Date    Arthritis     Diabetes mellitus ([address]     Difficult intubation     carries card, copy on chart  Glidescope#3 with ETT size7    Hyperlipidemia     Hypothyroidism     Left sided abdominal pain     Memory problem     still competent    GORDY on CPAP     Pacemaker     Rectal bleeding     Ringing in the ears     Supraventricular tachycardia ([address] 2/8/2023     Past Surgical History:   Procedure Laterality Date    ABLATION OF DYSRHYTHMIC FOCUS  1996    BREAST SURGERY      implants    COLONOSCOPY  2009? Dr. Mary Moraes twisted     COLONOSCOPY  2008? Dr. Rashida Garcia. incomplete. COLONOSCOPY  12/17/2018    Dr. Parul Lagunas 12/17/2018    COLONOSCOPY DIAGNOSTIC performed by Ernst Ruffin MD at [facility] N/A 1/26/2022    COLONOSCOPY DIAGNOSTIC performed by Blaise Ventura MD at [address]  09/10/2011    POSTERIOR    HYSTERECTOMY (CERVIX STATUS UNKNOWN)      vaginal. uterus only.      KNEE ARTHROPLASTY Right     partial    KNEE ARTHROSCOPY  9/08/2011    right knee    PACEMAKER PLACEMENT  5/23/2014    Medtronic dual chamber    TOTAL HIP ARTHROPLASTY Right 2015    UPPER GASTROINTESTINAL ENDOSCOPY N/A 1/26/2022    EGD BIOPSY performed by Maudry Kussmaul, MD at [address] History   Problem Relation Age of Onset    Pacemaker Mother     Heart Attack Father     Cancer Other 48        Bone Marrow Cancer / Had Bone Marrow Transplant     Social History     Tobacco Use    Smoking status: Former     Packs/day: 1.50     Years: 13.00     Pack years: 19.50     Types: Cigarettes     Start date: 1973     Quit date: 1986     Years since quittin.1    Smokeless tobacco: Never    Tobacco comments:     quit smoking    Vaping Use    Vaping Use: Never used   Substance Use Topics    Alcohol use: Yes     Comment: socially -- 3 drinks per month at the most    Drug use: No      Social History     Social History Narrative    PMH:    Problem List: Urinary tract infectious disease, Obstructive sleep apnea syndrome, Adult health    examination, Adult health examination, Constipation, Derangement of knee, Vitamin D deficiency,    Hypothyroidism, Conduction disorder of the heart, Hyperlipidemia    Health Maintenance:    Mini Mental Status - (2018)    Colonoscopy - (2018)    Colonoscopy Screening - (2018)    Mammogram Screening - (2018)    Mammogram - (2018)    Colonoscopy - (2010)    Couseled on Home Safety - (2015)    Bone Density Scan - (3/28/2012)    Medical Problems:    Sleep Apnea - Dr Ozzy Montgomery, cpap    Pneumonia    Hypothyroidism - Dr Conchis Jenkins - Dr Parminder Bedolla    Hyperlipidemia - intolerance to all statins    Cardiac Arrhythmia - ? type. s/p ablation    cardiac dysrhythmia - pauses - lead to pacemaker    Skin Cancer    Surgical Hx:    Partial Hysterectomy - Still with Both Ovaries. Breast Implants    Pacemaker - Dr Zoila Dietz    Knee Replacement, Carpal Tunnel Release, colon-vaginal fistula repair    Bladder Repair - sling    RT Hip Arthroplasty - MARGARITA        FH:    Father:    . (Hx)    Mother:    . (Hx)    Dad - MI 39,  70 MI    Mom - DM , currently living age 80        SH: Marital: . Personal Habits: Cigarette Use: Former Cigarette Smoker - quit age 36. Occ ETOH, minimal. . 2 kids, 6 step kids. 27 grandkids. .Alcohol: Rarely consumes alcohol        Vitals:    02/22/23 1323   BP: 106/64   Pulse: 61   Resp: 20   Temp: 97.8 °F (36.6 °C)   TempSrc: Temporal   SpO2: 96%   Weight: 188 lb (85.3 kg)   Height: 5' 4.5\" (1.638 m)     Wt Readings from Last 3 Encounters:   02/22/23 188 lb (85.3 kg)   02/08/23 187 lb (84.8 kg)   02/01/23 187 lb (84.8 kg)        Physical Exam    Exam:  Const: Appears comfortable. No signs of acute distress present. Head/Face: Atraumatic, normocephalic on inspection. Eyes: No discharge from the eyes. Sclerae clear. ENMT: Unremarkable  Neck: Supple. Palpation reveals no adenopathy. No masses appreciated. No JVD. Resp: Respirations are unlabored. Clear to auscultation bilaterally. No rales, rhonchi or wheezes appreciated over the  lungs bilaterally. CV: RRR   Extremities: No clubbing or cyanosis. No edema of the lower limbs  bilaterally. No calf inflammation or tenderness. Abdomen: Abdomen is soft, nontender, and nondistended. No abdominal masses  appreciated. No palpable hepatosplenomegaly. Bowel sounds are normoactive. Skin: Unremarkable  muscular skeletal: No acute joint inflammation. Neuro:Grossly intact without focal deficit      Office Labs This Visit :  No results found for this visit on 02/22/23. Assessment and Plan:     Diagnosis Orders   1. Urinary tract infection without hematuria, site unspecified        2. Allergic rhinitis due to other allergic trigger, unspecified seasonality        3. Type 2 diabetes mellitus with hyperglycemia, without long-term current use of insulin ([address]        4. Essential hypertension        5. Complete heart block ([address]        6. History of paroxysmal supraventricular tachycardia        7. Mixed hyperlipidemia        8.  Vertigo              No problem-specific Assessment & Plan notes found for this encounter. 1. Urinary tract infection without hematuria, site unspecified  Resolved. Recommend follow-up Dr. Sarah Poe because of recurrent nature  2. Dermatitis  Consistent primarily with Candida. No evidence of necrotizing fasciitis. Last time prescribed Diflucan and Gyne-Lotrimin. Resolved    3. Anal fissure  Was referred to general surgeon but they defer further intervention now as symptoms resolved. We did discuss role of colonoscopy to rule out underlying conditions both inflammatory and neoplastic etc., declines. Hydrocortisone cream sparingly as directed max 2 weeks. Symptoms resolved    Leukocytosis, unspecified type  Awaiting blood work, repeat       Type 2 diabetes mellitus with hyperglycemia, without long-term current use of insulin ([facility])  Hemoglobin A1c stable, 5.9 to 6.1 -5.6-5.5-5.5-5.6-5.6 on metformin 500 mg twice a day. Previously 1000 twice a day. Precautions reviewed. Risks  reviewed, proper hydration reviewed, standard precautions reviewed including loose bowels, LA. Encouraged  she watch blood sugar ambulatory with parameters reviewed call in if out of range. Hyper and hypoglycemic precautions reviewed. Micro-and macrovascular  complications reviewed. Importance of at least yearly eye exams and daily foot exams reviewed. Tolerating metformin . no longer on invokana. Monitor. Awaiting blood work. Increasing risk of serious infection        History of SVT     H/o svt Status post ablation  . Also history of long pauses-since had pacemaker by Dr. Angelo Araya. FU with her. Follow-up Dr Jeris Olszewski . Asymptomatic sends recordings by phone.  ekg done and reviewed with her     Dementia without behavioral disturbance  Counseled extensively. Differential reviewed including various forms of dementia and pseudodementia. On Aricept 10 mg daily, and Namenda. Continue per Dr. Luis Morales they were seeing Dr. Elder Garcia specialist from [place].   However they re noncompliant without follow-up, feels it is a \"racket\". Follow-up with Dr. Brittany Spencer. She is enrolled in a study, nitrate water. She is not driving and we emphasized importance of this. Safety precautions reviewed.  feels her dementia is stable. continue current management and she follows up with Dr. Brittany Spencer, recently saw    Hypertension  Stable, continue current therapy. Await blood work    Hyperlipidemia  Risk of reviewed. Await blood work. Allergic rhinitis  She is going to start Flonase. Risk benefit Claritin 5 to 10 mg daily as needed reviewed  Mild vertigo  They feel secondary to underlying allergies. Large differential reviewed. They simply want to start Flonase and notify symptoms rest worsen. Falls risk reviewed. Falls precautions reviewed. M/M related fracture reviewed. On Fosamax for osteopenia  And avoid meclizine with sedative properties    Plan as above. Counseled extensively and differential diagnoses relevant to above were reviewed, including serious etiologies, risks and complications, especially of left uncontrolled. If relevant, instructions and  alternatives to meds/treatment reviewed, as well as interactions, and  SE's/ADRs reviewed, notify immediately if any, discontinuing new meds if any. Plan made after discussion and shared decision making. Concerns reviewed. This point is simply not proceed as above, proper hydration, get labs ASAP and simply will follow-up in a week sooner as needed. Again importance of ER symptoms persist worsen or other serious signs or symptoms reviewed. As long as symptoms steadily improve/resolve, and medical conditions follow the expected course, FU as below, sooner PRN. Return in about 1 week (around 3/1/2023), or if symptoms worsen or fail to improve, for review bw.                 Educational materials and/or home exercises printed for patient's review and were included in patient instructions on his/her After Visit Summary and given to patient at the end of visit. After discussion, patient and/or guardian verbalizes understanding, agrees, feels comfortable with and wishes to proceed with above treatment plan. Call for any pending results, FU sooner if abnormal, as needed or if any current symptoms persist/worsen. Advised patient to call with any new medication issues, and read all Rx info from pharmacy to assure aware of all possible risks and side effects of medication before taking. Reviewed age and gender appropriate health screening exams and vaccinations. Advised patient regarding importance of keeping up with recommended health maintenance and to schedule as soon as possible if overdue, as this is important in assessing for undiagnosed pathology, especially cancer, as well as protecting against potentially harmful/life threatening disease. Patient and/or guardian verbalizes understanding and agrees with above counseling, assessment and plan. All questions answered. Signs and symptoms to watch for discussed, serious signs and symptoms reviewed. ER if any. Hemalatha Camp MD    Patients are advised to check with insurance company to ensure coverage and to fully understand benefits and cost prior to any testing. This note was created with the assistance of voice recognition software. Document was reviewed however may contain grammatical errors.

## 2023-02-23 LAB — URINE CULTURE, ROUTINE: NORMAL

## 2023-02-27 ENCOUNTER — OFFICE VISIT (OUTPATIENT)
Dept: NEUROLOGY | Age: 78
End: 2023-02-27
Payer: MEDICARE

## 2023-02-27 ENCOUNTER — HOSPITAL ENCOUNTER (OUTPATIENT)
Dept: CARDIAC REHAB | Age: 78
Discharge: HOME | End: 2023-02-27

## 2023-02-27 VITALS
WEIGHT: 188 LBS | SYSTOLIC BLOOD PRESSURE: 124 MMHG | DIASTOLIC BLOOD PRESSURE: 80 MMHG | BODY MASS INDEX: 32.1 KG/M2 | HEIGHT: 64 IN

## 2023-02-27 DIAGNOSIS — R26.9 GAIT DIFFICULTY: Primary | ICD-10-CM

## 2023-02-27 DIAGNOSIS — F03.90 DEMENTIA, UNSPECIFIED DEMENTIA SEVERITY, UNSPECIFIED DEMENTIA TYPE, UNSPECIFIED WHETHER BEHAVIORAL, PSYCHOTIC, OR MOOD DISTURBANCE OR ANXIETY (HCC): ICD-10-CM

## 2023-02-27 PROCEDURE — 1123F ACP DISCUSS/DSCN MKR DOCD: CPT | Performed by: CLINICAL NURSE SPECIALIST

## 2023-02-27 PROCEDURE — 1036F TOBACCO NON-USER: CPT | Performed by: CLINICAL NURSE SPECIALIST

## 2023-02-27 PROCEDURE — G8399 PT W/DXA RESULTS DOCUMENT: HCPCS | Performed by: CLINICAL NURSE SPECIALIST

## 2023-02-27 PROCEDURE — 1090F PRES/ABSN URINE INCON ASSESS: CPT | Performed by: CLINICAL NURSE SPECIALIST

## 2023-02-27 PROCEDURE — G8484 FLU IMMUNIZE NO ADMIN: HCPCS | Performed by: CLINICAL NURSE SPECIALIST

## 2023-02-27 PROCEDURE — G8427 DOCREV CUR MEDS BY ELIG CLIN: HCPCS | Performed by: CLINICAL NURSE SPECIALIST

## 2023-02-27 PROCEDURE — G8417 CALC BMI ABV UP PARAM F/U: HCPCS | Performed by: CLINICAL NURSE SPECIALIST

## 2023-02-27 PROCEDURE — 99205 OFFICE O/P NEW HI 60 MIN: CPT | Performed by: CLINICAL NURSE SPECIALIST

## 2023-02-27 ASSESSMENT — PATIENT HEALTH QUESTIONNAIRE - PHQ9
8. MOVING OR SPEAKING SO SLOWLY THAT OTHER PEOPLE COULD HAVE NOTICED. OR THE OPPOSITE, BEING SO FIGETY OR RESTLESS THAT YOU HAVE BEEN MOVING AROUND A LOT MORE THAN USUAL: 0
SUM OF ALL RESPONSES TO PHQ QUESTIONS 1-9: 0
SUM OF ALL RESPONSES TO PHQ9 QUESTIONS 1 & 2: 0
2. FEELING DOWN, DEPRESSED OR HOPELESS: 0
1. LITTLE INTEREST OR PLEASURE IN DOING THINGS: 0
7. TROUBLE CONCENTRATING ON THINGS, SUCH AS READING THE NEWSPAPER OR WATCHING TELEVISION: 0
4. FEELING TIRED OR HAVING LITTLE ENERGY: 0
6. FEELING BAD ABOUT YOURSELF - OR THAT YOU ARE A FAILURE OR HAVE LET YOURSELF OR YOUR FAMILY DOWN: 0
SUM OF ALL RESPONSES TO PHQ QUESTIONS 1-9: 0
10. IF YOU CHECKED OFF ANY PROBLEMS, HOW DIFFICULT HAVE THESE PROBLEMS MADE IT FOR YOU TO DO YOUR WORK, TAKE CARE OF THINGS AT HOME, OR GET ALONG WITH OTHER PEOPLE: 0
9. THOUGHTS THAT YOU WOULD BE BETTER OFF DEAD, OR OF HURTING YOURSELF: 0
SUM OF ALL RESPONSES TO PHQ QUESTIONS 1-9: 0
SUM OF ALL RESPONSES TO PHQ QUESTIONS 1-9: 0
5. POOR APPETITE OR OVEREATING: 0

## 2023-02-27 NOTE — PROGRESS NOTES
General Bautista is a 69 y.o. right handed woman    Past Medical History:     Past Medical History:   Diagnosis Date    Arthritis     Diabetes mellitus ([address]     Difficult intubation     carries card, copy on chart  Glidescope#3 with ETT size7    Hyperlipidemia     Hypothyroidism     Left sided abdominal pain     Memory problem     still competent    GORDY on CPAP     Pacemaker     Rectal bleeding     Ringing in the ears     Supraventricular tachycardia ([address] 2/8/2023     Past Surgical History:     Past Surgical History:   Procedure Laterality Date    ABLATION OF DYSRHYTHMIC FOCUS  1996    BREAST SURGERY      implants    COLONOSCOPY  2009? Dr. Abiodun Mosquera twisted     COLONOSCOPY  2008? Dr. Codi Chino. incomplete. COLONOSCOPY  12/17/2018    Dr. Courtney Helton 12/17/2018    COLONOSCOPY DIAGNOSTIC performed by Ashtyn Kern MD at [address] [facility] N/A 1/26/2022    COLONOSCOPY DIAGNOSTIC performed by Francine Blanca MD at [address] [facility]  09/10/2011    POSTERIOR    HYSTERECTOMY (CERVIX STATUS UNKNOWN)      vaginal. uterus only. KNEE ARTHROPLASTY Right     partial    KNEE ARTHROSCOPY  9/08/2011    right knee    PACEMAKER PLACEMENT  5/23/2014    Medtronic dual chamber    TOTAL HIP ARTHROPLASTY Right 2015    UPPER GASTROINTESTINAL ENDOSCOPY N/A 1/26/2022    EGD BIOPSY performed by Francine Blanca MD at [address]:     Morphine, Statins, and Statins depletion therapy    Medications:     Prior to Admission medications    Medication Sig Start Date End Date Taking? Authorizing Provider   fluconazole (DIFLUCAN) 150 MG tablet Take 1 po qd x 1.  May repeat  x 1 in 7 days if needed 2/8/23  Yes Monique Echeverria MD   metoprolol succinate (TOPROL XL) 50 MG extended release tablet Take 1 tablet by mouth 2 times daily 10/6/22  Yes Monique Echeverria MD   donepezil (ARICEPT) 10 MG tablet TAKE 1 TABLET BY MOUTH  TWICE DAILY 9/28/22  Yes Mikaela Guadalupe MD   memantine (NAMENDA) 10 MG tablet TAKE 1 TABLET BY MOUTH TWICE DAILY 22  Yes Estephania Cruz MD   levothyroxine (SYNTHROID) 100 MCG tablet Take 1 tablet by mouth in the morning. Ideally on empty stomach. 8/15/22  Yes Addis Bocanegra MD   omeprazole (PRILOSEC) 20 MG delayed release capsule Take 1 capsule by mouth daily Ideally 1hr prior to dinner 22  Yes Addis Bocanegra MD   ammonium lactate (LAC-HYDRIN) 12 % lotion BID PRN 3/21/22  Yes Addis Bocanegra MD   metFORMIN (GLUCOPHAGE) 500 MG tablet Take 1 tablet by mouth 2 times daily (with meals) 3/11/22  Yes Addis Bocanegra MD   omega-3 acid ethyl esters (LOVAZA) 1 g capsule Take 2 capsules by mouth 2 times daily  Patient taking differently: Take 2 g by mouth daily 21  Yes Addis Bocanegra MD       Social History:     Social History     Tobacco Use    Smoking status: Former     Packs/day: 1.50     Years: 13.00     Pack years: 19.50     Types: Cigarettes     Start date: 1973     Quit date: 1986     Years since quittin.1    Smokeless tobacco: Never    Tobacco comments:     quit smoking    Vaping Use    Vaping Use: Never used   Substance Use Topics    Alcohol use: Yes     Comment: socially -- 3 drinks per month at the most    Drug use: No       Family History:     Family History   Problem Relation Age of Onset    Pacemaker Mother     Heart Attack Father     Cancer Other 48        Bone Marrow Cancer / Had Bone Marrow Transplant      History of Present Illness:     With a long history of dementia. Here with  states that she has suffered with memory issues for approximately 3 years and currently follows with a geriatrician maintained on Aricept 10 mg daily and Namenda 10 mg twice a day as well as a research study drink that she takes. She was referred to neurology for underlying dementia as well as gait difficulties.     There have been no reported falls however she seems quite unsteady when she gets up from a seated position     is an excellent historian  Well versed on research studies treating Alzheimer's dementia        Objective:   /80 (Site: Left Upper Arm, Position: Sitting, Cuff Size: Medium Adult)   Ht 5' 4\" (1.626 m)   Wt 188 lb (85.3 kg)   BMI 32.27 kg/m²      General appearance: alert, appears stated age and cooperative  Head: Normocephalic, without obvious abnormality, atraumatic  Extremities: no cyanosis or edema  Pulses: 2+ and symmetric  Skin: no rashes or lesions     Mental Status: Alert, oriented to person     Speech: clear  Language: Laconic    Cranial Nerves:  I: smell    II: visual acuity     II: visual fields Full    II: pupils DELONTE   III,VII: ptosis None   III,IV,VI: extraocular muscles  EOMI without nystagmus    V: mastication Normal   V: facial light touch sensation  Normal   V,VII: corneal reflex  Present   VII: facial muscle function - upper     VII: facial muscle function - lower Normal   VIII: hearing Normal   IX: soft palate elevation  Normal   IX,X: gag reflex    XI: trapezius strength  5/5   XI: sternocleidomastoid strength 5/5   XI: neck extension strength  5/5   XII: tongue strength  Normal     Motor:  5/5 throughout  Normal bulk and tone   No drift    Sensory:  LT normal  Vibration normal     Coordination:   FN, FFM and HAROON normal    Gait:  Normal     DTR:   No reflexes    No Gerber's     Laboratory/Radiology:     CBC with Differential:    Lab Results   Component Value Date/Time    WBC 11.5 02/22/2023 05:10 PM    RBC 5.17 02/22/2023 05:10 PM    HGB 15.4 02/22/2023 05:10 PM    HCT 44.7 02/22/2023 05:10 PM     02/22/2023 05:10 PM    MCV 86.5 02/22/2023 05:10 PM    MCH 29.8 02/22/2023 05:10 PM    MCHC 34.5 02/22/2023 05:10 PM    RDW 12.4 02/22/2023 05:10 PM    SEGSPCT 51 10/04/2013 09:00 AM    LYMPHOPCT 45.1 02/22/2023 05:10 PM    MONOPCT 5.6 02/22/2023 05:10 PM    BASOPCT 0.7 02/22/2023 05:10 PM    MONOSABS 0.64 02/22/2023 05:10 PM    LYMPHSABS 5.17 02/22/2023 05:10 PM    EOSABS 0.18 02/22/2023 05:10 PM    BASOSABS 0.08 02/22/2023 05:10 PM     CMP:    Lab Results   Component Value Date/Time     02/22/2023 05:10 PM    K 4.0 02/22/2023 05:10 PM     02/22/2023 05:10 PM    CO2 20 02/22/2023 05:10 PM    BUN 12 02/22/2023 05:10 PM    CREATININE 0.7 02/22/2023 05:10 PM    GFRAA >60 10/04/2022 04:05 PM    LABGLOM >60 02/22/2023 05:10 PM    GLUCOSE 85 02/22/2023 05:10 PM    GLUCOSE 104 08/15/2011 10:50 AM    PROT 7.0 02/22/2023 05:10 PM    LABALBU 4.2 02/22/2023 05:10 PM    LABALBU 4.4 08/15/2011 10:50 AM    CALCIUM 9.6 02/22/2023 05:10 PM    BILITOT 0.3 02/22/2023 05:10 PM    ALKPHOS 72 02/22/2023 05:10 PM    AST 24 02/22/2023 05:10 PM    ALT 19 02/22/2023 05:10 PM       CT Head 2022  No acute intracranial abnormality or hemorrhage. I independently reviewed the labs and imaging studies today. Assessment:     Patient with underlying dementia-previous CT of the head unrevealing for acute findings however no recent MRI completed   Patient possibly with Alzheimer's dementia however no PET scan or further diagnostic work-up has been completed    Gait difficulties-May be related to a frontal lobe gait apraxia. There are no sensory difficulties or ataxia appreciated on exam      Plan:      We will begin with MRI studies of her brain    If MRI unrevealing for any hydrocephalus or other intracranial abnormalities would consider laboratory work-up to look for nutritional causes for underlying dementia    I would also consider PET scan or possible LP to help firm up diagnosis    Would make no changes to her current medication at this time however will review his [phone] study that he is referencing    Questions answered at this time    MARIAMA Shipman - CNS  1:35 PM  2/27/2023

## 2023-02-28 ENCOUNTER — OFFICE VISIT (OUTPATIENT)
Dept: PRIMARY CARE CLINIC | Age: 78
End: 2023-02-28
Payer: MEDICARE

## 2023-02-28 VITALS
OXYGEN SATURATION: 95 % | RESPIRATION RATE: 18 BRPM | HEART RATE: 61 BPM | DIASTOLIC BLOOD PRESSURE: 76 MMHG | SYSTOLIC BLOOD PRESSURE: 100 MMHG | BODY MASS INDEX: 32.1 KG/M2 | TEMPERATURE: 98 F | HEIGHT: 64 IN | WEIGHT: 188 LBS

## 2023-02-28 DIAGNOSIS — E55.9 VITAMIN D DEFICIENCY: ICD-10-CM

## 2023-02-28 DIAGNOSIS — E03.9 ACQUIRED HYPOTHYROIDISM: ICD-10-CM

## 2023-02-28 DIAGNOSIS — I44.2 COMPLETE HEART BLOCK (HCC): ICD-10-CM

## 2023-02-28 DIAGNOSIS — M85.839 OSTEOPENIA OF FOREARM, UNSPECIFIED LATERALITY: ICD-10-CM

## 2023-02-28 DIAGNOSIS — F03.90 DEMENTIA WITHOUT BEHAVIORAL DISTURBANCE (HCC): ICD-10-CM

## 2023-02-28 DIAGNOSIS — N39.0 RECURRENT UTI: ICD-10-CM

## 2023-02-28 DIAGNOSIS — Z86.79 HISTORY OF PAROXYSMAL SUPRAVENTRICULAR TACHYCARDIA: ICD-10-CM

## 2023-02-28 DIAGNOSIS — I10 ESSENTIAL HYPERTENSION: Primary | ICD-10-CM

## 2023-02-28 DIAGNOSIS — E53.8 VITAMIN B12 DEFICIENCY: ICD-10-CM

## 2023-02-28 DIAGNOSIS — E11.65 TYPE 2 DIABETES MELLITUS WITH HYPERGLYCEMIA, WITHOUT LONG-TERM CURRENT USE OF INSULIN (HCC): ICD-10-CM

## 2023-02-28 DIAGNOSIS — E78.2 MIXED HYPERLIPIDEMIA: ICD-10-CM

## 2023-02-28 DIAGNOSIS — D75.1 POLYCYTHEMIA: ICD-10-CM

## 2023-02-28 PROCEDURE — G8484 FLU IMMUNIZE NO ADMIN: HCPCS | Performed by: FAMILY MEDICINE

## 2023-02-28 PROCEDURE — 3044F HG A1C LEVEL LT 7.0%: CPT | Performed by: FAMILY MEDICINE

## 2023-02-28 PROCEDURE — G8399 PT W/DXA RESULTS DOCUMENT: HCPCS | Performed by: FAMILY MEDICINE

## 2023-02-28 PROCEDURE — 99214 OFFICE O/P EST MOD 30 MIN: CPT | Performed by: FAMILY MEDICINE

## 2023-02-28 PROCEDURE — G8427 DOCREV CUR MEDS BY ELIG CLIN: HCPCS | Performed by: FAMILY MEDICINE

## 2023-02-28 PROCEDURE — 3078F DIAST BP <80 MM HG: CPT | Performed by: FAMILY MEDICINE

## 2023-02-28 PROCEDURE — 1036F TOBACCO NON-USER: CPT | Performed by: FAMILY MEDICINE

## 2023-02-28 PROCEDURE — 3074F SYST BP LT 130 MM HG: CPT | Performed by: FAMILY MEDICINE

## 2023-02-28 PROCEDURE — 1123F ACP DISCUSS/DSCN MKR DOCD: CPT | Performed by: FAMILY MEDICINE

## 2023-02-28 PROCEDURE — G8417 CALC BMI ABV UP PARAM F/U: HCPCS | Performed by: FAMILY MEDICINE

## 2023-02-28 PROCEDURE — 1090F PRES/ABSN URINE INCON ASSESS: CPT | Performed by: FAMILY MEDICINE

## 2023-02-28 RX ORDER — METOPROLOL SUCCINATE 25 MG/1
25 TABLET, EXTENDED RELEASE ORAL 2 TIMES DAILY
Qty: 60 TABLET | Refills: 3 | Status: SHIPPED | OUTPATIENT
Start: 2023-02-28

## 2023-02-28 NOTE — PROGRESS NOTES
Desirae Inman : 1945 Sex: female  Age: 69 y.o. Chief Complaint   Patient presents with    Results       HPI:    Presents today for follow-up. In cardiac rehab. Overall feeling well. Blood pressure borderline low today. Although asymptomatic we will lower her with parameters reviewed. She did get blood work. Previous vertigo resolved. Denies headache dizziness syncope near syncope ENT complaints chest pain palpitation shortness of breath abdominal pain nausea vomiting change in bowels dysuria urgency frequency numbness tingling focal weakness slurred speech facial droop or otherwise         Falls precautions again reviewed. Fracture risk reviewed. No headache. No syncope or near syncope. No chest pain or palpitations. Importance of follow cardiologist reviewed. Tolerating Fosamax     BMP shows CO2 20 HDL 48  triglyceride 283 LFTs normal hemoglobin A1c 5.8 Free T4 slightly elevated taking med prior to blood draw 1.76 TSH normal 1.4 vitamin D 32 vitamin B12 427 CBC grossly normal differential reviewed folic acid 8.9 urinalysis positive nitrite small excite esterase but denies any symptoms of UTI at this time. Precaution reviewed.   Should follow Dr. Misti Jovel for recurrent UTIs    Most Recent Labs  CBC  Lab Results   Component Value Date/Time    WBC 11.5 2023 05:10 PM    WBC 12.6 2023 04:26 PM    WBC 10.1 10/04/2022 04:05 PM    RBC 5.17 2023 05:10 PM    RBC 4.93 2023 04:26 PM    RBC 5.08 10/04/2022 04:05 PM    HGB 15.4 2023 05:10 PM    HGB 15.1 2023 04:26 PM    HGB 15.4 10/04/2022 04:05 PM    HCT 44.7 2023 05:10 PM    HCT 45.1 2023 04:26 PM    HCT 47.7 10/04/2022 04:05 PM    MCV 86.5 2023 05:10 PM    MCV 91.5 2023 04:26 PM    MCV 93.9 10/04/2022 04:05 PM     2023 05:10 PM     2023 04:26 PM     10/04/2022 04:05 PM      CMP  Lab Results   Component Value Date/Time     2023 05:10 PM     02/01/2023 04:26 PM     10/04/2022 04:05 PM    K 4.0 02/22/2023 05:10 PM    K 4.1 02/01/2023 04:26 PM    K 4.4 10/04/2022 04:05 PM     02/22/2023 05:10 PM     02/01/2023 04:26 PM     10/04/2022 04:05 PM    CO2 20 02/22/2023 05:10 PM    CO2 22 02/01/2023 04:26 PM    CO2 26 10/04/2022 04:05 PM    ANIONGAP 15 02/22/2023 05:10 PM    ANIONGAP 10 02/01/2023 04:26 PM    ANIONGAP 11 10/04/2022 04:05 PM    GLUCOSE 85 02/22/2023 05:10 PM    GLUCOSE 85 02/01/2023 04:26 PM    GLUCOSE 89 10/04/2022 04:05 PM    GLUCOSE 104 08/15/2011 10:50 AM    GLUCOSE 113 04/25/2011 02:28 PM    GLUCOSE 97 04/01/2011 10:40 AM    BUN 12 02/22/2023 05:10 PM    BUN 13 02/01/2023 04:26 PM    BUN 9 10/04/2022 04:05 PM    CREATININE 0.7 02/22/2023 05:10 PM    CREATININE 0.7 02/01/2023 04:26 PM    CREATININE 0.8 10/04/2022 04:05 PM    LABGLOM >60 02/22/2023 05:10 PM    LABGLOM >60 02/01/2023 04:26 PM    LABGLOM >60 10/04/2022 04:05 PM    LABGLOM >60 09/09/2022 01:00 PM    LABGLOM >60 04/28/2022 01:35 PM    GFRAA >60 10/04/2022 04:05 PM    GFRAA >60 09/09/2022 01:00 PM    GFRAA >60 04/28/2022 01:35 PM    CALCIUM 9.6 02/22/2023 05:10 PM    CALCIUM 9.3 02/01/2023 04:26 PM    CALCIUM 9.6 10/04/2022 04:05 PM    PROT 7.0 02/22/2023 05:10 PM    PROT 6.5 02/01/2023 04:26 PM    PROT 7.0 10/04/2022 04:05 PM    LABALBU 4.2 02/22/2023 05:10 PM    LABALBU 3.8 02/01/2023 04:26 PM    LABALBU 4.3 10/04/2022 04:05 PM    LABALBU 4.4 08/15/2011 10:50 AM    LABALBU 4.2 04/25/2011 02:28 PM    LABALBU 4.2 03/24/2011 03:55 AM    BILITOT 0.3 02/22/2023 05:10 PM    BILITOT 0.4 02/01/2023 04:26 PM    BILITOT 0.6 10/04/2022 04:05 PM    ALKPHOS 72 02/22/2023 05:10 PM    ALKPHOS 69 02/01/2023 04:26 PM    ALKPHOS 85 10/04/2022 04:05 PM    AST 24 02/22/2023 05:10 PM    AST 22 02/01/2023 04:26 PM    AST 22 10/04/2022 04:05 PM    ALT 19 02/22/2023 05:10 PM    ALT 17 02/01/2023 04:26 PM    ALT 20 10/04/2022 04:05 PM     A1C  Lab Results Component Value Date/Time    LABA1C 5.8 02/22/2023 05:10 PM    LABA1C 5.6 10/04/2022 04:05 PM    LABA1C 5.6 09/09/2022 01:00 PM     TSH  Lab Results   Component Value Date/Time    TSH 1.420 02/22/2023 05:10 PM    TSH 1.140 10/04/2022 04:05 PM    TSH 1.530 09/09/2022 01:00 PM     FREET4  Lab Results   Component Value Date/Time    D2DBEGX 7.1 06/26/2019 03:07 PM    T0CMPQN 7.6 05/21/2014 12:13 PM     LIPID  Lab Results   Component Value Date/Time    CHOL 228 02/22/2023 05:10 PM    CHOL 243 10/04/2022 04:05 PM    CHOL 219 09/09/2022 01:00 PM    HDL 48 02/22/2023 05:10 PM    HDL 44 10/04/2022 04:05 PM    HDL 44 09/09/2022 01:00 PM    LDLCALC 123 02/22/2023 05:10 PM    LDLCALC 159 10/04/2022 04:05 PM    LDLCALC 132 09/09/2022 01:00 PM    TRIG 283 02/22/2023 05:10 PM    TRIG 201 10/04/2022 04:05 PM    TRIG 216 09/09/2022 01:00 PM     VITAMIN D  Lab Results   Component Value Date/Time    VITD25 32 02/22/2023 05:10 PM    VITD25 55 10/04/2022 04:05 PM    VITD25 48 09/09/2022 01:00 PM     MAGNESIUM  Lab Results   Component Value Date/Time    MG 2.2 05/22/2014 04:40 AM    MG 2.0 04/28/2014 09:37 AM    MG 2.3 03/23/2011 12:45 PM      PHOS  No results found for: PHOS   RON   Lab Results   Component Value Date/Time    RON NEGATIVE 11/29/2012 02:20 PM     RHEUMATOID FACTOR  No results found for: RF  PSA  No results found for: PSA   HEPATITIS C  Lab Results   Component Value Date/Time    HCVABI Non-Reactive 08/12/2020 04:14 PM     HIV  No results found for: [id number], HIV1QT  UA  Lab Results   Component Value Date/Time    COLORU Yellow 02/22/2023 05:10 PM    COLORU Yellow 02/08/2023 12:00 PM    COLORU Yellow 02/01/2023 04:26 PM    CLARITYU Clear 02/22/2023 05:10 PM    CLARITYU Clear 02/08/2023 12:00 PM    CLARITYU Clear 02/01/2023 04:26 PM    GLUCOSEU Negative 02/22/2023 05:10 PM    GLUCOSEU Negative 02/08/2023 12:00 PM    GLUCOSEU Negative 02/01/2023 04:26 PM    BILIRUBINUR Negative 02/22/2023 05:10 PM    BILIRUBINUR Negative 02/08/2023 12:00 PM    BILIRUBINUR Negative 02/01/2023 04:26 PM    BILIRUBINUR negative 09/30/2022 04:02 PM    BILIRUBINUR negative 11/26/2021 01:52 PM    BILIRUBINUR negative 09/16/2021 03:18 PM    KETUA Negative 02/22/2023 05:10 PM    KETUA Negative 02/08/2023 12:00 PM    KETUA Negative 02/01/2023 04:26 PM    SPECGRAV 1.025 02/22/2023 05:10 PM    SPECGRAV 1.015 02/08/2023 12:00 PM    SPECGRAV 1.025 02/01/2023 04:26 PM    BLOODU Negative 02/22/2023 05:10 PM    BLOODU Negative 02/08/2023 12:00 PM    BLOODU Negative 02/01/2023 04:26 PM    PHUR 5.5 02/22/2023 05:10 PM    PHUR 6.0 02/08/2023 12:00 PM    PHUR 6.0 02/01/2023 04:26 PM    PROTEINU Negative 02/22/2023 05:10 PM    PROTEINU Negative 02/08/2023 12:00 PM    PROTEINU Negative 02/01/2023 04:26 PM    UROBILINOGEN 0.2 02/22/2023 05:10 PM    UROBILINOGEN 0.2 02/08/2023 12:00 PM    UROBILINOGEN 0.2 02/01/2023 04:26 PM    NITRU POSITIVE 02/22/2023 05:10 PM    NITRU Negative 02/08/2023 12:00 PM    NITRU POSITIVE 02/01/2023 04:26 PM    LEUKOCYTESUR SMALL 02/22/2023 05:10 PM    LEUKOCYTESUR Negative 02/08/2023 12:00 PM    LEUKOCYTESUR SMALL 02/01/2023 04:26 PM     Urine Micro/Albumin Ratio  Lab Results   Component Value Date/Time    MALBCR 21.3 10/04/2022 03:00 PM    MALBCR 10.3 09/09/2022 12:46 PM    MALBCR - 04/28/2022 01:37 PM         Current ROS as below with intermittent symptoms as above  ROS:  Const: Denies chills, fever, malaise and sweats. Eyes: Denies discharge, pain, redness and visual disturbance. ENMT: Denies earaches, other ear symptoms. Denies nasal or sinus symptoms other than stated  above. Denies mouth and tongue lesions and sore throat. CV: Denies chest discomfort, pain; diaphoresis, dizziness, edema, lightheadedness, orthopnea,  palpitations, syncope and near syncopal episode or any exertional symptoms  Resp: Denies cough, hemoptysis, pleuritic pain, SOB, sputum production and wheezing.   GI: Denies abdominal pain, change in bowel habits, hematochezia, melena, nausea and vomiting. : Dysuria urgency frequency, no hematuria  Musculo: Denies musculoskeletal symptoms. Skin: Denies bruising and rash  Neuro: Denies headache, numbness, stiff neck, tingling and focal weakness slurred speech or facial  droop  Hema/Lymph: Denies bleeding/bruising tendency and enlarged lymph nodes      Current Outpatient Medications:     metoprolol succinate (TOPROL XL) 25 MG extended release tablet, Take 1 tablet by mouth 2 times daily, Disp: 60 tablet, Rfl: 3    fluconazole (DIFLUCAN) 150 MG tablet, Take 1 po qd x 1. May repeat  x 1 in 7 days if needed, Disp: 2 tablet, Rfl: 0    donepezil (ARICEPT) 10 MG tablet, TAKE 1 TABLET BY MOUTH  TWICE DAILY, Disp: 180 tablet, Rfl: 3    memantine (NAMENDA) 10 MG tablet, TAKE 1 TABLET BY MOUTH TWICE DAILY, Disp: 180 tablet, Rfl: 3    levothyroxine (SYNTHROID) 100 MCG tablet, Take 1 tablet by mouth in the morning.  Ideally on empty stomach., Disp: 90 tablet, Rfl: 1    omeprazole (PRILOSEC) 20 MG delayed release capsule, Take 1 capsule by mouth daily Ideally 1hr prior to dinner, Disp: 90 capsule, Rfl: 1    ammonium lactate (LAC-HYDRIN) 12 % lotion, BID PRN, Disp: 225 g, Rfl: 1    metFORMIN (GLUCOPHAGE) 500 MG tablet, Take 1 tablet by mouth 2 times daily (with meals), Disp: 180 tablet, Rfl: 3    omega-3 acid ethyl esters (LOVAZA) 1 g capsule, Take 2 capsules by mouth 2 times daily (Patient taking differently: Take 2 g by mouth daily), Disp: 360 capsule, Rfl: 3  Allergies   Allergen Reactions    Morphine Itching    Statins      Other reaction(s): Unknown    Statins Depletion Therapy Other (See Comments)     MYALGIA       Past Medical History:   Diagnosis Date    Arthritis     Diabetes mellitus ([facility] [address]     Difficult intubation     carries card, copy on chart  Glidescope#3 with ETT size7    Hyperlipidemia     Hypothyroidism     Left sided abdominal pain     Memory problem     still competent    GORDY on CPAP     Pacemaker     Rectal bleeding Ringing in the ears     Supraventricular tachycardia ([facility] [address] 2023     Past Surgical History:   Procedure Laterality Date    ABLATION OF DYSRHYTHMIC FOCUS      BREAST SURGERY      implants    COLONOSCOPY  ? Dr. Misbah Iyer twisted     COLONOSCOPY  ? Dr. Hazel Fuentes. incomplete. COLONOSCOPY  2018    Dr. Rosalie Ross 2018    COLONOSCOPY DIAGNOSTIC performed by Aminata Mendoza MD at [address] N/A 2022    COLONOSCOPY DIAGNOSTIC performed by Merrick Libman, MD at [address] [facility]  09/10/2011    POSTERIOR    HYSTERECTOMY (CERVIX STATUS UNKNOWN)      vaginal. uterus only.      JOINT REPLACEMENT      KNEE ARTHROPLASTY Right     partial    KNEE ARTHROSCOPY  2011    right knee    PACEMAKER PLACEMENT  2014    Medtronic dual chamber    TOTAL HIP ARTHROPLASTY Right 2015    UPPER GASTROINTESTINAL ENDOSCOPY N/A 2022    EGD BIOPSY performed by Merrick Libman, MD at [address] History   Problem Relation Age of Onset    Pacemaker Mother     Heart Attack Father     Cancer Other 48        Bone Marrow Cancer / Had Bone Marrow Transplant     Social History     Tobacco Use    Smoking status: Former     Packs/day: 1.50     Years: 13.00     Pack years: 19.50     Types: Cigarettes     Start date: 1973     Quit date: 1986     Years since quittin.1    Smokeless tobacco: Never    Tobacco comments:     quit smoking    Vaping Use    Vaping Use: Never used   Substance Use Topics    Alcohol use: Yes     Comment: socially -- 3 drinks per month at the most    Drug use: No      Social History     Social History Narrative    PMH:    Problem List: Urinary tract infectious disease, Obstructive sleep apnea syndrome, Adult health    examination, Adult health examination, Constipation, Derangement of knee, Vitamin D deficiency,    Hypothyroidism, Conduction disorder of the heart, Hyperlipidemia    Health Maintenance:    Mini Mental Status - (2018)    Colonoscopy - (2018)    Colonoscopy Screening - (2018)    Mammogram Screening - (2018)    Mammogram - (2018)    Colonoscopy - (2010)    Couseled on Home Safety - (2015)    Bone Density Scan - (3/28/2012)    Medical Problems:    Sleep Apnea - Dr Rachel Vargas, cpap    Pneumonia    Hypothyroidism - Dr Andres Osgood - Dr Meggan Chacon    Hyperlipidemia - intolerance to all statins    Cardiac Arrhythmia - ? type. s/p ablation    cardiac dysrhythmia - pauses - lead to pacemaker    Skin Cancer    Surgical Hx:    Partial Hysterectomy - Still with Both Ovaries. Breast Implants    Pacemaker - Dr Vernell Carreon    Knee Replacement, Carpal Tunnel Release, colon-vaginal fistula repair    Bladder Repair - sling    RT Hip Arthroplasty - MARGARITA        FH:    Father:    . (Hx)    Mother:    . (Hx)    Dad - MI 39,  70 MI    Mom - DM , currently living age 80        SH: Marital: . Personal Habits: Cigarette Use: Former Cigarette Smoker - quit age 36. Occ ETOH, minimal. . 2 kids, 6 step kids. 27 grandkids. .Alcohol: Rarely consumes alcohol        Vitals:    23 1429   BP: 100/76   Pulse: 61   Resp: 18   Temp: 98 °F (36.7 °C)   TempSrc: Temporal   SpO2: 95%   Weight: 188 lb (85.3 kg)   Height: 5' 4\" (1.626 m)     Wt Readings from Last 3 Encounters:   23 188 lb (85.3 kg)   23 188 lb (85.3 kg)   23 188 lb (85.3 kg)            Exam:  Const: Appears comfortable. No signs of acute distress present. Head/Face: Atraumatic, normocephalic on inspection. Eyes: No discharge from the eyes. Sclerae clear. ENMT: Ears clear at this time without cerumen, nose boggy oropharynx watery postnasal drainage cobblestoning  Neck: Supple. Palpation reveals no adenopathy. No masses appreciated. No JVD. Resp: Respirations are unlabored. Clear to auscultation bilaterally. No rales, rhonchi or wheezes appreciated over the  lungs bilaterally.   CV: RRR   Extremities: No clubbing or cyanosis. No edema of the lower limbs  bilaterally. No calf inflammation or tenderness. Abdomen: Abdomen is soft,and nondistended. No abdominal masses  appreciated. No palpable hepatosplenomegaly. Bowel sounds are normoactive. Nontender  Skin: Dry and warm with no rash. Muscular skeletal: No acute joint inflammation. Neuro:Grossly intact without focal deficit  No CVA tenderness    Office Labs This Visit :  No results found for this visit on 02/28/23. Assessment and Plan:   Diagnosis Orders   1. Essential hypertension  metoprolol succinate (TOPROL XL) 25 MG extended release tablet    CBC with Auto Differential      2. Type 2 diabetes mellitus with hyperglycemia, without long-term current use of insulin (HCC)        3. Complete heart block ([address]        4. History of paroxysmal supraventricular tachycardia        5. Mixed hyperlipidemia  CK    Comprehensive Metabolic Panel    Lipid Panel      6. Acquired hypothyroidism  TSH    T4, Free      7. Vitamin B12 deficiency  Vitamin B12 & Folate      8. Vitamin D deficiency  Vitamin D 25 Hydroxy      9. Dementia without behavioral disturbance ([address]        10. Recurrent UTI  Culture, Urine    Urinalysis      11. Osteopenia of forearm, unspecified laterality        12. Polycythemia          Hypertension  Now hypotensive, continue to reduce metoprolol, reduced from 50-25 twice a day. Parameters reviewed and written, for BP and pulse. Call if out of range. Risk of hypertension and hypotension reviewed. Continue per cardiology. Neoplasm of skin    sun damaged skin. Continue per Dr. Anthony Lorenzo. pylori infection    noted on EGD 1/22- since followed up with Dr. Shonna Newell, was treated, symptoms resolved, nc with breath test        Recurrent UTI  No symptoms now. Monitor. Proper hygiene reviewed. Counseled on cranberry/Azo. She follow-up Dr. Vianney Sommer. Microscopic hematuria  Refer back to Dr. Vianney Sommer. Fluctuates.   Last check normal    Chronic incontinence/irritable bladder  Was started on Myrbetriq through Dr. Yamileth Mosley, no significant benefit that they can tell and expensive. Could be contributing to gait dysfunction. Discontinued mid September 2022 to see if this was contributing to balance issues    Osteopenia of forearm    bone density 9/22 showed T score +1.4 L1-L4-risk of false negative reviewed. -1.8 forearm. Risk of fracture reviewed. Falls precautions reviewed. M/M related to fracture reviewed. Appropriate calcium/D reviewed. After discussion services making agreeable to starting Fosamax with standard precautions. Denies contraindication. Hold prior to dental procedures. Repeat bone density 1 to 2 years sooner as needed. Lichen sclerosus  On 11/21 labial biopsy through Dr. Yanick Buckley, counseled, follow with him      Constipation  Counseled extensively. Differential reviewed, including serious etiologies. Resolved after MiraLAX taper  Colonoscopy 1/22 - except hemorrhoids-Dr. Bird Graham         Polycythemia  Counseled. Hemoglobin fluctuates, ferritin was borderline high, but improved/stable. H/H now normalized monitor. Counseled on hemochromatosis. Proper hydration. Counseled extensively. Differential reviewed, including serious etiologies. Obstructive sleep apnea syndrome     Counseled. Doing very well CPAP. Uses it at least 8 to 10 hours per night 7 nights per week with very good results. Got a new machine toward the end of 2019. Encourage her machine be checked. She follows up with Dr. Albert Shin 10/27/2021     Hypothyroidism     TSH normal, free T4 mildly elevated taking med just before blood draw, asymptomatic, continue 100 mcg daily, monitor    Type 2 diabetes mellitus with hyperglycemia, without long-term current use of insulin ([facility])  Hemoglobin A1c stable, 5.9 to 6.1 -5.6-5.5-5.5-5.6-5.6-5.8 on metformin 500 mg twice a day. Previously 1000 twice a day. Precautions reviewed. Risks  reviewed, proper hydration reviewed, standard precautions reviewed including loose bowels, LA. Encouraged  she watch blood sugar ambulatory with parameters reviewed call in if out of range. Hyper and hypoglycemic precautions reviewed. Micro-and macrovascular  complications reviewed. Importance of at least yearly eye exams and daily foot exams reviewed. Tolerating metformin . no longer on invokana. Monitor               Dementia without behavioral disturbance  Counseled extensively. Differential reviewed including various forms of dementia and pseudodementia. On Aricept 10 mg daily, and Namenda. Continue per Dr. Miriam Joyner they were seeing Dr. Alek Murray specialist from [facility] [employer]. However they re noncompliant without follow-up, feels it is a \"racket\". Follow-up with Dr. Miriam Joyner. She is enrolled in a study, nitrate water. She is not driving and we emphasized importance of this. Safety precautions reviewed.  feels her dementia is stable. continue current management and she follows up with Dr. Miriam Joyner    Mixed hyperlipidemia  not at goal but stable. Taking Lovaza only. Proper way reviewed. Significant risk of cerebrovascular/cardiovascular event. Imperative this  be controlled with history of TIA, however adamantly refuses any further treatment at this time. Refuses to try any other statin or Zetia stating that these were not tolerated in the  past. She did not tolerate Niaspan. did not tolerate WelChol. . declines  cholestyramine. Risk of stroke and heart attack reviewed. lifestyle modification  reviewed. risk of hyperlipidemia reviewed . Did not tolerate fenofibrate  Counseled on repatha, declines. Supraventricular tachycardia ([address]  h/o svt Status post ablation  . Also history of long pauses-since had pacemaker by Dr. Jimbo Cook. FU with her. Follow-up Dr Belen Lopez . Asymptomatic        Liver disease  Counseled extensively. Differential reviewed, including serious etiologies. Likely  fatty liver. Precautions reviewed. Lifestyle modification appropriate diet and weight  loss reviewed. Risks of even this leading to cirrhosis reviewed. Other than basic  monitoring on interested in other evaluation or treatment, in-depth blood work,  imaging or otherwise. Hep C 10/18 negative, again was negative 9/20. LFTs currently normal           Tubular adenoma  Small polyp 7/18. Dr. La Crane recommended basic screenings if repeated at all. Asymptomatic. Repeat colonoscopy Dr. Ignacia Choudhary 1/22 showed hemorrhoids otherwise negative     Vitamin D deficiency  On supplementation she was borderline toxic, it has stabilized, continue off vitamin D. Monitor     Health maintenance examination  Health maintenance issues discussed at length  6/21. Encourage yearly. B12 deficiency  Risk of high and low reviewed. Normal, continue current dosing        Breast cancer screening  Mammogram was negative 9/22          Plan as above. Counseled extensively and differential diagnoses relevant to above were reviewed, including serious etiologies, risks and complications, especially of left uncontrolled. If relevant, instructions and  alternatives to meds/treatment reviewed, as well as interactions, and  SE's/ADRs reviewed, notify immediately if any, discontinuing new meds if any. Plan made after discussion and shared decision making. Counseled. Continue cardiac rehab. She is going to follow-up cardiologist soon. Continue per all specialist.  Lower metoprolol from 50 to 25 mg twice a day with standard precautions and parameters reviewed. Notify if any issues.  due for follow-up in 1 month and they like to come together so she is going to follow-up in 1 month sooner as needed. Precautions reviewed. PG             As long as symptoms steadily improve/resolve, and medical conditions follow the expected course, FU as below, sooner PRN.     Return in about 3 months (around 5/28/2023), or if symptoms worsen or fail to improve. Educational materials and/or home exercises printed for patient's review and were included in patient instructions on his/her After Visit Summary and given to patient at the end of visit. After discussion, patient and/or guardian verbalizes understanding, agrees, feels comfortable with and wishes to proceed with above treatment plan. Call for any pending results, FU sooner if abnormal, as needed or if any current symptoms persist/worsen. Advised patient to call with any new medication issues, and read all Rx info from pharmacy to assure aware of all possible risks and side effects of medication before taking. Reviewed age and gender appropriate health screening exams and vaccinations. Advised patient regarding importance of keeping up with recommended health maintenance and to schedule as soon as possible if overdue, as this is important in assessing for undiagnosed pathology, especially cancer, as well as protecting against potentially harmful/life threatening disease. Patient and/or guardian verbalizes understanding and agrees with above counseling, assessment and plan. All questions answered. Signs and symptoms to watch for discussed, serious signs and symptoms reviewed. ER if any. Defers at this time. Bipin Park MD    Patients are advised to check with insurance company to ensure coverage and to fully understand benefits and cost prior to any testing. This note was created with the assistance of voice recognition software. Document was reviewed however may contain grammatical errors.

## 2023-03-09 PROCEDURE — 9900000038 HC CARDIAC REHAB PHASE 3 - MONTHLY

## 2023-03-22 ENCOUNTER — OFFICE VISIT (OUTPATIENT)
Dept: GERIATRIC MEDICINE | Age: 78
End: 2023-03-22
Payer: MEDICARE

## 2023-03-22 VITALS
DIASTOLIC BLOOD PRESSURE: 84 MMHG | WEIGHT: 190.3 LBS | TEMPERATURE: 97.6 F | HEART RATE: 56 BPM | SYSTOLIC BLOOD PRESSURE: 130 MMHG | BODY MASS INDEX: 32.66 KG/M2 | RESPIRATION RATE: 16 BRPM

## 2023-03-22 DIAGNOSIS — G31.84 MCI (MILD COGNITIVE IMPAIRMENT): Primary | ICD-10-CM

## 2023-03-22 PROCEDURE — 1123F ACP DISCUSS/DSCN MKR DOCD: CPT | Performed by: INTERNAL MEDICINE

## 2023-03-22 PROCEDURE — G8399 PT W/DXA RESULTS DOCUMENT: HCPCS | Performed by: INTERNAL MEDICINE

## 2023-03-22 PROCEDURE — G8417 CALC BMI ABV UP PARAM F/U: HCPCS | Performed by: INTERNAL MEDICINE

## 2023-03-22 PROCEDURE — 1090F PRES/ABSN URINE INCON ASSESS: CPT | Performed by: INTERNAL MEDICINE

## 2023-03-22 PROCEDURE — G8484 FLU IMMUNIZE NO ADMIN: HCPCS | Performed by: INTERNAL MEDICINE

## 2023-03-22 PROCEDURE — G8427 DOCREV CUR MEDS BY ELIG CLIN: HCPCS | Performed by: INTERNAL MEDICINE

## 2023-03-22 PROCEDURE — 99212 OFFICE O/P EST SF 10 MIN: CPT | Performed by: INTERNAL MEDICINE

## 2023-03-22 PROCEDURE — 1036F TOBACCO NON-USER: CPT | Performed by: INTERNAL MEDICINE

## 2023-03-22 NOTE — PROGRESS NOTES
CC Evaluate memory    On maximal dementia therapy  And may be stable  Welbutrin trial  Lays in bed 12 hours   OK with PN  Weight stable   COVID x 2  And vaccinated  [place]  And unaware of [place]  IADL's per  and she does microwave  ADL's  ok  No diving   Knows address  Anniversary does not know  [facility] ?  Good days and worse days   4 AM excellent memory   Mary study with new agent that cures mice   Impression; MCI and stable  Plan: Continue same           And  aware of new agent cures without affecting the the plaques           Cardiac rehab per [facility]

## 2023-03-27 ENCOUNTER — HOSPITAL ENCOUNTER (OUTPATIENT)
Dept: CARDIAC REHAB | Age: 78
Discharge: HOME | End: 2023-03-27

## 2023-03-28 ENCOUNTER — OFFICE VISIT (OUTPATIENT)
Dept: PRIMARY CARE CLINIC | Age: 78
End: 2023-03-28
Payer: MEDICARE

## 2023-03-28 VITALS
BODY MASS INDEX: 32.44 KG/M2 | HEART RATE: 56 BPM | DIASTOLIC BLOOD PRESSURE: 78 MMHG | WEIGHT: 190 LBS | OXYGEN SATURATION: 97 % | SYSTOLIC BLOOD PRESSURE: 110 MMHG | TEMPERATURE: 98.4 F | HEIGHT: 64 IN

## 2023-03-28 DIAGNOSIS — F03.90 DEMENTIA WITHOUT BEHAVIORAL DISTURBANCE (HCC): ICD-10-CM

## 2023-03-28 DIAGNOSIS — N39.0 URINARY TRACT INFECTION WITHOUT HEMATURIA, SITE UNSPECIFIED: ICD-10-CM

## 2023-03-28 DIAGNOSIS — N39.0 URINARY TRACT INFECTION WITHOUT HEMATURIA, SITE UNSPECIFIED: Primary | ICD-10-CM

## 2023-03-28 DIAGNOSIS — A04.8 H. PYLORI INFECTION: ICD-10-CM

## 2023-03-28 LAB
BACTERIA URNS QL MICRO: ABNORMAL /HPF
BILIRUB UR QL STRIP: NEGATIVE
CLARITY UR: CLEAR
COLOR UR: YELLOW
GLUCOSE UR STRIP-MCNC: NEGATIVE MG/DL
HGB UR QL STRIP: NEGATIVE
KETONES UR STRIP-MCNC: NEGATIVE MG/DL
LEUKOCYTE ESTERASE UR QL STRIP: ABNORMAL
NITRITE UR QL STRIP: POSITIVE
PH UR STRIP: 6 [PH] (ref 5–9)
PROT UR STRIP-MCNC: NEGATIVE MG/DL
RBC #/AREA URNS HPF: ABNORMAL /HPF (ref 0–2)
SP GR UR STRIP: >=1.03 (ref 1–1.03)
UROBILINOGEN UR STRIP-ACNC: 0.2 E.U./DL
WBC #/AREA URNS HPF: ABNORMAL /HPF (ref 0–5)

## 2023-03-28 PROCEDURE — 1123F ACP DISCUSS/DSCN MKR DOCD: CPT | Performed by: FAMILY MEDICINE

## 2023-03-28 PROCEDURE — G8427 DOCREV CUR MEDS BY ELIG CLIN: HCPCS | Performed by: FAMILY MEDICINE

## 2023-03-28 PROCEDURE — 99214 OFFICE O/P EST MOD 30 MIN: CPT | Performed by: FAMILY MEDICINE

## 2023-03-28 PROCEDURE — G8399 PT W/DXA RESULTS DOCUMENT: HCPCS | Performed by: FAMILY MEDICINE

## 2023-03-28 PROCEDURE — G8484 FLU IMMUNIZE NO ADMIN: HCPCS | Performed by: FAMILY MEDICINE

## 2023-03-28 PROCEDURE — 1090F PRES/ABSN URINE INCON ASSESS: CPT | Performed by: FAMILY MEDICINE

## 2023-03-28 PROCEDURE — 1036F TOBACCO NON-USER: CPT | Performed by: FAMILY MEDICINE

## 2023-03-28 PROCEDURE — G8417 CALC BMI ABV UP PARAM F/U: HCPCS | Performed by: FAMILY MEDICINE

## 2023-03-28 RX ORDER — NITROFURANTOIN 25; 75 MG/1; MG/1
100 CAPSULE ORAL 2 TIMES DAILY
Qty: 14 CAPSULE | Refills: 0 | Status: SHIPPED | OUTPATIENT
Start: 2023-03-28 | End: 2023-04-04

## 2023-03-28 RX ORDER — OMEPRAZOLE 20 MG/1
20 CAPSULE, DELAYED RELEASE ORAL DAILY
Qty: 90 CAPSULE | Refills: 1 | Status: CANCELLED | OUTPATIENT
Start: 2023-03-28

## 2023-03-31 LAB
BACTERIA UR CULT: ABNORMAL
ORGANISM: ABNORMAL

## 2023-03-31 NOTE — RESULT ENCOUNTER NOTE
Urine culture positive for over 100,000 E. coli sensitive to Macrobid, if tolerating and getting better follow-up as instructed at office visit sooner as needed

## 2023-04-06 PROCEDURE — 9900000038 HC CARDIAC REHAB PHASE 3 - MONTHLY

## 2023-04-21 ENCOUNTER — HOSPITAL ENCOUNTER (OUTPATIENT)
Age: 78
Discharge: HOME | End: 2023-04-21
Payer: MEDICARE

## 2023-04-21 DIAGNOSIS — E78.2 MIXED HYPERLIPIDEMIA: ICD-10-CM

## 2023-04-21 DIAGNOSIS — E03.9 ACQUIRED HYPOTHYROIDISM: ICD-10-CM

## 2023-04-21 DIAGNOSIS — N39.0 RECURRENT UTI: ICD-10-CM

## 2023-04-21 DIAGNOSIS — I10 ESSENTIAL HYPERTENSION: ICD-10-CM

## 2023-04-21 DIAGNOSIS — E55.9 VITAMIN D DEFICIENCY: ICD-10-CM

## 2023-04-21 DIAGNOSIS — E53.8 VITAMIN B12 DEFICIENCY: ICD-10-CM

## 2023-04-21 LAB
ALBUMIN SERPL-MCNC: 4.2 G/DL (ref 3.5–5.2)
ALP SERPL-CCNC: 68 U/L (ref 35–104)
ALT SERPL-CCNC: 21 U/L (ref 0–32)
ANION GAP SERPL CALCULATED.3IONS-SCNC: 12 MMOL/L (ref 7–16)
AST SERPL-CCNC: 21 U/L (ref 0–31)
BACTERIA URNS QL MICRO: ABNORMAL /HPF
BASOPHILS # BLD: 0.06 E9/L (ref 0–0.2)
BASOPHILS NFR BLD: 0.6 % (ref 0–2)
BILIRUB SERPL-MCNC: 0.4 MG/DL (ref 0–1.2)
BILIRUB UR QL STRIP: NEGATIVE
BUN SERPL-MCNC: 11 MG/DL (ref 6–23)
CALCIUM SERPL-MCNC: 9.1 MG/DL (ref 8.6–10.2)
CHLORIDE SERPL-SCNC: 107 MMOL/L (ref 98–107)
CHOLESTEROL, TOTAL: 209 MG/DL (ref 0–199)
CK SERPL-CCNC: 26 U/L (ref 20–180)
CLARITY UR: CLEAR
CO2 SERPL-SCNC: 24 MMOL/L (ref 22–29)
COLOR UR: YELLOW
CREAT SERPL-MCNC: 0.7 MG/DL (ref 0.5–1)
EOSINOPHIL # BLD: 0.14 E9/L (ref 0.05–0.5)
EOSINOPHIL NFR BLD: 1.3 % (ref 0–6)
EPI CELLS #/AREA URNS HPF: ABNORMAL /HPF
ERYTHROCYTE [DISTWIDTH] IN BLOOD BY AUTOMATED COUNT: 12.6 FL (ref 11.5–15)
FOLATE SERPL-MCNC: 8.7 NG/ML (ref 4.8–24.2)
GLUCOSE SERPL-MCNC: 83 MG/DL (ref 74–99)
GLUCOSE UR STRIP-MCNC: NEGATIVE MG/DL
HCT VFR BLD AUTO: 44.4 % (ref 34–48)
HDLC SERPL-MCNC: 44 MG/DL
HGB BLD-MCNC: 14.9 G/DL (ref 11.5–15.5)
HGB UR QL STRIP: NEGATIVE
IMM GRANULOCYTES # BLD: 0.04 E9/L
IMM GRANULOCYTES NFR BLD: 0.4 % (ref 0–5)
KETONES UR STRIP-MCNC: NEGATIVE MG/DL
LDLC SERPL CALC-MCNC: 114 MG/DL (ref 0–99)
LEUKOCYTE ESTERASE UR QL STRIP: ABNORMAL
LYMPHOCYTES # BLD: 4.9 E9/L (ref 1.5–4)
LYMPHOCYTES NFR BLD: 46.1 % (ref 20–42)
MCH RBC QN AUTO: 30.7 PG (ref 26–35)
MCHC RBC AUTO-ENTMCNC: 33.6 % (ref 32–34.5)
MCV RBC AUTO: 91.5 FL (ref 80–99.9)
MONOCYTES # BLD: 0.7 E9/L (ref 0.1–0.95)
MONOCYTES NFR BLD: 6.6 % (ref 2–12)
NEUTROPHILS # BLD: 4.8 E9/L (ref 1.8–7.3)
NEUTS SEG NFR BLD: 45 % (ref 43–80)
NITRITE UR QL STRIP: POSITIVE
PH UR STRIP: 5.5 [PH] (ref 5–9)
PLATELET # BLD AUTO: 175 E9/L (ref 130–450)
PMV BLD AUTO: 10.8 FL (ref 7–12)
POTASSIUM SERPL-SCNC: 3.8 MMOL/L (ref 3.5–5)
PROT SERPL-MCNC: 6.6 G/DL (ref 6.4–8.3)
PROT UR STRIP-MCNC: NEGATIVE MG/DL
RBC # BLD AUTO: 4.85 E12/L (ref 3.5–5.5)
RBC #/AREA URNS HPF: ABNORMAL /HPF (ref 0–2)
SODIUM SERPL-SCNC: 143 MMOL/L (ref 132–146)
SP GR UR STRIP: 1.02 (ref 1–1.03)
T4 FREE SERPL-MCNC: 1.46 NG/DL (ref 0.93–1.7)
TRIGL SERPL-MCNC: 256 MG/DL (ref 0–149)
TSH SERPL-MCNC: 1.42 UIU/ML (ref 0.27–4.2)
UROBILINOGEN UR STRIP-ACNC: 0.2 E.U./DL
VIT B12 SERPL-MCNC: 417 PG/ML (ref 211–946)
VITAMIN D 25-HYDROXY: 28 NG/ML (ref 30–100)
VLDLC SERPL CALC-MCNC: 51 MG/DL
WBC # BLD: 10.6 E9/L (ref 4.5–11.5)
WBC #/AREA URNS HPF: ABNORMAL /HPF (ref 0–5)

## 2023-04-21 PROCEDURE — 80053 COMPREHEN METABOLIC PANEL: CPT

## 2023-04-21 PROCEDURE — 82746 ASSAY OF FOLIC ACID SERUM: CPT

## 2023-04-21 PROCEDURE — 84439 ASSAY OF FREE THYROXINE: CPT

## 2023-04-21 PROCEDURE — 85025 COMPLETE CBC W/AUTO DIFF WBC: CPT

## 2023-04-21 PROCEDURE — 82607 VITAMIN B-12: CPT

## 2023-04-21 PROCEDURE — 36415 COLL VENOUS BLD VENIPUNCTURE: CPT

## 2023-04-21 PROCEDURE — 84443 ASSAY THYROID STIM HORMONE: CPT

## 2023-04-21 PROCEDURE — 82550 ASSAY OF CK (CPK): CPT

## 2023-04-21 PROCEDURE — 81001 URINALYSIS AUTO W/SCOPE: CPT

## 2023-04-21 PROCEDURE — 82306 VITAMIN D 25 HYDROXY: CPT

## 2023-04-21 PROCEDURE — 80061 LIPID PANEL: CPT

## 2023-04-24 ENCOUNTER — HOSPITAL ENCOUNTER (OUTPATIENT)
Dept: CARDIAC REHAB | Age: 78
Discharge: HOME | End: 2023-04-24

## 2023-04-25 ENCOUNTER — OFFICE VISIT (OUTPATIENT)
Dept: PRIMARY CARE CLINIC | Age: 78
End: 2023-04-25

## 2023-04-25 VITALS
BODY MASS INDEX: 32.61 KG/M2 | OXYGEN SATURATION: 97 % | WEIGHT: 191 LBS | HEIGHT: 64 IN | HEART RATE: 67 BPM | TEMPERATURE: 98.7 F | SYSTOLIC BLOOD PRESSURE: 124 MMHG | DIASTOLIC BLOOD PRESSURE: 80 MMHG

## 2023-04-25 DIAGNOSIS — M85.839 OSTEOPENIA OF FOREARM, UNSPECIFIED LATERALITY: ICD-10-CM

## 2023-04-25 DIAGNOSIS — R79.89 ELEVATED FERRITIN: ICD-10-CM

## 2023-04-25 DIAGNOSIS — Z86.79 HISTORY OF PAROXYSMAL SUPRAVENTRICULAR TACHYCARDIA: ICD-10-CM

## 2023-04-25 DIAGNOSIS — N39.0 RECURRENT UTI: Primary | ICD-10-CM

## 2023-04-25 DIAGNOSIS — I10 ESSENTIAL HYPERTENSION: ICD-10-CM

## 2023-04-25 DIAGNOSIS — E03.9 ACQUIRED HYPOTHYROIDISM: ICD-10-CM

## 2023-04-25 DIAGNOSIS — E78.2 MIXED HYPERLIPIDEMIA: ICD-10-CM

## 2023-04-25 DIAGNOSIS — I44.2 COMPLETE HEART BLOCK (HCC): ICD-10-CM

## 2023-04-25 DIAGNOSIS — F03.90 DEMENTIA WITHOUT BEHAVIORAL DISTURBANCE (HCC): ICD-10-CM

## 2023-04-25 DIAGNOSIS — E53.8 VITAMIN B12 DEFICIENCY: ICD-10-CM

## 2023-04-25 DIAGNOSIS — E55.9 VITAMIN D DEFICIENCY: ICD-10-CM

## 2023-04-25 DIAGNOSIS — E11.69 TYPE 2 DIABETES MELLITUS WITH OTHER SPECIFIED COMPLICATION, WITHOUT LONG-TERM CURRENT USE OF INSULIN (HCC): ICD-10-CM

## 2023-04-25 RX ORDER — NITROFURANTOIN 25; 75 MG/1; MG/1
100 CAPSULE ORAL 2 TIMES DAILY
Qty: 14 CAPSULE | Refills: 0 | Status: SHIPPED | OUTPATIENT
Start: 2023-04-25 | End: 2023-05-02

## 2023-04-25 NOTE — PROGRESS NOTES
Librado Sandoval : 1945 Sex: female  Age: 69 y.o. Chief Complaint   Patient presents with    Discuss Labs    Hypertension       HPI:    Presents for follow-up. Generally feeling well except symptoms of UTI again. Has been recurrent.  says she does have upcoming appointment with urology.    reports feeling slight improvement in her dementia symptoms      Blood work reviewed BMP normal LFTs normal BF28 6 O45 folic acid normal  HDL reportedly started 2-56 defers change in therapy TFTs normal vitamin D slightly suboptimal 28, continue appropriate documentation CBC gross normal differential reviewed urinalysis positive nitrite small leukocyte 1 3 RBC    Most Recent Labs  CBC  Lab Results   Component Value Date/Time    WBC 10.6 2023 05:00 PM    WBC 11.5 2023 05:10 PM    WBC 12.6 2023 04:26 PM    RBC 4.85 2023 05:00 PM    RBC 5.17 2023 05:10 PM    RBC 4.93 2023 04:26 PM    HGB 14.9 2023 05:00 PM    HGB 15.4 2023 05:10 PM    HGB 15.1 2023 04:26 PM    HCT 44.4 2023 05:00 PM    HCT 44.7 2023 05:10 PM    HCT 45.1 2023 04:26 PM    MCV 91.5 2023 05:00 PM    MCV 86.5 2023 05:10 PM    MCV 91.5 2023 04:26 PM     2023 05:00 PM     2023 05:10 PM     2023 04:26 PM      CMP  Lab Results   Component Value Date/Time     2023 05:00 PM     2023 05:10 PM     2023 04:26 PM    K 3.8 2023 05:00 PM    K 4.0 2023 05:10 PM    K 4.1 2023 04:26 PM     2023 05:00 PM     2023 05:10 PM     2023 04:26 PM    CO2 24 2023 05:00 PM    CO2 20 2023 05:10 PM    CO2 22 2023 04:26 PM    ANIONGAP 12 2023 05:00 PM    ANIONGAP 15 2023 05:10 PM    ANIONGAP 10 2023 04:26 PM    GLUCOSE 83 2023 05:00 PM    GLUCOSE 85 2023 05:10 PM    GLUCOSE 85 2023 04:26 PM

## 2023-05-08 ENCOUNTER — HOSPITAL ENCOUNTER (OUTPATIENT)
Age: 78
Discharge: HOME | End: 2023-05-08
Payer: MEDICARE

## 2023-05-08 DIAGNOSIS — E11.69 TYPE 2 DIABETES MELLITUS WITH OTHER SPECIFIED COMPLICATION, WITHOUT LONG-TERM CURRENT USE OF INSULIN (HCC): ICD-10-CM

## 2023-05-08 DIAGNOSIS — E03.9 ACQUIRED HYPOTHYROIDISM: ICD-10-CM

## 2023-05-08 DIAGNOSIS — E53.8 VITAMIN B12 DEFICIENCY: ICD-10-CM

## 2023-05-08 DIAGNOSIS — E55.9 VITAMIN D DEFICIENCY: ICD-10-CM

## 2023-05-08 DIAGNOSIS — R79.89 ELEVATED FERRITIN: ICD-10-CM

## 2023-05-08 DIAGNOSIS — E78.2 MIXED HYPERLIPIDEMIA: ICD-10-CM

## 2023-05-08 DIAGNOSIS — N39.0 RECURRENT UTI: ICD-10-CM

## 2023-05-08 LAB
ALBUMIN SERPL-MCNC: 4.2 G/DL (ref 3.5–5.2)
ALP SERPL-CCNC: 63 U/L (ref 35–104)
ALT SERPL-CCNC: 29 U/L (ref 0–32)
ANION GAP SERPL CALCULATED.3IONS-SCNC: 17 MMOL/L (ref 7–16)
AST SERPL-CCNC: 27 U/L (ref 0–31)
BACTERIA URNS QL MICRO: ABNORMAL /HPF
BASOPHILS # BLD: 0.07 E9/L (ref 0–0.2)
BASOPHILS NFR BLD: 0.7 % (ref 0–2)
BILIRUB SERPL-MCNC: 0.4 MG/DL (ref 0–1.2)
BILIRUB UR QL STRIP: NEGATIVE
BUN SERPL-MCNC: 9 MG/DL (ref 6–23)
CALCIUM SERPL-MCNC: 9.4 MG/DL (ref 8.6–10.2)
CHLORIDE SERPL-SCNC: 106 MMOL/L (ref 98–107)
CHOLESTEROL, TOTAL: 239 MG/DL (ref 0–199)
CK SERPL-CCNC: 28 U/L (ref 20–180)
CLARITY UR: ABNORMAL
CO2 SERPL-SCNC: 18 MMOL/L (ref 22–29)
COLOR UR: YELLOW
CREAT SERPL-MCNC: 0.7 MG/DL (ref 0.5–1)
CREAT UR-MCNC: 136 MG/DL (ref 29–226)
EOSINOPHIL # BLD: 0.15 E9/L (ref 0.05–0.5)
EOSINOPHIL NFR BLD: 1.4 % (ref 0–6)
EPI CELLS #/AREA URNS HPF: ABNORMAL /HPF
ERYTHROCYTE [DISTWIDTH] IN BLOOD BY AUTOMATED COUNT: 12.7 FL (ref 11.5–15)
FERRITIN SERPL-MCNC: 177 NG/ML
FOLATE SERPL-MCNC: 8.5 NG/ML (ref 4.8–24.2)
GLUCOSE SERPL-MCNC: 95 MG/DL (ref 74–99)
GLUCOSE UR STRIP-MCNC: NEGATIVE MG/DL
HBA1C MFR BLD: 5.9 % (ref 4–5.6)
HCT VFR BLD AUTO: 46 % (ref 34–48)
HDLC SERPL-MCNC: 48 MG/DL
HGB BLD-MCNC: 15.1 G/DL (ref 11.5–15.5)
HGB UR QL STRIP: NEGATIVE
IMM GRANULOCYTES # BLD: 0.03 E9/L
IMM GRANULOCYTES NFR BLD: 0.3 % (ref 0–5)
KETONES UR STRIP-MCNC: NEGATIVE MG/DL
LDLC SERPL CALC-MCNC: 141 MG/DL (ref 0–99)
LEUKOCYTE ESTERASE UR QL STRIP: ABNORMAL
LYMPHOCYTES # BLD: 4.19 E9/L (ref 1.5–4)
LYMPHOCYTES NFR BLD: 40.4 % (ref 20–42)
MCH RBC QN AUTO: 30.1 PG (ref 26–35)
MCHC RBC AUTO-ENTMCNC: 32.8 % (ref 32–34.5)
MCV RBC AUTO: 91.6 FL (ref 80–99.9)
MICROALBUMIN UR-MCNC: 12.3 MG/L
MICROALBUMIN/CREAT UR-RTO: 9 (ref 0–30)
MONOCYTES # BLD: 0.74 E9/L (ref 0.1–0.95)
MONOCYTES NFR BLD: 7.1 % (ref 2–12)
NEUTROPHILS # BLD: 5.18 E9/L (ref 1.8–7.3)
NEUTS SEG NFR BLD: 50.1 % (ref 43–80)
NITRITE UR QL STRIP: NEGATIVE
PH UR STRIP: 5.5 [PH] (ref 5–9)
PLATELET # BLD AUTO: 177 E9/L (ref 130–450)
PMV BLD AUTO: 10.3 FL (ref 7–12)
POTASSIUM SERPL-SCNC: 3.9 MMOL/L (ref 3.5–5)
PROT SERPL-MCNC: 6.8 G/DL (ref 6.4–8.3)
PROT UR STRIP-MCNC: NEGATIVE MG/DL
RBC # BLD AUTO: 5.02 E12/L (ref 3.5–5.5)
RBC #/AREA URNS HPF: ABNORMAL /HPF (ref 0–2)
SODIUM SERPL-SCNC: 141 MMOL/L (ref 132–146)
SP GR UR STRIP: 1.02 (ref 1–1.03)
T4 FREE SERPL-MCNC: 1.8 NG/DL (ref 0.93–1.7)
TRIGL SERPL-MCNC: 249 MG/DL (ref 0–149)
TSH SERPL-MCNC: 1.19 UIU/ML (ref 0.27–4.2)
UROBILINOGEN UR STRIP-ACNC: 0.2 E.U./DL
VIT B12 SERPL-MCNC: 401 PG/ML (ref 211–946)
VITAMIN D 25-HYDROXY: 29 NG/ML (ref 30–100)
VLDLC SERPL CALC-MCNC: 50 MG/DL
WBC # BLD: 10.4 E9/L (ref 4.5–11.5)
WBC #/AREA URNS HPF: ABNORMAL /HPF (ref 0–5)

## 2023-05-08 PROCEDURE — 82550 ASSAY OF CK (CPK): CPT

## 2023-05-08 PROCEDURE — 80053 COMPREHEN METABOLIC PANEL: CPT

## 2023-05-08 PROCEDURE — 82746 ASSAY OF FOLIC ACID SERUM: CPT

## 2023-05-08 PROCEDURE — 81001 URINALYSIS AUTO W/SCOPE: CPT

## 2023-05-08 PROCEDURE — 82306 VITAMIN D 25 HYDROXY: CPT

## 2023-05-08 PROCEDURE — 82607 VITAMIN B-12: CPT

## 2023-05-08 PROCEDURE — 36415 COLL VENOUS BLD VENIPUNCTURE: CPT

## 2023-05-08 PROCEDURE — 83036 HEMOGLOBIN GLYCOSYLATED A1C: CPT

## 2023-05-08 PROCEDURE — 82570 ASSAY OF URINE CREATININE: CPT

## 2023-05-08 PROCEDURE — 87088 URINE BACTERIA CULTURE: CPT

## 2023-05-08 PROCEDURE — 82728 ASSAY OF FERRITIN: CPT

## 2023-05-08 PROCEDURE — 80061 LIPID PANEL: CPT

## 2023-05-08 PROCEDURE — 84439 ASSAY OF FREE THYROXINE: CPT

## 2023-05-08 PROCEDURE — 85025 COMPLETE CBC W/AUTO DIFF WBC: CPT

## 2023-05-08 PROCEDURE — 82044 UR ALBUMIN SEMIQUANTITATIVE: CPT

## 2023-05-08 PROCEDURE — 84443 ASSAY THYROID STIM HORMONE: CPT

## 2023-05-08 NOTE — PROGRESS NOTES
Pt given discharge instructions. Verbalized understanding, no questions regarding care. Private Auto Walk in

## 2023-05-09 ENCOUNTER — OFFICE VISIT (OUTPATIENT)
Dept: PRIMARY CARE CLINIC | Age: 78
End: 2023-05-09
Payer: MEDICARE

## 2023-05-09 VITALS
OXYGEN SATURATION: 97 % | DIASTOLIC BLOOD PRESSURE: 80 MMHG | HEART RATE: 56 BPM | WEIGHT: 193 LBS | SYSTOLIC BLOOD PRESSURE: 118 MMHG | BODY MASS INDEX: 32.95 KG/M2 | HEIGHT: 64 IN | TEMPERATURE: 97.8 F

## 2023-05-09 DIAGNOSIS — E11.69 TYPE 2 DIABETES MELLITUS WITH OTHER SPECIFIED COMPLICATION, WITHOUT LONG-TERM CURRENT USE OF INSULIN (HCC): ICD-10-CM

## 2023-05-09 DIAGNOSIS — I10 ESSENTIAL HYPERTENSION: ICD-10-CM

## 2023-05-09 DIAGNOSIS — N39.0 RECURRENT UTI: ICD-10-CM

## 2023-05-09 DIAGNOSIS — F03.90 DEMENTIA WITHOUT BEHAVIORAL DISTURBANCE (HCC): ICD-10-CM

## 2023-05-09 DIAGNOSIS — E78.2 MIXED HYPERLIPIDEMIA: Primary | ICD-10-CM

## 2023-05-09 PROCEDURE — 3074F SYST BP LT 130 MM HG: CPT | Performed by: FAMILY MEDICINE

## 2023-05-09 PROCEDURE — 99214 OFFICE O/P EST MOD 30 MIN: CPT | Performed by: FAMILY MEDICINE

## 2023-05-09 PROCEDURE — 3044F HG A1C LEVEL LT 7.0%: CPT | Performed by: FAMILY MEDICINE

## 2023-05-09 PROCEDURE — 3079F DIAST BP 80-89 MM HG: CPT | Performed by: FAMILY MEDICINE

## 2023-05-09 PROCEDURE — G8399 PT W/DXA RESULTS DOCUMENT: HCPCS | Performed by: FAMILY MEDICINE

## 2023-05-09 PROCEDURE — 1090F PRES/ABSN URINE INCON ASSESS: CPT | Performed by: FAMILY MEDICINE

## 2023-05-09 PROCEDURE — G8417 CALC BMI ABV UP PARAM F/U: HCPCS | Performed by: FAMILY MEDICINE

## 2023-05-09 PROCEDURE — G8427 DOCREV CUR MEDS BY ELIG CLIN: HCPCS | Performed by: FAMILY MEDICINE

## 2023-05-09 PROCEDURE — 1036F TOBACCO NON-USER: CPT | Performed by: FAMILY MEDICINE

## 2023-05-09 PROCEDURE — 1123F ACP DISCUSS/DSCN MKR DOCD: CPT | Performed by: FAMILY MEDICINE

## 2023-05-09 RX ORDER — OMEGA-3-ACID ETHYL ESTERS 1 G/1
2 CAPSULE, LIQUID FILLED ORAL 2 TIMES DAILY
Qty: 360 CAPSULE | Refills: 3 | Status: SHIPPED | OUTPATIENT
Start: 2023-05-09

## 2023-05-09 NOTE — PROGRESS NOTES
Yaquelin Bourne : 1945 Sex: female  Age: 69 y.o. Chief Complaint   Patient presents with    Hypertension     Pt here for check up and discuss labs. Discuss Labs       HPI:    Presents for follow-up.  thinks medicine finally worked, patient not noting any urinary symptoms nor does he notice any odor    No acute complaints , Unfortunately lab once again sanna labs that were not intended, was supposed to have urinalysis which was overall negative and culture overall negative, however labs were not due until  but they sanna them early, we are looking into it. BMP shows CO2 18 anion gap 17 LFTs normal HDL 48  triglyceride 249 vitamin D was nearly toxic at one-point at 97 now 29, she is going to resume vitamin D3 2000 IUs daily.   Free T41.0 Taking medicine shortly before blood draw, TSH normal B12 normal CBC grossly normal differential reviewed ferritin 433 folic acid 8.5, not taking her Lovaza    Most Recent Labs  CBC  Lab Results   Component Value Date/Time    WBC 10.4 2023 04:02 PM    WBC 10.6 2023 05:00 PM    WBC 11.5 2023 05:10 PM    RBC 5.02 2023 04:02 PM    RBC 4.85 2023 05:00 PM    RBC 5.17 2023 05:10 PM    HGB 15.1 2023 04:02 PM    HGB 14.9 2023 05:00 PM    HGB 15.4 2023 05:10 PM    HCT 46.0 2023 04:02 PM    HCT 44.4 2023 05:00 PM    HCT 44.7 2023 05:10 PM    MCV 91.6 2023 04:02 PM    MCV 91.5 2023 05:00 PM    MCV 86.5 2023 05:10 PM     2023 04:02 PM     2023 05:00 PM     2023 05:10 PM      CMP  Lab Results   Component Value Date/Time     2023 04:02 PM     2023 05:00 PM     2023 05:10 PM    K 3.9 2023 04:02 PM    K 3.8 2023 05:00 PM    K 4.0 2023 05:10 PM     2023 04:02 PM     2023 05:00 PM     2023 05:10 PM    CO2 18 2023 04:02 PM    CO2 24

## 2023-05-10 LAB — BACTERIA UR CULT: NORMAL

## 2023-05-11 DIAGNOSIS — E03.9 ACQUIRED HYPOTHYROIDISM: ICD-10-CM

## 2023-05-11 DIAGNOSIS — I10 ESSENTIAL HYPERTENSION: ICD-10-CM

## 2023-05-11 RX ORDER — LEVOTHYROXINE SODIUM 0.1 MG/1
100 TABLET ORAL DAILY
Qty: 90 TABLET | Refills: 1 | Status: SHIPPED | OUTPATIENT
Start: 2023-05-11

## 2023-05-11 RX ORDER — METOPROLOL SUCCINATE 25 MG/1
25 TABLET, EXTENDED RELEASE ORAL 2 TIMES DAILY
Qty: 180 TABLET | Refills: 1 | Status: SHIPPED | OUTPATIENT
Start: 2023-05-11

## 2023-05-22 ENCOUNTER — HOSPITAL ENCOUNTER (OUTPATIENT)
Dept: CARDIAC REHAB | Age: 78
Discharge: HOME | End: 2023-05-22

## 2023-05-24 ENCOUNTER — OFFICE VISIT (OUTPATIENT)
Dept: PRIMARY CARE CLINIC | Age: 78
End: 2023-05-24
Payer: MEDICARE

## 2023-05-24 VITALS
WEIGHT: 193 LBS | HEIGHT: 64 IN | TEMPERATURE: 97.8 F | SYSTOLIC BLOOD PRESSURE: 124 MMHG | HEART RATE: 64 BPM | OXYGEN SATURATION: 95 % | BODY MASS INDEX: 32.95 KG/M2 | DIASTOLIC BLOOD PRESSURE: 68 MMHG

## 2023-05-24 DIAGNOSIS — N39.0 RECURRENT UTI: ICD-10-CM

## 2023-05-24 DIAGNOSIS — I10 ESSENTIAL HYPERTENSION: ICD-10-CM

## 2023-05-24 DIAGNOSIS — N39.0 URINARY TRACT INFECTION WITHOUT HEMATURIA, SITE UNSPECIFIED: ICD-10-CM

## 2023-05-24 DIAGNOSIS — E11.69 TYPE 2 DIABETES MELLITUS WITH OTHER SPECIFIED COMPLICATION, WITHOUT LONG-TERM CURRENT USE OF INSULIN (HCC): ICD-10-CM

## 2023-05-24 DIAGNOSIS — N39.0 URINARY TRACT INFECTION WITHOUT HEMATURIA, SITE UNSPECIFIED: Primary | ICD-10-CM

## 2023-05-24 PROCEDURE — G8427 DOCREV CUR MEDS BY ELIG CLIN: HCPCS | Performed by: FAMILY MEDICINE

## 2023-05-24 PROCEDURE — 1123F ACP DISCUSS/DSCN MKR DOCD: CPT | Performed by: FAMILY MEDICINE

## 2023-05-24 PROCEDURE — G8399 PT W/DXA RESULTS DOCUMENT: HCPCS | Performed by: FAMILY MEDICINE

## 2023-05-24 PROCEDURE — G8417 CALC BMI ABV UP PARAM F/U: HCPCS | Performed by: FAMILY MEDICINE

## 2023-05-24 PROCEDURE — 1036F TOBACCO NON-USER: CPT | Performed by: FAMILY MEDICINE

## 2023-05-24 PROCEDURE — 3078F DIAST BP <80 MM HG: CPT | Performed by: FAMILY MEDICINE

## 2023-05-24 PROCEDURE — 3074F SYST BP LT 130 MM HG: CPT | Performed by: FAMILY MEDICINE

## 2023-05-24 PROCEDURE — 81002 URINALYSIS NONAUTO W/O SCOPE: CPT | Performed by: FAMILY MEDICINE

## 2023-05-24 PROCEDURE — 1090F PRES/ABSN URINE INCON ASSESS: CPT | Performed by: FAMILY MEDICINE

## 2023-05-24 PROCEDURE — 99214 OFFICE O/P EST MOD 30 MIN: CPT | Performed by: FAMILY MEDICINE

## 2023-05-24 PROCEDURE — 3044F HG A1C LEVEL LT 7.0%: CPT | Performed by: FAMILY MEDICINE

## 2023-05-24 RX ORDER — NITROFURANTOIN 25; 75 MG/1; MG/1
100 CAPSULE ORAL 2 TIMES DAILY
Qty: 14 CAPSULE | Refills: 0 | Status: SHIPPED | OUTPATIENT
Start: 2023-05-24 | End: 2023-05-31

## 2023-05-24 NOTE — PROGRESS NOTES
competent    GORDY on CPAP     Pacemaker     Rectal bleeding     Ringing in the ears     Supraventricular tachycardia ([address] 2023     Past Surgical History:   Procedure Laterality Date    ABLATION OF DYSRHYTHMIC FOCUS      BREAST SURGERY      implants    COLONOSCOPY  ? Dr. Esther Solares twisted     COLONOSCOPY  ? Dr. Hermann Logan. incomplete. COLONOSCOPY  2018    Dr. Ramya Espinoza 2018    COLONOSCOPY DIAGNOSTIC performed by Rachel Thomas MD at [facility] N/A 2022    COLONOSCOPY DIAGNOSTIC performed by Jim Mazariegos MD at [facility]  09/10/2011    POSTERIOR    HYSTERECTOMY (CERVIX STATUS UNKNOWN)      vaginal. uterus only.      JOINT REPLACEMENT      KNEE ARTHROPLASTY Right     partial    KNEE ARTHROSCOPY  2011    right knee    PACEMAKER PLACEMENT  2014    Medtronic dual chamber    TOTAL HIP ARTHROPLASTY Right 2015    UPPER GASTROINTESTINAL ENDOSCOPY N/A 2022    EGD BIOPSY performed by Jim Mazariegos MD at [facility] History   Problem Relation Age of Onset    Pacemaker Mother     Heart Attack Father     Cancer Other 48        Bone Marrow Cancer / Had Bone Marrow Transplant     Social History     Tobacco Use    Smoking status: Former     Packs/day: 1.50     Years: 13.00     Pack years: 19.50     Types: Cigarettes     Start date: 1973     Quit date: 1986     Years since quittin.4    Smokeless tobacco: Never    Tobacco comments:     quit smoking    Vaping Use    Vaping Use: Never used   Substance Use Topics    Alcohol use: Yes     Comment: socially -- 3 drinks per month at the most    Drug use: No      Social History     Social History Narrative    PMH:    Problem List: Urinary tract infectious disease, Obstructive sleep apnea syndrome, Adult health    examination, Adult health examination, Constipation, Derangement of knee, Vitamin D deficiency,    Hypothyroidism, Conduction disorder of

## 2023-05-26 LAB — BACTERIA UR CULT: NORMAL

## 2023-06-05 ENCOUNTER — HOSPITAL ENCOUNTER (OUTPATIENT)
Age: 78
Discharge: HOME | End: 2023-06-05
Payer: MEDICARE

## 2023-06-05 ENCOUNTER — OFFICE VISIT (OUTPATIENT)
Dept: CARDIOLOGY CLINIC | Age: 78
End: 2023-06-05
Payer: MEDICARE

## 2023-06-05 VITALS
SYSTOLIC BLOOD PRESSURE: 118 MMHG | WEIGHT: 196.6 LBS | HEIGHT: 64 IN | HEART RATE: 51 BPM | BODY MASS INDEX: 33.57 KG/M2 | RESPIRATION RATE: 18 BRPM | DIASTOLIC BLOOD PRESSURE: 74 MMHG

## 2023-06-05 DIAGNOSIS — N39.0 URINARY TRACT INFECTION WITHOUT HEMATURIA, SITE UNSPECIFIED: ICD-10-CM

## 2023-06-05 DIAGNOSIS — E78.2 MIXED HYPERLIPIDEMIA: ICD-10-CM

## 2023-06-05 DIAGNOSIS — I44.2 COMPLETE HEART BLOCK (HCC): Primary | ICD-10-CM

## 2023-06-05 DIAGNOSIS — R60.0 LOWER EXTREMITY EDEMA: ICD-10-CM

## 2023-06-05 LAB
ALBUMIN SERPL-MCNC: 4.2 G/DL (ref 3.5–5.2)
ALP SERPL-CCNC: 67 U/L (ref 35–104)
ALT SERPL-CCNC: 26 U/L (ref 0–32)
ANION GAP SERPL CALCULATED.3IONS-SCNC: 13 MMOL/L (ref 7–16)
AST SERPL-CCNC: 25 U/L (ref 0–31)
BACTERIA URNS QL MICRO: NORMAL /HPF
BILIRUB SERPL-MCNC: 0.4 MG/DL (ref 0–1.2)
BILIRUB UR QL STRIP: NEGATIVE
BUN SERPL-MCNC: 11 MG/DL (ref 6–23)
CALCIUM SERPL-MCNC: 9.3 MG/DL (ref 8.6–10.2)
CHLORIDE SERPL-SCNC: 107 MMOL/L (ref 98–107)
CHOLESTEROL, TOTAL: 197 MG/DL (ref 0–199)
CK SERPL-CCNC: 29 U/L (ref 20–180)
CLARITY UR: CLEAR
CO2 SERPL-SCNC: 22 MMOL/L (ref 22–29)
COLOR UR: YELLOW
CREAT SERPL-MCNC: 0.7 MG/DL (ref 0.5–1)
GLUCOSE SERPL-MCNC: 70 MG/DL (ref 74–99)
GLUCOSE UR STRIP-MCNC: NEGATIVE MG/DL
HDLC SERPL-MCNC: 46 MG/DL
HGB UR QL STRIP: NEGATIVE
KETONES UR STRIP-MCNC: NEGATIVE MG/DL
LDLC SERPL CALC-MCNC: 97 MG/DL (ref 0–99)
LEUKOCYTE ESTERASE UR QL STRIP: ABNORMAL
NITRITE UR QL STRIP: NEGATIVE
PH UR STRIP: 6 [PH] (ref 5–9)
POTASSIUM SERPL-SCNC: 3.8 MMOL/L (ref 3.5–5)
PROT SERPL-MCNC: 6.7 G/DL (ref 6.4–8.3)
PROT UR STRIP-MCNC: NEGATIVE MG/DL
RBC #/AREA URNS HPF: NORMAL /HPF (ref 0–2)
SODIUM SERPL-SCNC: 142 MMOL/L (ref 132–146)
SP GR UR STRIP: 1.01 (ref 1–1.03)
TRIGL SERPL-MCNC: 272 MG/DL (ref 0–149)
UROBILINOGEN UR STRIP-ACNC: 0.2 E.U./DL
VLDLC SERPL CALC-MCNC: 54 MG/DL
WBC #/AREA URNS HPF: NORMAL /HPF (ref 0–5)

## 2023-06-05 PROCEDURE — 87088 URINE BACTERIA CULTURE: CPT

## 2023-06-05 PROCEDURE — G8427 DOCREV CUR MEDS BY ELIG CLIN: HCPCS | Performed by: INTERNAL MEDICINE

## 2023-06-05 PROCEDURE — G8417 CALC BMI ABV UP PARAM F/U: HCPCS | Performed by: INTERNAL MEDICINE

## 2023-06-05 PROCEDURE — 1123F ACP DISCUSS/DSCN MKR DOCD: CPT | Performed by: INTERNAL MEDICINE

## 2023-06-05 PROCEDURE — 80061 LIPID PANEL: CPT

## 2023-06-05 PROCEDURE — 36415 COLL VENOUS BLD VENIPUNCTURE: CPT

## 2023-06-05 PROCEDURE — 82550 ASSAY OF CK (CPK): CPT

## 2023-06-05 PROCEDURE — G8399 PT W/DXA RESULTS DOCUMENT: HCPCS | Performed by: INTERNAL MEDICINE

## 2023-06-05 PROCEDURE — 81001 URINALYSIS AUTO W/SCOPE: CPT

## 2023-06-05 PROCEDURE — 1036F TOBACCO NON-USER: CPT | Performed by: INTERNAL MEDICINE

## 2023-06-05 PROCEDURE — 80053 COMPREHEN METABOLIC PANEL: CPT

## 2023-06-05 PROCEDURE — 99204 OFFICE O/P NEW MOD 45 MIN: CPT | Performed by: INTERNAL MEDICINE

## 2023-06-05 PROCEDURE — 93000 ELECTROCARDIOGRAM COMPLETE: CPT | Performed by: INTERNAL MEDICINE

## 2023-06-05 PROCEDURE — 87186 SC STD MICRODIL/AGAR DIL: CPT

## 2023-06-05 PROCEDURE — 1090F PRES/ABSN URINE INCON ASSESS: CPT | Performed by: INTERNAL MEDICINE

## 2023-06-05 RX ORDER — ALENDRONATE SODIUM 70 MG/1
70 TABLET ORAL
COMMUNITY
Start: 2023-06-02

## 2023-06-05 NOTE — PROGRESS NOTES
quittin.4    Smokeless tobacco: Never    Tobacco comments:     quit smoking 1985   Vaping Use    Vaping Use: Never used   Substance and Sexual Activity    Alcohol use: Yes     Comment: socially -- 3 drinks per month at the most    Drug use: No   Social History Narrative    PMH:    Problem List: Urinary tract infectious disease, Obstructive sleep apnea syndrome, Adult health    examination, Adult health examination, Constipation, Derangement of knee, Vitamin D deficiency,    Hypothyroidism, Conduction disorder of the heart, Hyperlipidemia    Health Maintenance:    Mini Mental Status - (2018)    Colonoscopy - (2018)    Colonoscopy Screening - (2018)    Mammogram Screening - (2018)    Mammogram - (2018)    Colonoscopy - (2010)    Couseled on Home Safety - (2015)    Bone Density Scan - (3/28/2012)    Medical Problems:    Sleep Apnea - Dr Kelechi Ferrera, cpap    Pneumonia    Hypothyroidism - Dr Tomas Blanca - Dr Aries Balderas    Hyperlipidemia - intolerance to all statins    Cardiac Arrhythmia - ? type. s/p ablation    cardiac dysrhythmia - pauses - lead to pacemaker    Skin Cancer    Surgical Hx:    Partial Hysterectomy - Still with Both Ovaries. Breast Implants    Pacemaker - Dr Isabela Stevenson    Knee Replacement, Carpal Tunnel Release, colon-vaginal fistula repair    Bladder Repair - sling    RT Hip Arthroplasty - MARGARITA        FH:    Father:    . (Hx)    Mother:    . (Hx)    Dad - MI 39,  70 MI    Mom - DM , currently living age 80        SH: Marital: . 8 kids (7 living)    Personal Habits: Cigarette Use: Former Cigarette Smoker - quit age 36. Occ ETOH, minimal. . 2 kids, 6 step kids. 27 grandkids. .Alcohol: Rarely consumes alcohol     Social Determinants of Health     Financial Resource Strain: Low Risk     Difficulty of Paying Living Expenses: Not hard at all   Food Insecurity: No Food Insecurity    Worried About [employer] in the Last Year: Never true    Ran Out

## 2023-06-06 ENCOUNTER — OFFICE VISIT (OUTPATIENT)
Dept: PRIMARY CARE CLINIC | Age: 78
End: 2023-06-06
Payer: MEDICARE

## 2023-06-06 VITALS
BODY MASS INDEX: 33.3 KG/M2 | TEMPERATURE: 97.2 F | WEIGHT: 194 LBS | HEART RATE: 56 BPM | DIASTOLIC BLOOD PRESSURE: 62 MMHG | OXYGEN SATURATION: 96 % | SYSTOLIC BLOOD PRESSURE: 124 MMHG

## 2023-06-06 DIAGNOSIS — F03.90 DEMENTIA WITHOUT BEHAVIORAL DISTURBANCE (HCC): ICD-10-CM

## 2023-06-06 DIAGNOSIS — I47.1 SUPRAVENTRICULAR TACHYCARDIA (HCC): ICD-10-CM

## 2023-06-06 DIAGNOSIS — Z86.79 HISTORY OF PAROXYSMAL SUPRAVENTRICULAR TACHYCARDIA: ICD-10-CM

## 2023-06-06 DIAGNOSIS — I44.2 COMPLETE HEART BLOCK (HCC): ICD-10-CM

## 2023-06-06 DIAGNOSIS — N39.0 RECURRENT UTI: Primary | ICD-10-CM

## 2023-06-06 DIAGNOSIS — E11.69 TYPE 2 DIABETES MELLITUS WITH OTHER SPECIFIED COMPLICATION, WITHOUT LONG-TERM CURRENT USE OF INSULIN (HCC): ICD-10-CM

## 2023-06-06 DIAGNOSIS — I10 ESSENTIAL HYPERTENSION: ICD-10-CM

## 2023-06-06 DIAGNOSIS — E53.8 VITAMIN B12 DEFICIENCY: ICD-10-CM

## 2023-06-06 DIAGNOSIS — E55.9 VITAMIN D DEFICIENCY: ICD-10-CM

## 2023-06-06 DIAGNOSIS — E78.2 MIXED HYPERLIPIDEMIA: ICD-10-CM

## 2023-06-06 DIAGNOSIS — E03.9 ACQUIRED HYPOTHYROIDISM: ICD-10-CM

## 2023-06-06 DIAGNOSIS — M85.839 OSTEOPENIA OF FOREARM, UNSPECIFIED LATERALITY: ICD-10-CM

## 2023-06-06 PROCEDURE — 1090F PRES/ABSN URINE INCON ASSESS: CPT | Performed by: FAMILY MEDICINE

## 2023-06-06 PROCEDURE — 3044F HG A1C LEVEL LT 7.0%: CPT | Performed by: FAMILY MEDICINE

## 2023-06-06 PROCEDURE — 1036F TOBACCO NON-USER: CPT | Performed by: FAMILY MEDICINE

## 2023-06-06 PROCEDURE — G8399 PT W/DXA RESULTS DOCUMENT: HCPCS | Performed by: FAMILY MEDICINE

## 2023-06-06 PROCEDURE — 3074F SYST BP LT 130 MM HG: CPT | Performed by: FAMILY MEDICINE

## 2023-06-06 PROCEDURE — 1123F ACP DISCUSS/DSCN MKR DOCD: CPT | Performed by: FAMILY MEDICINE

## 2023-06-06 PROCEDURE — G8417 CALC BMI ABV UP PARAM F/U: HCPCS | Performed by: FAMILY MEDICINE

## 2023-06-06 PROCEDURE — 3078F DIAST BP <80 MM HG: CPT | Performed by: FAMILY MEDICINE

## 2023-06-06 PROCEDURE — G8427 DOCREV CUR MEDS BY ELIG CLIN: HCPCS | Performed by: FAMILY MEDICINE

## 2023-06-06 PROCEDURE — 99214 OFFICE O/P EST MOD 30 MIN: CPT | Performed by: FAMILY MEDICINE

## 2023-06-06 NOTE — PROGRESS NOTES
tachycardia ([address]  h/o svt Status post ablation  . Also history of long pauses-since had pacemaker by Dr. Cameron Campa. As of 6/23 recently saw Dr. Chaka Yao recommended echocardiogram and following with EP    Liver disease  Counseled extensively. Differential reviewed, including serious etiologies. Likely  fatty liver. Precautions reviewed. Lifestyle modification appropriate diet and weight  loss reviewed. Risks of even this reviewed. Merely wants basic bw monitoring at this time. Hep C 10/18 negative, again was negative 9/20. LFTs currently normal           Tubular adenoma  Small polyp 7/18. Dr. Ellin Lesch recommended basic screenings if repeated at all. Asymptomatic. Repeat colonoscopy Dr. Regine Roque 1/22 showed hemorrhoids otherwise negative     Vitamin D deficiency  On supplementation she was borderline toxic, it has now dropped to somewhat suboptimal 28-29-I would recommend vitamin D3 2000 IUs daily     Health maintenance examination  Health maintenance issues discussed at length  9/22  Encourage yearly. B12 deficiency  Risk of high and low reviewed. Normal, continue current dosing          Breast cancer screening  Mammogram was negative 9/22    Complete heart block  History of. Status post dual-chamber pacemaker. Continue per cardiology and EP. No problem-specific Assessment & Plan notes found for this encounter. Plan as above. Counseled extensively and differential diagnoses relevant to above were reviewed, including serious etiologies, risks and complications, especially of left uncontrolled. If relevant, instructions and  alternatives to meds/treatment reviewed, as well as interactions, and  SE's/ADRs reviewed, notify immediately if any, discontinuing new meds if any. Plan made after discussion and shared decision making. Counseled. Compliance with meds reviewed.   Continue per multiple specialist.  Planning blood work in 3 months but  has appointment in 1 month and he would like her

## 2023-06-07 DIAGNOSIS — N39.0 RECURRENT UTI: Primary | ICD-10-CM

## 2023-06-07 RX ORDER — CEPHALEXIN 500 MG/1
500 CAPSULE ORAL 2 TIMES DAILY
Qty: 14 CAPSULE | Refills: 0 | Status: SHIPPED | OUTPATIENT
Start: 2023-06-07 | End: 2023-06-14

## 2023-06-07 NOTE — RESULT ENCOUNTER NOTE
Urine culture starting to grow organism again, patient's  notes \"odor consistent with past UTIs\" again. Sensitivity not back. In the meantime we will start cephalexin 500 twice a day for 7 days with standard precautions. Send back so I can send to pharmacy if patient agrees. Also please check if has appointment with urology, if not please refer back for \"recurrent UTIs\".

## 2023-06-08 LAB
BACTERIA UR CULT: ABNORMAL
ORGANISM: ABNORMAL

## 2023-06-16 ENCOUNTER — TELEPHONE (OUTPATIENT)
Dept: GERIATRIC MEDICINE | Age: 78
End: 2023-06-16

## 2023-06-21 NOTE — TELEPHONE ENCOUNTER
I've checked with various sources in the community and not aware of anyone giving the drug locally . May need to check with [facility] [employer] or [facility].

## 2023-06-23 NOTE — TELEPHONE ENCOUNTER
notified re:  DR's reply, and that, per discussion with Dr Edmond Manzano, if  finds a place that gives the med,  Dr Edmond Manzano said he would make a referral.   appreciates the return call with update.

## 2023-07-19 ENCOUNTER — OFFICE VISIT (OUTPATIENT)
Dept: FAMILY MEDICINE CLINIC | Age: 78
End: 2023-07-19
Payer: MEDICARE

## 2023-07-19 ENCOUNTER — TELEPHONE (OUTPATIENT)
Dept: PRIMARY CARE CLINIC | Age: 78
End: 2023-07-19

## 2023-07-19 VITALS
WEIGHT: 198.2 LBS | HEART RATE: 56 BPM | DIASTOLIC BLOOD PRESSURE: 82 MMHG | OXYGEN SATURATION: 96 % | TEMPERATURE: 97.6 F | SYSTOLIC BLOOD PRESSURE: 136 MMHG | BODY MASS INDEX: 34.02 KG/M2

## 2023-07-19 DIAGNOSIS — N39.0 URINARY TRACT INFECTION WITH HEMATURIA, SITE UNSPECIFIED: Primary | ICD-10-CM

## 2023-07-19 DIAGNOSIS — R30.0 DYSURIA: ICD-10-CM

## 2023-07-19 DIAGNOSIS — R82.90 FOUL SMELLING URINE: ICD-10-CM

## 2023-07-19 DIAGNOSIS — R31.9 URINARY TRACT INFECTION WITH HEMATURIA, SITE UNSPECIFIED: Primary | ICD-10-CM

## 2023-07-19 LAB
BILIRUBIN, POC: NORMAL
BLOOD URINE, POC: NORMAL
CLARITY, POC: NORMAL
COLOR, POC: YELLOW
GLUCOSE URINE, POC: NORMAL
KETONES, POC: NORMAL
LEUKOCYTE EST, POC: NORMAL
NITRITE, POC: POSITIVE
PH, POC: 6
PROTEIN, POC: NORMAL
SPECIFIC GRAVITY, POC: 1.02
UROBILINOGEN, POC: 0.2

## 2023-07-19 PROCEDURE — 1123F ACP DISCUSS/DSCN MKR DOCD: CPT | Performed by: PHYSICIAN ASSISTANT

## 2023-07-19 PROCEDURE — 81002 URINALYSIS NONAUTO W/O SCOPE: CPT | Performed by: PHYSICIAN ASSISTANT

## 2023-07-19 PROCEDURE — 1090F PRES/ABSN URINE INCON ASSESS: CPT | Performed by: PHYSICIAN ASSISTANT

## 2023-07-19 PROCEDURE — 99214 OFFICE O/P EST MOD 30 MIN: CPT | Performed by: PHYSICIAN ASSISTANT

## 2023-07-19 PROCEDURE — G8427 DOCREV CUR MEDS BY ELIG CLIN: HCPCS | Performed by: PHYSICIAN ASSISTANT

## 2023-07-19 PROCEDURE — 96372 THER/PROPH/DIAG INJ SC/IM: CPT | Performed by: PHYSICIAN ASSISTANT

## 2023-07-19 PROCEDURE — 1036F TOBACCO NON-USER: CPT | Performed by: PHYSICIAN ASSISTANT

## 2023-07-19 PROCEDURE — G8399 PT W/DXA RESULTS DOCUMENT: HCPCS | Performed by: PHYSICIAN ASSISTANT

## 2023-07-19 PROCEDURE — G8417 CALC BMI ABV UP PARAM F/U: HCPCS | Performed by: PHYSICIAN ASSISTANT

## 2023-07-19 RX ORDER — CEFDINIR 300 MG/1
300 CAPSULE ORAL 2 TIMES DAILY
Qty: 20 CAPSULE | Refills: 0 | Status: SHIPPED | OUTPATIENT
Start: 2023-07-19 | End: 2023-07-29

## 2023-07-19 NOTE — PROGRESS NOTES
Organism Escherichia coli Abnormal     Urine Culture, Routine >100,000 CFU/ml    Resulting Agency [facility]        Susceptibility    Escherichia coli (1)    Antibiotic Interpretation Microscan  Method Status    amoxicillin-clavulanate Sensitive ^8 mcg/mL BACTERIAL SUSCEPTIBILITY PANEL BY STONE     ceFAZolin Sensitive <=^4 mcg/mL BACTERIAL SUSCEPTIBILITY PANEL BY STONE     cefepime Sensitive <=^0.12 mcg/mL BACTERIAL SUSCEPTIBILITY PANEL BY STONE     cefotaxime Sensitive <=^0.25 mcg/mL BACTERIAL SUSCEPTIBILITY PANEL BY STONE     cefOXitin Sensitive <=^4 mcg/mL BACTERIAL SUSCEPTIBILITY PANEL BY STONE     cefTAZidime-avibactam Sensitive ^0. 25 mcg/mL BACTERIAL SUSCEPTIBILITY PANEL BY STONE     gentamicin Sensitive <=^1 mcg/mL BACTERIAL SUSCEPTIBILITY PANEL BY STONE     levofloxacin Sensitive <=^0.12 mcg/mL BACTERIAL SUSCEPTIBILITY PANEL BY STONE     meropenem Sensitive <=^0.25 mcg/mL BACTERIAL SUSCEPTIBILITY PANEL BY STONE     nitrofurantoin Resistant ^128 mcg/mL BACTERIAL SUSCEPTIBILITY PANEL BY STONE     piperacillin-tazobactam Sensitive <=^4 mcg/mL BACTERIAL SUSCEPTIBILITY PANEL BY STONE     trimethoprim-sulfamethoxazole Sensitive <=^20 mcg/mL BACTERIAL SUSCEPTIBILITY PANEL BY STONE        Narrative  Performed by:  Josiah Cisneros  Source: URINE       Site:                 Specimen Collected: 06/05/23 15:30 EDT Last Resulted: 06/08/23 07:59 EDT           Results for orders placed or performed in visit on 07/19/23   POCT Urinalysis no Micro   Result Value Ref Range    Color, UA yellow     Clarity, UA cloudy     Glucose, UA POC neg     Bilirubin, UA neg     Ketones, UA neg     Spec Grav, UA 1.025     Blood, UA POC trace-intact     pH, UA 6.0     Protein, UA POC neg     Urobilinogen, UA 0.2     Leukocytes, UA small     Nitrite, UA positive          Medical Decision Making:     Vital signs reviewed    Past medical history reviewed. Allergies reviewed. Medications reviewed.     Patient on arrival

## 2023-07-19 NOTE — TELEPHONE ENCOUNTER
calling stating he feels pts UTI has returned. He states \"she stinks again\" and has dysuria.  She has appt 7/25 but he is asking if you can do anything now

## 2023-07-22 LAB
CULTURE: ABNORMAL
SPECIMEN DESCRIPTION: ABNORMAL

## 2023-07-22 NOTE — RESULT ENCOUNTER NOTE
Urine culture did show evidence of E. coli. It is sensitive to the antibiotic.   Please continue with antibiotic and follow-up with PCP or return to [facility]

## 2023-07-24 ENCOUNTER — HOSPITAL ENCOUNTER (OUTPATIENT)
Age: 78
Discharge: HOME | End: 2023-07-24
Payer: MEDICARE

## 2023-07-24 DIAGNOSIS — E11.69 TYPE 2 DIABETES MELLITUS WITH OTHER SPECIFIED COMPLICATION, WITHOUT LONG-TERM CURRENT USE OF INSULIN (HCC): ICD-10-CM

## 2023-07-24 DIAGNOSIS — E55.9 VITAMIN D DEFICIENCY: ICD-10-CM

## 2023-07-24 DIAGNOSIS — E53.8 VITAMIN B12 DEFICIENCY: ICD-10-CM

## 2023-07-24 DIAGNOSIS — N39.0 RECURRENT UTI: ICD-10-CM

## 2023-07-24 DIAGNOSIS — E03.9 ACQUIRED HYPOTHYROIDISM: ICD-10-CM

## 2023-07-24 DIAGNOSIS — E78.2 MIXED HYPERLIPIDEMIA: ICD-10-CM

## 2023-07-24 LAB
25(OH)D3 SERPL-MCNC: 21.6 NG/ML (ref 30–100)
ALBUMIN SERPL-MCNC: 4.1 G/DL (ref 3.5–5.2)
ALP SERPL-CCNC: 64 U/L (ref 35–104)
ALT SERPL-CCNC: 32 U/L (ref 0–32)
ANION GAP SERPL CALCULATED.3IONS-SCNC: 16 MMOL/L (ref 7–16)
AST SERPL-CCNC: 30 U/L (ref 0–31)
BASOPHILS # BLD: 0.06 K/UL (ref 0–0.2)
BASOPHILS NFR BLD: 1 % (ref 0–2)
BILIRUB SERPL-MCNC: 0.5 MG/DL (ref 0–1.2)
BILIRUB UR QL STRIP: NEGATIVE
BUN SERPL-MCNC: 9 MG/DL (ref 6–23)
CALCIUM SERPL-MCNC: 8.8 MG/DL (ref 8.6–10.2)
CHLORIDE SERPL-SCNC: 109 MMOL/L (ref 98–107)
CHOLEST SERPL-MCNC: 197 MG/DL
CK SERPL-CCNC: 24 U/L (ref 20–180)
CLARITY UR: CLEAR
CO2 SERPL-SCNC: 19 MMOL/L (ref 22–29)
COLOR UR: YELLOW
CREAT SERPL-MCNC: 0.7 MG/DL (ref 0.5–1)
EOSINOPHIL # BLD: 0.13 K/UL (ref 0.05–0.5)
EOSINOPHILS RELATIVE PERCENT: 2 % (ref 0–6)
ERYTHROCYTE [DISTWIDTH] IN BLOOD BY AUTOMATED COUNT: 12.6 % (ref 11.5–15)
FOLATE SERPL-MCNC: 11 NG/ML (ref 4.8–24.2)
GFR SERPL CREATININE-BSD FRML MDRD: >60 ML/MIN/1.73M2
GLUCOSE SERPL-MCNC: 104 MG/DL (ref 74–99)
GLUCOSE UR STRIP-MCNC: NEGATIVE MG/DL
HCT VFR BLD AUTO: 44.4 % (ref 34–48)
HDLC SERPL-MCNC: 48 MG/DL
HGB BLD-MCNC: 14.8 G/DL (ref 11.5–15.5)
HGB UR QL STRIP.AUTO: NEGATIVE
IMM GRANULOCYTES # BLD AUTO: 0.04 K/UL (ref 0–0.58)
IMM GRANULOCYTES NFR BLD: 0 % (ref 0–5)
KETONES UR STRIP-MCNC: NEGATIVE MG/DL
LDLC SERPL CALC-MCNC: 120 MG/DL
LEUKOCYTE ESTERASE UR QL STRIP: ABNORMAL
LYMPHOCYTES NFR BLD: 4.15 K/UL (ref 1.5–4)
LYMPHOCYTES RELATIVE PERCENT: 47 % (ref 20–42)
MCH RBC QN AUTO: 30.6 PG (ref 26–35)
MCHC RBC AUTO-ENTMCNC: 33.3 G/DL (ref 32–34.5)
MCV RBC AUTO: 91.9 FL (ref 80–99.9)
MONOCYTES NFR BLD: 0.55 K/UL (ref 0.1–0.95)
MONOCYTES NFR BLD: 6 % (ref 2–12)
NEUTROPHILS NFR BLD: 45 % (ref 43–80)
NEUTS SEG NFR BLD: 3.98 K/UL (ref 1.8–7.3)
NITRITE UR QL STRIP: NEGATIVE
PH UR STRIP: 6 [PH] (ref 5–9)
PLATELET # BLD AUTO: 154 K/UL (ref 130–450)
PMV BLD AUTO: 10.2 FL (ref 7–12)
POTASSIUM SERPL-SCNC: 4 MMOL/L (ref 3.5–5)
PROT SERPL-MCNC: 6.5 G/DL (ref 6.4–8.3)
PROT UR STRIP-MCNC: NEGATIVE MG/DL
RBC # BLD AUTO: 4.83 M/UL (ref 3.5–5.5)
SODIUM SERPL-SCNC: 144 MMOL/L (ref 132–146)
SP GR UR STRIP: 1.02 (ref 1–1.03)
T4 FREE SERPL-MCNC: 1.6 NG/DL (ref 0.9–1.7)
TRIGL SERPL-MCNC: 147 MG/DL
TSH SERPL DL<=0.05 MIU/L-ACNC: 1.68 UIU/ML (ref 0.27–4.2)
UROBILINOGEN UR STRIP-ACNC: 0.2 EU/DL (ref 0–1)
VIT B12 SERPL-MCNC: 437 PG/ML (ref 211–946)
VLDLC SERPL CALC-MCNC: 29 MG/DL
WBC OTHER # BLD: 8.9 K/UL (ref 4.5–11.5)

## 2023-07-24 PROCEDURE — 82306 VITAMIN D 25 HYDROXY: CPT

## 2023-07-24 PROCEDURE — 87086 URINE CULTURE/COLONY COUNT: CPT

## 2023-07-24 PROCEDURE — 82550 ASSAY OF CK (CPK): CPT

## 2023-07-24 PROCEDURE — 82746 ASSAY OF FOLIC ACID SERUM: CPT

## 2023-07-24 PROCEDURE — 84439 ASSAY OF FREE THYROXINE: CPT

## 2023-07-24 PROCEDURE — 36415 COLL VENOUS BLD VENIPUNCTURE: CPT

## 2023-07-24 PROCEDURE — 84443 ASSAY THYROID STIM HORMONE: CPT

## 2023-07-24 PROCEDURE — 85027 COMPLETE CBC AUTOMATED: CPT

## 2023-07-24 PROCEDURE — 82607 VITAMIN B-12: CPT

## 2023-07-24 PROCEDURE — 80061 LIPID PANEL: CPT

## 2023-07-24 PROCEDURE — 80053 COMPREHEN METABOLIC PANEL: CPT

## 2023-07-25 ENCOUNTER — OFFICE VISIT (OUTPATIENT)
Dept: PRIMARY CARE CLINIC | Age: 78
End: 2023-07-25
Payer: MEDICARE

## 2023-07-25 VITALS
HEART RATE: 54 BPM | WEIGHT: 197 LBS | SYSTOLIC BLOOD PRESSURE: 110 MMHG | TEMPERATURE: 98.6 F | DIASTOLIC BLOOD PRESSURE: 70 MMHG | HEIGHT: 64 IN | OXYGEN SATURATION: 97 % | BODY MASS INDEX: 33.63 KG/M2

## 2023-07-25 DIAGNOSIS — M85.839 OSTEOPENIA OF FOREARM, UNSPECIFIED LATERALITY: ICD-10-CM

## 2023-07-25 DIAGNOSIS — E11.69 TYPE 2 DIABETES MELLITUS WITH OTHER SPECIFIED COMPLICATION, WITHOUT LONG-TERM CURRENT USE OF INSULIN (HCC): ICD-10-CM

## 2023-07-25 DIAGNOSIS — I44.2 COMPLETE HEART BLOCK (HCC): ICD-10-CM

## 2023-07-25 DIAGNOSIS — E03.9 ACQUIRED HYPOTHYROIDISM: ICD-10-CM

## 2023-07-25 DIAGNOSIS — E55.9 VITAMIN D DEFICIENCY: ICD-10-CM

## 2023-07-25 DIAGNOSIS — I10 ESSENTIAL HYPERTENSION: Primary | ICD-10-CM

## 2023-07-25 DIAGNOSIS — E53.8 VITAMIN B12 DEFICIENCY: ICD-10-CM

## 2023-07-25 DIAGNOSIS — N39.0 RECURRENT UTI: ICD-10-CM

## 2023-07-25 DIAGNOSIS — E78.2 MIXED HYPERLIPIDEMIA: ICD-10-CM

## 2023-07-25 DIAGNOSIS — F03.90 DEMENTIA WITHOUT BEHAVIORAL DISTURBANCE (HCC): ICD-10-CM

## 2023-07-25 DIAGNOSIS — Z86.79 HISTORY OF PAROXYSMAL SUPRAVENTRICULAR TACHYCARDIA: ICD-10-CM

## 2023-07-25 PROCEDURE — 3074F SYST BP LT 130 MM HG: CPT | Performed by: FAMILY MEDICINE

## 2023-07-25 PROCEDURE — 99215 OFFICE O/P EST HI 40 MIN: CPT | Performed by: FAMILY MEDICINE

## 2023-07-25 PROCEDURE — 3078F DIAST BP <80 MM HG: CPT | Performed by: FAMILY MEDICINE

## 2023-07-25 PROCEDURE — G8417 CALC BMI ABV UP PARAM F/U: HCPCS | Performed by: FAMILY MEDICINE

## 2023-07-25 PROCEDURE — 1036F TOBACCO NON-USER: CPT | Performed by: FAMILY MEDICINE

## 2023-07-25 PROCEDURE — G8399 PT W/DXA RESULTS DOCUMENT: HCPCS | Performed by: FAMILY MEDICINE

## 2023-07-25 PROCEDURE — G8427 DOCREV CUR MEDS BY ELIG CLIN: HCPCS | Performed by: FAMILY MEDICINE

## 2023-07-25 PROCEDURE — 1123F ACP DISCUSS/DSCN MKR DOCD: CPT | Performed by: FAMILY MEDICINE

## 2023-07-25 PROCEDURE — 3044F HG A1C LEVEL LT 7.0%: CPT | Performed by: FAMILY MEDICINE

## 2023-07-25 PROCEDURE — 1090F PRES/ABSN URINE INCON ASSESS: CPT | Performed by: FAMILY MEDICINE

## 2023-07-25 RX ORDER — METOPROLOL SUCCINATE 25 MG/1
25 TABLET, EXTENDED RELEASE ORAL 2 TIMES DAILY
Qty: 180 TABLET | Refills: 1 | Status: SHIPPED | OUTPATIENT
Start: 2023-07-25

## 2023-07-25 NOTE — PROGRESS NOTES
03:15 PM    ANIONGAP 17 05/08/2023 04:02 PM    GLUCOSE 104 07/24/2023 09:50 AM    GLUCOSE 70 06/05/2023 03:15 PM    GLUCOSE 95 05/08/2023 04:02 PM    GLUCOSE 104 08/15/2011 10:50 AM    GLUCOSE 113 04/25/2011 02:28 PM    GLUCOSE 97 04/01/2011 10:40 AM    BUN 9 07/24/2023 09:50 AM    BUN 11 06/05/2023 03:15 PM    BUN 9 05/08/2023 04:02 PM    CREATININE 0.7 07/24/2023 09:50 AM    CREATININE 0.7 06/05/2023 03:15 PM    CREATININE 0.7 05/08/2023 04:02 PM    LABGLOM >60 07/24/2023 09:50 AM    LABGLOM >60 06/05/2023 03:15 PM    LABGLOM >60 05/08/2023 04:02 PM    GFRAA >60 10/04/2022 04:05 PM    GFRAA >60 09/09/2022 01:00 PM    GFRAA >60 04/28/2022 01:35 PM    CALCIUM 8.8 07/24/2023 09:50 AM    CALCIUM 9.3 06/05/2023 03:15 PM    CALCIUM 9.4 05/08/2023 04:02 PM    PROT 6.5 07/24/2023 09:50 AM    PROT 6.7 06/05/2023 03:15 PM    PROT 6.8 05/08/2023 04:02 PM    LABALBU 4.1 07/24/2023 09:50 AM    LABALBU 4.2 06/05/2023 03:15 PM    LABALBU 4.2 05/08/2023 04:02 PM    LABALBU 4.4 08/15/2011 10:50 AM    LABALBU 4.2 04/25/2011 02:28 PM    LABALBU 4.2 03/24/2011 03:55 AM    BILITOT 0.5 07/24/2023 09:50 AM    BILITOT 0.4 06/05/2023 03:15 PM    BILITOT 0.4 05/08/2023 04:02 PM    ALKPHOS 64 07/24/2023 09:50 AM    ALKPHOS 67 06/05/2023 03:15 PM    ALKPHOS 63 05/08/2023 04:02 PM    AST 30 07/24/2023 09:50 AM    AST 25 06/05/2023 03:15 PM    AST 27 05/08/2023 04:02 PM    ALT 32 07/24/2023 09:50 AM    ALT 26 06/05/2023 03:15 PM    ALT 29 05/08/2023 04:02 PM     A1C  Lab Results   Component Value Date/Time    LABA1C 5.9 05/08/2023 04:02 PM    LABA1C 5.8 02/22/2023 05:10 PM    LABA1C 5.6 10/04/2022 04:05 PM     TSH  Lab Results   Component Value Date/Time    TSH 1.68 07/24/2023 09:50 AM    TSH 1.190 05/08/2023 04:02 PM    TSH 1.420 04/21/2023 05:00 PM     FREET4  Lab Results   Component Value Date/Time    T1MTCKE 7.1 06/26/2019 03:07 PM    C8NIUOO 7.6 05/21/2014 12:13 PM     LIPID  Lab Results   Component Value Date/Time    CHOL 197 07/24/2023

## 2023-07-26 ENCOUNTER — OFFICE VISIT (OUTPATIENT)
Dept: NEUROLOGY | Age: 78
End: 2023-07-26
Payer: MEDICARE

## 2023-07-26 VITALS
WEIGHT: 195 LBS | DIASTOLIC BLOOD PRESSURE: 67 MMHG | SYSTOLIC BLOOD PRESSURE: 120 MMHG | BODY MASS INDEX: 33.47 KG/M2 | HEART RATE: 55 BPM | TEMPERATURE: 97.6 F | OXYGEN SATURATION: 95 %

## 2023-07-26 DIAGNOSIS — F03.90 DEMENTIA, UNSPECIFIED DEMENTIA SEVERITY, UNSPECIFIED DEMENTIA TYPE, UNSPECIFIED WHETHER BEHAVIORAL, PSYCHOTIC, OR MOOD DISTURBANCE OR ANXIETY (HCC): Primary | ICD-10-CM

## 2023-07-26 DIAGNOSIS — R26.9 GAIT DIFFICULTY: ICD-10-CM

## 2023-07-26 PROCEDURE — 1090F PRES/ABSN URINE INCON ASSESS: CPT | Performed by: CLINICAL NURSE SPECIALIST

## 2023-07-26 PROCEDURE — G8399 PT W/DXA RESULTS DOCUMENT: HCPCS | Performed by: CLINICAL NURSE SPECIALIST

## 2023-07-26 PROCEDURE — G8427 DOCREV CUR MEDS BY ELIG CLIN: HCPCS | Performed by: CLINICAL NURSE SPECIALIST

## 2023-07-26 PROCEDURE — G8417 CALC BMI ABV UP PARAM F/U: HCPCS | Performed by: CLINICAL NURSE SPECIALIST

## 2023-07-26 PROCEDURE — 1036F TOBACCO NON-USER: CPT | Performed by: CLINICAL NURSE SPECIALIST

## 2023-07-26 PROCEDURE — 1123F ACP DISCUSS/DSCN MKR DOCD: CPT | Performed by: CLINICAL NURSE SPECIALIST

## 2023-07-26 PROCEDURE — 99215 OFFICE O/P EST HI 40 MIN: CPT | Performed by: CLINICAL NURSE SPECIALIST

## 2023-07-27 ENCOUNTER — TELEPHONE (OUTPATIENT)
Dept: NEUROLOGY | Age: 78
End: 2023-07-27

## 2023-07-27 NOTE — TELEPHONE ENCOUNTER
Faxed referral to [facility] for dementia specialist. Aura Sample to media.

## 2023-08-21 RX ORDER — MEMANTINE HYDROCHLORIDE 10 MG/1
10 TABLET ORAL 2 TIMES DAILY
Qty: 180 TABLET | Refills: 3 | Status: SHIPPED | OUTPATIENT
Start: 2023-08-21

## 2023-08-28 ENCOUNTER — HOSPITAL ENCOUNTER (OUTPATIENT)
Age: 78
Discharge: HOME | End: 2023-08-28
Payer: MEDICARE

## 2023-08-28 DIAGNOSIS — E78.2 MIXED HYPERLIPIDEMIA: ICD-10-CM

## 2023-08-28 DIAGNOSIS — I10 ESSENTIAL HYPERTENSION: ICD-10-CM

## 2023-08-28 DIAGNOSIS — E11.69 TYPE 2 DIABETES MELLITUS WITH OTHER SPECIFIED COMPLICATION, WITHOUT LONG-TERM CURRENT USE OF INSULIN (HCC): ICD-10-CM

## 2023-08-28 DIAGNOSIS — E03.9 ACQUIRED HYPOTHYROIDISM: ICD-10-CM

## 2023-08-28 DIAGNOSIS — E55.9 VITAMIN D DEFICIENCY: ICD-10-CM

## 2023-08-28 DIAGNOSIS — N39.0 RECURRENT UTI: ICD-10-CM

## 2023-08-28 DIAGNOSIS — E53.8 VITAMIN B12 DEFICIENCY: ICD-10-CM

## 2023-08-28 LAB
25(OH)D3 SERPL-MCNC: 34.4 NG/ML (ref 30–100)
ALBUMIN SERPL-MCNC: 4.2 G/DL (ref 3.5–5.2)
ALP SERPL-CCNC: 59 U/L (ref 35–104)
ALT SERPL-CCNC: 27 U/L (ref 0–32)
ANION GAP SERPL CALCULATED.3IONS-SCNC: 17 MMOL/L (ref 7–16)
AST SERPL-CCNC: 26 U/L (ref 0–31)
BACTERIA URNS QL MICRO: ABNORMAL
BASOPHILS # BLD: 0.05 K/UL (ref 0–0.2)
BASOPHILS NFR BLD: 1 % (ref 0–2)
BILIRUB SERPL-MCNC: 0.6 MG/DL (ref 0–1.2)
BILIRUB UR QL STRIP: NEGATIVE
BUN SERPL-MCNC: 12 MG/DL (ref 6–23)
CALCIUM SERPL-MCNC: 9.3 MG/DL (ref 8.6–10.2)
CHLORIDE SERPL-SCNC: 106 MMOL/L (ref 98–107)
CK SERPL-CCNC: 18 U/L (ref 20–180)
CLARITY UR: ABNORMAL
CO2 SERPL-SCNC: 20 MMOL/L (ref 22–29)
COLOR UR: YELLOW
CREAT SERPL-MCNC: 0.7 MG/DL (ref 0.5–1)
CREAT UR-MCNC: 156.5 MG/DL (ref 29–226)
EOSINOPHIL # BLD: 0.12 K/UL (ref 0.05–0.5)
EOSINOPHILS RELATIVE PERCENT: 1 % (ref 0–6)
EPI CELLS #/AREA URNS HPF: ABNORMAL /HPF
ERYTHROCYTE [DISTWIDTH] IN BLOOD BY AUTOMATED COUNT: 12.5 % (ref 11.5–15)
FOLATE SERPL-MCNC: 10.9 NG/ML (ref 4.8–24.2)
GFR SERPL CREATININE-BSD FRML MDRD: >60 ML/MIN/1.73M2
GLUCOSE SERPL-MCNC: 109 MG/DL (ref 74–99)
GLUCOSE UR STRIP-MCNC: NEGATIVE MG/DL
HBA1C MFR BLD: 5.8 % (ref 4–5.6)
HCT VFR BLD AUTO: 44.2 % (ref 34–48)
HGB BLD-MCNC: 14.7 G/DL (ref 11.5–15.5)
HGB UR QL STRIP.AUTO: ABNORMAL
IMM GRANULOCYTES # BLD AUTO: 0.03 K/UL (ref 0–0.58)
IMM GRANULOCYTES NFR BLD: 0 % (ref 0–5)
KETONES UR STRIP-MCNC: NEGATIVE MG/DL
LEUKOCYTE ESTERASE UR QL STRIP: ABNORMAL
LYMPHOCYTES NFR BLD: 3.36 K/UL (ref 1.5–4)
LYMPHOCYTES RELATIVE PERCENT: 40 % (ref 20–42)
MCH RBC QN AUTO: 30.8 PG (ref 26–35)
MCHC RBC AUTO-ENTMCNC: 33.3 G/DL (ref 32–34.5)
MCV RBC AUTO: 92.5 FL (ref 80–99.9)
MICROALBUMIN UR-MCNC: 19 MG/L (ref 0–19)
MICROALBUMIN/CREAT UR-RTO: 12 MCG/MG CREAT (ref 0–30)
MONOCYTES NFR BLD: 0.47 K/UL (ref 0.1–0.95)
MONOCYTES NFR BLD: 6 % (ref 2–12)
NEUTROPHILS NFR BLD: 53 % (ref 43–80)
NEUTS SEG NFR BLD: 4.46 K/UL (ref 1.8–7.3)
NITRITE UR QL STRIP: POSITIVE
PH UR STRIP: 5.5 [PH] (ref 5–9)
PLATELET # BLD AUTO: 159 K/UL (ref 130–450)
PMV BLD AUTO: 10.3 FL (ref 7–12)
POTASSIUM SERPL-SCNC: 3.7 MMOL/L (ref 3.5–5)
PROT SERPL-MCNC: 6.8 G/DL (ref 6.4–8.3)
PROT UR STRIP-MCNC: NEGATIVE MG/DL
RBC # BLD AUTO: 4.78 M/UL (ref 3.5–5.5)
RBC #/AREA URNS HPF: ABNORMAL /HPF
SODIUM SERPL-SCNC: 143 MMOL/L (ref 132–146)
SP GR UR STRIP: >1.03 (ref 1–1.03)
T4 FREE SERPL-MCNC: 1.6 NG/DL (ref 0.9–1.7)
TSH SERPL DL<=0.05 MIU/L-ACNC: 1 UIU/ML (ref 0.27–4.2)
UROBILINOGEN UR STRIP-ACNC: 0.2 EU/DL (ref 0–1)
VIT B12 SERPL-MCNC: 357 PG/ML (ref 211–946)
WBC #/AREA URNS HPF: ABNORMAL /HPF
WBC OTHER # BLD: 8.5 K/UL (ref 4.5–11.5)

## 2023-08-28 PROCEDURE — 82306 VITAMIN D 25 HYDROXY: CPT

## 2023-08-28 PROCEDURE — 85025 COMPLETE CBC W/AUTO DIFF WBC: CPT

## 2023-08-28 PROCEDURE — 36415 COLL VENOUS BLD VENIPUNCTURE: CPT

## 2023-08-28 PROCEDURE — 84443 ASSAY THYROID STIM HORMONE: CPT

## 2023-08-28 PROCEDURE — 81001 URINALYSIS AUTO W/SCOPE: CPT

## 2023-08-28 PROCEDURE — 82607 VITAMIN B-12: CPT

## 2023-08-28 PROCEDURE — 87077 CULTURE AEROBIC IDENTIFY: CPT

## 2023-08-28 PROCEDURE — 82570 ASSAY OF URINE CREATININE: CPT

## 2023-08-28 PROCEDURE — 82550 ASSAY OF CK (CPK): CPT

## 2023-08-28 PROCEDURE — 82746 ASSAY OF FOLIC ACID SERUM: CPT

## 2023-08-28 PROCEDURE — 82043 UR ALBUMIN QUANTITATIVE: CPT

## 2023-08-28 PROCEDURE — 87086 URINE CULTURE/COLONY COUNT: CPT

## 2023-08-28 PROCEDURE — 80053 COMPREHEN METABOLIC PANEL: CPT

## 2023-08-28 PROCEDURE — 83036 HEMOGLOBIN GLYCOSYLATED A1C: CPT

## 2023-08-28 PROCEDURE — 84439 ASSAY OF FREE THYROXINE: CPT

## 2023-08-28 NOTE — RESULT ENCOUNTER NOTE
Lab sanna labs 2mos early again, please have leslie look into as I know she was staying on top of this and reminding lab when this occurs    Ck if sxs of uti?   Make fu to review labs

## 2023-08-29 ENCOUNTER — OFFICE VISIT (OUTPATIENT)
Dept: PRIMARY CARE CLINIC | Age: 78
End: 2023-08-29
Payer: MEDICARE

## 2023-08-29 ENCOUNTER — HOSPITAL ENCOUNTER (OUTPATIENT)
Dept: NUCLEAR MEDICINE | Age: 78
Discharge: HOME | End: 2023-08-31
Payer: MEDICARE

## 2023-08-29 VITALS
DIASTOLIC BLOOD PRESSURE: 80 MMHG | HEART RATE: 65 BPM | WEIGHT: 195 LBS | HEIGHT: 64 IN | BODY MASS INDEX: 33.29 KG/M2 | TEMPERATURE: 98.8 F | OXYGEN SATURATION: 95 % | SYSTOLIC BLOOD PRESSURE: 126 MMHG

## 2023-08-29 DIAGNOSIS — I10 ESSENTIAL HYPERTENSION: ICD-10-CM

## 2023-08-29 DIAGNOSIS — N39.0 RECURRENT UTI: Primary | ICD-10-CM

## 2023-08-29 DIAGNOSIS — E53.8 VITAMIN B12 DEFICIENCY: ICD-10-CM

## 2023-08-29 DIAGNOSIS — F03.90 DEMENTIA WITHOUT BEHAVIORAL DISTURBANCE (HCC): ICD-10-CM

## 2023-08-29 DIAGNOSIS — Z86.79 HISTORY OF PAROXYSMAL SUPRAVENTRICULAR TACHYCARDIA: ICD-10-CM

## 2023-08-29 DIAGNOSIS — I44.2 COMPLETE HEART BLOCK (HCC): ICD-10-CM

## 2023-08-29 DIAGNOSIS — E55.9 VITAMIN D DEFICIENCY: ICD-10-CM

## 2023-08-29 DIAGNOSIS — F03.90 DEMENTIA, UNSPECIFIED DEMENTIA SEVERITY, UNSPECIFIED DEMENTIA TYPE, UNSPECIFIED WHETHER BEHAVIORAL, PSYCHOTIC, OR MOOD DISTURBANCE OR ANXIETY (HCC): ICD-10-CM

## 2023-08-29 DIAGNOSIS — E78.2 MIXED HYPERLIPIDEMIA: ICD-10-CM

## 2023-08-29 DIAGNOSIS — E03.9 ACQUIRED HYPOTHYROIDISM: ICD-10-CM

## 2023-08-29 DIAGNOSIS — E11.69 TYPE 2 DIABETES MELLITUS WITH OTHER SPECIFIED COMPLICATION, WITHOUT LONG-TERM CURRENT USE OF INSULIN (HCC): ICD-10-CM

## 2023-08-29 PROCEDURE — 3430000000 HC RX DIAGNOSTIC RADIOPHARMACEUTICAL: Performed by: RADIOLOGY

## 2023-08-29 PROCEDURE — G8427 DOCREV CUR MEDS BY ELIG CLIN: HCPCS | Performed by: FAMILY MEDICINE

## 2023-08-29 PROCEDURE — 3044F HG A1C LEVEL LT 7.0%: CPT | Performed by: FAMILY MEDICINE

## 2023-08-29 PROCEDURE — 1090F PRES/ABSN URINE INCON ASSESS: CPT | Performed by: FAMILY MEDICINE

## 2023-08-29 PROCEDURE — 99214 OFFICE O/P EST MOD 30 MIN: CPT | Performed by: FAMILY MEDICINE

## 2023-08-29 PROCEDURE — 3079F DIAST BP 80-89 MM HG: CPT | Performed by: FAMILY MEDICINE

## 2023-08-29 PROCEDURE — 3074F SYST BP LT 130 MM HG: CPT | Performed by: FAMILY MEDICINE

## 2023-08-29 PROCEDURE — G8399 PT W/DXA RESULTS DOCUMENT: HCPCS | Performed by: FAMILY MEDICINE

## 2023-08-29 PROCEDURE — 1036F TOBACCO NON-USER: CPT | Performed by: FAMILY MEDICINE

## 2023-08-29 PROCEDURE — A9552 F18 FDG: HCPCS | Performed by: RADIOLOGY

## 2023-08-29 PROCEDURE — 78608 BRAIN IMAGING (PET): CPT

## 2023-08-29 PROCEDURE — 1123F ACP DISCUSS/DSCN MKR DOCD: CPT | Performed by: FAMILY MEDICINE

## 2023-08-29 PROCEDURE — G8417 CALC BMI ABV UP PARAM F/U: HCPCS | Performed by: FAMILY MEDICINE

## 2023-08-29 RX ORDER — CEPHALEXIN 500 MG/1
500 CAPSULE ORAL 2 TIMES DAILY
Qty: 14 CAPSULE | Refills: 0 | Status: SHIPPED | OUTPATIENT
Start: 2023-08-29 | End: 2023-09-05

## 2023-08-29 RX ORDER — FLUDEOXYGLUCOSE F 18 200 MCI/ML
11.5 INJECTION, SOLUTION INTRAVENOUS
Status: COMPLETED | OUTPATIENT
Start: 2023-08-29 | End: 2023-08-29

## 2023-08-29 RX ADMIN — FLUDEOXYGLUCOSE F 18 11.5 MILLICURIE: 200 INJECTION, SOLUTION INTRAVENOUS at 12:47

## 2023-08-29 NOTE — PROGRESS NOTES
vomiting. : Dysuria urgency and frequency  Musculo: Denies musculoskeletal symptoms. Skin: Denies bruising and rash  Neuro: Denies headache, numbness, stiff neck, tingling and focal weakness slurred speech or facial  droop  Hema/Lymph: Denies bleeding/bruising tendency and enlarged lymph nodes      Current Outpatient Medications:     cephALEXin (KEFLEX) 500 MG capsule, Take 1 capsule by mouth 2 times daily for 7 days, Disp: 14 capsule, Rfl: 0    memantine (NAMENDA) 10 MG tablet, Take 1 tablet by mouth 2 times daily, Disp: 180 tablet, Rfl: 3    metoprolol succinate (TOPROL XL) 25 MG extended release tablet, Take 1 tablet by mouth 2 times daily, Disp: 180 tablet, Rfl: 1    levothyroxine (SYNTHROID) 100 MCG tablet, Take 1 tablet by mouth daily Ideally on empty stomach, Disp: 90 tablet, Rfl: 1    omega-3 acid ethyl esters (LOVAZA) 1 g capsule, Take 2 capsules by mouth 2 times daily, Disp: 360 capsule, Rfl: 3    donepezil (ARICEPT) 10 MG tablet, TAKE 1 TABLET BY MOUTH  TWICE DAILY, Disp: 180 tablet, Rfl: 3    ammonium lactate (LAC-HYDRIN) 12 % lotion, BID PRN, Disp: 225 g, Rfl: 1    metFORMIN (GLUCOPHAGE) 500 MG tablet, Take 1 tablet by mouth 2 times daily (with meals), Disp: 180 tablet, Rfl: 3  Allergies   Allergen Reactions    Morphine Itching    Statins      Other reaction(s): Unknown    Statins Depletion Therapy Other (See Comments)     MYALGIA       Past Medical History:   Diagnosis Date    Arthritis     Diabetes mellitus ([address])     Difficult intubation     carries card, copy on chart  Glidescope#3 with ETT size7    Hyperlipidemia     Hypothyroidism     Left sided abdominal pain     Memory problem     still competent    GORDY on CPAP     Pacemaker     Rectal bleeding     Ringing in the ears     Supraventricular tachycardia ([address]) 2/8/2023     Past Surgical History:   Procedure Laterality Date    ABLATION OF DYSRHYTHMIC FOCUS  1996    BREAST SURGERY      implants    COLONOSCOPY  2009?     Dr. Amanda Son twisted

## 2023-08-30 DIAGNOSIS — N39.0 RECURRENT UTI: Primary | ICD-10-CM

## 2023-08-30 LAB
MICROORGANISM SPEC CULT: ABNORMAL
SPECIMEN DESCRIPTION: ABNORMAL

## 2023-08-30 RX ORDER — CIPROFLOXACIN 250 MG/1
250 TABLET, FILM COATED ORAL 2 TIMES DAILY
Qty: 6 TABLET | Refills: 0 | Status: SHIPPED | OUTPATIENT
Start: 2023-08-30 | End: 2023-09-02

## 2023-08-30 NOTE — RESULT ENCOUNTER NOTE
Final urine culture back; based on results I would like to change her antibiotic. Stop cephalexin. Start Cipro 250 mg twice a day for 3 days. Recheck urinalysis and culture in 1 week sooner as needed.   Standard precautions including that of quinolones

## 2023-09-07 ENCOUNTER — HOSPITAL ENCOUNTER (OUTPATIENT)
Dept: CARDIOLOGY | Age: 78
Discharge: HOME | End: 2023-09-07
Attending: INTERNAL MEDICINE
Payer: MEDICARE

## 2023-09-07 DIAGNOSIS — R60.0 LOWER EXTREMITY EDEMA: ICD-10-CM

## 2023-09-07 PROCEDURE — 93306 TTE W/DOPPLER COMPLETE: CPT | Performed by: PSYCHIATRY & NEUROLOGY

## 2023-09-08 ENCOUNTER — TELEPHONE (OUTPATIENT)
Dept: CARDIOLOGY CLINIC | Age: 78
End: 2023-09-08

## 2023-09-08 DIAGNOSIS — I51.7 RVH (RIGHT VENTRICULAR HYPERTROPHY): ICD-10-CM

## 2023-09-08 DIAGNOSIS — R93.1 SUBOPTIMAL ECHOCARDIOGRAM: Primary | ICD-10-CM

## 2023-09-08 NOTE — TELEPHONE ENCOUNTER
Patient's  Kathia Bouchra) notified of echo results and Dr. Kristine Perez recommendation. Order placed. MDCR - No Auth required. Patient's  given Familia Trevizo number to call to schedule around patient's availability.

## 2023-09-08 NOTE — TELEPHONE ENCOUNTER
----- Message from Leandro Ledesma DO sent at 9/8/2023  1:26 PM EDT -----  Let her know that the heart function and valves look good. The images were difficult to see. I would recommend a cardiac MRI due to poor visualization of the echo and possible RVH. So far I do not see anything from the heart that would cause the lower extremity edema.

## 2023-09-12 ENCOUNTER — OFFICE VISIT (OUTPATIENT)
Dept: PRIMARY CARE CLINIC | Age: 78
End: 2023-09-12
Payer: MEDICARE

## 2023-09-12 VITALS
HEART RATE: 54 BPM | BODY MASS INDEX: 32.34 KG/M2 | TEMPERATURE: 97.6 F | RESPIRATION RATE: 17 BRPM | DIASTOLIC BLOOD PRESSURE: 76 MMHG | SYSTOLIC BLOOD PRESSURE: 124 MMHG | OXYGEN SATURATION: 96 % | WEIGHT: 188.4 LBS

## 2023-09-12 DIAGNOSIS — N39.0 RECURRENT URINARY TRACT INFECTION: Primary | ICD-10-CM

## 2023-09-12 DIAGNOSIS — I10 ESSENTIAL HYPERTENSION: ICD-10-CM

## 2023-09-12 DIAGNOSIS — N39.0 RECURRENT URINARY TRACT INFECTION: ICD-10-CM

## 2023-09-12 DIAGNOSIS — I44.2 COMPLETE HEART BLOCK (HCC): ICD-10-CM

## 2023-09-12 DIAGNOSIS — F03.90 DEMENTIA WITHOUT BEHAVIORAL DISTURBANCE (HCC): ICD-10-CM

## 2023-09-12 DIAGNOSIS — E11.69 TYPE 2 DIABETES MELLITUS WITH OTHER SPECIFIED COMPLICATION, WITHOUT LONG-TERM CURRENT USE OF INSULIN (HCC): ICD-10-CM

## 2023-09-12 LAB
BILIRUBIN, POC: NEGATIVE
BLOOD URINE, POC: NEGATIVE
CLARITY, POC: CLEAR
COLOR, POC: YELLOW
GLUCOSE URINE, POC: NEGATIVE
KETONES, POC: NEGATIVE
LEUKOCYTE EST, POC: NEGATIVE
NITRITE, POC: NEGATIVE
PH, POC: 7
PROTEIN, POC: NEGATIVE
SPECIFIC GRAVITY, POC: 1.02
UROBILINOGEN, POC: 0.2

## 2023-09-12 PROCEDURE — 1036F TOBACCO NON-USER: CPT | Performed by: FAMILY MEDICINE

## 2023-09-12 PROCEDURE — G8417 CALC BMI ABV UP PARAM F/U: HCPCS | Performed by: FAMILY MEDICINE

## 2023-09-12 PROCEDURE — 3078F DIAST BP <80 MM HG: CPT | Performed by: FAMILY MEDICINE

## 2023-09-12 PROCEDURE — G8427 DOCREV CUR MEDS BY ELIG CLIN: HCPCS | Performed by: FAMILY MEDICINE

## 2023-09-12 PROCEDURE — 99214 OFFICE O/P EST MOD 30 MIN: CPT | Performed by: FAMILY MEDICINE

## 2023-09-12 PROCEDURE — 3074F SYST BP LT 130 MM HG: CPT | Performed by: FAMILY MEDICINE

## 2023-09-12 PROCEDURE — 81002 URINALYSIS NONAUTO W/O SCOPE: CPT | Performed by: FAMILY MEDICINE

## 2023-09-12 PROCEDURE — 1123F ACP DISCUSS/DSCN MKR DOCD: CPT | Performed by: FAMILY MEDICINE

## 2023-09-12 PROCEDURE — 3044F HG A1C LEVEL LT 7.0%: CPT | Performed by: FAMILY MEDICINE

## 2023-09-12 PROCEDURE — G8399 PT W/DXA RESULTS DOCUMENT: HCPCS | Performed by: FAMILY MEDICINE

## 2023-09-12 PROCEDURE — 1090F PRES/ABSN URINE INCON ASSESS: CPT | Performed by: FAMILY MEDICINE

## 2023-09-12 ASSESSMENT — PATIENT HEALTH QUESTIONNAIRE - PHQ9
2. FEELING DOWN, DEPRESSED OR HOPELESS: 0
SUM OF ALL RESPONSES TO PHQ QUESTIONS 1-9: 0
SUM OF ALL RESPONSES TO PHQ9 QUESTIONS 1 & 2: 0
1. LITTLE INTEREST OR PLEASURE IN DOING THINGS: 0
SUM OF ALL RESPONSES TO PHQ QUESTIONS 1-9: 0

## 2023-09-12 NOTE — PROGRESS NOTES
Ford Aragon : 1945 Sex: female  Age: 69 y.o. Chief Complaint   Patient presents with    Follow-up     2 Week follow up existing condition        HPI:      Presents today to follow-up on UTI, last office visit she was prescribed cephalexin, culture grew over 100,000 Klebsiella with resistance to cefazolin and cefoxitin, intermediate nitrofurantoin, therefore switched to Cipro. Symptoms improved but  could still \"smell her\". Therefore he gave her the original antibiotic as well i.e. cephalexin. Symptoms resolved. He is upset the condition recurs. We discussed persistent versus recurrence. We discussed possible structural issues but risk benefits of procedures in that event reviewed. Also discussed prophylactic antibiotic. I did encourage her to follow-up with Dr. Shira Fernando and referral was placed today. Proper hydration reviewed. Proper hygiene. Repeat urinalysis today negative. Culture sent.     Most Recent Labs  CBC  Lab Results   Component Value Date/Time    WBC 8.5 2023 11:45 AM    WBC 8.9 2023 09:50 AM    WBC 10.4 2023 04:02 PM    RBC 4.78 2023 11:45 AM    RBC 4.83 2023 09:50 AM    RBC 5.02 2023 04:02 PM    HGB 14.7 2023 11:45 AM    HGB 14.8 2023 09:50 AM    HGB 15.1 2023 04:02 PM    HCT 44.2 2023 11:45 AM    HCT 44.4 2023 09:50 AM    HCT 46.0 2023 04:02 PM    MCV 92.5 2023 11:45 AM    MCV 91.9 2023 09:50 AM    MCV 91.6 2023 04:02 PM     2023 11:45 AM     2023 09:50 AM     2023 04:02 PM      CMP  Lab Results   Component Value Date/Time     2023 11:45 AM     2023 09:50 AM     2023 03:15 PM    K 3.7 2023 11:45 AM    K 4.0 2023 09:50 AM    K 3.8 2023 03:15 PM     2023 11:45 AM     2023 09:50 AM     2023 03:15 PM    CO2 20 2023 11:45 AM    CO2 19

## 2023-09-13 ENCOUNTER — NURSE ONLY (OUTPATIENT)
Dept: NON INVASIVE DIAGNOSTICS | Age: 78
End: 2023-09-13

## 2023-09-13 DIAGNOSIS — Z95.0 PACEMAKER: Primary | ICD-10-CM

## 2023-09-13 DIAGNOSIS — I44.2 COMPLETE HEART BLOCK (HCC): ICD-10-CM

## 2023-09-14 LAB
CULTURE: ABNORMAL
SPECIMEN DESCRIPTION: ABNORMAL

## 2023-09-18 ENCOUNTER — HOSPITAL ENCOUNTER (OUTPATIENT)
Dept: MRI IMAGING | Age: 78
Discharge: HOME | End: 2023-09-20
Attending: INTERNAL MEDICINE
Payer: MEDICARE

## 2023-09-18 VITALS — DIASTOLIC BLOOD PRESSURE: 70 MMHG | SYSTOLIC BLOOD PRESSURE: 133 MMHG | HEART RATE: 69 BPM | OXYGEN SATURATION: 96 %

## 2023-09-18 DIAGNOSIS — I51.7 RVH (RIGHT VENTRICULAR HYPERTROPHY): ICD-10-CM

## 2023-09-18 DIAGNOSIS — R93.1 SUBOPTIMAL ECHOCARDIOGRAM: ICD-10-CM

## 2023-09-18 PROCEDURE — 75561 CARDIAC MRI FOR MORPH W/DYE: CPT

## 2023-09-18 PROCEDURE — A9579 GAD-BASE MR CONTRAST NOS,1ML: HCPCS | Performed by: RADIOLOGY

## 2023-09-18 PROCEDURE — 75561 CARDIAC MRI FOR MORPH W/DYE: CPT | Performed by: INTERNAL MEDICINE

## 2023-09-18 PROCEDURE — 6360000004 HC RX CONTRAST MEDICATION: Performed by: RADIOLOGY

## 2023-09-18 RX ADMIN — GADOTERIDOL 34 ML: 279.3 INJECTION, SOLUTION INTRAVENOUS at 13:15

## 2023-09-19 ENCOUNTER — TELEPHONE (OUTPATIENT)
Dept: CARDIOLOGY CLINIC | Age: 78
End: 2023-09-19

## 2023-09-19 NOTE — TELEPHONE ENCOUNTER
----- Message from Ernestine King DO sent at 9/18/2023  6:41 PM EDT -----  Let her know that the heart size and function was good. The valves are good. There is no cardiac etiology for volume/fluid retention. Continue to follow-up with the EP. Can follow with cardiology as needed.

## 2023-09-29 DIAGNOSIS — F03.90 DEMENTIA WITHOUT BEHAVIORAL DISTURBANCE (HCC): ICD-10-CM

## 2023-09-29 DIAGNOSIS — E03.9 ACQUIRED HYPOTHYROIDISM: ICD-10-CM

## 2023-09-29 RX ORDER — LEVOTHYROXINE SODIUM 0.1 MG/1
100 TABLET ORAL DAILY
Qty: 90 TABLET | Refills: 1 | Status: SHIPPED | OUTPATIENT
Start: 2023-09-29

## 2023-09-29 RX ORDER — DONEPEZIL HYDROCHLORIDE 10 MG/1
TABLET, FILM COATED ORAL
Qty: 180 TABLET | Refills: 0 | Status: SHIPPED | OUTPATIENT
Start: 2023-09-29

## 2023-09-29 RX ORDER — LEVOTHYROXINE SODIUM 0.1 MG/1
100 TABLET ORAL DAILY
Qty: 14 TABLET | Refills: 0 | Status: SHIPPED | OUTPATIENT
Start: 2023-09-29

## 2023-09-29 NOTE — TELEPHONE ENCOUNTER
Patient is waiting on mail order. Also patient used her last refill. Needing new refill sent to mail order.

## 2023-10-20 ENCOUNTER — HOSPITAL ENCOUNTER (OUTPATIENT)
Age: 78
Discharge: HOME | End: 2023-10-20
Payer: MEDICARE

## 2023-10-20 DIAGNOSIS — E55.9 VITAMIN D DEFICIENCY: ICD-10-CM

## 2023-10-20 DIAGNOSIS — N39.0 RECURRENT UTI: ICD-10-CM

## 2023-10-20 DIAGNOSIS — E78.2 MIXED HYPERLIPIDEMIA: ICD-10-CM

## 2023-10-20 DIAGNOSIS — E53.8 VITAMIN B12 DEFICIENCY: ICD-10-CM

## 2023-10-20 DIAGNOSIS — E11.69 TYPE 2 DIABETES MELLITUS WITH OTHER SPECIFIED COMPLICATION, WITHOUT LONG-TERM CURRENT USE OF INSULIN (HCC): ICD-10-CM

## 2023-10-20 DIAGNOSIS — E03.9 ACQUIRED HYPOTHYROIDISM: ICD-10-CM

## 2023-10-20 LAB
25(OH)D3 SERPL-MCNC: 30.9 NG/ML (ref 30–100)
ALBUMIN SERPL-MCNC: 3.9 G/DL (ref 3.5–5.2)
ALP SERPL-CCNC: 67 U/L (ref 35–104)
ALT SERPL-CCNC: 28 U/L (ref 0–32)
ANION GAP SERPL CALCULATED.3IONS-SCNC: 12 MMOL/L (ref 7–16)
AST SERPL-CCNC: 30 U/L (ref 0–31)
BACTERIA URNS QL MICRO: ABNORMAL
BASOPHILS # BLD: 0.04 K/UL (ref 0–0.2)
BASOPHILS NFR BLD: 1 % (ref 0–2)
BILIRUB SERPL-MCNC: 0.6 MG/DL (ref 0–1.2)
BILIRUB UR QL STRIP: NEGATIVE
BUN SERPL-MCNC: 10 MG/DL (ref 6–23)
CALCIUM SERPL-MCNC: 9.3 MG/DL (ref 8.6–10.2)
CHLORIDE SERPL-SCNC: 107 MMOL/L (ref 98–107)
CHOLEST SERPL-MCNC: 209 MG/DL
CK SERPL-CCNC: 22 U/L (ref 20–180)
CLARITY UR: CLEAR
CO2 SERPL-SCNC: 22 MMOL/L (ref 22–29)
COLOR UR: YELLOW
CREAT SERPL-MCNC: 0.7 MG/DL (ref 0.5–1)
CREAT UR-MCNC: 137.8 MG/DL (ref 29–226)
EOSINOPHIL # BLD: 0.08 K/UL (ref 0.05–0.5)
EOSINOPHILS RELATIVE PERCENT: 1 % (ref 0–6)
EPI CELLS #/AREA URNS HPF: ABNORMAL /HPF
ERYTHROCYTE [DISTWIDTH] IN BLOOD BY AUTOMATED COUNT: 13.1 % (ref 11.5–15)
FOLATE SERPL-MCNC: 7.7 NG/ML (ref 4.8–24.2)
GFR SERPL CREATININE-BSD FRML MDRD: >60 ML/MIN/1.73M2
GLUCOSE SERPL-MCNC: 106 MG/DL (ref 74–99)
GLUCOSE UR STRIP-MCNC: NEGATIVE MG/DL
HBA1C MFR BLD: 6.2 % (ref 4–5.6)
HCT VFR BLD AUTO: 43.5 % (ref 34–48)
HDLC SERPL-MCNC: 48 MG/DL
HGB BLD-MCNC: 14.3 G/DL (ref 11.5–15.5)
HGB UR QL STRIP.AUTO: NEGATIVE
IMM GRANULOCYTES # BLD AUTO: <0.03 K/UL (ref 0–0.58)
IMM GRANULOCYTES NFR BLD: 0 % (ref 0–5)
KETONES UR STRIP-MCNC: NEGATIVE MG/DL
LDLC SERPL CALC-MCNC: 135 MG/DL
LEUKOCYTE ESTERASE UR QL STRIP: ABNORMAL
LYMPHOCYTES NFR BLD: 3.29 K/UL (ref 1.5–4)
LYMPHOCYTES RELATIVE PERCENT: 41 % (ref 20–42)
MCH RBC QN AUTO: 30.5 PG (ref 26–35)
MCHC RBC AUTO-ENTMCNC: 32.9 G/DL (ref 32–34.5)
MCV RBC AUTO: 92.8 FL (ref 80–99.9)
MICROALBUMIN UR-MCNC: <12 MG/L (ref 0–19)
MICROALBUMIN/CREAT UR-RTO: NORMAL MCG/MG CREAT (ref 0–30)
MONOCYTES NFR BLD: 0.48 K/UL (ref 0.1–0.95)
MONOCYTES NFR BLD: 6 % (ref 2–12)
NEUTROPHILS NFR BLD: 51 % (ref 43–80)
NEUTS SEG NFR BLD: 4.1 K/UL (ref 1.8–7.3)
NITRITE UR QL STRIP: POSITIVE
PH UR STRIP: 5.5 [PH] (ref 5–9)
PLATELET # BLD AUTO: 160 K/UL (ref 130–450)
PMV BLD AUTO: 10.9 FL (ref 7–12)
POTASSIUM SERPL-SCNC: 3.9 MMOL/L (ref 3.5–5)
PROT SERPL-MCNC: 6.9 G/DL (ref 6.4–8.3)
PROT UR STRIP-MCNC: NEGATIVE MG/DL
RBC # BLD AUTO: 4.69 M/UL (ref 3.5–5.5)
RBC #/AREA URNS HPF: ABNORMAL /HPF
SODIUM SERPL-SCNC: 141 MMOL/L (ref 132–146)
SP GR UR STRIP: >1.03 (ref 1–1.03)
T4 FREE SERPL-MCNC: 1.5 NG/DL (ref 0.9–1.7)
TRIGL SERPL-MCNC: 130 MG/DL
TSH SERPL DL<=0.05 MIU/L-ACNC: 3.03 UIU/ML (ref 0.27–4.2)
UROBILINOGEN UR STRIP-ACNC: 0.2 EU/DL (ref 0–1)
VIT B12 SERPL-MCNC: 449 PG/ML (ref 211–946)
VLDLC SERPL CALC-MCNC: 26 MG/DL
WBC #/AREA URNS HPF: ABNORMAL /HPF
WBC OTHER # BLD: 8 K/UL (ref 4.5–11.5)

## 2023-10-20 PROCEDURE — 80061 LIPID PANEL: CPT

## 2023-10-20 PROCEDURE — 87086 URINE CULTURE/COLONY COUNT: CPT

## 2023-10-20 PROCEDURE — 82607 VITAMIN B-12: CPT

## 2023-10-20 PROCEDURE — 82306 VITAMIN D 25 HYDROXY: CPT

## 2023-10-20 PROCEDURE — 80053 COMPREHEN METABOLIC PANEL: CPT

## 2023-10-20 PROCEDURE — 82550 ASSAY OF CK (CPK): CPT

## 2023-10-20 PROCEDURE — 87077 CULTURE AEROBIC IDENTIFY: CPT

## 2023-10-20 PROCEDURE — 83036 HEMOGLOBIN GLYCOSYLATED A1C: CPT

## 2023-10-20 PROCEDURE — 82746 ASSAY OF FOLIC ACID SERUM: CPT

## 2023-10-20 PROCEDURE — 82570 ASSAY OF URINE CREATININE: CPT

## 2023-10-20 PROCEDURE — 84443 ASSAY THYROID STIM HORMONE: CPT

## 2023-10-20 PROCEDURE — 82043 UR ALBUMIN QUANTITATIVE: CPT

## 2023-10-20 PROCEDURE — 81001 URINALYSIS AUTO W/SCOPE: CPT

## 2023-10-20 PROCEDURE — 85025 COMPLETE CBC W/AUTO DIFF WBC: CPT

## 2023-10-20 PROCEDURE — 84439 ASSAY OF FREE THYROXINE: CPT

## 2023-10-22 LAB
MICROORGANISM SPEC CULT: ABNORMAL
SPECIMEN DESCRIPTION: ABNORMAL

## 2023-10-23 ENCOUNTER — HOSPITAL ENCOUNTER (EMERGENCY)
Age: 78
Discharge: HOME | End: 2023-10-23
Attending: EMERGENCY MEDICINE
Payer: MEDICARE

## 2023-10-23 ENCOUNTER — APPOINTMENT (OUTPATIENT)
Dept: GENERAL RADIOLOGY | Age: 78
End: 2023-10-23
Payer: MEDICARE

## 2023-10-23 VITALS
WEIGHT: 188.49 LBS | TEMPERATURE: 97.4 F | DIASTOLIC BLOOD PRESSURE: 94 MMHG | SYSTOLIC BLOOD PRESSURE: 142 MMHG | RESPIRATION RATE: 16 BRPM | OXYGEN SATURATION: 97 % | HEART RATE: 54 BPM | BODY MASS INDEX: 32.35 KG/M2

## 2023-10-23 DIAGNOSIS — R07.9 CHEST PAIN, UNSPECIFIED TYPE: ICD-10-CM

## 2023-10-23 DIAGNOSIS — N39.0 URINARY TRACT INFECTION WITHOUT HEMATURIA, SITE UNSPECIFIED: Primary | ICD-10-CM

## 2023-10-23 LAB
ALBUMIN SERPL-MCNC: 4.2 G/DL (ref 3.5–5.2)
ALBUMIN SERPL-MCNC: NORMAL G/DL (ref 3.5–5.2)
ALBUMIN/GLOB SERPL: NORMAL {RATIO} (ref 1–2.5)
ALP SERPL-CCNC: 65 U/L (ref 35–104)
ALP SERPL-CCNC: NORMAL U/L (ref 35–104)
ALT SERPL-CCNC: 27 U/L (ref 0–32)
ALT SERPL-CCNC: NORMAL U/L (ref 5–33)
ANION GAP SERPL CALCULATED.3IONS-SCNC: 16 MMOL/L (ref 7–16)
ANION GAP SERPL CALCULATED.3IONS-SCNC: NORMAL MMOL/L (ref 9–17)
AST SERPL-CCNC: 33 U/L (ref 0–31)
AST SERPL-CCNC: NORMAL U/L
BASOPHILS # BLD: 0.05 K/UL (ref 0–0.2)
BASOPHILS NFR BLD: 1 % (ref 0–2)
BILIRUB SERPL-MCNC: 0.6 MG/DL (ref 0–1.2)
BILIRUB SERPL-MCNC: NORMAL MG/DL (ref 0.3–1.2)
BILIRUB UR QL STRIP: NEGATIVE
BNP SERPL-MCNC: 199 PG/ML (ref 0–450)
BUN SERPL-MCNC: 12 MG/DL (ref 6–23)
BUN SERPL-MCNC: NORMAL MG/DL (ref 8–23)
BUN/CREAT SERPL: NORMAL (ref 9–20)
CALCIUM SERPL-MCNC: 9.3 MG/DL (ref 8.6–10.2)
CALCIUM SERPL-MCNC: NORMAL MG/DL (ref 8.6–10.4)
CHLORIDE SERPL-SCNC: 107 MMOL/L (ref 98–107)
CHLORIDE SERPL-SCNC: NORMAL MMOL/L (ref 98–107)
CLARITY UR: CLEAR
CO2 SERPL-SCNC: 18 MMOL/L (ref 22–29)
CO2 SERPL-SCNC: NORMAL MMOL/L (ref 20–31)
COLOR UR: YELLOW
CREAT SERPL-MCNC: 0.6 MG/DL (ref 0.5–1)
CREAT SERPL-MCNC: NORMAL MG/DL (ref 0.5–0.9)
EOSINOPHIL # BLD: 0.11 K/UL (ref 0.05–0.5)
EOSINOPHILS RELATIVE PERCENT: 1 % (ref 0–6)
ERYTHROCYTE [DISTWIDTH] IN BLOOD BY AUTOMATED COUNT: 13.2 % (ref 11.5–15)
GFR SERPL CREATININE-BSD FRML MDRD: >60 ML/MIN/1.73M2
GFR SERPL CREATININE-BSD FRML MDRD: NORMAL ML/MIN/1.73M2
GLUCOSE SERPL-MCNC: 99 MG/DL (ref 74–99)
GLUCOSE SERPL-MCNC: NORMAL MG/DL (ref 70–99)
GLUCOSE UR STRIP-MCNC: NEGATIVE MG/DL
HCT VFR BLD AUTO: 43.5 % (ref 34–48)
HGB BLD-MCNC: 14.2 G/DL (ref 11.5–15.5)
HGB UR QL STRIP.AUTO: NEGATIVE
IMM GRANULOCYTES # BLD AUTO: 0.08 K/UL (ref 0–0.58)
IMM GRANULOCYTES NFR BLD: 1 % (ref 0–5)
KETONES UR STRIP-MCNC: NEGATIVE MG/DL
LACTATE BLDV-SCNC: 2.1 MMOL/L (ref 0.5–2.2)
LEUKOCYTE ESTERASE UR QL STRIP: ABNORMAL
LIPASE SERPL-CCNC: 43 U/L (ref 13–60)
LYMPHOCYTES NFR BLD: 3.85 K/UL (ref 1.5–4)
LYMPHOCYTES RELATIVE PERCENT: 40 % (ref 20–42)
MCH RBC QN AUTO: 30.7 PG (ref 26–35)
MCHC RBC AUTO-ENTMCNC: 32.6 G/DL (ref 32–34.5)
MCV RBC AUTO: 94.2 FL (ref 80–99.9)
MONOCYTES NFR BLD: 0.61 K/UL (ref 0.1–0.95)
MONOCYTES NFR BLD: 6 % (ref 2–12)
NEUTROPHILS NFR BLD: 51 % (ref 43–80)
NEUTS SEG NFR BLD: 4.93 K/UL (ref 1.8–7.3)
NITRITE UR QL STRIP: NEGATIVE
PH UR STRIP: 6 [PH] (ref 5–9)
PLATELET # BLD AUTO: 157 K/UL (ref 130–450)
PMV BLD AUTO: 10.8 FL (ref 7–12)
POTASSIUM SERPL-SCNC: 4.3 MMOL/L (ref 3.5–5)
POTASSIUM SERPL-SCNC: NORMAL MMOL/L (ref 3.7–5.3)
PROT SERPL-MCNC: 7.1 G/DL (ref 6.4–8.3)
PROT SERPL-MCNC: NORMAL G/DL (ref 6.4–8.3)
PROT UR STRIP-MCNC: NEGATIVE MG/DL
RBC # BLD AUTO: 4.62 M/UL (ref 3.5–5.5)
RBC #/AREA URNS HPF: ABNORMAL /HPF
SODIUM SERPL-SCNC: 141 MMOL/L (ref 132–146)
SODIUM SERPL-SCNC: NORMAL MMOL/L (ref 135–144)
SP GR UR STRIP: 1.01 (ref 1–1.03)
TROPONIN I SERPL HS-MCNC: 7 NG/L (ref 0–9)
TROPONIN I SERPL HS-MCNC: 7 NG/L (ref 0–9)
TROPONIN I SERPL HS-MCNC: NORMAL NG/L (ref 0–14)
TROPONIN INTERP: NORMAL
TROPONIN T SERPL-MCNC: NORMAL NG/ML
UROBILINOGEN UR STRIP-ACNC: 0.2 EU/DL (ref 0–1)
WBC #/AREA URNS HPF: ABNORMAL /HPF
WBC OTHER # BLD: 9.6 K/UL (ref 4.5–11.5)

## 2023-10-23 PROCEDURE — 87086 URINE CULTURE/COLONY COUNT: CPT

## 2023-10-23 PROCEDURE — 81001 URINALYSIS AUTO W/SCOPE: CPT

## 2023-10-23 PROCEDURE — 83880 ASSAY OF NATRIURETIC PEPTIDE: CPT

## 2023-10-23 PROCEDURE — 83690 ASSAY OF LIPASE: CPT

## 2023-10-23 PROCEDURE — 84484 ASSAY OF TROPONIN QUANT: CPT

## 2023-10-23 PROCEDURE — 85025 COMPLETE CBC W/AUTO DIFF WBC: CPT

## 2023-10-23 PROCEDURE — 80053 COMPREHEN METABOLIC PANEL: CPT

## 2023-10-23 PROCEDURE — 71045 X-RAY EXAM CHEST 1 VIEW: CPT

## 2023-10-23 PROCEDURE — 87077 CULTURE AEROBIC IDENTIFY: CPT

## 2023-10-23 PROCEDURE — 83605 ASSAY OF LACTIC ACID: CPT

## 2023-10-23 PROCEDURE — 93005 ELECTROCARDIOGRAM TRACING: CPT | Performed by: STUDENT IN AN ORGANIZED HEALTH CARE EDUCATION/TRAINING PROGRAM

## 2023-10-23 PROCEDURE — 99285 EMERGENCY DEPT VISIT HI MDM: CPT

## 2023-10-23 RX ORDER — CEFDINIR 300 MG/1
300 CAPSULE ORAL 2 TIMES DAILY
Qty: 14 CAPSULE | Refills: 0 | Status: SHIPPED | OUTPATIENT
Start: 2023-10-23 | End: 2023-10-30

## 2023-10-23 RX ORDER — PHENAZOPYRIDINE HYDROCHLORIDE 100 MG/1
100 TABLET, FILM COATED ORAL 3 TIMES DAILY PRN
Qty: 9 TABLET | Refills: 0 | Status: SHIPPED | OUTPATIENT
Start: 2023-10-23 | End: 2023-10-26

## 2023-10-23 ASSESSMENT — LIFESTYLE VARIABLES
HOW OFTEN DO YOU HAVE A DRINK CONTAINING ALCOHOL: NEVER
HOW MANY STANDARD DRINKS CONTAINING ALCOHOL DO YOU HAVE ON A TYPICAL DAY: PATIENT DOES NOT DRINK

## 2023-10-23 NOTE — DISCHARGE INSTRUCTIONS
Please follow-up with your primary care doctor. Please return ED for any new or worsening symptoms. Please stay well hydrated.

## 2023-10-23 NOTE — ED PROVIDER NOTES
[address] [facility] [address]  [phone]    Call in 1 day  For follow up      DISCHARGE MEDICATIONS:  Discharge Medication List as of 10/23/2023  4:59 PM        START taking these medications    Details   cefdinir (OMNICEF) 300 MG capsule Take 1 capsule by mouth 2 times daily for 7 days, Disp-14 capsule, R-0Normal      phenazopyridine (PYRIDIUM) 100 MG tablet Take 1 tablet by mouth 3 times daily as needed for Pain, Disp-9 tablet, R-0Normal                  Please note that portions of this note were completed with a voice recognition program.    Efforts were made to edit the dictations but occasionally words are mis-transcribed.     Sydnie Lion DO (electronically signed)       Sydnie Lion DO  Resident  10/24/23 [id number]

## 2023-10-24 ENCOUNTER — OFFICE VISIT (OUTPATIENT)
Dept: PRIMARY CARE CLINIC | Age: 78
End: 2023-10-24
Payer: MEDICARE

## 2023-10-24 VITALS
SYSTOLIC BLOOD PRESSURE: 128 MMHG | HEART RATE: 66 BPM | OXYGEN SATURATION: 96 % | TEMPERATURE: 97.7 F | RESPIRATION RATE: 20 BRPM | WEIGHT: 203 LBS | HEIGHT: 64 IN | DIASTOLIC BLOOD PRESSURE: 74 MMHG | BODY MASS INDEX: 34.66 KG/M2

## 2023-10-24 DIAGNOSIS — N39.0 RECURRENT URINARY TRACT INFECTION: Primary | ICD-10-CM

## 2023-10-24 DIAGNOSIS — Z86.79 HISTORY OF PAROXYSMAL SUPRAVENTRICULAR TACHYCARDIA: ICD-10-CM

## 2023-10-24 DIAGNOSIS — E11.69 TYPE 2 DIABETES MELLITUS WITH OTHER SPECIFIED COMPLICATION, WITHOUT LONG-TERM CURRENT USE OF INSULIN (HCC): ICD-10-CM

## 2023-10-24 DIAGNOSIS — E03.9 ACQUIRED HYPOTHYROIDISM: ICD-10-CM

## 2023-10-24 DIAGNOSIS — I44.2 COMPLETE HEART BLOCK (HCC): ICD-10-CM

## 2023-10-24 DIAGNOSIS — E55.9 VITAMIN D DEFICIENCY: ICD-10-CM

## 2023-10-24 DIAGNOSIS — F03.90 DEMENTIA WITHOUT BEHAVIORAL DISTURBANCE (HCC): ICD-10-CM

## 2023-10-24 DIAGNOSIS — E78.2 MIXED HYPERLIPIDEMIA: ICD-10-CM

## 2023-10-24 DIAGNOSIS — I10 ESSENTIAL HYPERTENSION: ICD-10-CM

## 2023-10-24 DIAGNOSIS — M85.839 OSTEOPENIA OF FOREARM, UNSPECIFIED LATERALITY: ICD-10-CM

## 2023-10-24 DIAGNOSIS — Z23 ENCOUNTER FOR IMMUNIZATION: ICD-10-CM

## 2023-10-24 DIAGNOSIS — E53.8 VITAMIN B12 DEFICIENCY: ICD-10-CM

## 2023-10-24 LAB
EKG ATRIAL RATE: 56 BPM
EKG P-R INTERVAL: 272 MS
EKG Q-T INTERVAL: 428 MS
EKG QRS DURATION: 78 MS
EKG QTC CALCULATION (BAZETT): 413 MS
EKG R AXIS: 68 DEGREES
EKG T AXIS: 98 DEGREES
EKG VENTRICULAR RATE: 56 BPM

## 2023-10-24 PROCEDURE — G8484 FLU IMMUNIZE NO ADMIN: HCPCS | Performed by: FAMILY MEDICINE

## 2023-10-24 PROCEDURE — 1036F TOBACCO NON-USER: CPT | Performed by: FAMILY MEDICINE

## 2023-10-24 PROCEDURE — G8427 DOCREV CUR MEDS BY ELIG CLIN: HCPCS | Performed by: FAMILY MEDICINE

## 2023-10-24 PROCEDURE — 1123F ACP DISCUSS/DSCN MKR DOCD: CPT | Performed by: FAMILY MEDICINE

## 2023-10-24 PROCEDURE — 3074F SYST BP LT 130 MM HG: CPT | Performed by: FAMILY MEDICINE

## 2023-10-24 PROCEDURE — 3078F DIAST BP <80 MM HG: CPT | Performed by: FAMILY MEDICINE

## 2023-10-24 PROCEDURE — 93010 ELECTROCARDIOGRAM REPORT: CPT | Performed by: INTERNAL MEDICINE

## 2023-10-24 PROCEDURE — 1090F PRES/ABSN URINE INCON ASSESS: CPT | Performed by: FAMILY MEDICINE

## 2023-10-24 PROCEDURE — 99215 OFFICE O/P EST HI 40 MIN: CPT | Performed by: FAMILY MEDICINE

## 2023-10-24 PROCEDURE — G8399 PT W/DXA RESULTS DOCUMENT: HCPCS | Performed by: FAMILY MEDICINE

## 2023-10-24 PROCEDURE — G8417 CALC BMI ABV UP PARAM F/U: HCPCS | Performed by: FAMILY MEDICINE

## 2023-10-24 PROCEDURE — 3044F HG A1C LEVEL LT 7.0%: CPT | Performed by: FAMILY MEDICINE

## 2023-10-24 NOTE — PROGRESS NOTES
precautions. Denies contraindication. Hold prior to dental procedures. Repeat bone density 1 to 2 years sooner as needed. Defers for 2 years from last       Lichen sclerosus  On 11/21 labial biopsy through Dr. Wendy Paredes, counseled, follow with him      Constipation  Counseled extensively. Differential reviewed, including serious etiologies. Resolved after MiraLAX taper  Colonoscopy 1/22 - except hemorrhoids-Dr. Mary Jordan          Obstructive sleep apnea syndrome  Counseled. Doing very well CPAP. Uses it at least 8 to 10 hours per night 7 nights per week with very good results. Got a new machine toward the end of 2019. Encourage her machine be checked. She follows up with Dr. Alpa Mcknight 10/27/2021     Hypothyroidism  TFTs stable continue 100 mcg daily, monitor    Type 2 diabetes mellitus with hyperglycemia, without long-term current use of insulin (HCC)  Stable on metformin 500 mg twice a day. Previously 1000 twice a day. Precautions reviewed. Risks  reviewed, proper hydration reviewed, standard precautions reviewed including loose bowels, LA. Encouraged  she watch blood sugar ambulatory with parameters reviewed call in if out of range. Hyper and hypoglycemic precautions reviewed. Micro-and macrovascular  complications reviewed. Importance of at least yearly eye exams and daily foot exams reviewed. Tolerating metformin . no longer on invokana. Monitor          Dementia without behavioral disturbance  Counseled  On Aricept 10 mg daily, and Namenda. Continue per Dr. Luis Morejon, and Loreta Valdovinos. They were seeing Dr. Corey Kaplan specialist from [facility] [employer]. However decided not to follow-up, felt it was a \"racket\". F  She is enrolled in a study, nitrate water. She is not driving and we emphasized importance of this. Safety precautions reviewed.  feels her dementia is stable.    continue current management, neuro did PET scan 8/23, it showed diminished activity in the bilateral frontal lobes

## 2023-10-25 LAB
MICROORGANISM SPEC CULT: ABNORMAL
SPECIMEN DESCRIPTION: ABNORMAL

## 2023-10-26 ENCOUNTER — NURSE ONLY (OUTPATIENT)
Dept: PRIMARY CARE CLINIC | Age: 78
End: 2023-10-26
Payer: MEDICARE

## 2023-10-26 DIAGNOSIS — Z23 NEED FOR INFLUENZA VACCINATION: Primary | ICD-10-CM

## 2023-10-26 PROCEDURE — 90694 VACC AIIV4 NO PRSRV 0.5ML IM: CPT | Performed by: FAMILY MEDICINE

## 2023-10-26 PROCEDURE — G0008 ADMIN INFLUENZA VIRUS VAC: HCPCS | Performed by: FAMILY MEDICINE

## 2023-11-01 ENCOUNTER — OFFICE VISIT (OUTPATIENT)
Dept: GERIATRIC MEDICINE | Age: 78
End: 2023-11-01
Payer: MEDICARE

## 2023-11-01 VITALS
SYSTOLIC BLOOD PRESSURE: 128 MMHG | TEMPERATURE: 97.5 F | HEART RATE: 56 BPM | WEIGHT: 202.1 LBS | DIASTOLIC BLOOD PRESSURE: 76 MMHG | RESPIRATION RATE: 16 BRPM | BODY MASS INDEX: 34.69 KG/M2

## 2023-11-01 DIAGNOSIS — G31.84 MCI (MILD COGNITIVE IMPAIRMENT): Primary | ICD-10-CM

## 2023-11-01 DIAGNOSIS — E66.01 SEVERE OBESITY (BMI 35.0-39.9) WITH COMORBIDITY (HCC): ICD-10-CM

## 2023-11-01 PROCEDURE — 1036F TOBACCO NON-USER: CPT | Performed by: INTERNAL MEDICINE

## 2023-11-01 PROCEDURE — G8417 CALC BMI ABV UP PARAM F/U: HCPCS | Performed by: INTERNAL MEDICINE

## 2023-11-01 PROCEDURE — 1123F ACP DISCUSS/DSCN MKR DOCD: CPT | Performed by: INTERNAL MEDICINE

## 2023-11-01 PROCEDURE — 1090F PRES/ABSN URINE INCON ASSESS: CPT | Performed by: INTERNAL MEDICINE

## 2023-11-01 PROCEDURE — G8484 FLU IMMUNIZE NO ADMIN: HCPCS | Performed by: INTERNAL MEDICINE

## 2023-11-01 PROCEDURE — G8399 PT W/DXA RESULTS DOCUMENT: HCPCS | Performed by: INTERNAL MEDICINE

## 2023-11-01 PROCEDURE — G8427 DOCREV CUR MEDS BY ELIG CLIN: HCPCS | Performed by: INTERNAL MEDICINE

## 2023-11-01 PROCEDURE — 99213 OFFICE O/P EST LOW 20 MIN: CPT | Performed by: INTERNAL MEDICINE

## 2023-11-01 NOTE — PROGRESS NOTES
CC Memory reevaluation    Here with  and has had a minor deggradation  Like getting upset about [employer]  S/P COVID in September  VS stable   Meds reviewed, Aricept 20 mg and Namenda 20 mg a day  IADL's per   She may be dong AL's  Sleeps well  Still on H5 taking daily  ADL's OK  He does all domestic things  Sleeps 12 hours  Impression; SDAT  stable                      MRI inconclusive   Plan: Continue same             Interested in [place] drug to reverse disease              to look up name of drug

## 2023-11-01 NOTE — PROGRESS NOTES
Chief Complaint   Patient presents with    6 Month Follow-Up     March follow up. Here with . Pt &  both had COVID in September -- feeling better now.

## 2023-11-06 ENCOUNTER — HOSPITAL ENCOUNTER (OUTPATIENT)
Age: 78
Discharge: HOME | End: 2023-11-06
Payer: MEDICARE

## 2023-11-06 DIAGNOSIS — N39.0 RECURRENT URINARY TRACT INFECTION: ICD-10-CM

## 2023-11-06 PROCEDURE — 87086 URINE CULTURE/COLONY COUNT: CPT

## 2023-11-06 PROCEDURE — 81001 URINALYSIS AUTO W/SCOPE: CPT

## 2023-11-07 ENCOUNTER — OFFICE VISIT (OUTPATIENT)
Dept: PRIMARY CARE CLINIC | Age: 78
End: 2023-11-07
Payer: MEDICARE

## 2023-11-07 VITALS
HEIGHT: 64 IN | SYSTOLIC BLOOD PRESSURE: 124 MMHG | TEMPERATURE: 97.7 F | RESPIRATION RATE: 16 BRPM | DIASTOLIC BLOOD PRESSURE: 78 MMHG | HEART RATE: 64 BPM | BODY MASS INDEX: 34.83 KG/M2 | WEIGHT: 204 LBS | OXYGEN SATURATION: 96 %

## 2023-11-07 VITALS
HEART RATE: 64 BPM | HEIGHT: 64 IN | TEMPERATURE: 97.7 F | BODY MASS INDEX: 34.83 KG/M2 | OXYGEN SATURATION: 96 % | SYSTOLIC BLOOD PRESSURE: 124 MMHG | WEIGHT: 204 LBS | DIASTOLIC BLOOD PRESSURE: 78 MMHG | RESPIRATION RATE: 18 BRPM

## 2023-11-07 DIAGNOSIS — E03.9 ACQUIRED HYPOTHYROIDISM: ICD-10-CM

## 2023-11-07 DIAGNOSIS — E78.2 MIXED HYPERLIPIDEMIA: ICD-10-CM

## 2023-11-07 DIAGNOSIS — E53.8 VITAMIN B12 DEFICIENCY: ICD-10-CM

## 2023-11-07 DIAGNOSIS — N39.0 RECURRENT URINARY TRACT INFECTION: Primary | ICD-10-CM

## 2023-11-07 DIAGNOSIS — M85.839 OSTEOPENIA OF FOREARM, UNSPECIFIED LATERALITY: ICD-10-CM

## 2023-11-07 DIAGNOSIS — Z00.00 MEDICARE ANNUAL WELLNESS VISIT, SUBSEQUENT: Primary | ICD-10-CM

## 2023-11-07 DIAGNOSIS — E55.9 VITAMIN D DEFICIENCY: ICD-10-CM

## 2023-11-07 DIAGNOSIS — F03.90 DEMENTIA WITHOUT BEHAVIORAL DISTURBANCE (HCC): ICD-10-CM

## 2023-11-07 DIAGNOSIS — E11.69 TYPE 2 DIABETES MELLITUS WITH OTHER SPECIFIED COMPLICATION, WITHOUT LONG-TERM CURRENT USE OF INSULIN (HCC): ICD-10-CM

## 2023-11-07 DIAGNOSIS — I10 ESSENTIAL HYPERTENSION: ICD-10-CM

## 2023-11-07 LAB
AMORPH SED URNS QL MICRO: PRESENT
BACTERIA URNS QL MICRO: ABNORMAL
BILIRUB UR QL STRIP: NEGATIVE
CLARITY UR: ABNORMAL
COLOR UR: YELLOW
GLUCOSE UR STRIP-MCNC: NEGATIVE MG/DL
HGB UR QL STRIP.AUTO: NEGATIVE
KETONES UR STRIP-MCNC: NEGATIVE MG/DL
LEUKOCYTE ESTERASE UR QL STRIP: NEGATIVE
MICROORGANISM SPEC CULT: NO GROWTH
NITRITE UR QL STRIP: NEGATIVE
PH UR STRIP: 6 [PH] (ref 5–9)
PROT UR STRIP-MCNC: NEGATIVE MG/DL
RBC #/AREA URNS HPF: ABNORMAL /HPF
SP GR UR STRIP: >1.03 (ref 1–1.03)
SPECIMEN DESCRIPTION: NORMAL
UROBILINOGEN UR STRIP-ACNC: 0.2 EU/DL (ref 0–1)
WBC #/AREA URNS HPF: ABNORMAL /HPF

## 2023-11-07 PROCEDURE — G8484 FLU IMMUNIZE NO ADMIN: HCPCS | Performed by: FAMILY MEDICINE

## 2023-11-07 PROCEDURE — G0439 PPPS, SUBSEQ VISIT: HCPCS | Performed by: FAMILY MEDICINE

## 2023-11-07 PROCEDURE — 99999 PR OFFICE/OUTPT VISIT,PROCEDURE ONLY: CPT | Performed by: FAMILY MEDICINE

## 2023-11-07 PROCEDURE — 1123F ACP DISCUSS/DSCN MKR DOCD: CPT | Performed by: FAMILY MEDICINE

## 2023-11-07 ASSESSMENT — PATIENT HEALTH QUESTIONNAIRE - PHQ9
1. LITTLE INTEREST OR PLEASURE IN DOING THINGS: 0
SUM OF ALL RESPONSES TO PHQ9 QUESTIONS 1 & 2: 0
SUM OF ALL RESPONSES TO PHQ QUESTIONS 1-9: 0
2. FEELING DOWN, DEPRESSED OR HOPELESS: 0

## 2023-11-07 ASSESSMENT — LIFESTYLE VARIABLES
HOW MANY STANDARD DRINKS CONTAINING ALCOHOL DO YOU HAVE ON A TYPICAL DAY: 1 OR 2
HOW OFTEN DO YOU HAVE A DRINK CONTAINING ALCOHOL: MONTHLY OR LESS

## 2023-11-07 NOTE — PROGRESS NOTES
[facility] : 1945 Sex: female  Age: 69 y.o. Chief Complaint   Patient presents with    Results       HPI:      Patient presents today with  for follow-up. Since seeing me saw Dr. Loli Bower ordered ultrasound and she is going to follow-up. He started her on prophylactic antibiotic I believe nitrofurantoin every other day. Despite that, the day before her last appointment with me, developed another UTI, went to the ER. Urine culture that was collected 10/20 grew over 100,000 Klebsiella resistant to cefazolin and cefoxitin, intermediate nitrofurantoin, urine culture in ER 10/23 growing 50 100,000 Klebsiella susceptibility showed resistance to cefazolin and cefoxitin, intermediate nitrofurantoin, otherwise sensitive to third-generation cephalosporins and was discharged on cefdinir and symptoms resolved. She ran out of the prophylactic antibiotic and she will contact Dr. Loli Bower. Asymptomatic at this time      Yesterday had urinalysis drawn that showed amorphous sediment otherwise negative with culture no growth. She will notify us if any other symptoms      Most Recent Labs  CBC  Lab Results   Component Value Date/Time    WBC 9.6 10/23/2023 12:00 PM    WBC 8.0 10/20/2023 12:46 PM    WBC 8.5 2023 11:45 AM    RBC 4.62 10/23/2023 12:00 PM    RBC 4.69 10/20/2023 12:46 PM    RBC 4.78 2023 11:45 AM    HGB 14.2 10/23/2023 12:00 PM    HGB 14.3 10/20/2023 12:46 PM    HGB 14.7 2023 11:45 AM    HCT 43.5 10/23/2023 12:00 PM    HCT 43.5 10/20/2023 12:46 PM    HCT 44.2 2023 11:45 AM    MCV 94.2 10/23/2023 12:00 PM    MCV 92.8 10/20/2023 12:46 PM    MCV 92.5 2023 11:45 AM     10/23/2023 12:00 PM     10/20/2023 12:46 PM     2023 11:45 AM      CMP  Lab Results   Component Value Date/Time     10/23/2023 02:08 PM    NA Unable to perform testing: Specimen hemolyzed.  10/23/2023 08:01 AM     10/20/2023 12:46 PM    K 4.3 10/23/2023

## 2023-11-23 DIAGNOSIS — F03.90 DEMENTIA WITHOUT BEHAVIORAL DISTURBANCE (HCC): ICD-10-CM

## 2023-11-27 RX ORDER — DONEPEZIL HYDROCHLORIDE 10 MG/1
TABLET, FILM COATED ORAL
Qty: 180 TABLET | Refills: 3 | Status: SHIPPED | OUTPATIENT
Start: 2023-11-27

## 2023-11-28 ENCOUNTER — HOSPITAL ENCOUNTER (OUTPATIENT)
Age: 78
Discharge: HOME | End: 2023-11-28

## 2023-12-11 ENCOUNTER — HOSPITAL ENCOUNTER (OUTPATIENT)
Age: 78
Discharge: HOME | End: 2023-12-11

## 2023-12-12 DIAGNOSIS — E11.69 TYPE 2 DIABETES MELLITUS WITH OTHER SPECIFIED COMPLICATION, WITHOUT LONG-TERM CURRENT USE OF INSULIN (HCC): Primary | ICD-10-CM

## 2023-12-20 LAB
CULTURE: NORMAL
CULTURE: NORMAL
SPECIMEN DESCRIPTION: NORMAL

## 2023-12-22 NOTE — RESULT ENCOUNTER NOTE
It looks like antibiotic, 7 days of cephalexin was given to them in express care on 12/18.  Is she still taking this?  Is it not working?  If continued symptoms should be seen again

## 2024-01-02 ENCOUNTER — HOSPITAL ENCOUNTER (OUTPATIENT)
Age: 79
Discharge: HOME | End: 2024-01-02
Payer: MEDICARE

## 2024-01-02 DIAGNOSIS — E53.8 VITAMIN B12 DEFICIENCY: ICD-10-CM

## 2024-01-02 DIAGNOSIS — E03.9 ACQUIRED HYPOTHYROIDISM: ICD-10-CM

## 2024-01-02 DIAGNOSIS — E78.2 MIXED HYPERLIPIDEMIA: ICD-10-CM

## 2024-01-02 DIAGNOSIS — I10 ESSENTIAL HYPERTENSION: ICD-10-CM

## 2024-01-02 DIAGNOSIS — E55.9 VITAMIN D DEFICIENCY: ICD-10-CM

## 2024-01-02 DIAGNOSIS — E11.69 TYPE 2 DIABETES MELLITUS WITH OTHER SPECIFIED COMPLICATION, WITHOUT LONG-TERM CURRENT USE OF INSULIN (HCC): ICD-10-CM

## 2024-01-02 LAB
25(OH)D3 SERPL-MCNC: 31 NG/ML (ref 30–100)
ALBUMIN SERPL-MCNC: 3.9 G/DL (ref 3.5–5.2)
ALP SERPL-CCNC: 67 U/L (ref 35–104)
ALT SERPL-CCNC: 36 U/L (ref 0–32)
ANION GAP SERPL CALCULATED.3IONS-SCNC: 9 MMOL/L (ref 7–16)
AST SERPL-CCNC: 38 U/L (ref 0–31)
BASOPHILS # BLD: 0.08 K/UL (ref 0–0.2)
BASOPHILS NFR BLD: 1 % (ref 0–2)
BILIRUB SERPL-MCNC: 0.4 MG/DL (ref 0–1.2)
BILIRUB UR QL STRIP: NEGATIVE
BUN SERPL-MCNC: 12 MG/DL (ref 6–23)
CALCIUM SERPL-MCNC: 9.5 MG/DL (ref 8.6–10.2)
CHLORIDE SERPL-SCNC: 107 MMOL/L (ref 98–107)
CHOLEST SERPL-MCNC: 227 MG/DL
CK SERPL-CCNC: 22 U/L (ref 20–180)
CLARITY UR: CLEAR
CO2 SERPL-SCNC: 23 MMOL/L (ref 22–29)
COLOR UR: YELLOW
COMMENT: ABNORMAL
CREAT SERPL-MCNC: 0.7 MG/DL (ref 0.5–1)
CREAT UR-MCNC: 155.5 MG/DL (ref 29–226)
EOSINOPHIL # BLD: 0.27 K/UL (ref 0.05–0.5)
EOSINOPHILS RELATIVE PERCENT: 3 % (ref 0–6)
ERYTHROCYTE [DISTWIDTH] IN BLOOD BY AUTOMATED COUNT: 13.1 % (ref 11.5–15)
FOLATE SERPL-MCNC: 8.4 NG/ML (ref 4.8–24.2)
GFR SERPL CREATININE-BSD FRML MDRD: >60 ML/MIN/1.73M2
GLUCOSE SERPL-MCNC: 160 MG/DL (ref 74–99)
GLUCOSE UR STRIP-MCNC: NEGATIVE MG/DL
HBA1C MFR BLD: 6.4 % (ref 4–5.6)
HCT VFR BLD AUTO: 45.4 % (ref 34–48)
HDLC SERPL-MCNC: 46 MG/DL
HGB BLD-MCNC: 14.8 G/DL (ref 11.5–15.5)
HGB UR QL STRIP.AUTO: NEGATIVE
IMM GRANULOCYTES # BLD AUTO: 0.03 K/UL (ref 0–0.58)
IMM GRANULOCYTES NFR BLD: 0 % (ref 0–5)
KETONES UR STRIP-MCNC: NEGATIVE MG/DL
LDLC SERPL CALC-MCNC: 143 MG/DL
LEUKOCYTE ESTERASE UR QL STRIP: NEGATIVE
LYMPHOCYTES NFR BLD: 3.59 K/UL (ref 1.5–4)
LYMPHOCYTES RELATIVE PERCENT: 40 % (ref 20–42)
MCH RBC QN AUTO: 30.6 PG (ref 26–35)
MCHC RBC AUTO-ENTMCNC: 32.6 G/DL (ref 32–34.5)
MCV RBC AUTO: 94 FL (ref 80–99.9)
MICROALBUMIN UR-MCNC: <12 MG/L (ref 0–19)
MICROALBUMIN/CREAT UR-RTO: NORMAL MCG/MG CREAT (ref 0–30)
MONOCYTES NFR BLD: 0.52 K/UL (ref 0.1–0.95)
MONOCYTES NFR BLD: 6 % (ref 2–12)
NEUTROPHILS NFR BLD: 50 % (ref 43–80)
NEUTS SEG NFR BLD: 4.43 K/UL (ref 1.8–7.3)
NITRITE UR QL STRIP: NEGATIVE
PH UR STRIP: 6 [PH] (ref 5–9)
PLATELET # BLD AUTO: 168 K/UL (ref 130–450)
PMV BLD AUTO: 11 FL (ref 7–12)
POTASSIUM SERPL-SCNC: 3.7 MMOL/L (ref 3.5–5)
PROT SERPL-MCNC: 6.8 G/DL (ref 6.4–8.3)
PROT UR STRIP-MCNC: NEGATIVE MG/DL
RBC # BLD AUTO: 4.83 M/UL (ref 3.5–5.5)
SODIUM SERPL-SCNC: 139 MMOL/L (ref 132–146)
SP GR UR STRIP: >1.03 (ref 1–1.03)
T4 FREE SERPL-MCNC: 1.5 NG/DL (ref 0.9–1.7)
TRIGL SERPL-MCNC: 190 MG/DL
TSH SERPL DL<=0.05 MIU/L-ACNC: 2.1 UIU/ML (ref 0.27–4.2)
UROBILINOGEN UR STRIP-ACNC: 0.2 EU/DL (ref 0–1)
VIT B12 SERPL-MCNC: 344 PG/ML (ref 211–946)
VLDLC SERPL CALC-MCNC: 38 MG/DL
WBC OTHER # BLD: 8.9 K/UL (ref 4.5–11.5)

## 2024-01-02 PROCEDURE — 85025 COMPLETE CBC W/AUTO DIFF WBC: CPT

## 2024-01-02 PROCEDURE — 82746 ASSAY OF FOLIC ACID SERUM: CPT

## 2024-01-02 PROCEDURE — 80053 COMPREHEN METABOLIC PANEL: CPT

## 2024-01-02 PROCEDURE — 82570 ASSAY OF URINE CREATININE: CPT

## 2024-01-02 PROCEDURE — 83036 HEMOGLOBIN GLYCOSYLATED A1C: CPT

## 2024-01-02 PROCEDURE — 82550 ASSAY OF CK (CPK): CPT

## 2024-01-02 PROCEDURE — 82043 UR ALBUMIN QUANTITATIVE: CPT

## 2024-01-02 PROCEDURE — 82607 VITAMIN B-12: CPT

## 2024-01-02 PROCEDURE — 82306 VITAMIN D 25 HYDROXY: CPT

## 2024-01-02 PROCEDURE — 80061 LIPID PANEL: CPT

## 2024-01-02 PROCEDURE — 84443 ASSAY THYROID STIM HORMONE: CPT

## 2024-01-02 PROCEDURE — 84439 ASSAY OF FREE THYROXINE: CPT

## 2024-01-02 PROCEDURE — 81003 URINALYSIS AUTO W/O SCOPE: CPT

## 2024-01-03 NOTE — RESULT ENCOUNTER NOTE
Labs reviewed, sugar elevated but hemoglobin A1c stable, B12 mildly low, LFTs mildly elevated, cholesterol not at goal but stable, keep follow-up to review more detail

## 2024-01-17 ENCOUNTER — OFFICE VISIT (OUTPATIENT)
Dept: FAMILY MEDICINE CLINIC | Age: 79
End: 2024-01-17
Payer: MEDICARE

## 2024-01-17 VITALS
SYSTOLIC BLOOD PRESSURE: 116 MMHG | BODY MASS INDEX: 35.05 KG/M2 | HEART RATE: 59 BPM | OXYGEN SATURATION: 96 % | DIASTOLIC BLOOD PRESSURE: 70 MMHG | TEMPERATURE: 97.1 F | WEIGHT: 207.4 LBS

## 2024-01-17 DIAGNOSIS — N30.00 ACUTE CYSTITIS WITHOUT HEMATURIA: Primary | ICD-10-CM

## 2024-01-17 DIAGNOSIS — R30.0 DYSURIA: ICD-10-CM

## 2024-01-17 DIAGNOSIS — R39.9 UTI SYMPTOMS: ICD-10-CM

## 2024-01-17 DIAGNOSIS — R82.90 FOUL SMELLING URINE: ICD-10-CM

## 2024-01-17 LAB
BILIRUBIN, POC: NORMAL
BLOOD URINE, POC: NORMAL
CLARITY, POC: NORMAL
COLOR, POC: YELLOW
GLUCOSE URINE, POC: NORMAL
KETONES, POC: NORMAL
LEUKOCYTE EST, POC: NORMAL
NITRITE, POC: NORMAL
PH, POC: 5.5
PROTEIN, POC: NORMAL
SPECIFIC GRAVITY, POC: >=1.03
UROBILINOGEN, POC: 0.2

## 2024-01-17 PROCEDURE — 81002 URINALYSIS NONAUTO W/O SCOPE: CPT | Performed by: PHYSICIAN ASSISTANT

## 2024-01-17 PROCEDURE — G8399 PT W/DXA RESULTS DOCUMENT: HCPCS | Performed by: PHYSICIAN ASSISTANT

## 2024-01-17 PROCEDURE — G8484 FLU IMMUNIZE NO ADMIN: HCPCS | Performed by: PHYSICIAN ASSISTANT

## 2024-01-17 PROCEDURE — 1090F PRES/ABSN URINE INCON ASSESS: CPT | Performed by: PHYSICIAN ASSISTANT

## 2024-01-17 PROCEDURE — 1123F ACP DISCUSS/DSCN MKR DOCD: CPT | Performed by: PHYSICIAN ASSISTANT

## 2024-01-17 PROCEDURE — 99214 OFFICE O/P EST MOD 30 MIN: CPT | Performed by: PHYSICIAN ASSISTANT

## 2024-01-17 PROCEDURE — G8417 CALC BMI ABV UP PARAM F/U: HCPCS | Performed by: PHYSICIAN ASSISTANT

## 2024-01-17 PROCEDURE — G8427 DOCREV CUR MEDS BY ELIG CLIN: HCPCS | Performed by: PHYSICIAN ASSISTANT

## 2024-01-17 PROCEDURE — 1036F TOBACCO NON-USER: CPT | Performed by: PHYSICIAN ASSISTANT

## 2024-01-17 RX ORDER — CIPROFLOXACIN 500 MG/1
500 TABLET, FILM COATED ORAL 2 TIMES DAILY
Qty: 14 TABLET | Refills: 0 | Status: SHIPPED | OUTPATIENT
Start: 2024-01-17 | End: 2024-01-24

## 2024-01-17 NOTE — PROGRESS NOTES
24  Lorenza Fu : 1945 Sex: female  Age 78 y.o.      Subjective:  Chief Complaint   Patient presents with    Dysuria     Burning and odor to urine, started last night. Also having issues with controlling bladder         HPI:   HPI  Lorenza Fu , 78 y.o. female presents to express care for evaluation of urinary burning, strong odor.  The patient started with the symptoms over the last couple of days.  The patient does have a history of recurrent urinary tract infection.  The patient does note that there is a burning with urination.  The patient is also having a very foul-smelling odor to the urine according to .  The patient does have a history of dementia.  The patient is also having some incontinence associated.  The patient has not had any fevers, chills.  No vomiting.  The patient seems to be at her baseline.      ROS:   Unless otherwise stated in this report the patient's positive and negative responses for review of systems for constitutional, eyes, ENT, cardiovascular, respiratory, gastrointestinal, neurological, , musculoskeletal, and integument systems and related systems to the presenting problem are either stated in the history of present illness or were not pertinent or were negative for the symptoms and/or complaints related to the presenting medical problem.  Positives and pertinent negatives as per HPI.  All others reviewed and are negative.      PMH:     Past Medical History:   Diagnosis Date    Arthritis     Diabetes mellitus (HCC)     Difficult intubation     carries card, copy on chart  Glidescope#3 with ETT size7    Hyperlipidemia     Hypothyroidism     Left sided abdominal pain     Memory problem     still competent    GORDY on CPAP     Pacemaker     Rectal bleeding     Ringing in the ears     Supraventricular tachycardia 2023       Past Surgical History:   Procedure Laterality Date    ABLATION OF DYSRHYTHMIC FOCUS      BREAST SURGERY

## 2024-01-19 LAB
CULTURE: NORMAL
SPECIMEN DESCRIPTION: NORMAL

## 2024-01-31 ENCOUNTER — TELEPHONE (OUTPATIENT)
Dept: PRIMARY CARE CLINIC | Age: 79
End: 2024-01-31

## 2024-01-31 DIAGNOSIS — R32 URINARY INCONTINENCE, UNSPECIFIED TYPE: ICD-10-CM

## 2024-01-31 NOTE — TELEPHONE ENCOUNTER
would like her back on the Myrbetriq, says it was working for her, he said she seen Teodoro on 1/17 and he said she had a UTI and given cipro, then she seen Paco and he said she didn't. He says the end result is that the ATB didn't stop wetting her pants.  said if you want to see her, he'll bring her in but he needs this resolved.

## 2024-01-31 NOTE — TELEPHONE ENCOUNTER
Donnie K.  I will send the Myrbetriq, standard precautions, even though Dr. Antonio should be managing because he prescribed.  I also would like this resolved for the patient.  It is important that they follow-up with Dr. Antonio for her chronic bladder issues.  I am happy to see in the meantime.  I will send Myrbetriq to [facility] now

## 2024-01-31 NOTE — TELEPHONE ENCOUNTER
Couple of things.  Dr. Antonio started Myrbetriq but it was subsequently discontinued because there was no noticeable change in her symptoms as relayed to us, and given other symptoms she was having it was felt that this was a side effect and stopped.  She is also had recurrent UTIs.  If she has been taking Myrbetriq all along with clear benefit, was tolerating and simply needs refill we can do that although it should come from Dr. Antonio since he was the one managing.  Cord in my note she was not on it for a while with no benefit to taking.  My concern then is that she have another UTI and if so she should be seen and checked

## 2024-01-31 NOTE — TELEPHONE ENCOUNTER
requesting script for Myrbetriq , I can't find it on her medication list and  doesn't know the dose,  says she has dementia and she's wetting her pants and she needs the medication.  Please call  to confirm this refill for him, he's trying to take care of her.

## 2024-02-08 ENCOUNTER — OFFICE VISIT (OUTPATIENT)
Dept: PRIMARY CARE CLINIC | Age: 79
End: 2024-02-08

## 2024-02-08 VITALS
WEIGHT: 208 LBS | OXYGEN SATURATION: 94 % | HEART RATE: 73 BPM | TEMPERATURE: 97.1 F | DIASTOLIC BLOOD PRESSURE: 60 MMHG | SYSTOLIC BLOOD PRESSURE: 122 MMHG | BODY MASS INDEX: 35.15 KG/M2

## 2024-02-08 DIAGNOSIS — E78.2 MIXED HYPERLIPIDEMIA: Primary | ICD-10-CM

## 2024-02-08 DIAGNOSIS — E66.01 SEVERE OBESITY (BMI 35.0-39.9) WITH COMORBIDITY (HCC): ICD-10-CM

## 2024-02-08 DIAGNOSIS — E11.69 TYPE 2 DIABETES MELLITUS WITH OTHER SPECIFIED COMPLICATION, WITHOUT LONG-TERM CURRENT USE OF INSULIN (HCC): ICD-10-CM

## 2024-02-08 DIAGNOSIS — F03.90 DEMENTIA WITHOUT BEHAVIORAL DISTURBANCE (HCC): ICD-10-CM

## 2024-02-08 DIAGNOSIS — I44.2 COMPLETE HEART BLOCK (HCC): ICD-10-CM

## 2024-02-08 RX ORDER — ROSUVASTATIN CALCIUM 5 MG/1
5 TABLET, COATED ORAL
Qty: 30 TABLET | Refills: 1 | Status: SHIPPED | OUTPATIENT
Start: 2024-02-08

## 2024-02-08 RX ORDER — NITROFURANTOIN MACROCRYSTALS 50 MG/1
50 CAPSULE ORAL NIGHTLY
COMMUNITY
Start: 2024-02-07

## 2024-02-08 SDOH — ECONOMIC STABILITY: FOOD INSECURITY: WITHIN THE PAST 12 MONTHS, YOU WORRIED THAT YOUR FOOD WOULD RUN OUT BEFORE YOU GOT MONEY TO BUY MORE.: NEVER TRUE

## 2024-02-08 SDOH — ECONOMIC STABILITY: FOOD INSECURITY: WITHIN THE PAST 12 MONTHS, THE FOOD YOU BOUGHT JUST DIDN'T LAST AND YOU DIDN'T HAVE MONEY TO GET MORE.: NEVER TRUE

## 2024-02-08 SDOH — ECONOMIC STABILITY: INCOME INSECURITY: HOW HARD IS IT FOR YOU TO PAY FOR THE VERY BASICS LIKE FOOD, HOUSING, MEDICAL CARE, AND HEATING?: NOT HARD AT ALL

## 2024-02-08 ASSESSMENT — PATIENT HEALTH QUESTIONNAIRE - PHQ9: DEPRESSION UNABLE TO ASSESS: FUNCTIONAL CAPACITY MOTIVATION LIMITS ACCURACY

## 2024-02-08 NOTE — PROGRESS NOTES
block  History of.  Status post dual-chamber pacemaker.  Continue per cardiology and EP.  Device check pending.      Plan as above.  Counseled extensively and differential diagnoses relevant to above were reviewed, including serious etiologies, risks and complications, especially of left uncontrolled.  If relevant, instructions and  alternatives to meds/treatment reviewed, as well as interactions, and  SE's/ADRs reviewed, notify immediately if any, discontinuing new meds if any.  Plan made after discussion and shared decision making.    Counseled.  Continue per specialists including Dr. Antonio (yesterday), Jovanni Hancock and Dr. Guillermo as well.  Also cardiology/EP.  Asymptomatic today.  Precautions reviewed.  Try low-dose Crestor 5 mg daily with co-Q10, DC if ADRs.  Blood work 1 month follow-up 5 weeks sooner as needed    As long as symptoms steadily improve/resolve, and medical conditions follow the expected course, FU as below, sooner PRN.    Return in about 5 weeks (around 3/14/2024).             Educational materials and/or home exercises printed for patient's review and were included in patient instructions on his/her After Visit Summary and given to patient at the end of visit.       After discussion, patient and/or guardian verbalizes understanding, agrees, feels comfortable with and wishes to proceed with above treatment plan. Call for any pending results, FU sooner if abnormal, as needed or if any current symptoms persist/worsen.      Advised patient to call with any new medication issues, and read all Rx info from pharmacy to assure aware of all possible risks and side effects of medication before taking.     Reviewed age and gender appropriate health screening exams and vaccinations.  Advised patient regarding importance of keeping up with recommended health maintenance and to schedule as soon as possible if overdue, as this is important in assessing for undiagnosed pathology, especially cancer, as

## 2024-02-28 ENCOUNTER — HOSPITAL ENCOUNTER (OUTPATIENT)
Dept: GENERAL RADIOLOGY | Age: 79
Discharge: HOME | End: 2024-03-01
Payer: MEDICARE

## 2024-02-28 ENCOUNTER — HOSPITAL ENCOUNTER (OUTPATIENT)
Age: 79
Discharge: HOME | End: 2024-03-01
Payer: MEDICARE

## 2024-02-28 DIAGNOSIS — R10.9 ABDOMINAL PAIN, UNSPECIFIED ABDOMINAL LOCATION: ICD-10-CM

## 2024-02-28 PROCEDURE — 74018 RADEX ABDOMEN 1 VIEW: CPT

## 2024-03-14 ENCOUNTER — HOSPITAL ENCOUNTER (OUTPATIENT)
Age: 79
Discharge: HOME | End: 2024-03-14
Payer: MEDICARE

## 2024-03-14 DIAGNOSIS — E78.2 MIXED HYPERLIPIDEMIA: ICD-10-CM

## 2024-03-14 LAB
ALBUMIN SERPL-MCNC: 4 G/DL (ref 3.5–5.2)
ALP SERPL-CCNC: 83 U/L (ref 35–104)
ALT SERPL-CCNC: 29 U/L (ref 0–32)
ANION GAP SERPL CALCULATED.3IONS-SCNC: 13 MMOL/L (ref 7–16)
AST SERPL-CCNC: 38 U/L (ref 0–31)
BILIRUB SERPL-MCNC: 0.6 MG/DL (ref 0–1.2)
BUN SERPL-MCNC: 15 MG/DL (ref 6–23)
CALCIUM SERPL-MCNC: 9.4 MG/DL (ref 8.6–10.2)
CHLORIDE SERPL-SCNC: 106 MMOL/L (ref 98–107)
CHOLEST SERPL-MCNC: 142 MG/DL
CK SERPL-CCNC: 22 U/L (ref 20–180)
CO2 SERPL-SCNC: 22 MMOL/L (ref 22–29)
CREAT SERPL-MCNC: 0.8 MG/DL (ref 0.5–1)
GFR SERPL CREATININE-BSD FRML MDRD: >60 ML/MIN/1.73M2
GLUCOSE SERPL-MCNC: 130 MG/DL (ref 74–99)
HDLC SERPL-MCNC: 48 MG/DL
LDLC SERPL CALC-MCNC: 71 MG/DL
POTASSIUM SERPL-SCNC: 4 MMOL/L (ref 3.5–5)
PROT SERPL-MCNC: 7 G/DL (ref 6.4–8.3)
SODIUM SERPL-SCNC: 141 MMOL/L (ref 132–146)
TRIGL SERPL-MCNC: 117 MG/DL
VLDLC SERPL CALC-MCNC: 23 MG/DL

## 2024-03-14 PROCEDURE — 80061 LIPID PANEL: CPT

## 2024-03-14 PROCEDURE — 80053 COMPREHEN METABOLIC PANEL: CPT

## 2024-03-14 PROCEDURE — 82550 ASSAY OF CK (CPK): CPT

## 2024-03-14 PROCEDURE — 36415 COLL VENOUS BLD VENIPUNCTURE: CPT

## 2024-03-15 ENCOUNTER — OFFICE VISIT (OUTPATIENT)
Dept: PRIMARY CARE CLINIC | Age: 79
End: 2024-03-15

## 2024-03-15 VITALS
HEART RATE: 52 BPM | WEIGHT: 208 LBS | OXYGEN SATURATION: 95 % | BODY MASS INDEX: 35.15 KG/M2 | DIASTOLIC BLOOD PRESSURE: 64 MMHG | TEMPERATURE: 97.8 F | SYSTOLIC BLOOD PRESSURE: 128 MMHG

## 2024-03-15 DIAGNOSIS — E03.9 ACQUIRED HYPOTHYROIDISM: ICD-10-CM

## 2024-03-15 DIAGNOSIS — I10 ESSENTIAL HYPERTENSION: ICD-10-CM

## 2024-03-15 DIAGNOSIS — E11.69 TYPE 2 DIABETES MELLITUS WITH OTHER SPECIFIED COMPLICATION, WITHOUT LONG-TERM CURRENT USE OF INSULIN (HCC): ICD-10-CM

## 2024-03-15 DIAGNOSIS — F03.90 DEMENTIA WITHOUT BEHAVIORAL DISTURBANCE (HCC): ICD-10-CM

## 2024-03-15 DIAGNOSIS — E78.2 MIXED HYPERLIPIDEMIA: Primary | ICD-10-CM

## 2024-03-15 DIAGNOSIS — E66.01 SEVERE OBESITY (BMI 35.0-39.9) WITH COMORBIDITY (HCC): ICD-10-CM

## 2024-03-15 DIAGNOSIS — E55.9 VITAMIN D DEFICIENCY: ICD-10-CM

## 2024-03-15 DIAGNOSIS — E53.8 VITAMIN B12 DEFICIENCY: ICD-10-CM

## 2024-03-15 DIAGNOSIS — I44.2 COMPLETE HEART BLOCK (HCC): ICD-10-CM

## 2024-03-15 RX ORDER — PRAVASTATIN SODIUM 10 MG
10 TABLET ORAL DAILY
Qty: 30 TABLET | Refills: 1 | Status: SHIPPED | OUTPATIENT
Start: 2024-03-15

## 2024-03-15 ASSESSMENT — PATIENT HEALTH QUESTIONNAIRE - PHQ9
2. FEELING DOWN, DEPRESSED OR HOPELESS: 0
1. LITTLE INTEREST OR PLEASURE IN DOING THINGS: 0
SUM OF ALL RESPONSES TO PHQ QUESTIONS 1-9: 0
SUM OF ALL RESPONSES TO PHQ9 QUESTIONS 1 & 2: 0

## 2024-03-15 NOTE — PROGRESS NOTES
Vaping Use    Vaping Use: Never used   Substance Use Topics    Alcohol use: Yes     Comment: socially -- 3 drinks per month at the most    Drug use: No      Social History     Social History Narrative    PMH:    Problem List: Urinary tract infectious disease, Obstructive sleep apnea syndrome, Adult health    examination, Adult health examination, Constipation, Derangement of knee, Vitamin D deficiency,    Hypothyroidism, Conduction disorder of the heart, Hyperlipidemia    Health Maintenance:    Mini Mental Status - (2018)    Colonoscopy - (2018)    Colonoscopy Screening - (2018)    Mammogram Screening - (2018)    Mammogram - (2018)    Colonoscopy - (2010)    Couseled on Home Safety - (2015)    Bone Density Scan - (3/28/2012)    Medical Problems:    Sleep Apnea - Dr Hernandez, cpap    Pneumonia    Hypothyroidism - Dr García    Vit D Def - Dr García    Hyperlipidemia - intolerance to all statins    Cardiac Arrhythmia - ? type. s/p ablation    cardiac dysrhythmia - pauses - lead to pacemaker    Skin Cancer    Surgical Hx:    Partial Hysterectomy - Still with Both Ovaries.    Breast Implants    Pacemaker - Dr Oliveros    Knee Replacement, Carpal Tunnel Release, colon-vaginal fistula repair    Bladder Repair - sling    RT Hip Arthroplasty - MARGARITA        FH:    Father:    . (Hx)    Mother:    . (Hx)    Dad - MI 41,  71 MI    Mom - DM , currently living age 83        SH:    Marital: . 8 kids (7 living)    Personal Habits: Cigarette Use: Former Cigarette Smoker - quit age 34. Occ ETOH, minimal. .    2 kids, 6 step kids. 27 grandkids..Alcohol: Rarely consumes alcohol        Vitals:    03/15/24 1502   BP: 128/64   Pulse: 52   Temp: 97.8 °F (36.6 °C)   SpO2: 95%   Weight: 94.3 kg (208 lb)     Wt Readings from Last 3 Encounters:   03/15/24 94.3 kg (208 lb)   24 94.3 kg (208 lb)   24 94.1 kg (207 lb 6.4 oz)            Exam:  Const: Appears comfortable. No signs of acute

## 2024-04-11 DIAGNOSIS — E11.69 TYPE 2 DIABETES MELLITUS WITH OTHER SPECIFIED COMPLICATION, WITHOUT LONG-TERM CURRENT USE OF INSULIN (HCC): ICD-10-CM

## 2024-04-11 DIAGNOSIS — I10 ESSENTIAL HYPERTENSION: ICD-10-CM

## 2024-04-11 RX ORDER — METOPROLOL SUCCINATE 25 MG/1
25 TABLET, EXTENDED RELEASE ORAL 2 TIMES DAILY
Qty: 180 TABLET | Refills: 1 | Status: SHIPPED | OUTPATIENT
Start: 2024-04-11

## 2024-04-11 RX ORDER — LEVOTHYROXINE SODIUM 0.1 MG/1
100 TABLET ORAL DAILY
Qty: 90 TABLET | Refills: 1 | Status: SHIPPED | OUTPATIENT
Start: 2024-04-11

## 2024-04-11 NOTE — TELEPHONE ENCOUNTER
Medication and pharmacy info verified with the pt's       Last Appointment:  3/15/2024    Future appts:  Future Appointments   Date Time Provider Department Center   4/26/2024  3:30 PM Bari Yan MD  [facility]   5/3/2024  1:15 PM Mason Guillermo MD [facility]

## 2024-04-12 ENCOUNTER — OFFICE VISIT (OUTPATIENT)
Dept: FAMILY MEDICINE CLINIC | Age: 79
End: 2024-04-12

## 2024-04-12 ENCOUNTER — TELEPHONE (OUTPATIENT)
Dept: PRIMARY CARE CLINIC | Age: 79
End: 2024-04-12

## 2024-04-12 VITALS
DIASTOLIC BLOOD PRESSURE: 82 MMHG | TEMPERATURE: 98.2 F | BODY MASS INDEX: 34.98 KG/M2 | WEIGHT: 207 LBS | HEART RATE: 69 BPM | OXYGEN SATURATION: 97 % | SYSTOLIC BLOOD PRESSURE: 112 MMHG

## 2024-04-12 DIAGNOSIS — R35.0 URINARY FREQUENCY: ICD-10-CM

## 2024-04-12 DIAGNOSIS — R31.9 URINARY TRACT INFECTION WITH HEMATURIA, SITE UNSPECIFIED: Primary | ICD-10-CM

## 2024-04-12 DIAGNOSIS — N39.0 URINARY TRACT INFECTION WITH HEMATURIA, SITE UNSPECIFIED: Primary | ICD-10-CM

## 2024-04-12 LAB
BILIRUBIN, POC: NORMAL
BLOOD URINE, POC: NORMAL
CLARITY, POC: NORMAL
COLOR, POC: YELLOW
GLUCOSE URINE, POC: NORMAL
KETONES, POC: NORMAL
LEUKOCYTE EST, POC: NORMAL
NITRITE, POC: NEGATIVE
PH, POC: 5.5
PROTEIN, POC: NORMAL
SPECIFIC GRAVITY, POC: >=1.03
UROBILINOGEN, POC: 1

## 2024-04-12 RX ORDER — CEFDINIR 300 MG/1
300 CAPSULE ORAL 2 TIMES DAILY
Qty: 14 CAPSULE | Refills: 0 | Status: SHIPPED | OUTPATIENT
Start: 2024-04-12 | End: 2024-04-19

## 2024-04-12 NOTE — TELEPHONE ENCOUNTER
Pt seen in [facility] care urologist  would like Mybertriq to be increased to 25-50mg.

## 2024-04-12 NOTE — PROGRESS NOTES
24  Lorenza Fu : 1945 Sex: female  Age 78 y.o.      Subjective:  Chief Complaint   Patient presents with    Urinary Frequency         HPI:   HPI  Lorenza Fu , 78 y.o. female presents to express care for evaluation of urinary frequency, urgency.  The patient has had issues with UTIs in the past.  The patient has had a very foul-smelling urine.  The patient is here with  who is the primary historian.  The patient is not having any back pain, flank pain.  The patient is not have any fever, chills.  She will complain of some lower abdominal pain.  The  had called the urology office and they did wonder if the patient should have her mybertriq increased from 25 to 50 mg.    Urology wanted PCP to increase this because they have previously prescribed.    The patient is here for UTI symptoms at which she has been seen multiple times in the past 4.        ROS:   Unless otherwise stated in this report the patient's positive and negative responses for review of systems for constitutional, eyes, ENT, cardiovascular, respiratory, gastrointestinal, neurological, , musculoskeletal, and integument systems and related systems to the presenting problem are either stated in the history of present illness or were not pertinent or were negative for the symptoms and/or complaints related to the presenting medical problem.  Positives and pertinent negatives as per HPI.  All others reviewed and are negative.      PMH:     Past Medical History:   Diagnosis Date    Arthritis     Diabetes mellitus (HCC)     Difficult intubation     carries card, copy on chart  Glidescope#3 with ETT size7    Hyperlipidemia     Hypothyroidism     Left sided abdominal pain     Memory problem     still competent    GORDY on CPAP     Pacemaker     Rectal bleeding     Ringing in the ears     Supraventricular tachycardia (HCC) 2023       Past Surgical History:   Procedure Laterality Date    ABLATION OF

## 2024-04-12 NOTE — TELEPHONE ENCOUNTER
There are potential side effects.  This medicine is typically managed by specialist, if urologist wants to increase it is up to them to do so

## 2024-04-26 ENCOUNTER — OFFICE VISIT (OUTPATIENT)
Dept: PRIMARY CARE CLINIC | Age: 79
End: 2024-04-26

## 2024-04-26 VITALS
WEIGHT: 203 LBS | DIASTOLIC BLOOD PRESSURE: 62 MMHG | BODY MASS INDEX: 34.31 KG/M2 | SYSTOLIC BLOOD PRESSURE: 128 MMHG | TEMPERATURE: 97.5 F | OXYGEN SATURATION: 96 %

## 2024-04-26 DIAGNOSIS — E78.2 MIXED HYPERLIPIDEMIA: ICD-10-CM

## 2024-04-26 DIAGNOSIS — N39.0 RECURRENT UTI: Primary | ICD-10-CM

## 2024-04-26 DIAGNOSIS — F03.90 DEMENTIA WITHOUT BEHAVIORAL DISTURBANCE (HCC): ICD-10-CM

## 2024-04-26 DIAGNOSIS — I10 ESSENTIAL HYPERTENSION: ICD-10-CM

## 2024-04-26 DIAGNOSIS — E11.69 TYPE 2 DIABETES MELLITUS WITH OTHER SPECIFIED COMPLICATION, WITHOUT LONG-TERM CURRENT USE OF INSULIN (HCC): ICD-10-CM

## 2024-04-26 LAB
BILIRUBIN, POC: NEGATIVE
BLOOD URINE, POC: NEGATIVE
CLARITY, POC: ABNORMAL
COLOR, POC: YELLOW
GLUCOSE URINE, POC: 250
KETONES, POC: ABNORMAL
LEUKOCYTE EST, POC: ABNORMAL
NITRITE, POC: ABNORMAL
PH, POC: 5.5
PROTEIN, POC: 30
SPECIFIC GRAVITY, POC: >=1.03
UROBILINOGEN, POC: 0.2

## 2024-04-26 RX ORDER — CIPROFLOXACIN 250 MG/1
250 TABLET, FILM COATED ORAL 2 TIMES DAILY
Qty: 14 TABLET | Refills: 0 | Status: SHIPPED | OUTPATIENT
Start: 2024-04-26 | End: 2024-05-03

## 2024-04-26 NOTE — PROGRESS NOTES
basic  monitoring on interested in other evaluation or treatment, in-depth blood work,  imaging or otherwise. Hep C 10/18 negative, again was negative 9/20.   LFTs fluctuate.  Monitor.  Cautious use of statin as above      Tubular adenoma  Small polyp 7/18. Dr. Palacio recommended basic screenings if repeated at all.  Asymptomatic.  Repeat colonoscopy Dr. Wilson 1/22 showed hemorrhoids otherwise negative     Vitamin D deficiency  Recommend continue vitamin D3 2000 IUs daily    Health maintenance examination  Health maintenance issues discussed at length  6/21.  Encourage yearly.     B12 deficiency  Risk of high and low reviewed.  Normal, continue current dosing        Breast cancer screening  Mammogram was negative 9/22.  Encourage    Complete heart block  History of.  Status post dual-chamber pacemaker.  Continue per cardiology and EP.     Plan as above.  Counseled extensively and differential diagnoses relevant to above were reviewed, including serious etiologies, risks and complications, especially of left uncontrolled.  If relevant, instructions and  alternatives to meds/treatment reviewed, as well as interactions, and  SE's/ADRs reviewed, notify immediately if any, discontinuing new meds if any.  Plan made after discussion and shared decision making.    Complicated.  Cipro as above.  Low-dose pravastatin as above.  Call for culture results Monday.  Follow-up if symptoms persist or worsen.  Sees Dr. Antonio May 7.  Otherwise plan blood work 1 month follow-up 5 weeks with me sooner as needed        As long as symptoms steadily improve/resolve, and medical conditions follow the expected course, FU as below, sooner PRN.    No follow-ups on file.             Educational materials and/or home exercises printed for patient's review and were included in patient instructions on his/her After Visit Summary and given to patient at the end of visit.       After discussion, patient and/or guardian verbalizes

## 2024-04-28 LAB
CULTURE: NORMAL
SPECIMEN DESCRIPTION: NORMAL

## 2024-05-03 ENCOUNTER — OFFICE VISIT (OUTPATIENT)
Dept: GERIATRIC MEDICINE | Age: 79
End: 2024-05-03
Payer: MEDICARE

## 2024-05-03 VITALS
DIASTOLIC BLOOD PRESSURE: 80 MMHG | SYSTOLIC BLOOD PRESSURE: 134 MMHG | TEMPERATURE: 98.1 F | HEART RATE: 72 BPM | RESPIRATION RATE: 20 BRPM | WEIGHT: 202.5 LBS | BODY MASS INDEX: 34.22 KG/M2

## 2024-05-03 DIAGNOSIS — G31.84 MCI (MILD COGNITIVE IMPAIRMENT): Primary | ICD-10-CM

## 2024-05-03 PROCEDURE — G8427 DOCREV CUR MEDS BY ELIG CLIN: HCPCS | Performed by: INTERNAL MEDICINE

## 2024-05-03 PROCEDURE — 1090F PRES/ABSN URINE INCON ASSESS: CPT | Performed by: INTERNAL MEDICINE

## 2024-05-03 PROCEDURE — G8417 CALC BMI ABV UP PARAM F/U: HCPCS | Performed by: INTERNAL MEDICINE

## 2024-05-03 PROCEDURE — 1123F ACP DISCUSS/DSCN MKR DOCD: CPT | Performed by: INTERNAL MEDICINE

## 2024-05-03 PROCEDURE — 1036F TOBACCO NON-USER: CPT | Performed by: INTERNAL MEDICINE

## 2024-05-03 PROCEDURE — 99213 OFFICE O/P EST LOW 20 MIN: CPT | Performed by: INTERNAL MEDICINE

## 2024-05-03 PROCEDURE — G8399 PT W/DXA RESULTS DOCUMENT: HCPCS | Performed by: INTERNAL MEDICINE

## 2024-05-03 NOTE — PROGRESS NOTES
CC Evaluate memory      Here with daughter   And  in Hospital with A Fib and syncope and need for pacemaker  She moves in with daughter at night and is sundowning and asking where he is  Frequently through the night   Her son in law is in same house recovering from TKR  IADL dependent  And ADL independent    in the [facility]  Impression: MCI stable   On Aricept 20 mg a day         And Namenda 20 mg a day

## 2024-05-28 DIAGNOSIS — R32 URINARY INCONTINENCE, UNSPECIFIED TYPE: ICD-10-CM

## 2024-05-28 RX ORDER — MIRABEGRON 25 MG/1
25 TABLET, FILM COATED, EXTENDED RELEASE ORAL DAILY
Qty: 30 TABLET | Refills: 3 | Status: CANCELLED | OUTPATIENT
Start: 2024-05-28

## 2024-05-28 NOTE — TELEPHONE ENCOUNTER
My notes and created that Dr. Antonio started this but I stopped it because of dizziness and balance issues.  If she definitely taking it?  Is there clear benefit?  If not I would stop it.  If symptoms i.e. chronic irritable bladder symptoms recur then she could resume.  Should be managed by Dr. Antonio

## 2024-05-29 NOTE — TELEPHONE ENCOUNTER
Denis currently admitted to hospital unable to discuss medications with patient due to her dementia

## 2024-06-04 ENCOUNTER — OFFICE VISIT (OUTPATIENT)
Dept: PRIMARY CARE CLINIC | Age: 79
End: 2024-06-04

## 2024-06-04 VITALS
WEIGHT: 205 LBS | TEMPERATURE: 97.6 F | DIASTOLIC BLOOD PRESSURE: 70 MMHG | OXYGEN SATURATION: 95 % | HEART RATE: 50 BPM | BODY MASS INDEX: 34.64 KG/M2 | SYSTOLIC BLOOD PRESSURE: 132 MMHG

## 2024-06-04 DIAGNOSIS — E55.9 VITAMIN D DEFICIENCY: ICD-10-CM

## 2024-06-04 DIAGNOSIS — I10 ESSENTIAL HYPERTENSION: ICD-10-CM

## 2024-06-04 DIAGNOSIS — E11.69 TYPE 2 DIABETES MELLITUS WITH OTHER SPECIFIED COMPLICATION, WITHOUT LONG-TERM CURRENT USE OF INSULIN (HCC): ICD-10-CM

## 2024-06-04 DIAGNOSIS — E53.8 VITAMIN B12 DEFICIENCY: ICD-10-CM

## 2024-06-04 DIAGNOSIS — R32 URINARY INCONTINENCE, UNSPECIFIED TYPE: Primary | ICD-10-CM

## 2024-06-04 DIAGNOSIS — R32 URINARY INCONTINENCE, UNSPECIFIED TYPE: ICD-10-CM

## 2024-06-04 DIAGNOSIS — E03.9 ACQUIRED HYPOTHYROIDISM: ICD-10-CM

## 2024-06-04 DIAGNOSIS — N39.0 RECURRENT URINARY TRACT INFECTION: ICD-10-CM

## 2024-06-04 DIAGNOSIS — E78.2 MIXED HYPERLIPIDEMIA: ICD-10-CM

## 2024-06-04 DIAGNOSIS — N39.0 RECURRENT UTI: ICD-10-CM

## 2024-06-04 DIAGNOSIS — F03.90 DEMENTIA WITHOUT BEHAVIORAL DISTURBANCE (HCC): ICD-10-CM

## 2024-06-04 LAB
BILIRUBIN, POC: NEGATIVE
BLOOD URINE, POC: ABNORMAL
CLARITY, POC: ABNORMAL
COLOR, POC: YELLOW
GLUCOSE URINE, POC: NEGATIVE
KETONES, POC: NEGATIVE
LEUKOCYTE EST, POC: ABNORMAL
NITRITE, POC: POSITIVE
PH, POC: 6
PROTEIN, POC: ABNORMAL
SPECIFIC GRAVITY, POC: 1.02
UROBILINOGEN, POC: 0.2

## 2024-06-04 RX ORDER — CEFDINIR 300 MG/1
300 CAPSULE ORAL 2 TIMES DAILY
Qty: 14 CAPSULE | Refills: 0 | Status: SHIPPED | OUTPATIENT
Start: 2024-06-04 | End: 2024-06-11

## 2024-06-04 NOTE — PROGRESS NOTES
There were a number of questions, all questions answered.      Signs and symptoms to watch for discussed, serious signs and symptoms reviewed.  ER if any.        Bari Yan MD    Patients are advised to check with insurance company to ensure coverage and to fully understand benefits and cost prior to any testing.  This note was created with the assistance of voice recognition software.  Document was reviewed however may contain grammatical errors.This note or partial portions of this note may have been created using a copy forward or copy paste feature but these portions have been verified and re-edited for accuracy and any portions not in need of editing or reviews are not being used to generate any component necessary for billing purposes.  Some portions may be carried over for continuity purposes and to aid me with monitoring of past medical conditions and discussions.  Elements necessary for proper CPT code selection are based only on elements of the visit that are truly unique to this visit.

## 2024-06-07 LAB
CULTURE: ABNORMAL
SPECIMEN DESCRIPTION: ABNORMAL

## 2024-06-25 ENCOUNTER — OFFICE VISIT (OUTPATIENT)
Dept: PRIMARY CARE CLINIC | Age: 79
End: 2024-06-25
Payer: MEDICARE

## 2024-06-25 VITALS
HEART RATE: 54 BPM | RESPIRATION RATE: 20 BRPM | DIASTOLIC BLOOD PRESSURE: 70 MMHG | BODY MASS INDEX: 34.16 KG/M2 | TEMPERATURE: 97.8 F | WEIGHT: 205 LBS | HEIGHT: 65 IN | SYSTOLIC BLOOD PRESSURE: 128 MMHG | OXYGEN SATURATION: 97 %

## 2024-06-25 DIAGNOSIS — F03.90 DEMENTIA WITHOUT BEHAVIORAL DISTURBANCE (HCC): ICD-10-CM

## 2024-06-25 DIAGNOSIS — R31.9 HEMATURIA, UNSPECIFIED TYPE: ICD-10-CM

## 2024-06-25 DIAGNOSIS — N39.0 RECURRENT UTI: Primary | ICD-10-CM

## 2024-06-25 DIAGNOSIS — R32 URINARY INCONTINENCE, UNSPECIFIED TYPE: ICD-10-CM

## 2024-06-25 DIAGNOSIS — E78.2 MIXED HYPERLIPIDEMIA: ICD-10-CM

## 2024-06-25 DIAGNOSIS — I10 ESSENTIAL HYPERTENSION: ICD-10-CM

## 2024-06-25 DIAGNOSIS — E11.69 TYPE 2 DIABETES MELLITUS WITH OTHER SPECIFIED COMPLICATION, WITHOUT LONG-TERM CURRENT USE OF INSULIN (HCC): ICD-10-CM

## 2024-06-25 LAB
BILIRUBIN, POC: NEGATIVE
BLOOD URINE, POC: ABNORMAL
CLARITY, POC: ABNORMAL
COLOR, POC: YELLOW
GLUCOSE URINE, POC: ABNORMAL
KETONES, POC: NEGATIVE
LEUKOCYTE EST, POC: ABNORMAL
NITRITE, POC: POSITIVE
PH, POC: 5.5
PROTEIN, POC: NEGATIVE
SPECIFIC GRAVITY, POC: 1.03
UROBILINOGEN, POC: 0.2

## 2024-06-25 PROCEDURE — G8399 PT W/DXA RESULTS DOCUMENT: HCPCS | Performed by: FAMILY MEDICINE

## 2024-06-25 PROCEDURE — 3078F DIAST BP <80 MM HG: CPT | Performed by: FAMILY MEDICINE

## 2024-06-25 PROCEDURE — 99214 OFFICE O/P EST MOD 30 MIN: CPT | Performed by: FAMILY MEDICINE

## 2024-06-25 PROCEDURE — 0509F URINE INCON PLAN DOCD: CPT | Performed by: FAMILY MEDICINE

## 2024-06-25 PROCEDURE — 1090F PRES/ABSN URINE INCON ASSESS: CPT | Performed by: FAMILY MEDICINE

## 2024-06-25 PROCEDURE — G2211 COMPLEX E/M VISIT ADD ON: HCPCS | Performed by: FAMILY MEDICINE

## 2024-06-25 PROCEDURE — 3044F HG A1C LEVEL LT 7.0%: CPT | Performed by: FAMILY MEDICINE

## 2024-06-25 PROCEDURE — 3074F SYST BP LT 130 MM HG: CPT | Performed by: FAMILY MEDICINE

## 2024-06-25 PROCEDURE — 81003 URINALYSIS AUTO W/O SCOPE: CPT | Performed by: FAMILY MEDICINE

## 2024-06-25 PROCEDURE — 1036F TOBACCO NON-USER: CPT | Performed by: FAMILY MEDICINE

## 2024-06-25 PROCEDURE — G8427 DOCREV CUR MEDS BY ELIG CLIN: HCPCS | Performed by: FAMILY MEDICINE

## 2024-06-25 PROCEDURE — 1123F ACP DISCUSS/DSCN MKR DOCD: CPT | Performed by: FAMILY MEDICINE

## 2024-06-25 PROCEDURE — G8417 CALC BMI ABV UP PARAM F/U: HCPCS | Performed by: FAMILY MEDICINE

## 2024-06-25 RX ORDER — CIPROFLOXACIN 250 MG/1
250 TABLET, FILM COATED ORAL 2 TIMES DAILY
Qty: 14 TABLET | Refills: 0 | Status: SHIPPED | OUTPATIENT
Start: 2024-06-25 | End: 2024-07-02

## 2024-06-25 RX ORDER — FLUCONAZOLE 150 MG/1
TABLET ORAL
Qty: 2 TABLET | Refills: 0 | Status: SHIPPED | OUTPATIENT
Start: 2024-06-25

## 2024-06-25 RX ORDER — METRONIDAZOLE 500 MG/1
500 TABLET ORAL 2 TIMES DAILY
Qty: 14 TABLET | Refills: 0 | Status: SHIPPED | OUTPATIENT
Start: 2024-06-25 | End: 2024-07-02

## 2024-06-25 NOTE — PROGRESS NOTES
negative, again was negative 9/20.   LFTs fluctuate.  Monitor.  Cautious use of statin as above      Tubular adenoma  Small polyp 7/18. Dr. Palacio recommended basic screenings if repeated at all.  Asymptomatic.  Repeat colonoscopy Dr. Wilson 1/22 showed hemorrhoids otherwise negative     Vitamin D deficiency  Recommend continue vitamin D3 2000 IUs daily    Health maintenance examination  Health maintenance issues discussed at length  6/21.  Encourage yearly.     B12 deficiency  Risk of high and low reviewed.  Normal, continue current dosing        Breast cancer screening  Mammogram was negative 9/22.  Encourage    Complete heart block  History of.  Status post dual-chamber pacemaker.  Continue per cardiology and EP.     Plan as above.  Counseled extensively and differential diagnoses relevant to above were reviewed, including serious etiologies, risks and complications, especially of left uncontrolled.  If relevant, instructions and  alternatives to meds/treatment reviewed, as well as interactions, and  SE's/ADRs reviewed, notify immediately if any, discontinuing new meds if any.  Plan made after discussion and shared decision making.    Complicated pleasant patient, had discussion as above.  Cipro, Flagyl, Diflucan as above.  Refer to Dr. Chapman.  Awaiting blood work.  Follow-up 2 weeks sooner as needed            As long as symptoms steadily improve/resolve, and medical conditions follow the expected course, FU as below, sooner PRN.    Return in about 2 weeks (around 7/9/2024), or if symptoms worsen or fail to improve, for Review BW.             Educational materials and/or home exercises printed for patient's review and were included in patient instructions on his/her After Visit Summary and given to patient at the end of visit.       After discussion, patient and/or guardian verbalizes understanding, agrees, feels comfortable with and wishes to proceed with above treatment plan. Call for any pending results, FU

## 2024-06-28 LAB
CULTURE: ABNORMAL
CULTURE: ABNORMAL
SPECIMEN DESCRIPTION: ABNORMAL

## 2024-07-08 ENCOUNTER — HOSPITAL ENCOUNTER (OUTPATIENT)
Age: 79
Discharge: HOME | End: 2024-07-08
Payer: MEDICARE

## 2024-07-08 DIAGNOSIS — I10 ESSENTIAL HYPERTENSION: ICD-10-CM

## 2024-07-08 DIAGNOSIS — E55.9 VITAMIN D DEFICIENCY: ICD-10-CM

## 2024-07-08 DIAGNOSIS — E53.8 VITAMIN B12 DEFICIENCY: ICD-10-CM

## 2024-07-08 DIAGNOSIS — E78.2 MIXED HYPERLIPIDEMIA: ICD-10-CM

## 2024-07-08 DIAGNOSIS — E11.69 TYPE 2 DIABETES MELLITUS WITH OTHER SPECIFIED COMPLICATION, WITHOUT LONG-TERM CURRENT USE OF INSULIN (HCC): ICD-10-CM

## 2024-07-08 LAB
25(OH)D3 SERPL-MCNC: 28.1 NG/ML (ref 30–100)
ALBUMIN SERPL-MCNC: 3.9 G/DL (ref 3.5–5.2)
ALP SERPL-CCNC: 68 U/L (ref 35–104)
ALT SERPL-CCNC: 42 U/L (ref 0–32)
ANION GAP SERPL CALCULATED.3IONS-SCNC: 24 MMOL/L (ref 7–16)
AST SERPL-CCNC: 59 U/L (ref 0–31)
BASOPHILS # BLD: 0.06 K/UL (ref 0–0.2)
BASOPHILS NFR BLD: 1 % (ref 0–2)
BILIRUB SERPL-MCNC: 0.4 MG/DL (ref 0–1.2)
BUN SERPL-MCNC: 10 MG/DL (ref 6–23)
CALCIUM SERPL-MCNC: 9.5 MG/DL (ref 8.6–10.2)
CHLORIDE SERPL-SCNC: 103 MMOL/L (ref 98–107)
CHOLEST SERPL-MCNC: 213 MG/DL
CK SERPL-CCNC: 23 U/L (ref 20–180)
CO2 SERPL-SCNC: 18 MMOL/L (ref 22–29)
CREAT SERPL-MCNC: 0.8 MG/DL (ref 0.5–1)
CREAT UR-MCNC: 208.6 MG/DL (ref 29–226)
EOSINOPHIL # BLD: 0.33 K/UL (ref 0.05–0.5)
EOSINOPHILS RELATIVE PERCENT: 4 % (ref 0–6)
ERYTHROCYTE [DISTWIDTH] IN BLOOD BY AUTOMATED COUNT: 13.3 % (ref 11.5–15)
FOLATE SERPL-MCNC: 9.2 NG/ML (ref 4.8–24.2)
GFR, ESTIMATED: 79 ML/MIN/1.73M2
GLUCOSE SERPL-MCNC: 211 MG/DL (ref 74–99)
HBA1C MFR BLD: 6.9 % (ref 4–5.6)
HCT VFR BLD AUTO: 45.3 % (ref 34–48)
HDLC SERPL-MCNC: 54 MG/DL
HGB BLD-MCNC: 14.9 G/DL (ref 11.5–15.5)
IMM GRANULOCYTES # BLD AUTO: 0.03 K/UL (ref 0–0.58)
IMM GRANULOCYTES NFR BLD: 0 % (ref 0–5)
LDLC SERPL CALC-MCNC: 129 MG/DL
LYMPHOCYTES NFR BLD: 3.77 K/UL (ref 1.5–4)
LYMPHOCYTES RELATIVE PERCENT: 45 % (ref 20–42)
MCH RBC QN AUTO: 30.8 PG (ref 26–35)
MCHC RBC AUTO-ENTMCNC: 32.9 G/DL (ref 32–34.5)
MCV RBC AUTO: 93.8 FL (ref 80–99.9)
MICROALBUMIN UR-MCNC: <12 MG/L (ref 0–19)
MICROALBUMIN/CREAT UR-RTO: NORMAL MCG/MG CREAT (ref 0–30)
MONOCYTES NFR BLD: 0.44 K/UL (ref 0.1–0.95)
MONOCYTES NFR BLD: 5 % (ref 2–12)
NEUTROPHILS NFR BLD: 45 % (ref 43–80)
NEUTS SEG NFR BLD: 3.83 K/UL (ref 1.8–7.3)
PLATELET # BLD AUTO: 180 K/UL (ref 130–450)
PMV BLD AUTO: 10.6 FL (ref 7–12)
POTASSIUM SERPL-SCNC: 4.1 MMOL/L (ref 3.5–5)
PROT SERPL-MCNC: 7 G/DL (ref 6.4–8.3)
RBC # BLD AUTO: 4.83 M/UL (ref 3.5–5.5)
SODIUM SERPL-SCNC: 145 MMOL/L (ref 132–146)
TRIGL SERPL-MCNC: 148 MG/DL
TSH SERPL DL<=0.05 MIU/L-ACNC: 2.12 UIU/ML (ref 0.27–4.2)
VIT B12 SERPL-MCNC: 357 PG/ML (ref 211–946)
VLDLC SERPL CALC-MCNC: 30 MG/DL
WBC OTHER # BLD: 8.5 K/UL (ref 4.5–11.5)

## 2024-07-08 PROCEDURE — 82550 ASSAY OF CK (CPK): CPT

## 2024-07-08 PROCEDURE — 81001 URINALYSIS AUTO W/SCOPE: CPT

## 2024-07-08 PROCEDURE — 80061 LIPID PANEL: CPT

## 2024-07-08 PROCEDURE — 82043 UR ALBUMIN QUANTITATIVE: CPT

## 2024-07-08 PROCEDURE — 36415 COLL VENOUS BLD VENIPUNCTURE: CPT

## 2024-07-08 PROCEDURE — 82746 ASSAY OF FOLIC ACID SERUM: CPT

## 2024-07-08 PROCEDURE — 82607 VITAMIN B-12: CPT

## 2024-07-08 PROCEDURE — 83036 HEMOGLOBIN GLYCOSYLATED A1C: CPT

## 2024-07-08 PROCEDURE — 80053 COMPREHEN METABOLIC PANEL: CPT

## 2024-07-08 PROCEDURE — 85025 COMPLETE CBC W/AUTO DIFF WBC: CPT

## 2024-07-08 PROCEDURE — 84443 ASSAY THYROID STIM HORMONE: CPT

## 2024-07-08 PROCEDURE — 82570 ASSAY OF URINE CREATININE: CPT

## 2024-07-08 PROCEDURE — 82306 VITAMIN D 25 HYDROXY: CPT

## 2024-07-09 ENCOUNTER — OFFICE VISIT (OUTPATIENT)
Dept: PRIMARY CARE CLINIC | Age: 79
End: 2024-07-09
Payer: MEDICARE

## 2024-07-09 VITALS
DIASTOLIC BLOOD PRESSURE: 60 MMHG | BODY MASS INDEX: 34.39 KG/M2 | TEMPERATURE: 97.6 F | OXYGEN SATURATION: 94 % | HEART RATE: 74 BPM | WEIGHT: 203.5 LBS | SYSTOLIC BLOOD PRESSURE: 124 MMHG

## 2024-07-09 DIAGNOSIS — E11.65 TYPE 2 DIABETES MELLITUS WITH HYPERGLYCEMIA, WITHOUT LONG-TERM CURRENT USE OF INSULIN (HCC): ICD-10-CM

## 2024-07-09 DIAGNOSIS — E78.2 MIXED HYPERLIPIDEMIA: ICD-10-CM

## 2024-07-09 DIAGNOSIS — R31.9 HEMATURIA, UNSPECIFIED TYPE: ICD-10-CM

## 2024-07-09 DIAGNOSIS — E55.9 VITAMIN D DEFICIENCY: ICD-10-CM

## 2024-07-09 DIAGNOSIS — F03.90 DEMENTIA WITHOUT BEHAVIORAL DISTURBANCE (HCC): ICD-10-CM

## 2024-07-09 DIAGNOSIS — I44.2 COMPLETE HEART BLOCK (HCC): ICD-10-CM

## 2024-07-09 DIAGNOSIS — E53.8 VITAMIN B12 DEFICIENCY: ICD-10-CM

## 2024-07-09 DIAGNOSIS — Z86.79 HISTORY OF PAROXYSMAL SUPRAVENTRICULAR TACHYCARDIA: ICD-10-CM

## 2024-07-09 DIAGNOSIS — N39.0 RECURRENT UTI: Primary | ICD-10-CM

## 2024-07-09 DIAGNOSIS — I10 ESSENTIAL HYPERTENSION: ICD-10-CM

## 2024-07-09 DIAGNOSIS — R32 URINARY INCONTINENCE, UNSPECIFIED TYPE: ICD-10-CM

## 2024-07-09 DIAGNOSIS — E03.9 ACQUIRED HYPOTHYROIDISM: ICD-10-CM

## 2024-07-09 PROBLEM — R21 RASH: Status: RESOLVED | Noted: 2022-03-21 | Resolved: 2024-07-09

## 2024-07-09 PROBLEM — D72.829 LEUKOCYTOSIS: Status: RESOLVED | Noted: 2020-12-04 | Resolved: 2024-07-09

## 2024-07-09 PROBLEM — R82.90 ABNORMAL URINE ODOR: Status: RESOLVED | Noted: 2021-11-26 | Resolved: 2024-07-09

## 2024-07-09 PROBLEM — D75.1 POLYCYTHEMIA: Status: RESOLVED | Noted: 2020-07-31 | Resolved: 2024-07-09

## 2024-07-09 PROBLEM — A04.8 H. PYLORI INFECTION: Status: RESOLVED | Noted: 2022-03-21 | Resolved: 2024-07-09

## 2024-07-09 PROBLEM — D48.5 NEOPLASM OF UNCERTAIN BEHAVIOR OF SKIN: Status: RESOLVED | Noted: 2020-07-31 | Resolved: 2024-07-09

## 2024-07-09 PROBLEM — R26.9 GAIT DISTURBANCE: Status: RESOLVED | Noted: 2022-09-15 | Resolved: 2024-07-09

## 2024-07-09 PROBLEM — R10.30 LOWER ABDOMINAL PAIN: Status: RESOLVED | Noted: 2021-11-26 | Resolved: 2024-07-09

## 2024-07-09 PROBLEM — D49.2 NEOPLASM OF SKIN: Status: RESOLVED | Noted: 2022-03-21 | Resolved: 2024-07-09

## 2024-07-09 PROBLEM — R74.8 ELEVATED LIPASE: Status: RESOLVED | Noted: 2021-12-16 | Resolved: 2024-07-09

## 2024-07-09 LAB
AMORPH SED URNS QL MICRO: PRESENT
BACTERIA URNS QL MICRO: ABNORMAL
BILIRUB UR QL STRIP: NEGATIVE
CLARITY UR: ABNORMAL
COLOR UR: YELLOW
GLUCOSE UR STRIP-MCNC: 250 MG/DL
HGB UR QL STRIP.AUTO: NEGATIVE
KETONES UR STRIP-MCNC: NEGATIVE MG/DL
LEUKOCYTE ESTERASE UR QL STRIP: NEGATIVE
NITRITE UR QL STRIP: NEGATIVE
PH UR STRIP: 6 [PH] (ref 5–9)
PROT UR STRIP-MCNC: NEGATIVE MG/DL
RBC #/AREA URNS HPF: ABNORMAL /HPF
SP GR UR STRIP: >1.03 (ref 1–1.03)
UROBILINOGEN UR STRIP-ACNC: 0.2 EU/DL (ref 0–1)
WBC #/AREA URNS HPF: ABNORMAL /HPF

## 2024-07-09 PROCEDURE — 1123F ACP DISCUSS/DSCN MKR DOCD: CPT | Performed by: FAMILY MEDICINE

## 2024-07-09 PROCEDURE — G8427 DOCREV CUR MEDS BY ELIG CLIN: HCPCS | Performed by: FAMILY MEDICINE

## 2024-07-09 PROCEDURE — G2211 COMPLEX E/M VISIT ADD ON: HCPCS | Performed by: FAMILY MEDICINE

## 2024-07-09 PROCEDURE — G8417 CALC BMI ABV UP PARAM F/U: HCPCS | Performed by: FAMILY MEDICINE

## 2024-07-09 PROCEDURE — 0509F URINE INCON PLAN DOCD: CPT | Performed by: FAMILY MEDICINE

## 2024-07-09 PROCEDURE — 99215 OFFICE O/P EST HI 40 MIN: CPT | Performed by: FAMILY MEDICINE

## 2024-07-09 PROCEDURE — 3078F DIAST BP <80 MM HG: CPT | Performed by: FAMILY MEDICINE

## 2024-07-09 PROCEDURE — 1036F TOBACCO NON-USER: CPT | Performed by: FAMILY MEDICINE

## 2024-07-09 PROCEDURE — 3074F SYST BP LT 130 MM HG: CPT | Performed by: FAMILY MEDICINE

## 2024-07-09 PROCEDURE — G8399 PT W/DXA RESULTS DOCUMENT: HCPCS | Performed by: FAMILY MEDICINE

## 2024-07-09 PROCEDURE — 3044F HG A1C LEVEL LT 7.0%: CPT | Performed by: FAMILY MEDICINE

## 2024-07-09 PROCEDURE — 1090F PRES/ABSN URINE INCON ASSESS: CPT | Performed by: FAMILY MEDICINE

## 2024-07-09 NOTE — PROGRESS NOTES
Lorenza Fu : 1945 Sex: female  Age: 78 y.o.    Chief Complaint   Patient presents with    Discuss Labs    Frequent/Recurrent UTI     Per  no current symptoms currently felt like last abx helped        HPI:    Presents for follow-up with .  States urinary symptoms seem resolved for now, no further odor.  No incontinence.  No acute complaints.  Still interested in seeing Dr. Chapman, referral again placed.  Most recently was treated with Cipro, Diflucan and metronidazole.  He states this has worked the best yet.  He again reminds me that in his opinion cefdinir is complete garbage despite sensitivities.  Last urine culture again grew over 100,000 E. coli but it was pansensitive.    He admits to high sugar intake lately, glucose 211 but A1c 6.9 but this is up from 6.4 as well.  They will start monitoring and reduce sugar intake, defers change in therapy.  CO2 18 anion gap 24 LDL up to 129 cannot tolerate statins HDL 54 triglyceride 148 taking Lovaza, ALT fluctuates, currently 42 with an AST 59 vitamin D 28 going to start vitamin D3 1 to 2000 IUs daily vitamin B12 357 going to start sublingual 1000 mcg daily CBC grossly normal differential reviewed current urinalysis shows high specific gravity 2+ bacteria but otherwise negative including WBC RBC microalbumin creatinine ratio-NC          Most Recent Labs  CBC  Lab Results   Component Value Date/Time    WBC 8.5 2024 03:26 PM    WBC 8.9 2024 12:39 PM    WBC 9.6 10/23/2023 12:00 PM    RBC 4.83 2024 03:26 PM    RBC 4.83 2024 12:39 PM    RBC 4.62 10/23/2023 12:00 PM    HGB 14.9 2024 03:26 PM    HGB 14.8 2024 12:39 PM    HGB 14.2 10/23/2023 12:00 PM    HCT 45.3 2024 03:26 PM    HCT 45.4 2024 12:39 PM    HCT 43.5 10/23/2023 12:00 PM    MCV 93.8 2024 03:26 PM    MCV 94.0 2024 12:39 PM    MCV 94.2 10/23/2023 12:00 PM     2024 03:26 PM     2024 12:39 PM    PLT

## 2024-07-24 RX ORDER — MEMANTINE HYDROCHLORIDE 10 MG/1
10 TABLET ORAL 2 TIMES DAILY
Qty: 180 TABLET | Refills: 3 | Status: SHIPPED | OUTPATIENT
Start: 2024-07-24

## 2024-08-06 ENCOUNTER — OFFICE VISIT (OUTPATIENT)
Dept: FAMILY MEDICINE CLINIC | Age: 79
End: 2024-08-06

## 2024-08-06 VITALS
SYSTOLIC BLOOD PRESSURE: 130 MMHG | HEART RATE: 52 BPM | TEMPERATURE: 97.7 F | WEIGHT: 207 LBS | DIASTOLIC BLOOD PRESSURE: 78 MMHG | HEIGHT: 64 IN | BODY MASS INDEX: 35.34 KG/M2 | OXYGEN SATURATION: 97 %

## 2024-08-06 DIAGNOSIS — R35.0 URINARY FREQUENCY: Primary | ICD-10-CM

## 2024-08-06 DIAGNOSIS — N39.0 RECURRENT UTI: ICD-10-CM

## 2024-08-06 LAB
BILIRUBIN, POC: NEGATIVE
BLOOD URINE, POC: NEGATIVE
CLARITY, POC: CLEAR
COLOR, POC: YELLOW
GLUCOSE URINE, POC: NEGATIVE
KETONES, POC: NEGATIVE
LEUKOCYTE EST, POC: NORMAL
NITRITE, POC: POSITIVE
PH, POC: 6
PROTEIN, POC: NEGATIVE
SPECIFIC GRAVITY, POC: >1.03
UROBILINOGEN, POC: 0.2

## 2024-08-06 RX ORDER — FLUCONAZOLE 150 MG/1
TABLET ORAL
Qty: 2 TABLET | Refills: 0 | Status: SHIPPED | OUTPATIENT
Start: 2024-08-06

## 2024-08-06 RX ORDER — METRONIDAZOLE 500 MG/1
500 TABLET ORAL 2 TIMES DAILY
Qty: 14 TABLET | Refills: 0 | Status: SHIPPED | OUTPATIENT
Start: 2024-08-06 | End: 2024-08-13

## 2024-08-06 RX ORDER — CIPROFLOXACIN 250 MG/1
250 TABLET, FILM COATED ORAL 2 TIMES DAILY
Qty: 14 TABLET | Refills: 0 | Status: SHIPPED | OUTPATIENT
Start: 2024-08-06 | End: 2024-08-13

## 2024-08-06 NOTE — PROGRESS NOTES
mouth 2 times daily, Disp: 180 tablet, Rfl: 1    mirabegron (MYRBETRIQ) 25 MG TB24, Take 1 tablet by mouth daily, Disp: 30 tablet, Rfl: 3    donepezil (ARICEPT) 10 MG tablet, TAKE 1 TABLET BY MOUTH TWICE  DAILY, Disp: 180 tablet, Rfl: 3    omega-3 acid ethyl esters (LOVAZA) 1 g capsule, Take 2 capsules by mouth 2 times daily, Disp: 360 capsule, Rfl: 3    Allergies:     Allergies   Allergen Reactions    Morphine Itching    Statins      Other reaction(s): Unknown    Statins Depletion Therapy Other (See Comments)     MYALGIA       Social History:     Social History     Tobacco Use    Smoking status: Former     Current packs/day: 0.00     Average packs/day: 1.5 packs/day for 13.0 years (19.5 ttl pk-yrs)     Types: Cigarettes     Start date: 1973     Quit date: 1986     Years since quittin.6    Smokeless tobacco: Never    Tobacco comments:     quit smoking    Vaping Use    Vaping Use: Never used   Substance Use Topics    Alcohol use: Yes     Comment: socially -- 3 drinks per month at the most    Drug use: No       Patient lives at home.    Physical Exam:     Vitals:    24 1410   BP: 130/78   Pulse: 52   Temp: 97.7 °F (36.5 °C)   SpO2: 97%   Weight: 93.9 kg (207 lb)   Height: 1.638 m (5' 4.49\")       Exam:  Physical Exam  Nurses note and vital signs reviewed and patient is not hypoxic.    General: The patient appears well and in no apparent distress. Patient is resting comfortably on cart.  Skin: Warm, dry, no pallor noted. There is no rash noted.  Head: Normocephalic, atraumatic.  Eye: Normal conjunctiva  Ears, Nose, Mouth, and Throat: Oral mucosa is moist  Cardiovascular: Regular Rate and Rhythm  Respiratory: Patient is in no distress, no accessory muscle use, lungs are clear to auscultation, no wheezing, crackles or rhonchi  Back: Non-tender, no CVA tenderness bilaterally to percussion.  GI: Normal bowel sounds, no tenderness to palpation, no masses appreciated. No rebound, guarding, or rigidity  detailed exam

## 2024-08-07 DIAGNOSIS — F03.90 DEMENTIA WITHOUT BEHAVIORAL DISTURBANCE (HCC): ICD-10-CM

## 2024-08-08 RX ORDER — DONEPEZIL HYDROCHLORIDE 10 MG/1
TABLET, FILM COATED ORAL
Qty: 180 TABLET | Refills: 3 | Status: SHIPPED | OUTPATIENT
Start: 2024-08-08

## 2024-08-10 LAB
CULTURE: ABNORMAL
CULTURE: ABNORMAL
SPECIMEN DESCRIPTION: ABNORMAL

## 2024-08-29 ENCOUNTER — OFFICE VISIT (OUTPATIENT)
Dept: FAMILY MEDICINE CLINIC | Age: 79
End: 2024-08-29

## 2024-08-29 VITALS
BODY MASS INDEX: 35.17 KG/M2 | OXYGEN SATURATION: 95 % | HEART RATE: 68 BPM | DIASTOLIC BLOOD PRESSURE: 72 MMHG | SYSTOLIC BLOOD PRESSURE: 128 MMHG | TEMPERATURE: 97.6 F | HEIGHT: 64 IN | WEIGHT: 206 LBS

## 2024-08-29 DIAGNOSIS — N39.0 RECURRENT UTI: ICD-10-CM

## 2024-08-29 DIAGNOSIS — R30.0 DYSURIA: Primary | ICD-10-CM

## 2024-08-29 LAB
BILIRUBIN, POC: NEGATIVE
BLOOD URINE, POC: NEGATIVE
CLARITY, POC: CLEAR
COLOR, POC: NORMAL
GLUCOSE URINE, POC: NEGATIVE MG/DL
KETONES, POC: NEGATIVE MG/DL
LEUKOCYTE EST, POC: NORMAL
NITRITE, POC: POSITIVE
PH, POC: 6
PROTEIN, POC: NEGATIVE MG/DL
SPECIFIC GRAVITY, POC: >=1.03
UROBILINOGEN, POC: 0.2 MG/DL

## 2024-08-29 RX ORDER — CIPROFLOXACIN 500 MG/1
500 TABLET, FILM COATED ORAL 2 TIMES DAILY
Qty: 20 TABLET | Refills: 0 | Status: SHIPPED | OUTPATIENT
Start: 2024-08-29 | End: 2024-09-08

## 2024-08-29 RX ORDER — FLUCONAZOLE 150 MG/1
150 TABLET ORAL
Qty: 2 TABLET | Refills: 0 | Status: SHIPPED | OUTPATIENT
Start: 2024-08-29 | End: 2024-09-04

## 2024-08-29 ASSESSMENT — ENCOUNTER SYMPTOMS
ALLERGIC/IMMUNOLOGIC NEGATIVE: 1
SORE THROAT: 0
PHOTOPHOBIA: 0
CHEST TIGHTNESS: 0
ABDOMINAL PAIN: 0
DIARRHEA: 0
SINUS PAIN: 0
COUGH: 0
BLOOD IN STOOL: 0
VOMITING: 0
SHORTNESS OF BREATH: 0
EYE DISCHARGE: 0
NAUSEA: 0
EYE PAIN: 0
EYE REDNESS: 0
BACK PAIN: 0
TROUBLE SWALLOWING: 0

## 2024-08-29 NOTE — PROGRESS NOTES
24  Lorenza Fu : 1945 Sex: female  Age: 78 y.o.      Assessment and Plan:  Lorenza was seen today for dysuria.    Diagnoses and all orders for this visit:    Dysuria  -     POCT Urinalysis no Micro  -     Culture, Urine; Future  -     Culture, Urine  -     ciprofloxacin (CIPRO) 500 MG tablet; Take 1 tablet by mouth 2 times daily for 10 days  -     fluconazole (DIFLUCAN) 150 MG tablet; Take 1 tablet by mouth every 72 hours for 6 days    Recurrent UTI  -     ciprofloxacin (CIPRO) 500 MG tablet; Take 1 tablet by mouth 2 times daily for 10 days  -     fluconazole (DIFLUCAN) 150 MG tablet; Take 1 tablet by mouth every 72 hours for 6 days    Patient is getting recurrent urinary tract infections.  She is currently to see urology again for their input.  Last infection last month showed Klebsiella and E. coli sensitive to Cipro.  Urinalysis today shows WBCs and RBCs, culture was obtained.  Will empirically treat with Cipro again as it was effective for the last infection.  Adjustment will be made pending culture return.  Fluconazole 1 at the start of antibiotic treatment and then 1 again at the end was also prescribed for yeast overgrowth.  Patient is a has dementia but her mental status has been stable according to her .  If complaints do not improve, or worsen in any way, present back to the office.    Return 2 weeks recheck UA.    Chief Complaint   Patient presents with    Dysuria       The patient states that they have had dysuria and urinary frequency, and malodorous urine for the last 2 days.  The patient does admit to suprapubic pressure. The patient has had urgency but denies gross hematuria.  The patient denies any abdominal or flank pain.  The patient denies nausea and vomiting.  No fevers of chills.  No prior history of kidney stones.  The patient has a history of UTI's and states that this feels the same.  History obtained with the help of her  as patient has dementia.

## 2024-09-01 LAB
CULTURE: ABNORMAL
SPECIMEN DESCRIPTION: ABNORMAL

## 2024-10-01 ENCOUNTER — TELEPHONE (OUTPATIENT)
Dept: PRIMARY CARE CLINIC | Age: 79
End: 2024-10-01

## 2024-10-01 DIAGNOSIS — R32 URINARY INCONTINENCE, UNSPECIFIED TYPE: ICD-10-CM

## 2024-10-01 RX ORDER — MIRABEGRON 25 MG/1
25 TABLET, FILM COATED, EXTENDED RELEASE ORAL DAILY
Qty: 90 TABLET | Refills: 3 | Status: SHIPPED | OUTPATIENT
Start: 2024-10-01

## 2024-10-01 NOTE — TELEPHONE ENCOUNTER
I agree, this is confusing and that this was originally prescribed Dr. Antonio and should be managed by urology although they asked for a subsequent referral to Dr. Chapman.  At 1 point I recommended she discontinue it but it does not sound like she ever did.  I subsequently lowered the dose to 25 mg, not sure if they did or not but I would recommend a lower dose.  For now tell him that we are recommending, if he wants a refill from us, Myrbetriq 25 mg.  Check what pharmacy they want and pend.  Thank you

## 2024-10-01 NOTE — TELEPHONE ENCOUNTER
calling for refill on Myrbetriq. The bottle he has at home says 50mg \"from [facility]\". The last time that prescription was sent was in 2022.  said he has been giving her the 50mg and doesn't remember getting the 25mg from the local pharmacy that was sent in Jan 2024.  said he only has 3 pills left and needs this prescription. I did not pend any medication because I was not sure what dose you want to send over.     Alfredo Perez

## 2024-10-08 ENCOUNTER — HOSPITAL ENCOUNTER (OUTPATIENT)
Age: 79
Discharge: HOME | End: 2024-10-08
Payer: MEDICARE

## 2024-10-08 DIAGNOSIS — I10 ESSENTIAL HYPERTENSION: ICD-10-CM

## 2024-10-08 DIAGNOSIS — E55.9 VITAMIN D DEFICIENCY: ICD-10-CM

## 2024-10-08 DIAGNOSIS — E03.9 ACQUIRED HYPOTHYROIDISM: ICD-10-CM

## 2024-10-08 DIAGNOSIS — E53.8 VITAMIN B12 DEFICIENCY: ICD-10-CM

## 2024-10-08 DIAGNOSIS — F03.90 DEMENTIA WITHOUT BEHAVIORAL DISTURBANCE (HCC): ICD-10-CM

## 2024-10-08 DIAGNOSIS — E78.2 MIXED HYPERLIPIDEMIA: ICD-10-CM

## 2024-10-08 DIAGNOSIS — E11.65 TYPE 2 DIABETES MELLITUS WITH HYPERGLYCEMIA, WITHOUT LONG-TERM CURRENT USE OF INSULIN (HCC): ICD-10-CM

## 2024-10-08 LAB
25(OH)D3 SERPL-MCNC: 29.2 NG/ML (ref 30–100)
ALBUMIN SERPL-MCNC: 3.9 G/DL (ref 3.5–5.2)
ALP SERPL-CCNC: 87 U/L (ref 35–104)
ALT SERPL-CCNC: 34 U/L (ref 0–32)
ANION GAP SERPL CALCULATED.3IONS-SCNC: 10 MMOL/L (ref 7–16)
AST SERPL-CCNC: 42 U/L (ref 0–31)
BACTERIA URNS QL MICRO: ABNORMAL
BASOPHILS # BLD: 0.06 K/UL (ref 0–0.2)
BASOPHILS NFR BLD: 1 % (ref 0–2)
BILIRUB SERPL-MCNC: 0.4 MG/DL (ref 0–1.2)
BILIRUB UR QL STRIP: NEGATIVE
BUN SERPL-MCNC: 11 MG/DL (ref 6–23)
CALCIUM SERPL-MCNC: 9.1 MG/DL (ref 8.6–10.2)
CHLORIDE SERPL-SCNC: 108 MMOL/L (ref 98–107)
CHOLEST SERPL-MCNC: 217 MG/DL
CK SERPL-CCNC: 24 U/L (ref 20–180)
CLARITY UR: CLEAR
CO2 SERPL-SCNC: 24 MMOL/L (ref 22–29)
COLOR UR: YELLOW
CREAT SERPL-MCNC: 0.7 MG/DL (ref 0.5–1)
CREAT UR-MCNC: 177 MG/DL (ref 29–226)
EOSINOPHIL # BLD: 0.18 K/UL (ref 0.05–0.5)
EOSINOPHILS RELATIVE PERCENT: 2 % (ref 0–6)
ERYTHROCYTE [DISTWIDTH] IN BLOOD BY AUTOMATED COUNT: 13.2 % (ref 11.5–15)
FOLATE SERPL-MCNC: 5.6 NG/ML (ref 4.8–24.2)
GFR, ESTIMATED: 86 ML/MIN/1.73M2
GLUCOSE SERPL-MCNC: 198 MG/DL (ref 74–99)
GLUCOSE UR STRIP-MCNC: 500 MG/DL
HBA1C MFR BLD: 6.4 % (ref 4–5.6)
HCT VFR BLD AUTO: 43.6 % (ref 34–48)
HDLC SERPL-MCNC: 46 MG/DL
HGB BLD-MCNC: 14.2 G/DL (ref 11.5–15.5)
HGB UR QL STRIP.AUTO: NEGATIVE
IMM GRANULOCYTES # BLD AUTO: <0.03 K/UL (ref 0–0.58)
IMM GRANULOCYTES NFR BLD: 0 % (ref 0–5)
KETONES UR STRIP-MCNC: NEGATIVE MG/DL
LDLC SERPL CALC-MCNC: 132 MG/DL
LEUKOCYTE ESTERASE UR QL STRIP: NEGATIVE
LYMPHOCYTES NFR BLD: 2.84 K/UL (ref 1.5–4)
LYMPHOCYTES RELATIVE PERCENT: 35 % (ref 20–42)
MCH RBC QN AUTO: 31 PG (ref 26–35)
MCHC RBC AUTO-ENTMCNC: 32.6 G/DL (ref 32–34.5)
MCV RBC AUTO: 95.2 FL (ref 80–99.9)
MICROALBUMIN UR-MCNC: 19 MG/L (ref 0–19)
MICROALBUMIN/CREAT UR-RTO: 11 MCG/MG CREAT (ref 0–30)
MONOCYTES NFR BLD: 0.45 K/UL (ref 0.1–0.95)
MONOCYTES NFR BLD: 6 % (ref 2–12)
NEUTROPHILS NFR BLD: 56 % (ref 43–80)
NEUTS SEG NFR BLD: 4.51 K/UL (ref 1.8–7.3)
NITRITE UR QL STRIP: POSITIVE
PH UR STRIP: 6 [PH] (ref 5–9)
PLATELET # BLD AUTO: 163 K/UL (ref 130–450)
PMV BLD AUTO: 11.1 FL (ref 7–12)
POTASSIUM SERPL-SCNC: 3.7 MMOL/L (ref 3.5–5)
PROT SERPL-MCNC: 6.7 G/DL (ref 6.4–8.3)
PROT UR STRIP-MCNC: NEGATIVE MG/DL
RBC # BLD AUTO: 4.58 M/UL (ref 3.5–5.5)
RBC #/AREA URNS HPF: ABNORMAL /HPF
SODIUM SERPL-SCNC: 142 MMOL/L (ref 132–146)
SP GR UR STRIP: >1.03 (ref 1–1.03)
T4 FREE SERPL-MCNC: 1.2 NG/DL (ref 0.9–1.7)
TRIGL SERPL-MCNC: 197 MG/DL
TSH SERPL DL<=0.05 MIU/L-ACNC: 2.75 UIU/ML (ref 0.27–4.2)
UROBILINOGEN UR STRIP-ACNC: 0.2 EU/DL (ref 0–1)
VIT B12 SERPL-MCNC: 400 PG/ML (ref 211–946)
VLDLC SERPL CALC-MCNC: 39 MG/DL
WBC #/AREA URNS HPF: ABNORMAL /HPF
WBC OTHER # BLD: 8.1 K/UL (ref 4.5–11.5)

## 2024-10-08 PROCEDURE — 80053 COMPREHEN METABOLIC PANEL: CPT

## 2024-10-08 PROCEDURE — 82570 ASSAY OF URINE CREATININE: CPT

## 2024-10-08 PROCEDURE — 82607 VITAMIN B-12: CPT

## 2024-10-08 PROCEDURE — 36415 COLL VENOUS BLD VENIPUNCTURE: CPT

## 2024-10-08 PROCEDURE — 83036 HEMOGLOBIN GLYCOSYLATED A1C: CPT

## 2024-10-08 PROCEDURE — 84443 ASSAY THYROID STIM HORMONE: CPT

## 2024-10-08 PROCEDURE — 80061 LIPID PANEL: CPT

## 2024-10-08 PROCEDURE — 85025 COMPLETE CBC W/AUTO DIFF WBC: CPT

## 2024-10-08 PROCEDURE — 82306 VITAMIN D 25 HYDROXY: CPT

## 2024-10-08 PROCEDURE — 82746 ASSAY OF FOLIC ACID SERUM: CPT

## 2024-10-08 PROCEDURE — 82043 UR ALBUMIN QUANTITATIVE: CPT

## 2024-10-08 PROCEDURE — 84439 ASSAY OF FREE THYROXINE: CPT

## 2024-10-08 PROCEDURE — 82550 ASSAY OF CK (CPK): CPT

## 2024-10-08 PROCEDURE — 81001 URINALYSIS AUTO W/SCOPE: CPT

## 2024-10-09 ENCOUNTER — OFFICE VISIT (OUTPATIENT)
Dept: PRIMARY CARE CLINIC | Age: 79
End: 2024-10-09

## 2024-10-09 VITALS
TEMPERATURE: 98.7 F | OXYGEN SATURATION: 97 % | WEIGHT: 208 LBS | BODY MASS INDEX: 35.51 KG/M2 | HEIGHT: 64 IN | HEART RATE: 60 BPM

## 2024-10-09 DIAGNOSIS — R07.81 RIB PAIN ON RIGHT SIDE: ICD-10-CM

## 2024-10-09 DIAGNOSIS — E53.8 VITAMIN B12 DEFICIENCY: ICD-10-CM

## 2024-10-09 DIAGNOSIS — I10 ESSENTIAL HYPERTENSION: ICD-10-CM

## 2024-10-09 DIAGNOSIS — I44.2 COMPLETE HEART BLOCK (HCC): ICD-10-CM

## 2024-10-09 DIAGNOSIS — E11.65 TYPE 2 DIABETES MELLITUS WITH HYPERGLYCEMIA, WITHOUT LONG-TERM CURRENT USE OF INSULIN (HCC): ICD-10-CM

## 2024-10-09 DIAGNOSIS — K76.9 LIVER DISEASE: ICD-10-CM

## 2024-10-09 DIAGNOSIS — E78.2 MIXED HYPERLIPIDEMIA: Primary | ICD-10-CM

## 2024-10-09 DIAGNOSIS — E55.9 VITAMIN D DEFICIENCY: ICD-10-CM

## 2024-10-09 DIAGNOSIS — E03.9 ACQUIRED HYPOTHYROIDISM: ICD-10-CM

## 2024-10-09 DIAGNOSIS — M54.9 ACUTE BACK PAIN, UNSPECIFIED BACK LOCATION, UNSPECIFIED BACK PAIN LATERALITY: ICD-10-CM

## 2024-10-09 DIAGNOSIS — F03.90 DEMENTIA WITHOUT BEHAVIORAL DISTURBANCE (HCC): ICD-10-CM

## 2024-10-09 DIAGNOSIS — N39.0 RECURRENT UTI: ICD-10-CM

## 2024-10-09 RX ORDER — CIPROFLOXACIN 500 MG/1
500 TABLET, FILM COATED ORAL 2 TIMES DAILY
Qty: 20 TABLET | Refills: 0 | Status: SHIPPED | OUTPATIENT
Start: 2024-10-09 | End: 2024-10-19

## 2024-10-09 RX ORDER — OMEGA-3-ACID ETHYL ESTERS 1 G/1
1 CAPSULE, LIQUID FILLED ORAL 2 TIMES DAILY
Qty: 180 CAPSULE | Refills: 3 | Status: SHIPPED | OUTPATIENT
Start: 2024-10-09

## 2024-10-09 NOTE — PROGRESS NOTES
Asymptomatic.  Repeat colonoscopy Dr. Wilson 1/22 showed hemorrhoids otherwise negative     Vitamin D deficiency  Recommend she start vitamin D3 9822-1022 IUs daily    Health maintenance examination  Health maintenance issues discussed at length 11/23 encourage yearly.  Schedule next appointment for this     B12 deficiency  Suboptimal.  Recommend she take vitamin B12 sublingual 1000 mcg daily      Breast cancer screening  Mammogram was negative 9/22.  Encourage    Complete heart block  History of.  Status post dual-chamber pacemaker.  Continue per cardiology and EP.     Plan as above.  Counseled extensively and differential diagnoses relevant to above were reviewed, including serious etiologies, risks and complications, especially of left uncontrolled.  If relevant, instructions and  alternatives to meds/treatment reviewed, as well as interactions, and  SE's/ADRs reviewed, notify immediately if any, discontinuing new meds if any.  Plan made after discussion and shared decision making.    Complicated pleasant patient, had discussion as above.  Treat suspected UTI, send culture adjust treatment pending.  Await Dr. Chapman.  Again recommend proper urine hygiene wiping and wearing moisture absorbent pad/depends.    Increase Lovaza to 1 twice a day.    Check x-rays.  Pain management as above.  Falls precautions reviewed.  Not taking alendronate and defers now, defers DEXA now despite recommendations, discussed further at follow-up    Planning follow-up 5 weeks for AWV sooner as needed.  Otherwise blood work in 3 months sooner as needed      PG given age/CM          As long as symptoms steadily improve/resolve, and medical conditions follow the expected course, FU as below, sooner PRN.    Return in about 5 weeks (around 11/13/2024), or if symptoms worsen or fail to improve, for AWV.             Educational materials and/or home exercises printed for patient's review and were included in patient instructions on his/her

## 2024-10-11 DIAGNOSIS — F03.90 DEMENTIA WITHOUT BEHAVIORAL DISTURBANCE (HCC): ICD-10-CM

## 2024-10-11 DIAGNOSIS — E11.69 TYPE 2 DIABETES MELLITUS WITH OTHER SPECIFIED COMPLICATION, WITHOUT LONG-TERM CURRENT USE OF INSULIN (HCC): ICD-10-CM

## 2024-10-11 DIAGNOSIS — I10 ESSENTIAL HYPERTENSION: ICD-10-CM

## 2024-10-11 RX ORDER — LEVOTHYROXINE SODIUM 100 UG/1
100 TABLET ORAL DAILY
Qty: 90 TABLET | Refills: 1 | Status: SHIPPED | OUTPATIENT
Start: 2024-10-11

## 2024-10-11 RX ORDER — METOPROLOL SUCCINATE 25 MG/1
25 TABLET, EXTENDED RELEASE ORAL 2 TIMES DAILY
Qty: 180 TABLET | Refills: 1 | Status: SHIPPED | OUTPATIENT
Start: 2024-10-11

## 2024-10-11 RX ORDER — DONEPEZIL HYDROCHLORIDE 10 MG/1
TABLET, FILM COATED ORAL
Qty: 180 TABLET | Refills: 3 | OUTPATIENT
Start: 2024-10-11

## 2024-10-11 NOTE — TELEPHONE ENCOUNTER
Refill request    Last appt: 10/09/2024    Future Appt: 11/03/2024    [facility]    Medication and pharmacy verified with patient's

## 2024-10-12 LAB
CULTURE: ABNORMAL
SPECIMEN DESCRIPTION: ABNORMAL

## 2024-11-13 ENCOUNTER — OFFICE VISIT (OUTPATIENT)
Dept: PRIMARY CARE CLINIC | Age: 79
End: 2024-11-13

## 2024-11-13 VITALS
OXYGEN SATURATION: 97 % | TEMPERATURE: 97.8 F | DIASTOLIC BLOOD PRESSURE: 60 MMHG | HEIGHT: 64 IN | WEIGHT: 205 LBS | BODY MASS INDEX: 35 KG/M2 | SYSTOLIC BLOOD PRESSURE: 126 MMHG | HEART RATE: 69 BPM

## 2024-11-13 DIAGNOSIS — N39.0 RECURRENT UTI: ICD-10-CM

## 2024-11-13 DIAGNOSIS — M85.831 OTHER SPECIFIED DISORDERS OF BONE DENSITY AND STRUCTURE, RIGHT FOREARM: ICD-10-CM

## 2024-11-13 DIAGNOSIS — Z23 ENCOUNTER FOR IMMUNIZATION: ICD-10-CM

## 2024-11-13 DIAGNOSIS — Z00.00 MEDICARE ANNUAL WELLNESS VISIT, SUBSEQUENT: Primary | ICD-10-CM

## 2024-11-13 DIAGNOSIS — M85.839 OSTEOPENIA OF FOREARM, UNSPECIFIED LATERALITY: ICD-10-CM

## 2024-11-13 LAB
BILIRUBIN, POC: NEGATIVE
BLOOD URINE, POC: NEGATIVE
CLARITY, POC: CLEAR
COLOR, POC: YELLOW
GLUCOSE URINE, POC: NEGATIVE MG/DL
KETONES, POC: ABNORMAL MG/DL
LEUKOCYTE EST, POC: NEGATIVE
NITRITE, POC: POSITIVE
PH, POC: 6
PROTEIN, POC: ABNORMAL MG/DL
SPECIFIC GRAVITY, POC: >=1.03
UROBILINOGEN, POC: 0.2 MG/DL

## 2024-11-13 RX ORDER — CIPROFLOXACIN 250 MG/1
250 TABLET, FILM COATED ORAL 2 TIMES DAILY
Qty: 14 TABLET | Refills: 0 | Status: SHIPPED | OUTPATIENT
Start: 2024-11-13 | End: 2024-11-20

## 2024-11-13 ASSESSMENT — PATIENT HEALTH QUESTIONNAIRE - PHQ9
SUM OF ALL RESPONSES TO PHQ9 QUESTIONS 1 & 2: 0
1. LITTLE INTEREST OR PLEASURE IN DOING THINGS: NOT AT ALL
SUM OF ALL RESPONSES TO PHQ QUESTIONS 1-9: 0
2. FEELING DOWN, DEPRESSED OR HOPELESS: NOT AT ALL

## 2024-11-13 NOTE — PATIENT INSTRUCTIONS
workshops and storage areas for fire hazards, such as volatile liquids, piles of old rags or clothing and overloaded circuits.    Never smoke in bed or when lying down on a couch or recliner chair.    Small portable electric or kerosene heaters are responsible for many home fires and should be used cautiously if at all. If you do use one, be sure to keep them away from flammable materials.    In case of fire, make sure you have a pre-established emergency exit plan.    Have a professional check your fireplace and other fuel-burning appliances yearly.    Helping [employer]   Baby boomers entering the davidson years will continue to see the development of new products to help older adults live safely and independently in spite of age-related changes.  Making Life More Livable  , by [employer], lists over 1,000 products for \"living well in the mature years,\" such as bathing and mobility aids, household security devices, ergonomically designed knives and peelers, and faucet valves and knobs for temperature control. Medical supply stores and organizations are good sources of information about products that improve your quality of life and insure your safety.     Last Reviewed: November 2009 Edgar Crawford MD   Updated: 3/7/2011

## 2024-11-13 NOTE — PROGRESS NOTES
materials and/or home exercises printed for patient's review and were included in patient instructions on his/her After Visit Summary and given to patient at the end of visit.       After discussion, patient and/or guardian verbalizes understanding, agrees, feels comfortable with and wishes to proceed with above treatment plan. Call for any pending results, FU sooner if abnormal, as needed or if any current symptoms persist/worsen.      Advised patient to call with any new medication issues, and read all Rx info from pharmacy to assure aware of all possible risks and side effects of medication before taking.     Reviewed age and gender appropriate health screening exams and vaccinations.  Advised patient regarding importance of keeping up with recommended health maintenance and to schedule as soon as possible if overdue, as this is important in assessing for undiagnosed pathology, especially cancer, as well as protecting against potentially harmful/life threatening disease.          Patient and/or guardian verbalizes understanding and agrees with above counseling, assessment and plan.     All questions answered.          Signs and symptoms to watch for discussed, serious signs and symptoms reviewed.  ER if any.               Bari Yan MD    Patients are advised to check with insurance company to ensure coverage and to fully understand benefits and cost prior to any testing. This note was created with the assistance of voice recognition software.  Document was reviewed however may contain grammatical errors.  This note or partial portions of this note may have been created using a copy forward or copy paste feature but these portions have been verified and re-edited for accuracy and any portions not in need of editing or reviews are not being used to generate any component necessary for billing purposes.  Some portions may be carried over for continuity purposes and to aid me with monitoring of past medical

## 2024-11-15 LAB
CULTURE: NORMAL
SPECIMEN DESCRIPTION: NORMAL

## 2024-11-15 NOTE — RESULT ENCOUNTER NOTE
Urine culture not consistent with UTI, would recommend stopping Cipro.  If still having symptoms I would consider Flagyl and Diflucan if she would like.

## 2024-11-26 ENCOUNTER — HOSPITAL ENCOUNTER (OUTPATIENT)
Dept: MAMMOGRAPHY | Age: 79
Discharge: HOME | End: 2024-11-28
Attending: FAMILY MEDICINE
Payer: MEDICARE

## 2024-11-26 DIAGNOSIS — M85.839 OSTEOPENIA OF FOREARM, UNSPECIFIED LATERALITY: ICD-10-CM

## 2024-11-26 DIAGNOSIS — M85.831 OTHER SPECIFIED DISORDERS OF BONE DENSITY AND STRUCTURE, RIGHT FOREARM: ICD-10-CM

## 2024-11-26 PROCEDURE — 77080 DXA BONE DENSITY AXIAL: CPT

## 2024-12-13 ENCOUNTER — HOSPITAL ENCOUNTER (OUTPATIENT)
Age: 79
Discharge: HOME | End: 2024-12-13
Payer: MEDICARE

## 2024-12-13 ENCOUNTER — OFFICE VISIT (OUTPATIENT)
Dept: PRIMARY CARE CLINIC | Age: 79
End: 2024-12-13

## 2024-12-13 VITALS
TEMPERATURE: 98 F | SYSTOLIC BLOOD PRESSURE: 124 MMHG | WEIGHT: 207 LBS | HEART RATE: 72 BPM | OXYGEN SATURATION: 95 % | BODY MASS INDEX: 34.99 KG/M2 | DIASTOLIC BLOOD PRESSURE: 60 MMHG

## 2024-12-13 DIAGNOSIS — N39.0 RECURRENT UTI: ICD-10-CM

## 2024-12-13 DIAGNOSIS — I44.2 COMPLETE HEART BLOCK (HCC): ICD-10-CM

## 2024-12-13 DIAGNOSIS — E53.8 VITAMIN B12 DEFICIENCY: ICD-10-CM

## 2024-12-13 DIAGNOSIS — M85.831 OTHER SPECIFIED DISORDERS OF BONE DENSITY AND STRUCTURE, RIGHT FOREARM: ICD-10-CM

## 2024-12-13 DIAGNOSIS — I10 ESSENTIAL HYPERTENSION: ICD-10-CM

## 2024-12-13 DIAGNOSIS — E87.6 HYPOKALEMIA: Primary | ICD-10-CM

## 2024-12-13 DIAGNOSIS — E78.2 MIXED HYPERLIPIDEMIA: ICD-10-CM

## 2024-12-13 DIAGNOSIS — E11.65 TYPE 2 DIABETES MELLITUS WITH HYPERGLYCEMIA, WITHOUT LONG-TERM CURRENT USE OF INSULIN (HCC): ICD-10-CM

## 2024-12-13 DIAGNOSIS — E55.9 VITAMIN D DEFICIENCY: ICD-10-CM

## 2024-12-13 DIAGNOSIS — E03.9 ACQUIRED HYPOTHYROIDISM: ICD-10-CM

## 2024-12-13 DIAGNOSIS — F03.90 DEMENTIA WITHOUT BEHAVIORAL DISTURBANCE (HCC): ICD-10-CM

## 2024-12-13 DIAGNOSIS — M85.839 OSTEOPENIA OF FOREARM, UNSPECIFIED LATERALITY: ICD-10-CM

## 2024-12-13 LAB
25(OH)D3 SERPL-MCNC: 33.5 NG/ML (ref 30–100)
ALBUMIN SERPL-MCNC: 3.8 G/DL (ref 3.5–5.2)
ALP SERPL-CCNC: 86 U/L (ref 35–104)
ALT SERPL-CCNC: 29 U/L (ref 0–32)
ANION GAP SERPL CALCULATED.3IONS-SCNC: 13 MMOL/L (ref 7–16)
AST SERPL-CCNC: 33 U/L (ref 0–31)
BACTERIA URNS QL MICRO: ABNORMAL
BASOPHILS # BLD: 0.06 K/UL (ref 0–0.2)
BASOPHILS NFR BLD: 1 % (ref 0–2)
BILIRUB SERPL-MCNC: 0.5 MG/DL (ref 0–1.2)
BILIRUB UR QL STRIP: NEGATIVE
BUN SERPL-MCNC: 12 MG/DL (ref 6–23)
CALCIUM SERPL-MCNC: 9.2 MG/DL (ref 8.6–10.2)
CHLORIDE SERPL-SCNC: 104 MMOL/L (ref 98–107)
CHOLEST SERPL-MCNC: 208 MG/DL
CK SERPL-CCNC: 22 U/L (ref 20–180)
CLARITY UR: CLEAR
CO2 SERPL-SCNC: 22 MMOL/L (ref 22–29)
COLOR UR: YELLOW
CREAT SERPL-MCNC: 0.7 MG/DL (ref 0.5–1)
EOSINOPHIL # BLD: 0.21 K/UL (ref 0.05–0.5)
EOSINOPHILS RELATIVE PERCENT: 3 % (ref 0–6)
ERYTHROCYTE [DISTWIDTH] IN BLOOD BY AUTOMATED COUNT: 12.8 % (ref 11.5–15)
FOLATE SERPL-MCNC: 4.8 NG/ML (ref 4.8–24.2)
GFR, ESTIMATED: 83 ML/MIN/1.73M2
GLUCOSE SERPL-MCNC: 256 MG/DL (ref 74–99)
GLUCOSE UR STRIP-MCNC: 500 MG/DL
HBA1C MFR BLD: 6.6 % (ref 4–5.6)
HCT VFR BLD AUTO: 42.5 % (ref 34–48)
HDLC SERPL-MCNC: 43 MG/DL
HGB BLD-MCNC: 13.9 G/DL (ref 11.5–15.5)
HGB UR QL STRIP.AUTO: NEGATIVE
IMM GRANULOCYTES # BLD AUTO: 0.03 K/UL (ref 0–0.58)
IMM GRANULOCYTES NFR BLD: 0 % (ref 0–5)
KETONES UR STRIP-MCNC: NEGATIVE MG/DL
LDLC SERPL CALC-MCNC: 133 MG/DL
LEUKOCYTE ESTERASE UR QL STRIP: NEGATIVE
LYMPHOCYTES NFR BLD: 3.08 K/UL (ref 1.5–4)
LYMPHOCYTES RELATIVE PERCENT: 43 % (ref 20–42)
MCH RBC QN AUTO: 30.3 PG (ref 26–35)
MCHC RBC AUTO-ENTMCNC: 32.7 G/DL (ref 32–34.5)
MCV RBC AUTO: 92.6 FL (ref 80–99.9)
MONOCYTES NFR BLD: 0.32 K/UL (ref 0.1–0.95)
MONOCYTES NFR BLD: 4 % (ref 2–12)
NEUTROPHILS NFR BLD: 49 % (ref 43–80)
NEUTS SEG NFR BLD: 3.55 K/UL (ref 1.8–7.3)
NITRITE UR QL STRIP: POSITIVE
PH UR STRIP: 5.5 [PH] (ref 5–9)
PLATELET # BLD AUTO: 155 K/UL (ref 130–450)
PMV BLD AUTO: 11.2 FL (ref 7–12)
POTASSIUM SERPL-SCNC: 3.3 MMOL/L (ref 3.5–5)
PROT SERPL-MCNC: 6.8 G/DL (ref 6.4–8.3)
PROT UR STRIP-MCNC: NEGATIVE MG/DL
RBC # BLD AUTO: 4.59 M/UL (ref 3.5–5.5)
RBC #/AREA URNS HPF: ABNORMAL /HPF
SODIUM SERPL-SCNC: 139 MMOL/L (ref 132–146)
SP GR UR STRIP: >1.03 (ref 1–1.03)
T4 FREE SERPL-MCNC: 1.3 NG/DL (ref 0.9–1.7)
TRIGL SERPL-MCNC: 162 MG/DL
TSH SERPL DL<=0.05 MIU/L-ACNC: 3.38 UIU/ML (ref 0.27–4.2)
UROBILINOGEN UR STRIP-ACNC: 0.2 EU/DL (ref 0–1)
VIT B12 SERPL-MCNC: 350 PG/ML (ref 211–946)
VLDLC SERPL CALC-MCNC: 32 MG/DL
WBC #/AREA URNS HPF: ABNORMAL /HPF
WBC OTHER # BLD: 7.3 K/UL (ref 4.5–11.5)

## 2024-12-13 PROCEDURE — 84439 ASSAY OF FREE THYROXINE: CPT

## 2024-12-13 PROCEDURE — 83036 HEMOGLOBIN GLYCOSYLATED A1C: CPT

## 2024-12-13 PROCEDURE — 80053 COMPREHEN METABOLIC PANEL: CPT

## 2024-12-13 PROCEDURE — 82607 VITAMIN B-12: CPT

## 2024-12-13 PROCEDURE — 81001 URINALYSIS AUTO W/SCOPE: CPT

## 2024-12-13 PROCEDURE — 82306 VITAMIN D 25 HYDROXY: CPT

## 2024-12-13 PROCEDURE — 80061 LIPID PANEL: CPT

## 2024-12-13 PROCEDURE — 85025 COMPLETE CBC W/AUTO DIFF WBC: CPT

## 2024-12-13 PROCEDURE — 82746 ASSAY OF FOLIC ACID SERUM: CPT

## 2024-12-13 PROCEDURE — 36415 COLL VENOUS BLD VENIPUNCTURE: CPT

## 2024-12-13 PROCEDURE — 87086 URINE CULTURE/COLONY COUNT: CPT

## 2024-12-13 PROCEDURE — 84443 ASSAY THYROID STIM HORMONE: CPT

## 2024-12-13 PROCEDURE — 82550 ASSAY OF CK (CPK): CPT

## 2024-12-13 NOTE — PROGRESS NOTES
extensively and differential diagnoses relevant to above were reviewed, including serious etiologies, risks and complications, especially of left uncontrolled.  If relevant, instructions and  alternatives to meds/treatment reviewed, as well as interactions, and  SE's/ADRs reviewed, notify immediately if any, discontinuing new meds if any.  Plan made after discussion and shared decision making.    Complicated pleasant patient, discussion as above.  Encouraged she see urology.  Healthy diet and activity.  Monitor blood sugar notify if out of range.  Increase potassium in diet.  Otherwise simply wants blood work and follow-up 1 month sooner as needed or if symptoms    Addendum-Hemoglobin A1c came back 6.6 with a vitamin D of 33.  We will therefore not check at this next time, counseled as above    As long as symptoms steadily improve/resolve, and medical conditions follow the expected course, FU as below, sooner PRN.    Return in about 1 month (around 1/13/2025), or if symptoms worsen or fail to improve.             Educational materials and/or home exercises printed for patient's review and were included in patient instructions on his/her After Visit Summary and given to patient at the end of visit.       After discussion, patient and/or guardian verbalizes understanding, agrees, feels comfortable with and wishes to proceed with above treatment plan. Call for any pending results, FU sooner if abnormal, as needed or if any current symptoms persist/worsen.      Advised patient to call with any new medication issues, and read all Rx info from pharmacy to assure aware of all possible risks and side effects of medication before taking.     Reviewed age and gender appropriate health screening exams and vaccinations.  Advised patient regarding importance of keeping up with recommended health maintenance and to schedule as soon as possible if overdue, as this is important in assessing for undiagnosed pathology, especially

## 2024-12-14 LAB
MICROORGANISM SPEC CULT: ABNORMAL
SERVICE CMNT-IMP: ABNORMAL
SPECIMEN DESCRIPTION: ABNORMAL

## 2024-12-22 ENCOUNTER — OFFICE VISIT (OUTPATIENT)
Dept: FAMILY MEDICINE CLINIC | Age: 79
End: 2024-12-22

## 2024-12-22 VITALS
HEART RATE: 53 BPM | SYSTOLIC BLOOD PRESSURE: 126 MMHG | HEIGHT: 64 IN | DIASTOLIC BLOOD PRESSURE: 74 MMHG | BODY MASS INDEX: 34.66 KG/M2 | TEMPERATURE: 97.6 F | WEIGHT: 203 LBS | OXYGEN SATURATION: 97 %

## 2024-12-22 DIAGNOSIS — B96.89 ACUTE BACTERIAL SINUSITIS: Primary | ICD-10-CM

## 2024-12-22 DIAGNOSIS — J01.90 ACUTE BACTERIAL SINUSITIS: Primary | ICD-10-CM

## 2024-12-22 RX ORDER — DOXYCYCLINE HYCLATE 100 MG
100 TABLET ORAL 2 TIMES DAILY
Qty: 20 TABLET | Refills: 0 | Status: SHIPPED | OUTPATIENT
Start: 2024-12-22 | End: 2025-01-01

## 2024-12-22 RX ORDER — BENZONATATE 100 MG/1
100 CAPSULE ORAL 2 TIMES DAILY PRN
Qty: 20 CAPSULE | Refills: 0 | Status: SHIPPED | OUTPATIENT
Start: 2024-12-22 | End: 2024-12-29

## 2024-12-27 ENCOUNTER — OFFICE VISIT (OUTPATIENT)
Dept: FAMILY MEDICINE CLINIC | Age: 79
End: 2024-12-27

## 2024-12-27 VITALS
DIASTOLIC BLOOD PRESSURE: 70 MMHG | SYSTOLIC BLOOD PRESSURE: 126 MMHG | TEMPERATURE: 97.5 F | OXYGEN SATURATION: 96 % | WEIGHT: 206 LBS | HEART RATE: 54 BPM | BODY MASS INDEX: 35.36 KG/M2

## 2024-12-27 DIAGNOSIS — R05.9 COUGH, UNSPECIFIED TYPE: ICD-10-CM

## 2024-12-27 DIAGNOSIS — N39.41 URGE INCONTINENCE OF URINE: ICD-10-CM

## 2024-12-27 DIAGNOSIS — U09.9 POST-COVID SYNDROME: Primary | ICD-10-CM

## 2024-12-27 LAB
Lab: NORMAL
PERFORMING INSTRUMENT: NORMAL
QC PASS/FAIL: NORMAL
SARS-COV-2, POC: NORMAL

## 2024-12-27 RX ORDER — DOXYCYCLINE HYCLATE 100 MG
100 TABLET ORAL EVERY 12 HOURS
Qty: 14 TABLET | Refills: 0 | Status: SHIPPED | OUTPATIENT
Start: 2024-12-27 | End: 2025-01-03

## 2024-12-27 RX ORDER — BROMPHENIRAMINE MALEATE, PSEUDOEPHEDRINE HYDROCHLORIDE, AND DEXTROMETHORPHAN HYDROBROMIDE 2; 30; 10 MG/5ML; MG/5ML; MG/5ML
10 SYRUP ORAL 4 TIMES DAILY PRN
Qty: 400 ML | Refills: 0 | Status: SHIPPED | OUTPATIENT
Start: 2024-12-27 | End: 2025-01-06

## 2024-12-27 NOTE — PROGRESS NOTES
2024     Lorenza Fu 79 y.o. female    : 1945   Chief Complaint:   Cough (Tested + for covid 6 days ago, still has persistent cough)      History of Present Illness   Source of history provided by:  spouse/SO.    Lorenza Fu is a 79 y.o. old female who presents to walk-in for evaluation of cough x several days. Associated symptoms include congestion.  Since onset symptoms have been worsening.  Patient has had no known Covid 19 exposure.  Patient has not been diagnosed with COVID-19 in the last 90 days.  Has taken cough medication at home with some symptomatic relief. Denies any fever, chills, CP, dyspnea, LE edema, abdominal pain, nausea, vomiting, rash, dizziness, or lethargy. Denies any history of asthma, pneumonia, recurrent bronchitis or COPD.  They have no history of tobacco abuse.     The spouse is also concern that the patient is having trouble holding in her urine. He does not know what to do. Denies any burning with urination, blood in urine, or increased confusion or agitation.        ROS   Past Medical History:   Past Medical History:   Diagnosis Date    Arthritis     Diabetes mellitus (HCC)     Difficult intubation     carries card, copy on chart  Glidescope#3 with ETT size7    Hyperlipidemia     Hypothyroidism     Left sided abdominal pain     Memory problem     still competent    GORDY on CPAP     Pacemaker     Rectal bleeding     Ringing in the ears     Supraventricular tachycardia (HCC) 2023     Past Surgical History:  has a past surgical history that includes ablation of dysrhythmic focus (); Hysterectomy; Knee arthroscopy (2011); Colporrhaphy (09/10/2011); Colonoscopy (?); Colonoscopy (?); pacemaker placement (2014); Knee Arthroplasty (Right); Total hip arthroplasty (Right, 2015); Colonoscopy (2018); Colonoscopy (N/A, 2018); Breast surgery; Upper gastrointestinal endoscopy (N/A, 2022); Colonoscopy (N/A,

## 2025-01-14 ENCOUNTER — OFFICE VISIT (OUTPATIENT)
Dept: PRIMARY CARE CLINIC | Age: 80
End: 2025-01-14

## 2025-01-14 VITALS
DIASTOLIC BLOOD PRESSURE: 64 MMHG | HEIGHT: 64 IN | BODY MASS INDEX: 35 KG/M2 | WEIGHT: 205 LBS | HEART RATE: 80 BPM | SYSTOLIC BLOOD PRESSURE: 118 MMHG | OXYGEN SATURATION: 97 % | TEMPERATURE: 97.6 F

## 2025-01-14 DIAGNOSIS — E78.2 MIXED HYPERLIPIDEMIA: ICD-10-CM

## 2025-01-14 DIAGNOSIS — N39.0 RECURRENT UTI: ICD-10-CM

## 2025-01-14 DIAGNOSIS — I44.2 COMPLETE HEART BLOCK (HCC): ICD-10-CM

## 2025-01-14 DIAGNOSIS — E11.69 TYPE 2 DIABETES MELLITUS WITH OTHER SPECIFIED COMPLICATION, WITHOUT LONG-TERM CURRENT USE OF INSULIN (HCC): Primary | ICD-10-CM

## 2025-01-14 DIAGNOSIS — I10 ESSENTIAL HYPERTENSION: ICD-10-CM

## 2025-01-14 DIAGNOSIS — E87.6 HYPOKALEMIA: ICD-10-CM

## 2025-01-14 DIAGNOSIS — E11.65 TYPE 2 DIABETES MELLITUS WITH HYPERGLYCEMIA, WITHOUT LONG-TERM CURRENT USE OF INSULIN (HCC): ICD-10-CM

## 2025-01-14 DIAGNOSIS — F03.90 DEMENTIA WITHOUT BEHAVIORAL DISTURBANCE (HCC): ICD-10-CM

## 2025-01-14 DIAGNOSIS — E66.01 MORBID (SEVERE) OBESITY DUE TO EXCESS CALORIES: ICD-10-CM

## 2025-01-14 DIAGNOSIS — M85.831 OTHER SPECIFIED DISORDERS OF BONE DENSITY AND STRUCTURE, RIGHT FOREARM: ICD-10-CM

## 2025-01-14 RX ORDER — CIPROFLOXACIN 250 MG/1
250 TABLET, FILM COATED ORAL 2 TIMES DAILY
Qty: 6 TABLET | Refills: 0 | Status: SHIPPED | OUTPATIENT
Start: 2025-01-14 | End: 2025-01-17

## 2025-01-14 SDOH — ECONOMIC STABILITY: FOOD INSECURITY: WITHIN THE PAST 12 MONTHS, THE FOOD YOU BOUGHT JUST DIDN'T LAST AND YOU DIDN'T HAVE MONEY TO GET MORE.: NEVER TRUE

## 2025-01-14 SDOH — ECONOMIC STABILITY: FOOD INSECURITY: WITHIN THE PAST 12 MONTHS, YOU WORRIED THAT YOUR FOOD WOULD RUN OUT BEFORE YOU GOT MONEY TO BUY MORE.: NEVER TRUE

## 2025-01-14 ASSESSMENT — PATIENT HEALTH QUESTIONNAIRE - PHQ9
SUM OF ALL RESPONSES TO PHQ QUESTIONS 1-9: 0
SUM OF ALL RESPONSES TO PHQ9 QUESTIONS 1 & 2: 0
1. LITTLE INTEREST OR PLEASURE IN DOING THINGS: NOT AT ALL
2. FEELING DOWN, DEPRESSED OR HOPELESS: NOT AT ALL

## 2025-01-14 NOTE — PROGRESS NOTES
questions answered.    Signs and symptoms to watch for discussed. Serious signs and symptoms reviewed. ER if any.     Bari Yan MD    Patients are advised to check with insurance company to ensure coverage and to fully understand benefits and cost prior to any testing.  This note was created with the assistance of voice recognition software.  Document was reviewed however may contain grammatical errors.This note or partial portions of this note may have been created using a copy forward or copy paste feature but these portions have been verified and re-edited for accuracy and any portions not in need of editing or reviews are not being used to generate any component necessary for billing purposes.  Some portions may be carried over for continuity purposes and to aid me with monitoring of past medical conditions and discussions.  Elements necessary for proper CPT code selection are based only on elements of the visit that are truly unique to this visit.

## 2025-01-29 ENCOUNTER — HOSPITAL ENCOUNTER (OUTPATIENT)
Age: 80
Discharge: HOME | End: 2025-01-29
Payer: MEDICARE

## 2025-01-29 DIAGNOSIS — N39.0 RECURRENT UTI: ICD-10-CM

## 2025-01-29 DIAGNOSIS — E11.65 TYPE 2 DIABETES MELLITUS WITH HYPERGLYCEMIA, WITHOUT LONG-TERM CURRENT USE OF INSULIN (HCC): ICD-10-CM

## 2025-01-29 DIAGNOSIS — E78.2 MIXED HYPERLIPIDEMIA: ICD-10-CM

## 2025-01-29 DIAGNOSIS — F03.90 DEMENTIA WITHOUT BEHAVIORAL DISTURBANCE (HCC): ICD-10-CM

## 2025-01-29 DIAGNOSIS — E53.8 VITAMIN B12 DEFICIENCY: ICD-10-CM

## 2025-01-29 LAB
ALBUMIN SERPL-MCNC: 3.6 G/DL (ref 3.5–5.2)
ALP SERPL-CCNC: 81 U/L (ref 35–104)
ALT SERPL-CCNC: 45 U/L (ref 0–32)
ANION GAP SERPL CALCULATED.3IONS-SCNC: 12 MMOL/L (ref 7–16)
AST SERPL-CCNC: 51 U/L (ref 0–31)
BASOPHILS # BLD: 0.08 K/UL (ref 0–0.2)
BASOPHILS NFR BLD: 1 % (ref 0–2)
BILIRUB SERPL-MCNC: 0.4 MG/DL (ref 0–1.2)
BILIRUB UR QL STRIP: NEGATIVE
BUN SERPL-MCNC: 12 MG/DL (ref 6–23)
CALCIUM SERPL-MCNC: 9.4 MG/DL (ref 8.6–10.2)
CHLORIDE SERPL-SCNC: 105 MMOL/L (ref 98–107)
CHOLEST SERPL-MCNC: 204 MG/DL
CK SERPL-CCNC: 32 U/L (ref 20–180)
CLARITY UR: CLEAR
CO2 SERPL-SCNC: 23 MMOL/L (ref 22–29)
COLOR UR: YELLOW
COMMENT: ABNORMAL
CREAT SERPL-MCNC: 0.7 MG/DL (ref 0.5–1)
EOSINOPHIL # BLD: 0.35 K/UL (ref 0.05–0.5)
EOSINOPHILS RELATIVE PERCENT: 4 % (ref 0–6)
ERYTHROCYTE [DISTWIDTH] IN BLOOD BY AUTOMATED COUNT: 12.8 % (ref 11.5–15)
FOLATE SERPL-MCNC: 8.4 NG/ML (ref 4.8–24.2)
GFR, ESTIMATED: 89 ML/MIN/1.73M2
GLUCOSE SERPL-MCNC: 223 MG/DL (ref 74–99)
GLUCOSE UR STRIP-MCNC: 500 MG/DL
HCT VFR BLD AUTO: 44.8 % (ref 34–48)
HDLC SERPL-MCNC: 53 MG/DL
HGB BLD-MCNC: 14.3 G/DL (ref 11.5–15.5)
HGB UR QL STRIP.AUTO: NEGATIVE
IMM GRANULOCYTES # BLD AUTO: 0.03 K/UL (ref 0–0.58)
IMM GRANULOCYTES NFR BLD: 0 % (ref 0–5)
KETONES UR STRIP-MCNC: NEGATIVE MG/DL
LDLC SERPL CALC-MCNC: 118 MG/DL
LEUKOCYTE ESTERASE UR QL STRIP: NEGATIVE
LYMPHOCYTES NFR BLD: 3.42 K/UL (ref 1.5–4)
LYMPHOCYTES RELATIVE PERCENT: 36 % (ref 20–42)
MCH RBC QN AUTO: 30.1 PG (ref 26–35)
MCHC RBC AUTO-ENTMCNC: 31.9 G/DL (ref 32–34.5)
MCV RBC AUTO: 94.3 FL (ref 80–99.9)
MONOCYTES NFR BLD: 0.56 K/UL (ref 0.1–0.95)
MONOCYTES NFR BLD: 6 % (ref 2–12)
NEUTROPHILS NFR BLD: 54 % (ref 43–80)
NEUTS SEG NFR BLD: 5.16 K/UL (ref 1.8–7.3)
NITRITE UR QL STRIP: NEGATIVE
PH UR STRIP: 6 [PH] (ref 5–9)
PLATELET # BLD AUTO: 158 K/UL (ref 130–450)
PMV BLD AUTO: 10.8 FL (ref 7–12)
POTASSIUM SERPL-SCNC: 3.7 MMOL/L (ref 3.5–5)
PROT SERPL-MCNC: 6.9 G/DL (ref 6.4–8.3)
PROT UR STRIP-MCNC: NEGATIVE MG/DL
RBC # BLD AUTO: 4.75 M/UL (ref 3.5–5.5)
SODIUM SERPL-SCNC: 140 MMOL/L (ref 132–146)
SP GR UR STRIP: >1.03 (ref 1–1.03)
TRIGL SERPL-MCNC: 165 MG/DL
TSH SERPL DL<=0.05 MIU/L-ACNC: 2.99 UIU/ML (ref 0.27–4.2)
UROBILINOGEN UR STRIP-ACNC: 0.2 EU/DL (ref 0–1)
VIT B12 SERPL-MCNC: 337 PG/ML (ref 211–946)
VLDLC SERPL CALC-MCNC: 33 MG/DL
WBC OTHER # BLD: 9.6 K/UL (ref 4.5–11.5)

## 2025-01-29 PROCEDURE — 80053 COMPREHEN METABOLIC PANEL: CPT

## 2025-01-29 PROCEDURE — 36415 COLL VENOUS BLD VENIPUNCTURE: CPT

## 2025-01-29 PROCEDURE — 82746 ASSAY OF FOLIC ACID SERUM: CPT

## 2025-01-29 PROCEDURE — 85025 COMPLETE CBC W/AUTO DIFF WBC: CPT

## 2025-01-29 PROCEDURE — 82607 VITAMIN B-12: CPT

## 2025-01-29 PROCEDURE — 82043 UR ALBUMIN QUANTITATIVE: CPT

## 2025-01-29 PROCEDURE — 82570 ASSAY OF URINE CREATININE: CPT

## 2025-01-29 PROCEDURE — 81003 URINALYSIS AUTO W/O SCOPE: CPT

## 2025-01-29 PROCEDURE — 80061 LIPID PANEL: CPT

## 2025-01-29 PROCEDURE — 84443 ASSAY THYROID STIM HORMONE: CPT

## 2025-01-29 PROCEDURE — 82550 ASSAY OF CK (CPK): CPT

## 2025-01-29 PROCEDURE — 87086 URINE CULTURE/COLONY COUNT: CPT

## 2025-01-29 NOTE — RESULT ENCOUNTER NOTE
Sugar remaining quite elevated in the 200s yet hemoglobin A1c has been very acceptable?  Make sure you are eating a low sugar/low-carb all frequent meals.  Make sure she is taking her metformin.  Monitor glucose at home, notify if out of range.  Follow-up

## 2025-01-30 LAB
CREAT UR-MCNC: 141.9 MG/DL (ref 29–226)
MICROALBUMIN UR-MCNC: 352 MG/L (ref 0–19)
MICROALBUMIN/CREAT UR-RTO: 248 MCG/MG CREAT (ref 0–30)
MICROORGANISM SPEC CULT: NO GROWTH
SERVICE CMNT-IMP: NORMAL
SPECIMEN DESCRIPTION: NORMAL

## 2025-01-30 NOTE — RESULT ENCOUNTER NOTE
Protein in urine.  Consider ACE inhibitor/ARB if blood pressure allows.  Keep follow-up to review more detail

## 2025-01-31 ENCOUNTER — OFFICE VISIT (OUTPATIENT)
Dept: FAMILY MEDICINE CLINIC | Age: 80
End: 2025-01-31

## 2025-01-31 VITALS
RESPIRATION RATE: 18 BRPM | WEIGHT: 205 LBS | SYSTOLIC BLOOD PRESSURE: 126 MMHG | TEMPERATURE: 97.4 F | HEART RATE: 82 BPM | OXYGEN SATURATION: 98 % | DIASTOLIC BLOOD PRESSURE: 74 MMHG | BODY MASS INDEX: 35.19 KG/M2

## 2025-01-31 DIAGNOSIS — R31.9 HEMATURIA, UNSPECIFIED TYPE: Primary | ICD-10-CM

## 2025-01-31 LAB
BILIRUBIN, POC: NORMAL
BLOOD URINE, POC: NORMAL
CLARITY, POC: NORMAL
COLOR, POC: YELLOW
GLUCOSE URINE, POC: 100 MG/DL
KETONES, POC: NORMAL MG/DL
LEUKOCYTE EST, POC: NORMAL
NITRITE, POC: NORMAL
PH, POC: 6
PROTEIN, POC: 100 MG/DL
SPECIFIC GRAVITY, POC: 1.03
UROBILINOGEN, POC: 0.2 MG/DL

## 2025-01-31 RX ORDER — FLUCONAZOLE 150 MG/1
150 TABLET ORAL
Qty: 2 TABLET | Refills: 0 | Status: SHIPPED | OUTPATIENT
Start: 2025-01-31 | End: 2025-02-06

## 2025-01-31 RX ORDER — CEFDINIR 300 MG/1
300 CAPSULE ORAL 2 TIMES DAILY
Qty: 14 CAPSULE | Refills: 0 | Status: SHIPPED | OUTPATIENT
Start: 2025-01-31 | End: 2025-02-07

## 2025-01-31 ASSESSMENT — ENCOUNTER SYMPTOMS
BACK PAIN: 0
RESPIRATORY NEGATIVE: 1
ABDOMINAL PAIN: 0

## 2025-01-31 NOTE — PROGRESS NOTES
Lorenza Fu (:  1945) is a 79 y.o. female,Established patient, here for evaluation of the following chief complaint(s):  Hematuria         Assessment & Plan  Hematuria, unspecified type  At this time we will treat symptomatically.  Not enough for culture.  Follow-up with PCP as instructed for recheck.    Orders:    POCT Urinalysis no Micro    Culture, Urine; Future      No follow-ups on file.       Subjective   Hematuria  Irritative symptoms include frequency and urgency. Pertinent negatives include no abdominal pain, dysuria or flank pain.     Patient presents today with  for evaluation of several day history of worsening hematuria.  No fever or chills.  No chest pain or shortness of breath.   is concerned because of a history of UTIs in the past.  No nausea vomiting or diarrhea.    Review of Systems   Constitutional:  Positive for fatigue.   HENT: Negative.     Respiratory: Negative.     Cardiovascular: Negative.    Gastrointestinal:  Negative for abdominal pain.   Genitourinary:  Positive for frequency, hematuria and urgency. Negative for dysuria and flank pain.   Musculoskeletal:  Negative for back pain.   Skin: Negative.    Neurological: Negative.    All other systems reviewed and are negative.         Current Outpatient Medications:     cefdinir (OMNICEF) 300 MG capsule, Take 1 capsule by mouth 2 times daily for 7 days, Disp: 14 capsule, Rfl: 0    fluconazole (DIFLUCAN) 150 MG tablet, Take 1 tablet by mouth every 72 hours for 6 days, Disp: 2 tablet, Rfl: 0    levothyroxine (SYNTHROID) 100 MCG tablet, Take 1 tablet by mouth daily, Disp: 90 tablet, Rfl: 1    metoprolol succinate (TOPROL XL) 25 MG extended release tablet, Take 1 tablet by mouth 2 times daily, Disp: 180 tablet, Rfl: 1    metFORMIN (GLUCOPHAGE) 500 MG tablet, Take 1 tablet by mouth 2 times daily (with meals), Disp: 180 tablet, Rfl: 1    omega-3 acid ethyl esters (LOVAZA) 1 g capsule, Take 1 capsule by mouth 2

## 2025-02-04 ENCOUNTER — OFFICE VISIT (OUTPATIENT)
Dept: PRIMARY CARE CLINIC | Age: 80
End: 2025-02-04

## 2025-02-04 VITALS
SYSTOLIC BLOOD PRESSURE: 124 MMHG | WEIGHT: 206.6 LBS | HEIGHT: 65 IN | TEMPERATURE: 97.6 F | DIASTOLIC BLOOD PRESSURE: 72 MMHG | OXYGEN SATURATION: 95 % | BODY MASS INDEX: 34.42 KG/M2 | HEART RATE: 53 BPM

## 2025-02-04 DIAGNOSIS — M85.839 OSTEOPENIA OF FOREARM, UNSPECIFIED LATERALITY: ICD-10-CM

## 2025-02-04 DIAGNOSIS — E53.8 VITAMIN B12 DEFICIENCY: ICD-10-CM

## 2025-02-04 DIAGNOSIS — I10 ESSENTIAL HYPERTENSION: ICD-10-CM

## 2025-02-04 DIAGNOSIS — I44.2 COMPLETE HEART BLOCK (HCC): ICD-10-CM

## 2025-02-04 DIAGNOSIS — N39.0 RECURRENT UTI: ICD-10-CM

## 2025-02-04 DIAGNOSIS — F03.90 DEMENTIA WITHOUT BEHAVIORAL DISTURBANCE (HCC): ICD-10-CM

## 2025-02-04 DIAGNOSIS — E66.811 CLASS 1 OBESITY DUE TO EXCESS CALORIES WITH SERIOUS COMORBIDITY AND BODY MASS INDEX (BMI) OF 34.0 TO 34.9 IN ADULT: ICD-10-CM

## 2025-02-04 DIAGNOSIS — R80.9 PROTEINURIA, UNSPECIFIED TYPE: ICD-10-CM

## 2025-02-04 DIAGNOSIS — E11.65 TYPE 2 DIABETES MELLITUS WITH HYPERGLYCEMIA, WITHOUT LONG-TERM CURRENT USE OF INSULIN (HCC): Primary | ICD-10-CM

## 2025-02-04 DIAGNOSIS — E55.9 VITAMIN D DEFICIENCY: ICD-10-CM

## 2025-02-04 DIAGNOSIS — E66.09 CLASS 1 OBESITY DUE TO EXCESS CALORIES WITH SERIOUS COMORBIDITY AND BODY MASS INDEX (BMI) OF 34.0 TO 34.9 IN ADULT: ICD-10-CM

## 2025-02-04 DIAGNOSIS — E78.2 MIXED HYPERLIPIDEMIA: ICD-10-CM

## 2025-02-04 PROBLEM — E11.69 TYPE 2 DIABETES MELLITUS WITH OTHER SPECIFIED COMPLICATION, WITHOUT LONG-TERM CURRENT USE OF INSULIN (HCC): Status: RESOLVED | Noted: 2025-01-14 | Resolved: 2025-02-04

## 2025-02-04 LAB
CHP ED QC CHECK: NORMAL
GLUCOSE BLD-MCNC: 132 MG/DL

## 2025-02-04 RX ORDER — CYANOCOBALAMIN 1000 UG/ML
1000 INJECTION, SOLUTION INTRAMUSCULAR; SUBCUTANEOUS ONCE
Status: COMPLETED | OUTPATIENT
Start: 2025-02-04 | End: 2025-02-04

## 2025-02-04 RX ADMIN — CYANOCOBALAMIN 1000 MCG: 1000 INJECTION, SOLUTION INTRAMUSCULAR; SUBCUTANEOUS at 14:41

## 2025-02-04 NOTE — PROGRESS NOTES
Lorenza Fu : 1945 Sex: female  Age: 79 y.o.    Chief Complaint   Patient presents with    recent labs     Lab results       HPI:        Presents today for follow-up.  2 days after having blood work/urine studies for me, developed sudden UTI symptoms, came to express care where she was noted to have trace nitrite small bilirubin cloudy urine glucose large blood, was given cefdinir and Diflucan.  Feeling better.  We will repeat UA/culture in 2 weeks.  Encourage follow-up with urology.  No acute complaints today.  Glucose has been concerning only high on blood work 223 but hemoglobin A1c has been quite stable including 6.612/24.  Has been eating a lot of sugar and counseled.  Does not wish to change therapy.  Healthy lifestyle modification reviewed but difficult.  Today's glucose nonfasting 132.  BMP otherwise normal calcium 9.4 B12 low at 337 HDL 53  triglyceride 165, does not wish change of therapy, LFTs fluctuate, elevated but relatively stable 45, AST elevated but relatively stable 51, simply was monitored, TSH 2.99 CBC Sudheer normal differential reviewed microalbumin creatinine ratio 248, should be on ACE inhibitor/ARB but feeling well needed for change of therapy now, simply wants repeated next blood work in 3 months      Most Recent Labs  CBC  Lab Results   Component Value Date/Time    WBC 9.6 2025 01:52 PM    WBC 7.3 2024 01:09 PM    WBC 8.1 10/08/2024 03:45 PM    RBC 4.75 2025 01:52 PM    RBC 4.59 2024 01:09 PM    RBC 4.58 10/08/2024 03:45 PM    HGB 14.3 2025 01:52 PM    HGB 13.9 2024 01:09 PM    HGB 14.2 10/08/2024 03:45 PM    HCT 44.8 2025 01:52 PM    HCT 42.5 2024 01:09 PM    HCT 43.6 10/08/2024 03:45 PM    MCV 94.3 2025 01:52 PM    MCV 92.6 2024 01:09 PM    MCV 95.2 10/08/2024 03:45 PM     2025 01:52 PM     2024 01:09 PM     10/08/2024 03:45 PM      CMP  Lab Results   Component Value

## 2025-02-17 ENCOUNTER — TELEPHONE (OUTPATIENT)
Dept: NON INVASIVE DIAGNOSTICS | Age: 80
End: 2025-02-17

## 2025-03-13 ENCOUNTER — OFFICE VISIT (OUTPATIENT)
Dept: PRIMARY CARE CLINIC | Age: 80
End: 2025-03-13

## 2025-03-13 VITALS — WEIGHT: 206 LBS | HEIGHT: 65 IN | BODY MASS INDEX: 34.32 KG/M2

## 2025-03-13 DIAGNOSIS — F03.90 DEMENTIA WITHOUT BEHAVIORAL DISTURBANCE (HCC): ICD-10-CM

## 2025-03-13 DIAGNOSIS — R31.9 HEMATURIA, UNSPECIFIED TYPE: ICD-10-CM

## 2025-03-13 DIAGNOSIS — N39.0 RECURRENT UTI: Primary | ICD-10-CM

## 2025-03-13 LAB
BILIRUBIN, POC: NEGATIVE
BLOOD URINE, POC: ABNORMAL
CLARITY, POC: ABNORMAL
COLOR, POC: YELLOW
GLUCOSE URINE, POC: >=1000 MG/DL
KETONES, POC: ABNORMAL MG/DL
LEUKOCYTE EST, POC: ABNORMAL
NITRITE, POC: NEGATIVE
PH, POC: 6
PROTEIN, POC: 30 MG/DL
SPECIFIC GRAVITY, POC: >=1.03
UROBILINOGEN, POC: 1 MG/DL

## 2025-03-13 RX ORDER — CIPROFLOXACIN 250 MG/1
250 TABLET, FILM COATED ORAL 2 TIMES DAILY
Qty: 6 TABLET | Refills: 1 | Status: SHIPPED | OUTPATIENT
Start: 2025-03-13 | End: 2025-03-16

## 2025-03-13 NOTE — PROGRESS NOTES
Lorenza Fu : 1945 Sex: female  Age: 79 y.o.    Chief Complaint   Patient presents with    Frequent/Recurrent UTI       HPI  HPI:      Has been feels she has another UTI, smells the \"odor\" he smells when she has 1.  Urine incontinence at times.  She has no complaints, chronically pleasantly demented.  No pain.  No fever or chills.    ROS:  As above      Current Outpatient Medications:     ciprofloxacin (CIPRO) 250 MG tablet, Take 1 tablet by mouth 2 times daily for 3 days, Disp: 6 tablet, Rfl: 1    levothyroxine (SYNTHROID) 100 MCG tablet, Take 1 tablet by mouth daily, Disp: 90 tablet, Rfl: 1    metoprolol succinate (TOPROL XL) 25 MG extended release tablet, Take 1 tablet by mouth 2 times daily, Disp: 180 tablet, Rfl: 1    metFORMIN (GLUCOPHAGE) 500 MG tablet, Take 1 tablet by mouth 2 times daily (with meals), Disp: 180 tablet, Rfl: 1    omega-3 acid ethyl esters (LOVAZA) 1 g capsule, Take 1 capsule by mouth 2 times daily, Disp: 180 capsule, Rfl: 3    mirabegron (MYRBETRIQ) 25 MG TB24, Take 1 tablet by mouth daily, Disp: 90 tablet, Rfl: 3    donepezil (ARICEPT) 10 MG tablet, TAKE 1 TABLET BY MOUTH TWICE  DAILY, Disp: 180 tablet, Rfl: 3    memantine (NAMENDA) 10 MG tablet, TAKE 1 TABLET BY MOUTH TWICE DAILY, Disp: 180 tablet, Rfl: 3  Allergies   Allergen Reactions    Morphine Itching    Statins      Other reaction(s): Unknown    Statins Depletion Therapy Other (See Comments)     MYALGIA       Past Medical History:   Diagnosis Date    Arthritis     Diabetes mellitus (HCC)     Difficult intubation     carries card, copy on chart  Glidescope#3 with ETT size7    Hyperlipidemia     Hypothyroidism     Left sided abdominal pain     Memory problem     still competent    GORDY on CPAP     Pacemaker     Rectal bleeding     Ringing in the ears     Supraventricular tachycardia 2023     Past Surgical History:   Procedure Laterality Date    ABLATION OF DYSRHYTHMIC FOCUS      BREAST SURGERY      implants

## 2025-03-16 LAB
CULTURE: ABNORMAL
CULTURE: ABNORMAL
SPECIMEN DESCRIPTION: ABNORMAL

## 2025-03-17 ENCOUNTER — RESULTS FOLLOW-UP (OUTPATIENT)
Dept: PRIMARY CARE CLINIC | Age: 80
End: 2025-03-17

## 2025-03-17 DIAGNOSIS — N39.0 RECURRENT UTI: Primary | ICD-10-CM

## 2025-03-17 NOTE — RESULT ENCOUNTER NOTE
Urine culture positive for bacteria sensitive to antibiotic prescribed.  If tolerating and getting better repeat UA/culture 2 weeks, notify if issues.  Sooner as needed.  Make sure UA/culture is ordered

## 2025-03-24 ENCOUNTER — HOSPITAL ENCOUNTER (OUTPATIENT)
Age: 80
Discharge: HOME | End: 2025-03-24
Payer: MEDICARE

## 2025-03-24 DIAGNOSIS — N39.0 RECURRENT UTI: ICD-10-CM

## 2025-03-24 LAB
BILIRUB UR QL STRIP: NEGATIVE
CLARITY UR: CLEAR
COLOR UR: YELLOW
COMMENT: ABNORMAL
GLUCOSE UR STRIP-MCNC: 500 MG/DL
HGB UR QL STRIP.AUTO: NEGATIVE
KETONES UR STRIP-MCNC: NEGATIVE MG/DL
LEUKOCYTE ESTERASE UR QL STRIP: NEGATIVE
NITRITE UR QL STRIP: NEGATIVE
PH UR STRIP: 6 [PH] (ref 5–8)
PROT UR STRIP-MCNC: NEGATIVE MG/DL
SP GR UR STRIP: >1.03 (ref 1–1.03)
UROBILINOGEN UR STRIP-ACNC: 0.2 EU/DL (ref 0–1)

## 2025-03-24 PROCEDURE — 81003 URINALYSIS AUTO W/O SCOPE: CPT

## 2025-03-24 PROCEDURE — 87086 URINE CULTURE/COLONY COUNT: CPT

## 2025-03-24 PROCEDURE — 36415 COLL VENOUS BLD VENIPUNCTURE: CPT

## 2025-03-25 LAB
MICROORGANISM SPEC CULT: ABNORMAL
SERVICE CMNT-IMP: ABNORMAL
SPECIMEN DESCRIPTION: ABNORMAL

## 2025-03-31 ENCOUNTER — TELEPHONE (OUTPATIENT)
Dept: CARDIOLOGY CLINIC | Age: 80
End: 2025-03-31

## 2025-03-31 NOTE — TELEPHONE ENCOUNTER
Patient's  called in and stated that she doesn't have an apt scheduled. Did explain to the  that she had an apt, he had it written down on the calendar.

## 2025-05-14 ENCOUNTER — TELEPHONE (OUTPATIENT)
Dept: PRIMARY CARE CLINIC | Age: 80
End: 2025-05-14

## 2025-05-14 DIAGNOSIS — N39.0 RECURRENT UTI: ICD-10-CM

## 2025-05-14 RX ORDER — CIPROFLOXACIN 250 MG/1
250 TABLET, FILM COATED ORAL 2 TIMES DAILY
Qty: 6 TABLET | Refills: 1 | Status: SHIPPED | OUTPATIENT
Start: 2025-05-14 | End: 2025-05-17

## 2025-05-14 NOTE — TELEPHONE ENCOUNTER
It is always our recommendation to be seen to be safe, express care if necessary.  However if not willing to be seen and understands risk of phone medicine we can call in antibiotic.  Let me know.

## 2025-05-14 NOTE — TELEPHONE ENCOUNTER
PT's UTI has returned, treated for one 2 months ago and it's returned, has a terrible odor and frequency. Do you want her to make an appointment?  said you've called them something in before and wanted me to ask you again but he will bring her in if you insist.

## 2025-06-12 ENCOUNTER — TELEPHONE (OUTPATIENT)
Dept: NON INVASIVE DIAGNOSTICS | Age: 80
End: 2025-06-12

## 2025-06-13 ENCOUNTER — OFFICE VISIT (OUTPATIENT)
Dept: NON INVASIVE DIAGNOSTICS | Age: 80
End: 2025-06-13
Payer: MEDICARE

## 2025-06-13 ENCOUNTER — CLINICAL SUPPORT (OUTPATIENT)
Dept: NON INVASIVE DIAGNOSTICS | Age: 80
End: 2025-06-13

## 2025-06-13 ENCOUNTER — TELEPHONE (OUTPATIENT)
Dept: NON INVASIVE DIAGNOSTICS | Age: 80
End: 2025-06-13

## 2025-06-13 ENCOUNTER — HOSPITAL ENCOUNTER (OUTPATIENT)
Age: 80
Discharge: HOME | End: 2025-06-13
Payer: MEDICARE

## 2025-06-13 ENCOUNTER — PREP FOR PROCEDURE (OUTPATIENT)
Dept: NON INVASIVE DIAGNOSTICS | Age: 80
End: 2025-06-13

## 2025-06-13 ENCOUNTER — OFFICE VISIT (OUTPATIENT)
Dept: PRIMARY CARE CLINIC | Age: 80
End: 2025-06-13

## 2025-06-13 ENCOUNTER — RESULTS FOLLOW-UP (OUTPATIENT)
Dept: PRIMARY CARE CLINIC | Age: 80
End: 2025-06-13

## 2025-06-13 VITALS
BODY MASS INDEX: 35.3 KG/M2 | DIASTOLIC BLOOD PRESSURE: 74 MMHG | HEIGHT: 64 IN | WEIGHT: 206.8 LBS | RESPIRATION RATE: 18 BRPM | SYSTOLIC BLOOD PRESSURE: 102 MMHG | HEART RATE: 56 BPM

## 2025-06-13 VITALS
DIASTOLIC BLOOD PRESSURE: 60 MMHG | HEART RATE: 62 BPM | BODY MASS INDEX: 35.51 KG/M2 | SYSTOLIC BLOOD PRESSURE: 122 MMHG | WEIGHT: 208 LBS | HEIGHT: 64 IN | TEMPERATURE: 97.4 F | OXYGEN SATURATION: 96 %

## 2025-06-13 DIAGNOSIS — E55.9 VITAMIN D DEFICIENCY: ICD-10-CM

## 2025-06-13 DIAGNOSIS — E66.01 CLASS 2 SEVERE OBESITY DUE TO EXCESS CALORIES WITH SERIOUS COMORBIDITY AND BODY MASS INDEX (BMI) OF 35.0 TO 35.9 IN ADULT (HCC): ICD-10-CM

## 2025-06-13 DIAGNOSIS — Z95.0 PACEMAKER: Primary | ICD-10-CM

## 2025-06-13 DIAGNOSIS — R29.898 RIGHT LEG WEAKNESS: ICD-10-CM

## 2025-06-13 DIAGNOSIS — E11.65 TYPE 2 DIABETES MELLITUS WITH HYPERGLYCEMIA, WITHOUT LONG-TERM CURRENT USE OF INSULIN (HCC): ICD-10-CM

## 2025-06-13 DIAGNOSIS — F03.90 DEMENTIA WITHOUT BEHAVIORAL DISTURBANCE (HCC): ICD-10-CM

## 2025-06-13 DIAGNOSIS — M85.839 OSTEOPENIA OF FOREARM, UNSPECIFIED LATERALITY: ICD-10-CM

## 2025-06-13 DIAGNOSIS — E78.2 MIXED HYPERLIPIDEMIA: ICD-10-CM

## 2025-06-13 DIAGNOSIS — I44.2 COMPLETE HEART BLOCK (HCC): ICD-10-CM

## 2025-06-13 DIAGNOSIS — E53.8 VITAMIN B12 DEFICIENCY: ICD-10-CM

## 2025-06-13 DIAGNOSIS — I10 ESSENTIAL HYPERTENSION: ICD-10-CM

## 2025-06-13 DIAGNOSIS — E11.69 TYPE 2 DIABETES MELLITUS WITH OTHER SPECIFIED COMPLICATION, WITHOUT LONG-TERM CURRENT USE OF INSULIN (HCC): ICD-10-CM

## 2025-06-13 DIAGNOSIS — M54.16 LUMBAR RADICULOPATHY: ICD-10-CM

## 2025-06-13 DIAGNOSIS — N39.0 RECURRENT UTI: Primary | ICD-10-CM

## 2025-06-13 DIAGNOSIS — R26.89 BALANCE DISORDER: ICD-10-CM

## 2025-06-13 DIAGNOSIS — E66.812 CLASS 2 SEVERE OBESITY DUE TO EXCESS CALORIES WITH SERIOUS COMORBIDITY AND BODY MASS INDEX (BMI) OF 35.0 TO 35.9 IN ADULT (HCC): ICD-10-CM

## 2025-06-13 DIAGNOSIS — E78.2 MIXED HYPERLIPIDEMIA: Primary | ICD-10-CM

## 2025-06-13 DIAGNOSIS — M65.30 TRIGGER FINGER OF RIGHT HAND, UNSPECIFIED FINGER: ICD-10-CM

## 2025-06-13 DIAGNOSIS — R80.9 PROTEINURIA, UNSPECIFIED TYPE: ICD-10-CM

## 2025-06-13 LAB
25(OH)D3 SERPL-MCNC: 35.8 NG/ML (ref 30–100)
ALBUMIN SERPL-MCNC: 4 G/DL (ref 3.5–5.2)
ALP SERPL-CCNC: 98 U/L (ref 35–104)
ALT SERPL-CCNC: 40 U/L (ref 0–32)
ANION GAP SERPL CALCULATED.3IONS-SCNC: 13 MMOL/L (ref 7–16)
AST SERPL-CCNC: 39 U/L (ref 0–31)
BASOPHILS # BLD: 0.09 K/UL (ref 0–0.2)
BASOPHILS NFR BLD: 1 % (ref 0–2)
BILIRUB SERPL-MCNC: 0.3 MG/DL (ref 0–1.2)
BILIRUB UR QL STRIP: NEGATIVE
BILIRUBIN, POC: NEGATIVE
BLOOD URINE, POC: NEGATIVE
BUN SERPL-MCNC: 14 MG/DL (ref 6–23)
CALCIUM SERPL-MCNC: 9.4 MG/DL (ref 8.6–10.2)
CHLORIDE SERPL-SCNC: 106 MMOL/L (ref 98–107)
CHOLEST SERPL-MCNC: 212 MG/DL
CK SERPL-CCNC: 34 U/L (ref 20–180)
CLARITY UR: CLEAR
CLARITY, POC: ABNORMAL
CO2 SERPL-SCNC: 21 MMOL/L (ref 22–29)
COLOR UR: YELLOW
COLOR, POC: YELLOW
COMMENT: ABNORMAL
CREAT SERPL-MCNC: 0.8 MG/DL (ref 0.5–1)
CREAT UR-MCNC: 130 MG/DL (ref 29–226)
EOSINOPHIL # BLD: 0.3 K/UL (ref 0.05–0.5)
EOSINOPHILS RELATIVE PERCENT: 3 % (ref 0–6)
ERYTHROCYTE [DISTWIDTH] IN BLOOD BY AUTOMATED COUNT: 12.9 % (ref 11.5–15)
FOLATE SERPL-MCNC: 6.2 NG/ML (ref 4.6–34.8)
GFR, ESTIMATED: 79 ML/MIN/1.73M2
GLUCOSE SERPL-MCNC: 168 MG/DL (ref 74–99)
GLUCOSE UR STRIP-MCNC: 250 MG/DL
GLUCOSE URINE, POC: 250 MG/DL
HBA1C MFR BLD: 7.4 % (ref 4–5.6)
HCT VFR BLD AUTO: 45.8 % (ref 34–48)
HDLC SERPL-MCNC: 49 MG/DL
HGB BLD-MCNC: 14.5 G/DL (ref 11.5–15.5)
HGB UR QL STRIP.AUTO: NEGATIVE
IMM GRANULOCYTES # BLD AUTO: 0.03 K/UL (ref 0–0.58)
IMM GRANULOCYTES NFR BLD: 0 % (ref 0–5)
KETONES UR STRIP-MCNC: NEGATIVE MG/DL
KETONES, POC: ABNORMAL MG/DL
LDLC SERPL CALC-MCNC: 129 MG/DL
LEUKOCYTE EST, POC: ABNORMAL
LEUKOCYTE ESTERASE UR QL STRIP: NEGATIVE
LYMPHOCYTES NFR BLD: 4.55 K/UL (ref 1.5–4)
LYMPHOCYTES RELATIVE PERCENT: 46 % (ref 20–42)
MCH RBC QN AUTO: 30 PG (ref 26–35)
MCHC RBC AUTO-ENTMCNC: 31.7 G/DL (ref 32–34.5)
MCV RBC AUTO: 94.8 FL (ref 80–99.9)
MICROALBUMIN UR-MCNC: <12 MG/L (ref 0–20)
MICROALBUMIN/CREAT UR-RTO: <9 MCG/MG CREAT (ref 0–30)
MONOCYTES NFR BLD: 0.7 K/UL (ref 0.1–0.95)
MONOCYTES NFR BLD: 7 % (ref 2–12)
NEUTROPHILS NFR BLD: 43 % (ref 43–80)
NEUTS SEG NFR BLD: 4.19 K/UL (ref 1.8–7.3)
NITRITE UR QL STRIP: NEGATIVE
NITRITE, POC: NEGATIVE
PH UR STRIP: 6 [PH] (ref 5–8)
PH, POC: 6
PLATELET # BLD AUTO: 161 K/UL (ref 130–450)
PMV BLD AUTO: 10.8 FL (ref 7–12)
POTASSIUM SERPL-SCNC: 4.2 MMOL/L (ref 3.5–5)
PROT SERPL-MCNC: 7.1 G/DL (ref 6.4–8.3)
PROT UR STRIP-MCNC: NEGATIVE MG/DL
PROTEIN, POC: ABNORMAL MG/DL
RBC # BLD AUTO: 4.83 M/UL (ref 3.5–5.5)
SODIUM SERPL-SCNC: 140 MMOL/L (ref 132–146)
SP GR UR STRIP: 1.02 (ref 1–1.03)
SPECIFIC GRAVITY, POC: 1.02
TRIGL SERPL-MCNC: 170 MG/DL
TSH SERPL DL<=0.05 MIU/L-ACNC: 4.14 UIU/ML (ref 0.27–4.2)
UROBILINOGEN UR STRIP-ACNC: 0.2 EU/DL (ref 0–1)
UROBILINOGEN, POC: 1 MG/DL
VIT B12 SERPL-MCNC: 361 PG/ML (ref 232–1245)
VLDLC SERPL CALC-MCNC: 34 MG/DL
WBC OTHER # BLD: 9.9 K/UL (ref 4.5–11.5)

## 2025-06-13 PROCEDURE — 82570 ASSAY OF URINE CREATININE: CPT

## 2025-06-13 PROCEDURE — 1036F TOBACCO NON-USER: CPT | Performed by: INTERNAL MEDICINE

## 2025-06-13 PROCEDURE — 1123F ACP DISCUSS/DSCN MKR DOCD: CPT | Performed by: INTERNAL MEDICINE

## 2025-06-13 PROCEDURE — 85025 COMPLETE CBC W/AUTO DIFF WBC: CPT

## 2025-06-13 PROCEDURE — 84443 ASSAY THYROID STIM HORMONE: CPT

## 2025-06-13 PROCEDURE — 93000 ELECTROCARDIOGRAM COMPLETE: CPT | Performed by: INTERNAL MEDICINE

## 2025-06-13 PROCEDURE — 82607 VITAMIN B-12: CPT

## 2025-06-13 PROCEDURE — G8417 CALC BMI ABV UP PARAM F/U: HCPCS | Performed by: INTERNAL MEDICINE

## 2025-06-13 PROCEDURE — 99204 OFFICE O/P NEW MOD 45 MIN: CPT | Performed by: INTERNAL MEDICINE

## 2025-06-13 PROCEDURE — 1090F PRES/ABSN URINE INCON ASSESS: CPT | Performed by: INTERNAL MEDICINE

## 2025-06-13 PROCEDURE — 81003 URINALYSIS AUTO W/O SCOPE: CPT

## 2025-06-13 PROCEDURE — 83036 HEMOGLOBIN GLYCOSYLATED A1C: CPT

## 2025-06-13 PROCEDURE — G8427 DOCREV CUR MEDS BY ELIG CLIN: HCPCS | Performed by: INTERNAL MEDICINE

## 2025-06-13 PROCEDURE — 1159F MED LIST DOCD IN RCRD: CPT | Performed by: INTERNAL MEDICINE

## 2025-06-13 PROCEDURE — 82043 UR ALBUMIN QUANTITATIVE: CPT

## 2025-06-13 PROCEDURE — 80053 COMPREHEN METABOLIC PANEL: CPT

## 2025-06-13 PROCEDURE — 36415 COLL VENOUS BLD VENIPUNCTURE: CPT

## 2025-06-13 PROCEDURE — 1160F RVW MEDS BY RX/DR IN RCRD: CPT | Performed by: INTERNAL MEDICINE

## 2025-06-13 PROCEDURE — 82746 ASSAY OF FOLIC ACID SERUM: CPT

## 2025-06-13 PROCEDURE — 82550 ASSAY OF CK (CPK): CPT

## 2025-06-13 PROCEDURE — G8399 PT W/DXA RESULTS DOCUMENT: HCPCS | Performed by: INTERNAL MEDICINE

## 2025-06-13 PROCEDURE — 82306 VITAMIN D 25 HYDROXY: CPT

## 2025-06-13 PROCEDURE — 80061 LIPID PANEL: CPT

## 2025-06-13 RX ORDER — METOPROLOL SUCCINATE 25 MG/1
25 TABLET, EXTENDED RELEASE ORAL 2 TIMES DAILY
Qty: 180 TABLET | Refills: 1 | Status: SHIPPED | OUTPATIENT
Start: 2025-06-13

## 2025-06-13 RX ORDER — CIPROFLOXACIN 250 MG/1
250 TABLET, FILM COATED ORAL 2 TIMES DAILY
Qty: 14 TABLET | Refills: 0 | Status: SHIPPED | OUTPATIENT
Start: 2025-06-13 | End: 2025-06-20

## 2025-06-13 RX ORDER — SODIUM CHLORIDE 0.9 % (FLUSH) 0.9 %
5-40 SYRINGE (ML) INJECTION PRN
Status: CANCELLED | OUTPATIENT
Start: 2025-08-12

## 2025-06-13 RX ORDER — SODIUM CHLORIDE 0.9 % (FLUSH) 0.9 %
5-40 SYRINGE (ML) INJECTION EVERY 12 HOURS SCHEDULED
Status: CANCELLED | OUTPATIENT
Start: 2025-08-12

## 2025-06-13 RX ORDER — SODIUM CHLORIDE 9 MG/ML
INJECTION, SOLUTION INTRAVENOUS PRN
Status: CANCELLED | OUTPATIENT
Start: 2025-08-12

## 2025-06-13 NOTE — PROGRESS NOTES
reviewing the above stated recommendations.  And a total of >50% of that time involved face-to-face time providing counseling and or coordination of care with the other providers, preparation for the clinic visit, reviewing records/tests, counseling/education of the patient, ordering medications/tests/procedures, coordinating care, and documenting clinical information in the EHR.     Thank you for allowing me to participate in your patient's care.  Please call me if there are any questions or concerns.      Ailin Oliveros MD, [facility]  Cardiac Electrophysiology  [facility]  [facility] [place]: [place]

## 2025-06-13 NOTE — TELEPHONE ENCOUNTER
This nurse gave instructions to patient 6/13/25 at time of office visit.    You are scheduled at: [facility] [address] [place] [address]    Procedure: Pacemaker Gent change , [employer].      *Park in the parking garage(with the bridge).  Enter through the main entrance, go to the right  Check-in with admitting on 1st floor , they will direct you to the 3rd floor cath lab.    You should arrive at the hospital on 8/12 at 1030 for your procedure at 1230.    Instructions:    Nothing to eat or drink after midnight the night before. ( Monday )    2.  You can take your regular medications morning of with just enough water to get them down.      3.  Please hold following medications:      Metformin for 3 days and day of procedure.   ( Saturday 8/9 through Tuesday 8/12 )     4. You will need someone to drive you.        If you have any further questions, please call the office at [phone] ask for Flory.

## 2025-06-13 NOTE — PROGRESS NOTES
Lorenza Chajames : 1945 Sex: female  Age: 79 y.o.    Chief Complaint   Patient presents with    3 Month Follow-Up    Leg Problem     Right leg having trouble lifting leg       Trigger finger     Right hand   middle and ring finger     Incontinence     Per  patient has been urinating during the night in bed     Gait Problem     Balance issues  No falls        HPI:      Scheduled for routine follow-up several concerns.  Recurrent UTIs.  Feels like she has another 1.  Worsening incontinence.  Myrbetriq does not seem to help.  Does not like to wear depends.  Encouraged.  If Myrbetriq not helping discontinue.  Again recommend follow-up with Dr. Antonio.  Underlying neurologic conditions and dementia may be contributing.  They prefer Cipro    Having issues with intermittent weakness right leg and seems to be off balance at times.  No headaches.  Does complain of low back pain.  Falls risk reviewed.    Also intermittent trigger finger right 3rd and 4th finger, does not wish to do any route at this time    Did not get labs yet      Chronic dementia      Most Recent Labs  CBC  Lab Results   Component Value Date/Time    WBC 9.9 2025 05:05 PM    WBC 9.6 2025 01:52 PM    WBC 7.3 2024 01:09 PM    RBC 4.83 2025 05:05 PM    RBC 4.75 2025 01:52 PM    RBC 4.59 2024 01:09 PM    HGB 14.5 2025 05:05 PM    HGB 14.3 2025 01:52 PM    HGB 13.9 2024 01:09 PM    HCT 45.8 2025 05:05 PM    HCT 44.8 2025 01:52 PM    HCT 42.5 2024 01:09 PM    MCV 94.8 2025 05:05 PM    MCV 94.3 2025 01:52 PM    MCV 92.6 2024 01:09 PM     2025 05:05 PM     2025 01:52 PM     2024 01:09 PM      CMP  Lab Results   Component Value Date/Time     2025 01:52 PM     2024 01:09 PM     10/08/2024 03:45 PM    K 3.7 2025 01:52 PM    K 3.3 2024 01:09 PM    K 3.7 10/08/2024 03:45 PM

## 2025-06-14 LAB
CULTURE: NORMAL
SPECIMEN DESCRIPTION: NORMAL

## 2025-06-18 DIAGNOSIS — F03.90 DEMENTIA WITHOUT BEHAVIORAL DISTURBANCE (HCC): ICD-10-CM

## 2025-06-19 RX ORDER — DONEPEZIL HYDROCHLORIDE 10 MG/1
10 TABLET, FILM COATED ORAL 2 TIMES DAILY
Qty: 180 TABLET | Refills: 3 | Status: SHIPPED | OUTPATIENT
Start: 2025-06-19

## 2025-07-01 ENCOUNTER — HOSPITAL ENCOUNTER (OUTPATIENT)
Age: 80
Discharge: HOME | End: 2025-07-01
Payer: MEDICARE

## 2025-07-01 DIAGNOSIS — N39.0 RECURRENT UTI: ICD-10-CM

## 2025-07-01 LAB
BACTERIA URNS QL MICRO: ABNORMAL
BILIRUB UR QL STRIP: NEGATIVE
CLARITY UR: ABNORMAL
COLOR UR: YELLOW
EPI CELLS #/AREA URNS HPF: ABNORMAL /HPF
GLUCOSE UR STRIP-MCNC: 100 MG/DL
HGB UR QL STRIP.AUTO: NEGATIVE
KETONES UR STRIP-MCNC: NEGATIVE MG/DL
LEUKOCYTE ESTERASE UR QL STRIP: ABNORMAL
NITRITE UR QL STRIP: NEGATIVE
PH UR STRIP: 6 [PH] (ref 5–8)
PROT UR STRIP-MCNC: NEGATIVE MG/DL
RBC #/AREA URNS HPF: ABNORMAL /HPF
SP GR UR STRIP: >1.03 (ref 1–1.03)
UROBILINOGEN UR STRIP-ACNC: 0.2 EU/DL (ref 0–1)
WBC #/AREA URNS HPF: ABNORMAL /HPF

## 2025-07-01 PROCEDURE — 81001 URINALYSIS AUTO W/SCOPE: CPT

## 2025-07-01 PROCEDURE — 87077 CULTURE AEROBIC IDENTIFY: CPT

## 2025-07-01 PROCEDURE — 36415 COLL VENOUS BLD VENIPUNCTURE: CPT

## 2025-07-01 PROCEDURE — 87086 URINE CULTURE/COLONY COUNT: CPT

## 2025-07-02 ENCOUNTER — OFFICE VISIT (OUTPATIENT)
Dept: PRIMARY CARE CLINIC | Age: 80
End: 2025-07-02

## 2025-07-02 VITALS
BODY MASS INDEX: 35 KG/M2 | OXYGEN SATURATION: 96 % | SYSTOLIC BLOOD PRESSURE: 132 MMHG | HEART RATE: 70 BPM | DIASTOLIC BLOOD PRESSURE: 68 MMHG | WEIGHT: 205 LBS | HEIGHT: 64 IN | TEMPERATURE: 97.8 F

## 2025-07-02 DIAGNOSIS — E53.8 VITAMIN B12 DEFICIENCY: ICD-10-CM

## 2025-07-02 DIAGNOSIS — E11.65 TYPE 2 DIABETES MELLITUS WITH HYPERGLYCEMIA, WITHOUT LONG-TERM CURRENT USE OF INSULIN (HCC): ICD-10-CM

## 2025-07-02 DIAGNOSIS — E78.2 MIXED HYPERLIPIDEMIA: ICD-10-CM

## 2025-07-02 DIAGNOSIS — R80.9 PROTEINURIA, UNSPECIFIED TYPE: ICD-10-CM

## 2025-07-02 DIAGNOSIS — N39.0 RECURRENT UTI: Primary | ICD-10-CM

## 2025-07-02 DIAGNOSIS — E11.69 TYPE 2 DIABETES MELLITUS WITH OTHER SPECIFIED COMPLICATION, WITHOUT LONG-TERM CURRENT USE OF INSULIN (HCC): ICD-10-CM

## 2025-07-02 DIAGNOSIS — M54.16 LUMBAR RADICULOPATHY: ICD-10-CM

## 2025-07-02 DIAGNOSIS — F03.90 DEMENTIA WITHOUT BEHAVIORAL DISTURBANCE (HCC): ICD-10-CM

## 2025-07-02 DIAGNOSIS — E55.9 VITAMIN D DEFICIENCY: ICD-10-CM

## 2025-07-02 DIAGNOSIS — I10 ESSENTIAL HYPERTENSION: ICD-10-CM

## 2025-07-02 NOTE — PROGRESS NOTES
guardian verbalizes understanding, agrees, feels comfortable with and wishes to proceed with above treatment plan. Call for any pending results, FU sooner if abnormal, as needed or if any current symptoms persist/worsen.      Advised patient to call with any new medication issues, and read all Rx info from pharmacy to assure aware of all possible risks and side effects of medication before taking.     Reviewed age and gender appropriate health screening exams and vaccinations.  Advised patient regarding importance of keeping up with recommended health maintenance and to schedule as soon as possible if overdue, as this is important in assessing for undiagnosed pathology, especially cancer, as well as protecting against potentially harmful/life threatening disease.          Patient and/or guardian verbalizes understanding and agrees with above counseling, assessment and plan.     all questions answered.    Over 40 minutes spent reviewing records and preparing to see the patient, obtaining history, performing exam, counseling/educating the patient/family/caregiver, ordering medications and/or tests, referring/care coordination if applicable and documenting clinical information in the electronic health record.     Signs and symptoms to watch for discussed. Serious signs and symptoms reviewed. ER if any.     Bari Yan MD    Patients are advised to check with insurance company to ensure coverage and to fully understand benefits and cost prior to any testing.  This note was created with the assistance of voice recognition software.  Document was reviewed however may contain grammatical errors.This note or partial portions of this note may have been created using a copy forward or copy paste feature but these portions have been verified and re-edited for accuracy and any portions not in need of editing or reviews are not being used to generate any component necessary for billing purposes.  Some portions may be carried

## 2025-07-03 LAB
MICROORGANISM SPEC CULT: ABNORMAL
SERVICE CMNT-IMP: ABNORMAL
SPECIMEN DESCRIPTION: ABNORMAL

## 2025-07-03 RX ORDER — CEFDINIR 300 MG/1
300 CAPSULE ORAL 2 TIMES DAILY
Qty: 14 CAPSULE | Refills: 0 | Status: SHIPPED | OUTPATIENT
Start: 2025-07-03 | End: 2025-07-10

## 2025-07-07 RX ORDER — MEMANTINE HYDROCHLORIDE 10 MG/1
10 TABLET ORAL 2 TIMES DAILY
Qty: 180 TABLET | Refills: 3 | Status: SHIPPED | OUTPATIENT
Start: 2025-07-07

## 2025-08-12 ENCOUNTER — ANESTHESIA EVENT (OUTPATIENT)
Age: 80
End: 2025-08-12
Payer: MEDICARE

## 2025-08-12 ENCOUNTER — ANESTHESIA (OUTPATIENT)
Age: 80
End: 2025-08-12
Payer: MEDICARE

## 2025-08-12 ENCOUNTER — HOSPITAL ENCOUNTER (OUTPATIENT)
Age: 80
Setting detail: OUTPATIENT SURGERY
Discharge: HOME | End: 2025-08-12
Attending: INTERNAL MEDICINE | Admitting: INTERNAL MEDICINE
Payer: MEDICARE

## 2025-08-12 VITALS
TEMPERATURE: 97.6 F | OXYGEN SATURATION: 97 % | SYSTOLIC BLOOD PRESSURE: 139 MMHG | HEART RATE: 60 BPM | DIASTOLIC BLOOD PRESSURE: 69 MMHG | RESPIRATION RATE: 17 BRPM

## 2025-08-12 DIAGNOSIS — Z45.010 ENCOUNTER FOR PACEMAKER AT END OF BATTERY LIFE: ICD-10-CM

## 2025-08-12 PROBLEM — Z95.0 S/P CARDIAC PACEMAKER PROCEDURE: Status: ACTIVE | Noted: 2025-08-12

## 2025-08-12 LAB
ALBUMIN SERPL-MCNC: 3.9 G/DL (ref 3.5–5.2)
ALP SERPL-CCNC: 82 U/L (ref 35–104)
ALT SERPL-CCNC: 62 U/L (ref 0–35)
ANION GAP SERPL CALCULATED.3IONS-SCNC: 12 MMOL/L (ref 7–16)
AST SERPL-CCNC: 68 U/L (ref 0–35)
BASOPHILS # BLD: 0.07 K/UL (ref 0–0.2)
BASOPHILS NFR BLD: 1 % (ref 0–2)
BILIRUB SERPL-MCNC: 0.5 MG/DL (ref 0–1.2)
BUN SERPL-MCNC: 14 MG/DL (ref 8–23)
CALCIUM SERPL-MCNC: 9.4 MG/DL (ref 8.8–10.2)
CHLORIDE SERPL-SCNC: 107 MMOL/L (ref 98–107)
CO2 SERPL-SCNC: 23 MMOL/L (ref 22–29)
CREAT SERPL-MCNC: 0.8 MG/DL (ref 0.5–1)
EOSINOPHIL # BLD: 0.27 K/UL (ref 0.05–0.5)
EOSINOPHILS RELATIVE PERCENT: 3 % (ref 0–6)
ERYTHROCYTE [DISTWIDTH] IN BLOOD BY AUTOMATED COUNT: 12.7 % (ref 11.5–15)
GFR, ESTIMATED: 77 ML/MIN/1.73M2
GLUCOSE SERPL-MCNC: 173 MG/DL (ref 74–99)
HCT VFR BLD AUTO: 43.2 % (ref 34–48)
HGB BLD-MCNC: 14.5 G/DL (ref 11.5–15.5)
IMM GRANULOCYTES # BLD AUTO: 0.03 K/UL (ref 0–0.58)
IMM GRANULOCYTES NFR BLD: 0 % (ref 0–5)
LYMPHOCYTES NFR BLD: 3.58 K/UL (ref 1.5–4)
LYMPHOCYTES RELATIVE PERCENT: 42 % (ref 20–42)
MAGNESIUM SERPL-MCNC: 2.3 MG/DL (ref 1.6–2.4)
MCH RBC QN AUTO: 30.5 PG (ref 26–35)
MCHC RBC AUTO-ENTMCNC: 33.6 G/DL (ref 32–34.5)
MCV RBC AUTO: 90.9 FL (ref 80–99.9)
MONOCYTES NFR BLD: 0.55 K/UL (ref 0.1–0.95)
MONOCYTES NFR BLD: 7 % (ref 2–12)
NEUTROPHILS NFR BLD: 47 % (ref 43–80)
NEUTS SEG NFR BLD: 3.97 K/UL (ref 1.8–7.3)
PLATELET # BLD AUTO: 148 K/UL (ref 130–450)
PMV BLD AUTO: 11 FL (ref 7–12)
POTASSIUM SERPL-SCNC: 4.4 MMOL/L (ref 3.5–5.1)
PROT SERPL-MCNC: 7 G/DL (ref 6.4–8.3)
RBC # BLD AUTO: 4.75 M/UL (ref 3.5–5.5)
SODIUM SERPL-SCNC: 141 MMOL/L (ref 136–145)
TSH SERPL DL<=0.05 MIU/L-ACNC: 3.43 UIU/ML (ref 0.27–4.2)
WBC OTHER # BLD: 8.5 K/UL (ref 4.5–11.5)

## 2025-08-12 PROCEDURE — 2720000010 HC SURG SUPPLY STERILE: Performed by: INTERNAL MEDICINE

## 2025-08-12 PROCEDURE — 83735 ASSAY OF MAGNESIUM: CPT

## 2025-08-12 PROCEDURE — 6360000002 HC RX W HCPCS: Performed by: INTERNAL MEDICINE

## 2025-08-12 PROCEDURE — 33228 REMV&REPLC PM GEN DUAL LEAD: CPT | Performed by: INTERNAL MEDICINE

## 2025-08-12 PROCEDURE — 85025 COMPLETE CBC W/AUTO DIFF WBC: CPT

## 2025-08-12 PROCEDURE — 84443 ASSAY THYROID STIM HORMONE: CPT

## 2025-08-12 PROCEDURE — 7100000010 HC PHASE II RECOVERY - FIRST 15 MIN: Performed by: INTERNAL MEDICINE

## 2025-08-12 PROCEDURE — C1785 PMKR, DUAL, RATE-RESP: HCPCS | Performed by: INTERNAL MEDICINE

## 2025-08-12 PROCEDURE — 80053 COMPREHEN METABOLIC PANEL: CPT

## 2025-08-12 PROCEDURE — C1889 IMPLANT/INSERT DEVICE, NOC: HCPCS | Performed by: INTERNAL MEDICINE

## 2025-08-12 PROCEDURE — 2709999900 HC NON-CHARGEABLE SUPPLY: Performed by: INTERNAL MEDICINE

## 2025-08-12 PROCEDURE — 6360000002 HC RX W HCPCS: Performed by: NURSE ANESTHETIST, CERTIFIED REGISTERED

## 2025-08-12 PROCEDURE — 7100000011 HC PHASE II RECOVERY - ADDTL 15 MIN: Performed by: INTERNAL MEDICINE

## 2025-08-12 PROCEDURE — 3700000001 HC ADD 15 MINUTES (ANESTHESIA): Performed by: INTERNAL MEDICINE

## 2025-08-12 PROCEDURE — 3700000000 HC ANESTHESIA ATTENDED CARE: Performed by: INTERNAL MEDICINE

## 2025-08-12 DEVICE — PACEMAKER CARD 22.5GM W50.8XH46.6MM D7.4MM TI POLYUR SIL: Type: IMPLANTABLE DEVICE | Site: CHEST | Status: FUNCTIONAL

## 2025-08-12 DEVICE — ENVELOPE PACEMKR M W2.5XL2.7IN ABSRB ANTIBACT TYRX: Type: IMPLANTABLE DEVICE | Site: CHEST | Status: FUNCTIONAL

## 2025-08-12 RX ORDER — SODIUM CHLORIDE 9 MG/ML
INJECTION, SOLUTION INTRAVENOUS PRN
Status: DISCONTINUED | OUTPATIENT
Start: 2025-08-12 | End: 2025-08-12 | Stop reason: HOSPADM

## 2025-08-12 RX ORDER — MIDAZOLAM HYDROCHLORIDE 1 MG/ML
INJECTION, SOLUTION INTRAMUSCULAR; INTRAVENOUS
Status: DISCONTINUED | OUTPATIENT
Start: 2025-08-12 | End: 2025-08-12 | Stop reason: SDUPTHER

## 2025-08-12 RX ORDER — CEFAZOLIN SODIUM 1 G/3ML
INJECTION, POWDER, FOR SOLUTION INTRAMUSCULAR; INTRAVENOUS
Status: DISCONTINUED | OUTPATIENT
Start: 2025-08-12 | End: 2025-08-12 | Stop reason: SDUPTHER

## 2025-08-12 RX ORDER — DOXYCYCLINE HYCLATE 100 MG
100 TABLET ORAL 2 TIMES DAILY
Qty: 14 TABLET | Refills: 0 | Status: SHIPPED | OUTPATIENT
Start: 2025-08-12 | End: 2025-08-19

## 2025-08-12 RX ORDER — PROPOFOL 10 MG/ML
INJECTION, EMULSION INTRAVENOUS
Status: DISCONTINUED | OUTPATIENT
Start: 2025-08-12 | End: 2025-08-12 | Stop reason: SDUPTHER

## 2025-08-12 RX ORDER — SODIUM CHLORIDE 0.9 % (FLUSH) 0.9 %
5-40 SYRINGE (ML) INJECTION PRN
Status: DISCONTINUED | OUTPATIENT
Start: 2025-08-12 | End: 2025-08-12 | Stop reason: HOSPADM

## 2025-08-12 RX ORDER — SODIUM CHLORIDE 0.9 % (FLUSH) 0.9 %
5-40 SYRINGE (ML) INJECTION EVERY 12 HOURS SCHEDULED
Status: DISCONTINUED | OUTPATIENT
Start: 2025-08-12 | End: 2025-08-12 | Stop reason: HOSPADM

## 2025-08-12 RX ORDER — VANCOMYCIN HYDROCHLORIDE 1 G/20ML
INJECTION, POWDER, LYOPHILIZED, FOR SOLUTION INTRAVENOUS
Status: DISCONTINUED | OUTPATIENT
Start: 2025-08-12 | End: 2025-08-12 | Stop reason: SDUPTHER

## 2025-08-12 RX ORDER — FENTANYL CITRATE 50 UG/ML
INJECTION, SOLUTION INTRAMUSCULAR; INTRAVENOUS
Status: DISCONTINUED | OUTPATIENT
Start: 2025-08-12 | End: 2025-08-12 | Stop reason: SDUPTHER

## 2025-08-12 RX ADMIN — VANCOMYCIN HYDROCHLORIDE 1 MG: 1 INJECTION, POWDER, LYOPHILIZED, FOR SOLUTION INTRAVENOUS at 12:36

## 2025-08-12 RX ADMIN — CEFAZOLIN 2 G: 1 INJECTION, POWDER, FOR SOLUTION INTRAMUSCULAR; INTRAVENOUS at 12:36

## 2025-08-12 RX ADMIN — FENTANYL CITRATE 50 MCG: 50 INJECTION, SOLUTION INTRAMUSCULAR; INTRAVENOUS at 12:50

## 2025-08-12 RX ADMIN — PROPOFOL 40 MG: 10 INJECTION, EMULSION INTRAVENOUS at 12:44

## 2025-08-12 RX ADMIN — FENTANYL CITRATE 50 MCG: 50 INJECTION, SOLUTION INTRAMUSCULAR; INTRAVENOUS at 12:44

## 2025-08-12 RX ADMIN — PROPOFOL 20 MCG/KG/MIN: 10 INJECTION, EMULSION INTRAVENOUS at 12:45

## 2025-08-19 ENCOUNTER — TELEPHONE (OUTPATIENT)
Age: 80
End: 2025-08-19

## 2025-08-27 ENCOUNTER — CLINICAL SUPPORT (OUTPATIENT)
Dept: NON INVASIVE DIAGNOSTICS | Age: 80
End: 2025-08-27

## 2025-08-27 DIAGNOSIS — I44.2 COMPLETE HEART BLOCK (HCC): ICD-10-CM

## 2025-08-27 DIAGNOSIS — Z95.0 PACEMAKER: Primary | ICD-10-CM

## (undated) DEVICE — DEFENDO AIR WATER SUCTION AND BIOPSY VALVE KIT FOR  OLYMPUS: Brand: DEFENDO AIR/WATER/SUCTION AND BIOPSY VALVE

## (undated) DEVICE — PAD DEFIB AD RADIOTRANSPARENT W LD OUT

## (undated) DEVICE — GRADUATE TRIANG MEASURE 1000ML BLK PRNT

## (undated) DEVICE — FORCEPS BX L160CM JAW DIA2.4MM YEL L CAP W/ NDL DISP RAD

## (undated) DEVICE — AGENT HEMOSTATIC SURGIFLOW MATRIX KIT W/THROMBIN

## (undated) DEVICE — Z DISCONTINUED NO SUB IDED TUBING ETCO2 AD L6.5FT NSL ORAL CVD PRNG NONFLARED TIP OVR

## (undated) DEVICE — Device

## (undated) DEVICE — CONTAINER SPEC 60ML PH 7NEUTRAL BUFF FRMLN RDY TO USE

## (undated) DEVICE — GAUZE,SPONGE,POST-OP,4X3,STRL,LF: Brand: MEDLINE

## (undated) DEVICE — BITEBLOCK 54FR W/ DENT RIM BLOX

## (undated) DEVICE — SYRINGE MED 50ML LUERSLIP TIP

## (undated) DEVICE — CONNECTOR IRRIGATION AUXILIARY H2O JET W/ PRT MTL THRD HYDR